# Patient Record
Sex: MALE | Race: WHITE | NOT HISPANIC OR LATINO | URBAN - METROPOLITAN AREA
[De-identification: names, ages, dates, MRNs, and addresses within clinical notes are randomized per-mention and may not be internally consistent; named-entity substitution may affect disease eponyms.]

---

## 2017-11-22 ENCOUNTER — EMERGENCY (EMERGENCY)
Facility: HOSPITAL | Age: 57
LOS: 1 days | Discharge: HOME | End: 2017-11-22
Attending: EMERGENCY MEDICINE | Admitting: EMERGENCY MEDICINE
Payer: COMMERCIAL

## 2017-11-22 VITALS
OXYGEN SATURATION: 98 % | WEIGHT: 220.02 LBS | DIASTOLIC BLOOD PRESSURE: 90 MMHG | HEIGHT: 69 IN | TEMPERATURE: 97 F | HEART RATE: 78 BPM | SYSTOLIC BLOOD PRESSURE: 142 MMHG | RESPIRATION RATE: 17 BRPM

## 2017-11-22 VITALS
TEMPERATURE: 98 F | SYSTOLIC BLOOD PRESSURE: 137 MMHG | OXYGEN SATURATION: 98 % | HEART RATE: 71 BPM | RESPIRATION RATE: 16 BRPM | DIASTOLIC BLOOD PRESSURE: 74 MMHG

## 2017-11-22 LAB
ALBUMIN SERPL ELPH-MCNC: 3.8 G/DL — SIGNIFICANT CHANGE UP (ref 3.3–5)
ALP SERPL-CCNC: 69 U/L — SIGNIFICANT CHANGE UP (ref 40–120)
ALT FLD-CCNC: 49 U/L — SIGNIFICANT CHANGE UP (ref 12–78)
ANION GAP SERPL CALC-SCNC: 8 MMOL/L — SIGNIFICANT CHANGE UP (ref 5–17)
APPEARANCE UR: ABNORMAL
AST SERPL-CCNC: 30 U/L — SIGNIFICANT CHANGE UP (ref 15–37)
BILIRUB SERPL-MCNC: 0.7 MG/DL — SIGNIFICANT CHANGE UP (ref 0.2–1.2)
BILIRUB UR-MCNC: NEGATIVE — SIGNIFICANT CHANGE UP
BUN SERPL-MCNC: 26 MG/DL — HIGH (ref 7–23)
CALCIUM SERPL-MCNC: 8.5 MG/DL — SIGNIFICANT CHANGE UP (ref 8.5–10.1)
CHLORIDE SERPL-SCNC: 105 MMOL/L — SIGNIFICANT CHANGE UP (ref 96–108)
CO2 SERPL-SCNC: 25 MMOL/L — SIGNIFICANT CHANGE UP (ref 22–31)
COLOR SPEC: SIGNIFICANT CHANGE UP
CREAT SERPL-MCNC: 1.1 MG/DL — SIGNIFICANT CHANGE UP (ref 0.5–1.3)
DIFF PNL FLD: NEGATIVE — SIGNIFICANT CHANGE UP
GLUCOSE SERPL-MCNC: 196 MG/DL — HIGH (ref 70–99)
GLUCOSE UR QL: 100 MG/DL
HCT VFR BLD CALC: 45.8 % — SIGNIFICANT CHANGE UP (ref 39–50)
HGB BLD-MCNC: 15.5 G/DL — SIGNIFICANT CHANGE UP (ref 13–17)
KETONES UR-MCNC: NEGATIVE — SIGNIFICANT CHANGE UP
LEUKOCYTE ESTERASE UR-ACNC: NEGATIVE — SIGNIFICANT CHANGE UP
MCHC RBC-ENTMCNC: 30.5 PG — SIGNIFICANT CHANGE UP (ref 27–34)
MCHC RBC-ENTMCNC: 33.8 GM/DL — SIGNIFICANT CHANGE UP (ref 32–36)
MCV RBC AUTO: 90.4 FL — SIGNIFICANT CHANGE UP (ref 80–100)
NITRITE UR-MCNC: NEGATIVE — SIGNIFICANT CHANGE UP
PH UR: 5 — SIGNIFICANT CHANGE UP (ref 5–8)
PLATELET # BLD AUTO: 216 K/UL — SIGNIFICANT CHANGE UP (ref 150–400)
POTASSIUM SERPL-MCNC: 4.2 MMOL/L — SIGNIFICANT CHANGE UP (ref 3.5–5.3)
POTASSIUM SERPL-SCNC: 4.2 MMOL/L — SIGNIFICANT CHANGE UP (ref 3.5–5.3)
PROT SERPL-MCNC: 7.1 G/DL — SIGNIFICANT CHANGE UP (ref 6–8.3)
PROT UR-MCNC: 25 MG/DL
RBC # BLD: 5.07 M/UL — SIGNIFICANT CHANGE UP (ref 4.2–5.8)
RBC # FLD: 11.4 % — SIGNIFICANT CHANGE UP (ref 10.3–14.5)
SODIUM SERPL-SCNC: 138 MMOL/L — SIGNIFICANT CHANGE UP (ref 135–145)
SP GR SPEC: 1.02 — SIGNIFICANT CHANGE UP (ref 1.01–1.02)
UROBILINOGEN FLD QL: NEGATIVE — SIGNIFICANT CHANGE UP
WBC # BLD: 5.8 K/UL — SIGNIFICANT CHANGE UP (ref 3.8–10.5)
WBC # FLD AUTO: 5.8 K/UL — SIGNIFICANT CHANGE UP (ref 3.8–10.5)

## 2017-11-22 PROCEDURE — 96376 TX/PRO/DX INJ SAME DRUG ADON: CPT

## 2017-11-22 PROCEDURE — 87086 URINE CULTURE/COLONY COUNT: CPT

## 2017-11-22 PROCEDURE — 74176 CT ABD & PELVIS W/O CONTRAST: CPT | Mod: 26

## 2017-11-22 PROCEDURE — 85027 COMPLETE CBC AUTOMATED: CPT

## 2017-11-22 PROCEDURE — 81001 URINALYSIS AUTO W/SCOPE: CPT

## 2017-11-22 PROCEDURE — 99284 EMERGENCY DEPT VISIT MOD MDM: CPT | Mod: 25

## 2017-11-22 PROCEDURE — 99285 EMERGENCY DEPT VISIT HI MDM: CPT

## 2017-11-22 PROCEDURE — 96374 THER/PROPH/DIAG INJ IV PUSH: CPT

## 2017-11-22 PROCEDURE — 96375 TX/PRO/DX INJ NEW DRUG ADDON: CPT

## 2017-11-22 PROCEDURE — 80053 COMPREHEN METABOLIC PANEL: CPT

## 2017-11-22 PROCEDURE — 74176 CT ABD & PELVIS W/O CONTRAST: CPT

## 2017-11-22 RX ORDER — KETOROLAC TROMETHAMINE 30 MG/ML
30 SYRINGE (ML) INJECTION ONCE
Qty: 0 | Refills: 0 | Status: DISCONTINUED | OUTPATIENT
Start: 2017-11-22 | End: 2017-11-22

## 2017-11-22 RX ORDER — SODIUM CHLORIDE 9 MG/ML
1000 INJECTION INTRAMUSCULAR; INTRAVENOUS; SUBCUTANEOUS ONCE
Qty: 0 | Refills: 0 | Status: COMPLETED | OUTPATIENT
Start: 2017-11-22 | End: 2017-11-22

## 2017-11-22 RX ORDER — ONDANSETRON 8 MG/1
4 TABLET, FILM COATED ORAL ONCE
Qty: 0 | Refills: 0 | Status: COMPLETED | OUTPATIENT
Start: 2017-11-22 | End: 2017-11-22

## 2017-11-22 RX ORDER — MORPHINE SULFATE 50 MG/1
4 CAPSULE, EXTENDED RELEASE ORAL ONCE
Qty: 0 | Refills: 0 | Status: DISCONTINUED | OUTPATIENT
Start: 2017-11-22 | End: 2017-11-22

## 2017-11-22 RX ORDER — TAMSULOSIN HYDROCHLORIDE 0.4 MG/1
0.4 CAPSULE ORAL ONCE
Qty: 0 | Refills: 0 | Status: COMPLETED | OUTPATIENT
Start: 2017-11-22 | End: 2017-11-22

## 2017-11-22 RX ADMIN — MORPHINE SULFATE 4 MILLIGRAM(S): 50 CAPSULE, EXTENDED RELEASE ORAL at 08:22

## 2017-11-22 RX ADMIN — SODIUM CHLORIDE 2000 MILLILITER(S): 9 INJECTION INTRAMUSCULAR; INTRAVENOUS; SUBCUTANEOUS at 08:22

## 2017-11-22 RX ADMIN — SODIUM CHLORIDE 2000 MILLILITER(S): 9 INJECTION INTRAMUSCULAR; INTRAVENOUS; SUBCUTANEOUS at 09:37

## 2017-11-22 RX ADMIN — TAMSULOSIN HYDROCHLORIDE 0.4 MILLIGRAM(S): 0.4 CAPSULE ORAL at 08:22

## 2017-11-22 RX ADMIN — MORPHINE SULFATE 4 MILLIGRAM(S): 50 CAPSULE, EXTENDED RELEASE ORAL at 09:37

## 2017-11-22 RX ADMIN — MORPHINE SULFATE 4 MILLIGRAM(S): 50 CAPSULE, EXTENDED RELEASE ORAL at 09:44

## 2017-11-22 RX ADMIN — Medication 30 MILLIGRAM(S): at 09:37

## 2017-11-22 RX ADMIN — Medication 30 MILLIGRAM(S): at 08:22

## 2017-11-22 RX ADMIN — ONDANSETRON 4 MILLIGRAM(S): 8 TABLET, FILM COATED ORAL at 08:22

## 2017-11-22 NOTE — ED ADULT NURSE NOTE - CHPI ED SYMPTOMS NEG
no blood in stool/no abdominal distension/no diarrhea/no chills/no dysuria/no fever/no burning urination/no hematuria

## 2017-11-22 NOTE — ED PROVIDER NOTE - OBJECTIVE STATEMENT
56 yo white male, PHx of renal colic and lithotripsy presents here today c/o few hours of right flank pain, non radiating, acute in onset with associated nausea but no fever or chills. In addition no dysuria/frequency/hematuria./ No c/p or SOB.

## 2017-11-22 NOTE — ED ADULT NURSE NOTE - PSH
C2 cervical fracture  3/2008  Hernia  in Infancy  Renal stone  removed -by ? blasting  Rotator cuff tear, left

## 2017-11-22 NOTE — ED ADULT NURSE NOTE - OBJECTIVE STATEMENT
pt reporting left flank pain since this am, stabbing with nausea. hx of kidney stone. pt denies pain on palpation, denies urinary complaints, fever, chills. abdomen soft, non tender, non distended.

## 2017-11-22 NOTE — ED PROVIDER NOTE - CONSTITUTIONAL, MLM
normal... Uncomfortable appearing, white male, well nourished, awake, alert, oriented to person, place, time/situation and in moderate apparent distress.

## 2017-11-22 NOTE — ED ADULT NURSE NOTE - PMH
Anxiety and depression    C2 cervical fracture  with fusion  Fall  at work 03/2008  HTN (hypertension)    Kidney stones    Panic attacks

## 2017-11-23 LAB
CULTURE RESULTS: NO GROWTH — SIGNIFICANT CHANGE UP
SPECIMEN SOURCE: SIGNIFICANT CHANGE UP

## 2019-02-14 NOTE — ED ADULT TRIAGE NOTE - WEIGHT IN LBS
Telephone Encounter by Martha Herman at 11/20/18 05:09 PM     Author:  Martha Herman Service:  (none) Author Type:  Patient      Filed:  11/20/18 05:11 PM Encounter Date:  11/20/2018 Status:  Signed     :  Martha Herman (Patient )            Prior Authorization started in cover my meds    Please sign-on to Cover My Meds and complete the Prior authorization.    Message sent to the Prior authorization pool - Please don't close this message.        Prior authorization is required for medication - Prior authorization started online at Cover My Meds.    Medication requested to be refilled:[MS1.1T]   duloxetine (CYMBALTA) 30 MG Cap[MS1.1C]  Qty:[MS1.1T]  270[MS1.1M]       Revision History        User Key Date/Time User Provider Type Action    > MS1.1 11/20/18 05:11 PM Martha Herman Patient  Sign    C - Copied, M - Manual, T - Template             220

## 2020-07-13 PROBLEM — N20.0 CALCULUS OF KIDNEY: Chronic | Status: ACTIVE | Noted: 2017-11-22

## 2020-08-03 ENCOUNTER — APPOINTMENT (OUTPATIENT)
Dept: CARDIOLOGY | Facility: CLINIC | Age: 60
End: 2020-08-03
Payer: MEDICAID

## 2020-08-03 ENCOUNTER — NON-APPOINTMENT (OUTPATIENT)
Age: 60
End: 2020-08-03

## 2020-08-03 ENCOUNTER — APPOINTMENT (OUTPATIENT)
Dept: CARDIOLOGY | Facility: CLINIC | Age: 60
End: 2020-08-03
Payer: COMMERCIAL

## 2020-08-03 ENCOUNTER — APPOINTMENT (OUTPATIENT)
Dept: INTERNAL MEDICINE | Facility: CLINIC | Age: 60
End: 2020-08-03
Payer: MEDICAID

## 2020-08-03 VITALS
HEIGHT: 68 IN | HEART RATE: 67 BPM | OXYGEN SATURATION: 98 % | TEMPERATURE: 98.1 F | WEIGHT: 218 LBS | DIASTOLIC BLOOD PRESSURE: 82 MMHG | SYSTOLIC BLOOD PRESSURE: 147 MMHG | BODY MASS INDEX: 33.04 KG/M2

## 2020-08-03 DIAGNOSIS — H83.3X3 NOISE EFFECTS ON INNER EAR, BILATERAL: ICD-10-CM

## 2020-08-03 DIAGNOSIS — S46.019A STRAIN OF MUSCLE(S) AND TENDON(S) OF THE ROTATOR CUFF OF UNSPECIFIED SHOULDER, INITIAL ENCOUNTER: ICD-10-CM

## 2020-08-03 DIAGNOSIS — Z87.81 PERSONAL HISTORY OF (HEALED) TRAUMATIC FRACTURE: ICD-10-CM

## 2020-08-03 DIAGNOSIS — Z78.9 OTHER SPECIFIED HEALTH STATUS: ICD-10-CM

## 2020-08-03 PROCEDURE — 36415 COLL VENOUS BLD VENIPUNCTURE: CPT

## 2020-08-03 PROCEDURE — 93000 ELECTROCARDIOGRAM COMPLETE: CPT

## 2020-08-03 PROCEDURE — 99203 OFFICE O/P NEW LOW 30 MIN: CPT

## 2020-08-03 PROCEDURE — 93306 TTE W/DOPPLER COMPLETE: CPT

## 2020-08-03 PROCEDURE — 99386 PREV VISIT NEW AGE 40-64: CPT | Mod: 25

## 2020-08-03 NOTE — HEALTH RISK ASSESSMENT
[Good] : ~his/her~ current health as good [Monthly or less (1 pt)] : Monthly or less (1 point) [Never (0 pts)] : Never (0 points) [1 or 2 (0 pts)] : 1 or 2 (0 points) [No] : In the past 12 months have you used drugs other than those required for medical reasons? No [0] : 2) Feeling down, depressed, or hopeless: Not at all (0) [HIV Test offered] : HIV Test offered [Hepatitis C test offered] : Hepatitis C test offered [Financial] : financial [Employed] : employed [Sexually Active] : sexually active [] : No [Audit-CScore] : 1 [[id number]] : 0 [High Risk Behavior] : no high risk behavior [ColonoscopyComments] : Will address next visit. [FreeTextEntry2] : Monico

## 2020-08-03 NOTE — REVIEW OF SYSTEMS
[Dyspnea on exertion] : not dyspnea during exertion [Shortness Of Breath] : no shortness of breath [Chest  Pressure] : no chest pressure [Chest Pain] : no chest pain [Palpitations] : no palpitations [Lower Ext Edema] : lower extremity edema [Cough] : no cough [Dysphagia] : no dysphagia [Heartburn] : no heartburn [Abdominal Pain] : no abdominal pain [Tremor] : no tremor was seen [Dizziness] : no dizziness [Nocturia] : nocturia [Convulsions] : no convulsions [Tingling (Paresthesia)] : no tingling [Excessive Thirst] : no polydipsia [Anxiety] : no anxiety [Confusion] : no confusion was observed [Negative] : Eyes

## 2020-08-03 NOTE — PHYSICAL EXAM
[No Acute Distress] : no acute distress [Well Developed] : well developed [Well Nourished] : well nourished [Well-Appearing] : well-appearing [Normal Sclera/Conjunctiva] : normal sclera/conjunctiva [PERRL] : pupils equal round and reactive to light [EOMI] : extraocular movements intact [Normal TMs] : both tympanic membranes were normal [Normal Outer Ear/Nose] : the outer ears and nose were normal in appearance [Normal Oropharynx] : the oropharynx was normal [No Lymphadenopathy] : no lymphadenopathy [No JVD] : no jugular venous distention [Supple] : supple [Thyroid Normal, No Nodules] : the thyroid was normal and there were no nodules present [No Respiratory Distress] : no respiratory distress  [No Accessory Muscle Use] : no accessory muscle use [Normal Rate] : normal rate  [Clear to Auscultation] : lungs were clear to auscultation bilaterally [Normal S1, S2] : normal S1 and S2 [Regular Rhythm] : with a regular rhythm [No Murmur] : no murmur heard [No Abdominal Bruit] : a ~M bruit was not heard ~T in the abdomen [No Varicosities] : no varicosities [No Edema] : there was no peripheral edema [No Palpable Aorta] : no palpable aorta [No Extremity Clubbing/Cyanosis] : no extremity clubbing/cyanosis [Non Tender] : non-tender [Soft] : abdomen soft [Non-distended] : non-distended [No Masses] : no abdominal mass palpated [No HSM] : no HSM [Normal Bowel Sounds] : normal bowel sounds [Normal Posterior Cervical Nodes] : no posterior cervical lymphadenopathy [Normal Anterior Cervical Nodes] : no anterior cervical lymphadenopathy [No CVA Tenderness] : no CVA  tenderness [No Spinal Tenderness] : no spinal tenderness [Grossly Normal Strength/Tone] : grossly normal strength/tone [No Joint Swelling] : no joint swelling [Coordination Grossly Intact] : coordination grossly intact [Normal Gait] : normal gait [No Focal Deficits] : no focal deficits [Normal Affect] : the affect was normal [Normal Insight/Judgement] : insight and judgment were intact [de-identified] : Multiple tophi. Right foot worst with bony destruction on several toes and medial and lateral aspects of the foot. No inflammation or erythematous areas. No severe tenderness. Tophus also noted on right knee.

## 2020-08-03 NOTE — DISCUSSION/SUMMARY
[FreeTextEntry1] : -TTE without wma but confirms LVH\par -f/u lipid panel. possible statin. we discussed possible medications for hyperTG - ideally pt would be on fenofibrate for concomitant gout, however pt reports gout is well controlled and is adverse to side effects. If this is the case, recommend Vascepa 2g BID anuj if pt were to require statin to decrease incidence of myalgias.\par -BP usually well controlled - cont ARB. increase physical activity to moderate intensity 150 min/weekly, dietary changes including less red meat, low fat, more fish and plant based protein, more fruits and vegetables.\par

## 2020-08-03 NOTE — PHYSICAL EXAM
[Normal Appearance] : normal appearance [General Appearance - In No Acute Distress] : no acute distress [Eyelids - No Xanthelasma] : the eyelids demonstrated no xanthelasmas [Normal Jugular Venous A Waves Present] : normal jugular venous A waves present [No Jugular Venous Calixto A Waves] : no jugular venous calixto A waves [Normal Jugular Venous V Waves Present] : normal jugular venous V waves present [Heart Sounds] : normal S1 and S2 [Heart Rate And Rhythm] : heart rate and rhythm were normal [Edema] : no peripheral edema present [Arterial Pulses Normal] : the arterial pulses were normal [Murmurs] : no murmurs present [FreeTextEntry1] : varicose veins of the LE [Abdomen Tenderness] : non-tender [Abdomen Soft] : soft [Cyanosis, Localized] : no localized cyanosis [Nail Clubbing] : no clubbing of the fingernails [Skin Color & Pigmentation] : normal skin color and pigmentation [Oriented To Time, Place, And Person] : oriented to person, place, and time [Impaired Insight] : insight and judgment were intact [Skin Turgor] : normal skin turgor [Mood] : the mood was normal [Affect] : the affect was normal

## 2020-08-03 NOTE — REVIEW OF SYSTEMS
[Incontinence] : incontinence [Nocturia] : nocturia [Frequency] : frequency [Joint Pain] : joint pain [Joint Stiffness] : joint stiffness [Back Pain] : back pain [Joint Swelling] : joint swelling [Depression] : depression [Negative] : Heme/Lymph [Hesitancy] : no hesitancy [Dysuria] : no dysuria [Impotence] : no impotency [Hematuria] : no hematuria [Poor Libido] : libido not poor [Muscle Pain] : no muscle pain [Muscle Weakness] : no muscle weakness [Insomnia] : no insomnia [Suicidal] : not suicidal [Anxiety] : no anxiety

## 2020-08-03 NOTE — REASON FOR VISIT
[FreeTextEntry1] : 59M HTN, HTG, DM2, tophaceous gout who presents for abnormal ECG and hyperTG\par \par Longstanding hyperTG with levels as high as 1000, though reports recently was 600s.\par No hx of pancreatitis. has diet full of red meat and animal fats.\par Does not drink etoh. 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 no WMA

## 2020-08-03 NOTE — PLAN
[FreeTextEntry1] : Routine blood work and urine today.  Additional blood work and referrals placed. Refills sent. Patient also requesting ibuprofen 600 mg for use when he has occasional pains. Patient should follow up with cardiology for abnormal ECG. Yearly ophtho and podiatry. Audiologist referral placed for hearing aids. Pt advised to sign up for [facility] portal to review labs and communicate any questions or concerns directly. Yearly physical and return as needed for illness, medication refills, and new or existing complaints. f/u 3 months.

## 2020-08-03 NOTE — HISTORY OF PRESENT ILLNESS
[de-identified] : Pt is a 59 year M with PMH type 2 diabetes, hypertension, gout with gouty arthritis and tophi, depression hypertriglyceridemia, and history of cervical fusion of the spine presents for his initial CPE. Patient was following with a doctor up until his insurance ran out and the doctor no longer accepted him as a patient. Patient is requesting refills of his medications. He is due for blood work. Patient notes a history of taking care of his own medical conditions, such as removing smaller tophi and skin tags. He also has a history of not taking medication due to cost and occasionally do to history of side effect. Pt denies CP/SOB, fever/chills, n/v/d/c.

## 2020-08-04 DIAGNOSIS — I51.9 HEART DISEASE, UNSPECIFIED: ICD-10-CM

## 2020-08-04 DIAGNOSIS — I51.7 CARDIOMEGALY: ICD-10-CM

## 2020-08-05 LAB
ALBUMIN SERPL ELPH-MCNC: 4.4 G/DL
ALP BLD-CCNC: 74 U/L
ALT SERPL-CCNC: 39 U/L
ANION GAP SERPL CALC-SCNC: 14 MMOL/L
APPEARANCE: CLEAR
AST SERPL-CCNC: 35 U/L
BACTERIA: NEGATIVE
BASOPHILS # BLD AUTO: 0.05 K/UL
BASOPHILS NFR BLD AUTO: 1.1 %
BILIRUB SERPL-MCNC: 0.4 MG/DL
BILIRUBIN URINE: NEGATIVE
BLOOD URINE: NEGATIVE
BUN SERPL-MCNC: 20 MG/DL
CALCIUM SERPL-MCNC: 9 MG/DL
CHLORIDE SERPL-SCNC: 102 MMOL/L
CHOLEST SERPL-MCNC: 157 MG/DL
CHOLEST/HDLC SERPL: 5.3 RATIO
CO2 SERPL-SCNC: 21 MMOL/L
COLOR: NORMAL
CREAT SERPL-MCNC: 1.29 MG/DL
CREAT SPEC-SCNC: 86 MG/DL
EOSINOPHIL # BLD AUTO: 0.1 K/UL
EOSINOPHIL NFR BLD AUTO: 2.2 %
ESTIMATED AVERAGE GLUCOSE: 226 MG/DL
GLUCOSE QUALITATIVE U: ABNORMAL
GLUCOSE SERPL-MCNC: 230 MG/DL
HBA1C MFR BLD HPLC: 9.5 %
HCT VFR BLD CALC: 46 %
HCV AB SER QL: NONREACTIVE
HCV S/CO RATIO: 0.06 S/CO
HDLC SERPL-MCNC: 30 MG/DL
HGB BLD-MCNC: 15.2 G/DL
HIV1+2 AB SPEC QL IA.RAPID: NONREACTIVE
HYALINE CASTS: 1 /LPF
IMM GRANULOCYTES NFR BLD AUTO: 0.2 %
KETONES URINE: NEGATIVE
LDLC SERPL CALC-MCNC: NORMAL MG/DL
LEUKOCYTE ESTERASE URINE: NEGATIVE
LYMPHOCYTES # BLD AUTO: 1.54 K/UL
LYMPHOCYTES NFR BLD AUTO: 34.1 %
MAN DIFF?: NORMAL
MCHC RBC-ENTMCNC: 30.6 PG
MCHC RBC-ENTMCNC: 33 GM/DL
MCV RBC AUTO: 92.6 FL
MICROALBUMIN 24H UR DL<=1MG/L-MCNC: 3.6 MG/DL
MICROALBUMIN/CREAT 24H UR-RTO: 42 MG/G
MICROSCOPIC-UA: NORMAL
MONOCYTES # BLD AUTO: 0.36 K/UL
MONOCYTES NFR BLD AUTO: 8 %
NEUTROPHILS # BLD AUTO: 2.45 K/UL
NEUTROPHILS NFR BLD AUTO: 54.4 %
NITRITE URINE: NEGATIVE
PH URINE: 5
PLATELET # BLD AUTO: 162 K/UL
POTASSIUM SERPL-SCNC: 5 MMOL/L
PROT SERPL-MCNC: 6.5 G/DL
PROTEIN URINE: NEGATIVE
PSA FREE FLD-MCNC: 41 %
PSA FREE SERPL-MCNC: 0.28 NG/ML
PSA SERPL-MCNC: 0.69 NG/ML
RBC # BLD: 4.97 M/UL
RBC # FLD: 13.2 %
RED BLOOD CELLS URINE: 1 /HPF
SODIUM SERPL-SCNC: 137 MMOL/L
SPECIFIC GRAVITY URINE: 1.03
SQUAMOUS EPITHELIAL CELLS: 0 /HPF
TRIGL SERPL-MCNC: 450 MG/DL
TSH SERPL-ACNC: 1.34 UIU/ML
URATE SERPL-MCNC: 6.7 MG/DL
UROBILINOGEN URINE: NORMAL
WBC # FLD AUTO: 4.51 K/UL
WHITE BLOOD CELLS URINE: 0 /HPF

## 2020-08-05 RX ORDER — METFORMIN HYDROCHLORIDE 500 MG/1
500 TABLET, COATED ORAL
Refills: 0 | Status: DISCONTINUED | COMMUNITY
End: 2020-08-05

## 2020-10-13 ENCOUNTER — APPOINTMENT (OUTPATIENT)
Dept: INTERNAL MEDICINE | Facility: CLINIC | Age: 60
End: 2020-10-13
Payer: MEDICAID

## 2020-10-13 VITALS — TEMPERATURE: 98.9 F

## 2020-10-13 PROCEDURE — 90471 IMMUNIZATION ADMIN: CPT

## 2020-10-13 PROCEDURE — 90686 IIV4 VACC NO PRSV 0.5 ML IM: CPT

## 2020-10-14 ENCOUNTER — APPOINTMENT (OUTPATIENT)
Dept: INTERNAL MEDICINE | Facility: CLINIC | Age: 60
End: 2020-10-14
Payer: MEDICAID

## 2020-10-14 VITALS
SYSTOLIC BLOOD PRESSURE: 162 MMHG | HEIGHT: 68 IN | DIASTOLIC BLOOD PRESSURE: 97 MMHG | BODY MASS INDEX: 31.83 KG/M2 | HEART RATE: 96 BPM | RESPIRATION RATE: 16 BRPM | OXYGEN SATURATION: 96 % | WEIGHT: 210 LBS | TEMPERATURE: 97.9 F

## 2020-10-14 PROCEDURE — 99213 OFFICE O/P EST LOW 20 MIN: CPT

## 2020-10-14 NOTE — PHYSICAL EXAM
[No Acute Distress] : no acute distress [Well Nourished] : well nourished [Well Developed] : well developed [No Respiratory Distress] : no respiratory distress  [Well-Appearing] : well-appearing [No Accessory Muscle Use] : no accessory muscle use [Coordination Grossly Intact] : coordination grossly intact [No Focal Deficits] : no focal deficits [Normal Gait] : normal gait [Normal Insight/Judgement] : insight and judgment were intact [de-identified] : Anxious

## 2020-10-14 NOTE — HISTORY OF PRESENT ILLNESS
[de-identified] : Pt is a 59 year M with PMH type 2 diabetes, hypertension, gout with gouty arthritis and tophi, depression hypertriglyceridemia, and history of cervical fusion of the spine presenting for medication refill for Adderall. Pt was having refills sent by his PCP in the past and does not follow with psychiatry. Pt denies CP/SOB, fever/chills, n/v/d/c.

## 2020-10-14 NOTE — PLAN
[FreeTextEntry1] : I-STOP checked. Refill meds today. f/u 1 month for chronic conditions and refills. Yearly physical and return as needed for illness, medication refills, and new or existing complaints.

## 2020-11-02 ENCOUNTER — NON-APPOINTMENT (OUTPATIENT)
Age: 60
End: 2020-11-02

## 2020-11-02 ENCOUNTER — APPOINTMENT (OUTPATIENT)
Dept: INTERNAL MEDICINE | Facility: CLINIC | Age: 60
End: 2020-11-02
Payer: MEDICAID

## 2020-11-02 VITALS
BODY MASS INDEX: 31.52 KG/M2 | HEART RATE: 72 BPM | OXYGEN SATURATION: 99 % | HEIGHT: 68 IN | SYSTOLIC BLOOD PRESSURE: 154 MMHG | WEIGHT: 208 LBS | DIASTOLIC BLOOD PRESSURE: 76 MMHG | TEMPERATURE: 97.8 F | RESPIRATION RATE: 16 BRPM

## 2020-11-02 DIAGNOSIS — R94.4 ABNORMAL RESULTS OF KIDNEY FUNCTION STUDIES: ICD-10-CM

## 2020-11-02 PROCEDURE — 36415 COLL VENOUS BLD VENIPUNCTURE: CPT

## 2020-11-02 PROCEDURE — 99214 OFFICE O/P EST MOD 30 MIN: CPT | Mod: 25

## 2020-11-02 PROCEDURE — 99072 ADDL SUPL MATRL&STAF TM PHE: CPT

## 2020-11-02 RX ORDER — AMLODIPINE BESYLATE 10 MG/1
10 TABLET ORAL DAILY
Qty: 30 | Refills: 0 | Status: DISCONTINUED | COMMUNITY
Start: 2020-10-22 | End: 2020-11-02

## 2020-11-02 NOTE — PHYSICAL EXAM
[No Acute Distress] : no acute distress [Well Nourished] : well nourished [Well Developed] : well developed [Well-Appearing] : well-appearing [No Respiratory Distress] : no respiratory distress  [No Accessory Muscle Use] : no accessory muscle use [Coordination Grossly Intact] : coordination grossly intact [No Focal Deficits] : no focal deficits [Normal Gait] : normal gait [Normal Affect] : the affect was normal [Normal Insight/Judgement] : insight and judgment were intact [de-identified] : excoriations on arms

## 2020-11-02 NOTE — PLAN
[FreeTextEntry1] : Refill meds. Replace Candesartan with Losartan for coverage purposes. Atorvastatin for DM and triglycerides. Refill Vesicare and new Rx for Adderall sent after I-STOP checked. f/u 2 months for refills. Pt advised to sign up for [facility] portal to review labs and communicate any questions or concerns directly. Yearly physical and return as needed for illness, medication refills, and new or existing complaints.

## 2020-11-02 NOTE — HISTORY OF PRESENT ILLNESS
[de-identified] : Pt is a 59 year M with PMH type 2 diabetes, hypertension, gout with gouty arthritis and tophi, depression hypertriglyceridemia, and history of cervical fusion of the spine presenting for follow up blood work. He is due for refills. Pt has had issues with his medications in the past, resulting in BP elevated and non-treatment of certain conditions, like his hypertriglyceridemia. Pt did not fast today. Pt denies CP/SOB, fever/chills, n/v/d/c.

## 2020-11-03 ENCOUNTER — NON-APPOINTMENT (OUTPATIENT)
Age: 60
End: 2020-11-03

## 2020-11-03 LAB
ANION GAP SERPL CALC-SCNC: 14 MMOL/L
BUN SERPL-MCNC: 20 MG/DL
CALCIUM SERPL-MCNC: 9.1 MG/DL
CHLORIDE SERPL-SCNC: 100 MMOL/L
CHOLEST SERPL-MCNC: 179 MG/DL
CO2 SERPL-SCNC: 23 MMOL/L
CREAT SERPL-MCNC: 1.09 MG/DL
ESTIMATED AVERAGE GLUCOSE: 171 MG/DL
GLUCOSE SERPL-MCNC: 242 MG/DL
HBA1C MFR BLD HPLC: 7.6 %
HDLC SERPL-MCNC: 25 MG/DL
LDLC SERPL CALC-MCNC: NORMAL MG/DL
NONHDLC SERPL-MCNC: 154 MG/DL
POTASSIUM SERPL-SCNC: 4.6 MMOL/L
SODIUM SERPL-SCNC: 137 MMOL/L
TRIGL SERPL-MCNC: 752 MG/DL

## 2020-11-11 ENCOUNTER — APPOINTMENT (OUTPATIENT)
Dept: INTERNAL MEDICINE | Facility: CLINIC | Age: 60
End: 2020-11-11

## 2021-02-03 ENCOUNTER — APPOINTMENT (OUTPATIENT)
Dept: INTERNAL MEDICINE | Facility: CLINIC | Age: 61
End: 2021-02-03
Payer: MEDICAID

## 2021-02-03 ENCOUNTER — RX CHANGE (OUTPATIENT)
Age: 61
End: 2021-02-03

## 2021-02-03 VITALS
OXYGEN SATURATION: 96 % | TEMPERATURE: 97 F | SYSTOLIC BLOOD PRESSURE: 143 MMHG | HEART RATE: 86 BPM | DIASTOLIC BLOOD PRESSURE: 82 MMHG | BODY MASS INDEX: 33.8 KG/M2 | HEIGHT: 68 IN | RESPIRATION RATE: 16 BRPM | WEIGHT: 223 LBS

## 2021-02-03 DIAGNOSIS — N32.81 OVERACTIVE BLADDER: ICD-10-CM

## 2021-02-03 DIAGNOSIS — L81.9 DISORDER OF PIGMENTATION, UNSPECIFIED: ICD-10-CM

## 2021-02-03 DIAGNOSIS — R10.9 UNSPECIFIED ABDOMINAL PAIN: ICD-10-CM

## 2021-02-03 PROCEDURE — 99214 OFFICE O/P EST MOD 30 MIN: CPT

## 2021-02-03 PROCEDURE — 99072 ADDL SUPL MATRL&STAF TM PHE: CPT

## 2021-02-03 RX ORDER — ICOSAPENT ETHYL 1 G/1
1 CAPSULE ORAL TWICE DAILY
Qty: 120 | Refills: 2 | Status: DISCONTINUED | COMMUNITY
Start: 2020-08-05 | End: 2021-02-03

## 2021-02-03 RX ORDER — METFORMIN ER 500 MG 500 MG/1
500 TABLET ORAL TWICE DAILY
Qty: 360 | Refills: 0 | Status: DISCONTINUED | COMMUNITY
Start: 2020-08-05 | End: 2021-02-03

## 2021-02-03 RX ORDER — ATORVASTATIN CALCIUM 20 MG/1
20 TABLET, FILM COATED ORAL
Qty: 90 | Refills: 1 | Status: DISCONTINUED | COMMUNITY
Start: 2020-11-02 | End: 2021-02-03

## 2021-02-03 RX ORDER — SOLIFENACIN SUCCINATE 10 MG/1
10 TABLET ORAL
Qty: 90 | Refills: 0 | Status: DISCONTINUED | COMMUNITY
Start: 2021-02-03 | End: 2021-02-03

## 2021-02-03 NOTE — REVIEW OF SYSTEMS
[Nausea] : no nausea [Constipation] : no constipation [Diarrhea] : no diarrhea [Vomiting] : no vomiting [Heartburn] : no heartburn [Melena] : no melena [Dysuria] : no dysuria [Incontinence] : no incontinence [Hesitancy] : hesitancy [Nocturia] : nocturia [Hematuria] : no hematuria [Frequency] : frequency [Impotence] : impotence [Poor Libido] : libido not poor [Negative] : Heme/Lymph [FreeTextEntry7] : discomfort/heaviness

## 2021-02-03 NOTE — CURRENT MEDS
[Takes medication as prescribed] : does not take [Side Effects] :  side effects [Yes] : Reviewed medication list for presence of high-risk medications.

## 2021-02-03 NOTE — HISTORY OF PRESENT ILLNESS
[de-identified] : Pt is a 60 year M with PMH type 2 diabetes, hypertension, gout with gouty arthritis and tophi, depression hypertriglyceridemia, and history of cervical fusion of the spine presenting for follow up. He does not want to do blood work today but requests medication refills for all his meds. He wants to f/u in 1 month for the blood work. He has 2 complaints, one relating to urination and one related to stomach discomfort associated with straining his back. Pt denies CP/SOB, fever/chills, n/v/d/c.

## 2021-02-03 NOTE — PLAN
[FreeTextEntry1] : Blood work due, deferred 1 month. Renew meds. Increase metformin. Pt advised to sign up for [facility] portal to review labs and communicate any questions or concerns directly. Yearly physical and return as needed for illness, medication refills, and new or existing complaints. f/u 1 month.

## 2021-02-03 NOTE — PHYSICAL EXAM
[No Acute Distress] : no acute distress [Well Nourished] : well nourished [Well Developed] : well developed [Well-Appearing] : well-appearing [Normal Sclera/Conjunctiva] : normal sclera/conjunctiva [Normal Outer Ear/Nose] : the outer ears and nose were normal in appearance [No Respiratory Distress] : no respiratory distress  [No Accessory Muscle Use] : no accessory muscle use [Clear to Auscultation] : lungs were clear to auscultation bilaterally [Normal Rate] : normal rate  [Regular Rhythm] : with a regular rhythm [Normal S1, S2] : normal S1 and S2 [No Murmur] : no murmur heard [No Edema] : there was no peripheral edema [No Extremity Clubbing/Cyanosis] : no extremity clubbing/cyanosis [No Joint Swelling] : no joint swelling [Grossly Normal Strength/Tone] : grossly normal strength/tone [Coordination Grossly Intact] : coordination grossly intact [No Focal Deficits] : no focal deficits [Normal Gait] : normal gait [Normal Affect] : the affect was normal [Normal Insight/Judgement] : insight and judgment were intact [de-identified] : erythema on the arms, areas of hyperpigmentation, multiple cuts/scrapes

## 2021-02-04 ENCOUNTER — RX CHANGE (OUTPATIENT)
Age: 61
End: 2021-02-04

## 2021-02-09 ENCOUNTER — RX CHANGE (OUTPATIENT)
Age: 61
End: 2021-02-09

## 2021-03-01 ENCOUNTER — RX RENEWAL (OUTPATIENT)
Age: 61
End: 2021-03-01

## 2021-03-22 NOTE — ED ADULT NURSE NOTE - ABDOMEN
Wound Care Center Progress Note With Procedure    Gabino Tavares  AGE: 79 y.o. GENDER: male  : 1953  EPISODE DATE:  3/22/2021     Subjective:     Chief Complaint   Patient presents with    Wound Check         HISTORY of PRESENT ILLNESS     Boyd Rosado a 74 y. o. male who presents today for wound evaluation of Acute non-healing surgical and necrotizing fascitis ulcer(s) of the rectal and perirectal area.  The ulcer is of moderate severity. The underlying cause of the wound is nonhealing surgical post rectal and perirectal incision and drain related to necrotizing fascitis performed by Dr. Christy Hoffman 2020.   Wound Pain Timing/Severity: waxing and waning, moderate  Quality of pain: aching, tender  Severity of pain:  1 / 10   Modifying Factors: venous stasis, lymphedema, diabetes, poor glucose control, obesity, smoking and anticoagulation therapy  Associated Signs/Symptoms: edema, erythema, drainage and pain    PAST MEDICAL HISTORY        Diagnosis Date    CHF (congestive heart failure) (HCC)     Chronic ulcer of left leg with fat layer exposed ([address] 2016    Chronic ulcer of right leg with fat layer exposed ([address] 2016    Chronic ulcer of right leg with fat layer exposed ([address] 2016    Diabetes mellitus ([address]     Hypertension     PVD (peripheral vascular disease) ([address] 2016    Type 2 diabetes mellitus with diabetic peripheral angiopathy without gangrene, without long-term current use of insulin ([address] 2016    Type 2 diabetes mellitus with diabetic peripheral angiopathy without gangrene, without long-term current use of insulin ([address] 2016    Venous stasis ulcer of both lower extremities without varicose veins ([address] 2016    WD-Traumatic open wound of left lower leg, complicated by diabetes and venous insufficiency 10/8/2020    WD-Ulcer of shin, left, with fat layer exposed ([address] 2016       PAST SURGICAL HISTORY    Past Surgical History: Procedure Laterality Date    RECTAL SURGERY N/A 11/24/2020    RECTAL PERIRECTAL INCISION AND DRAINAGE performed by Pastora Joyner MD at [facility] History   Problem Relation Age of Onset    Asthma Mother     Breast Cancer Mother     Cancer Mother     Diabetes Mother     High Blood Pressure Mother     Cancer Father     Early Death Brother     Arthritis Paternal Grandmother        SOCIAL HISTORY    Social History     Tobacco Use    Smoking status: Current Every Day Smoker     Packs/day: 1.00     Years: 60.00     Pack years: 60.00     Types: Cigarettes    Smokeless tobacco: Never Used    Tobacco comment: healing    Substance Use Topics    Alcohol use: Never     Frequency: Never    Drug use: No       ALLERGIES    No Known Allergies    MEDICATIONS    Current Outpatient Medications on File Prior to Encounter   Medication Sig Dispense Refill    magnesium oxide (MAG-OX) 400 MG tablet Take 1 tablet by mouth daily 30 tablet 1    rivaroxaban (XARELTO) 15 MG TABS tablet Take 1 tablet by mouth daily 42 tablet 1    metoprolol succinate (TOPROL XL) 25 MG extended release tablet Take 1 tablet by mouth 2 times daily 30 tablet 3    amLODIPine (NORVASC) 10 MG tablet Take 1 tablet by mouth nightly 30 tablet 3    Latanoprost 0.005 % EMUL Apply to eye daily      ibuprofen (ADVIL;MOTRIN) 600 MG tablet Take 1 tablet by mouth every 6 hours as needed for Pain 30 tablet 0    glipiZIDE (GLUCOTROL) 5 MG tablet Take 5 mg by mouth 2 times daily (before meals)      potassium chloride (KLOR-CON M) 20 MEQ extended release tablet Take 20 mEq by mouth daily      ALPRAZolam (XANAX) 0.5 MG tablet Take 0.5 mg by mouth nightly. Bernarda Gonzalez ipratropium-albuterol (DUONEB) 0.5-2.5 (3) MG/3ML SOLN nebulizer solution Inhale 1 vial into the lungs every 4 hours      albuterol sulfate  (90 Base) MCG/ACT inhaler Inhale 2 puffs into the lungs every 6 hours as needed for Wheezing      aspirin EC 81 MG EC tablet Take 1 tablet by mouth daily 30 tablet 3    HYDROcodone-acetaminophen (NORCO) 7.5-325 MG per tablet Take 1 tablet by mouth 3 times daily .  metFORMIN (GLUCOPHAGE) 1000 MG tablet Take 1,000 mg by mouth 2 times daily (with meals)      lisinopril (PRINIVIL;ZESTRIL) 20 MG tablet Take 40 mg by mouth daily       tamsulosin (FLOMAX) 0.4 MG capsule Take 0.4 mg by mouth daily      finasteride (PROSCAR) 5 MG tablet Take 5 mg by mouth daily      amiodarone (CORDARONE) 200 MG tablet Take 1 tablet by mouth daily 30 tablet 0     No current facility-administered medications on file prior to encounter. REVIEW OF SYSTEMS    Pertinent items are noted in HPI. Constitutional: Negative for systemic symptoms including fever, chills and malaise. Objective:      BP (!) 136/55   Pulse 59   Temp 97.3 °F (36.3 °C) (Temporal)   Resp 18     PHYSICAL EXAM    General: The patient is in no acute distress. Mental status:  Patient is appropriate, is  oriented to place and plan of care.   Dermatologic exam: Visual inspection of the periwound reveals the skin to be normal in turgor and texture  Wound exam: see wound description below in procedure note      Assessment:     Problem List Items Addressed This Visit     WD-Venous stasis of both lower extremities    Relevant Medications    lidocaine (XYLOCAINE) 4 % external solution    gentamicin (GARAMYCIN) 0.1 % ointment    WD-Lymphedema of both lower extremities    Relevant Medications    lidocaine (XYLOCAINE) 4 % external solution    gentamicin (GARAMYCIN) 0.1 % ointment    WD-Traumatic open wound of left lower leg, complicated by diabetes and venous insufficiency - Primary    Relevant Orders    Supply: Wound Cleanser    Supply: Wound Dressings    Supply: Cover and Secure    HBO-Nonhealing surgical wound groin & buttock, initial encounter    Relevant Medications    lidocaine (XYLOCAINE) 4 % external solution    gentamicin (GARAMYCIN) 0.1 % ointment    HBO-Necrotizing fasciitis (HCC)    Relevant Medications    lidocaine (XYLOCAINE) 4 % external solution    gentamicin (GARAMYCIN) 0.1 % ointment        Procedure Note    Indications:  Based on my examination of this patient's wound(s) today, sharp excision into necrotic subcutaneous tissue is required to promote healing and evaluate the extent of previous healing. Performed by: FAITH Dalton CNP    Consent obtained: Yes    Time out taken:  Yes    Pain Control: Anesthetic  Anesthetic: 4% Lidocaine Liquid Topical     Debridement:Excisional Debridement    Using curette the wound(s) was/were sharply debrided down through and including the removal of subcutaneous tissue. Devitalized Tissue Debrided:  slough and exudate    Pre Debridement Measurements:  Are located in the Wound Documentation Flow Sheet    All active wounds listed below with today's date are evaluated  Wound(s)    debrided this date include # : 2     Post  Debridement Measurements:  Wound 11/25/20 Scrotum #2 Perineum (Active)   Wound Image   03/15/21 1029   Wound Etiology Surgical 03/22/21 1056   Dressing Status New dressing applied;Clean;Dry; Intact 03/15/21 1147   Wound Cleansed Soap and water 03/22/21 1056   Dressing/Treatment ABD; Alginate with Ag;Moisture barrier;Collagen 01/18/21 1213   Offloading for Diabetic Foot Ulcers Other (comment) 03/22/21 1056   Wound Length (cm) 12 cm 03/22/21 1056   Wound Width (cm) 1 cm 03/22/21 1056   Wound Depth (cm) 0.1 cm 03/22/21 1056   Wound Surface Area (cm^2) 12 cm^2 03/22/21 1056   Change in Wound Size % (l*w) 90.4 03/22/21 1056   Wound Volume (cm^3) 1.2 cm^3 03/22/21 1056   Wound Healing % 100 03/22/21 1056   Post-Procedure Length (cm) 12 cm 03/22/21 1114   Post-Procedure Width (cm) 1 cm 03/22/21 1114   Post-Procedure Depth (cm) 0.1 cm 03/22/21 1114   Post-Procedure Surface Area (cm^2) 12 cm^2 03/22/21 1114   Post-Procedure Volume (cm^3) 1.2 cm^3 03/22/21 1114   Distance Tunneling (cm) 0 cm 03/22/21 1056 Tunneling Position ___ O'Clock 0 03/22/21 1056   Undermining Starts ___ O'Clock 0 03/22/21 1056   Undermining Ends___ O'Clock 0 03/22/21 1056   Undermining Maxium Distance (cm) 0 03/22/21 1056   Wound Assessment Granulation tissue 03/22/21 1056   Drainage Amount Moderate 03/22/21 1056   Drainage Description Serosanguinous 03/22/21 1056   Odor None 03/22/21 1056   Merary-wound Assessment Intact 03/22/21 1056   Margins Defined edges 03/22/21 1056   Wound Thickness Description not for Pressure Injury Full thickness 03/22/21 1056   Number of days: 117       Percent of Wound(s) Debrided: approximately 100%    Total  Area  Debrided:  12 sq cm     Bleeding:  Minimal    Hemostasis Achieved:  by pressure    Procedural Pain:  0  / 10     Post Procedural Pain:  0 / 10     Response to treatment:  Well tolerated by patient. Status of wound progress and description from last visit: Improving, continue regimen. Hyperbaric oxygen therapy is completed. Hyperbaric oxygen therapy greatly impacted the speed of healing. Plan:       Discharge Instructions       PHYSICIAN ORDERS AND DISCHARGE INSTRUCTIONS     NOTE: Upon discharge from the [address], you will receive a patient experience survey. We would be grateful if you would take the time to fill this survey out.     Wound care order history:                 ANDREW's   Right       Left               Date:              Cultures: 1/15/21               Grafts:                Antibiotics:           Continuing wound care orders and information:                 Residence:  Home              Continue home health care with: [facility]              Your wound-care supplies will be provided by:      Wound cleansing:               MQ not scrub or use excessive force.              Wash hands with soap and water before and after dressing changes.               RSKBS to applying a clean dressing, cleanse wound with normal saline, wound cleanser, or mild soap and water.               Ask the physician or nurse before getting the wound(s) wet in a shower     Daily Wound management:              Keep weight off wounds and reposition every 2 hours.              Avoid standing for long periods of time.              Apply wraps/stockings in AM and remove at bedtime.              If swelling is present, elevate legs to the level of the heart or above for 30 minutes 4-5 times a day and/or when sitting.                                      When taking antibiotics take entire prescription as ordered by physician do not stop taking until medicine is all gone.                              Orders for this week:  3/22/21     Left Lower leg-- Healed         Perineum -  Wash with hibicleanse and water. Rinse with saline. Pat dry with 4x4. Apply Gentamycin and Stimulin powder to base of wound   Apply desitin to periwound. Lotrisone to groin. Cover with Ag+ alginate, and ABD pad and secure with paper tape. [place] to Change Daily and with bowel movements.      Follow up with Dahlia HENDERSON at the wound care center in 1 week on Monday   Call [phone] for any questions or concerns.   Date__________   Time________        Treatment Note Wound 11/25/20 Scrotum #2 Perineum-Dressing/Treatment: (Stimulin, gentamicin, desitin to rupert, ag ca alg, abd, tape)    Written Patient Dismissal Instructions Given            Electronically signed by FAITH Dalton CNP on 3/22/2021 at 12:19 PM nondistended/soft/nontender

## 2021-05-03 ENCOUNTER — RX RENEWAL (OUTPATIENT)
Age: 61
End: 2021-05-03

## 2021-05-04 ENCOUNTER — RX RENEWAL (OUTPATIENT)
Age: 61
End: 2021-05-04

## 2021-06-29 ENCOUNTER — RX RENEWAL (OUTPATIENT)
Age: 61
End: 2021-06-29

## 2021-07-30 ENCOUNTER — RX RENEWAL (OUTPATIENT)
Age: 61
End: 2021-07-30

## 2021-08-03 ENCOUNTER — EMERGENCY (EMERGENCY)
Facility: HOSPITAL | Age: 61
LOS: 1 days | Discharge: HOME | End: 2021-08-03
Attending: STUDENT IN AN ORGANIZED HEALTH CARE EDUCATION/TRAINING PROGRAM | Admitting: STUDENT IN AN ORGANIZED HEALTH CARE EDUCATION/TRAINING PROGRAM
Payer: MEDICAID

## 2021-08-03 VITALS
DIASTOLIC BLOOD PRESSURE: 107 MMHG | WEIGHT: 214.95 LBS | RESPIRATION RATE: 91 BRPM | HEIGHT: 69 IN | SYSTOLIC BLOOD PRESSURE: 161 MMHG | TEMPERATURE: 98 F | OXYGEN SATURATION: 98 %

## 2021-08-03 VITALS
RESPIRATION RATE: 18 BRPM | HEART RATE: 77 BPM | TEMPERATURE: 98 F | DIASTOLIC BLOOD PRESSURE: 99 MMHG | OXYGEN SATURATION: 97 % | SYSTOLIC BLOOD PRESSURE: 161 MMHG

## 2021-08-03 PROCEDURE — 12001 RPR S/N/AX/GEN/TRNK 2.5CM/<: CPT

## 2021-08-03 PROCEDURE — 99283 EMERGENCY DEPT VISIT LOW MDM: CPT | Mod: 25

## 2021-08-03 PROCEDURE — 73630 X-RAY EXAM OF FOOT: CPT | Mod: 26,RT

## 2021-08-03 PROCEDURE — 73630 X-RAY EXAM OF FOOT: CPT

## 2021-08-03 PROCEDURE — 12041 INTMD RPR N-HF/GENIT 2.5CM/<: CPT

## 2021-08-03 RX ADMIN — Medication 1 TABLET(S): at 20:46

## 2021-08-03 NOTE — ED PROVIDER NOTE - CARE PROVIDER_API CALL
Chung Beltran (DPM)  Podiatric Medicine and Surgery  [address] [place] [address]  Phone: [phone]  Fax: [phone]  Follow Up Time: 1-3 Days

## 2021-08-03 NOTE — ED ADULT NURSE NOTE - OBJECTIVE STATEMENT
Patient with history of HTN presents ambulatory to the unit with c/o   No fevers, chills or purulent drainage.  UTD with tetanus. Patient with history of HTN presents ambulatory to the unit with c/o wound to lateral R foot after using a knife to "cut out callous".  No fevers, chills or purulent drainage.  UTD with tetanus.  Patient is able to ambulate with steady gait.

## 2021-08-03 NOTE — ED ADULT NURSE NOTE - SUICIDE SCREENING QUESTION 1
Avoidance of allergies discussed.  Use masks when performing yardwork or cleaning activities if indicated.  Continue allergy medications as discussed.    Air purifier as needed.  If on nasal sprays, recommend to use daily as indicated.   Medical vs surgical options discussed.    Immunocap allergy testing    Resume meds   No

## 2021-08-03 NOTE — ED PROVIDER NOTE - OBJECTIVE STATEMENT
60 y male cut his right lateral foot with a knife, states a growth on his lateral right foot was causing him pain.  hx of diabetes, utd on tetanus. 60 y male cut his right lateral foot with a knife, states a growth on his lateral right foot was causing him pain.  hx of diabetes, utd on tetanus. no active bleeding.    PMH:  Anxiety and depression    C2 cervical fracture  with fusion  Fall  at work 03/2008  HTN (hypertension)    Kidney stones    Panic attacks

## 2021-08-03 NOTE — ED PROVIDER NOTE - NSFOLLOWUPINSTRUCTIONS_ED_ALL_ED_FT
follow up with wound care center at [facility]  call tomorrow to arrange follow up  any fever, worsening symptoms or concerns return to ED

## 2021-08-03 NOTE — ED PROVIDER NOTE - ATTENDING CONTRIBUTION TO CARE
60 M here with right foot bleeding after he tried to cut off a callus. On exam, patient well appearing, avulsion wound to right lateral foot. will do local wound care, update Td as needed, prophylax with antibiotics.

## 2021-08-03 NOTE — ED PROVIDER NOTE - PATIENT PORTAL LINK FT
You can access the FollowMyHealth Patient Portal offered by [facility] by registering at the following website: [email or web address]. By joining [facility]’s FollowMyHealth portal, you will also be able to view your health information using other applications (apps) compatible with our system.

## 2021-08-03 NOTE — ED PROVIDER NOTE - CLINICAL SUMMARY MEDICAL DECISION MAKING FREE TEXT BOX
right lateral foot laceration, self sustained related to pain of a foot lesion, will obtain xray, wound care with normal saline, apply xeroform dressing, with dsd, rx abx, patient to follow up with wound care center at [facility]

## 2021-08-17 ENCOUNTER — APPOINTMENT (OUTPATIENT)
Dept: WOUND CARE | Facility: HOSPITAL | Age: 61
End: 2021-08-17
Payer: MEDICAID

## 2021-08-17 ENCOUNTER — OUTPATIENT (OUTPATIENT)
Dept: OUTPATIENT SERVICES | Facility: HOSPITAL | Age: 61
LOS: 1 days | Discharge: HOME | End: 2021-08-17
Payer: MEDICAID

## 2021-08-17 VITALS
HEART RATE: 91 BPM | TEMPERATURE: 98.5 F | BODY MASS INDEX: 31.4 KG/M2 | WEIGHT: 212 LBS | OXYGEN SATURATION: 96 % | HEIGHT: 69 IN | DIASTOLIC BLOOD PRESSURE: 97 MMHG | RESPIRATION RATE: 20 BRPM | SYSTOLIC BLOOD PRESSURE: 154 MMHG

## 2021-08-17 DIAGNOSIS — S91.309A UNSPECIFIED OPEN WOUND, UNSPECIFIED FOOT, INITIAL ENCOUNTER: ICD-10-CM

## 2021-08-17 DIAGNOSIS — L97.413 NON-PRESSURE CHRONIC ULCER OF RIGHT HEEL AND MIDFOOT WITH NECROSIS OF MUSCLE: ICD-10-CM

## 2021-08-17 PROCEDURE — G0463: CPT

## 2021-08-17 PROCEDURE — 87077 CULTURE AEROBIC IDENTIFY: CPT

## 2021-08-17 PROCEDURE — 87186 SC STD MICRODIL/AGAR DIL: CPT

## 2021-08-17 PROCEDURE — 87070 CULTURE OTHR SPECIMN AEROBIC: CPT

## 2021-08-17 PROCEDURE — 99203 OFFICE O/P NEW LOW 30 MIN: CPT

## 2021-08-17 PROCEDURE — 87184 SC STD DISK METHOD PER PLATE: CPT

## 2021-08-17 RX ORDER — OXYBUTYNIN CHLORIDE 10 MG/1
10 TABLET, EXTENDED RELEASE ORAL DAILY
Qty: 90 | Refills: 0 | Status: DISCONTINUED | COMMUNITY
Start: 2021-02-03 | End: 2021-08-17

## 2021-08-17 RX ORDER — ICOSAPENT ETHYL 1000 MG/1
1 CAPSULE ORAL
Qty: 120 | Refills: 2 | Status: DISCONTINUED | COMMUNITY
Start: 2021-02-03 | End: 2021-08-17

## 2021-08-17 RX ORDER — TADALAFIL 20 MG/1
20 TABLET ORAL
Qty: 30 | Refills: 2 | Status: DISCONTINUED | COMMUNITY
End: 2021-08-17

## 2021-08-18 DIAGNOSIS — N32.81 OVERACTIVE BLADDER: ICD-10-CM

## 2021-08-18 DIAGNOSIS — Z87.81 PERSONAL HISTORY OF (HEALED) TRAUMATIC FRACTURE: ICD-10-CM

## 2021-08-18 DIAGNOSIS — Z79.899 OTHER LONG TERM (CURRENT) DRUG THERAPY: ICD-10-CM

## 2021-08-18 DIAGNOSIS — Z79.84 LONG TERM (CURRENT) USE OF ORAL HYPOGLYCEMIC DRUGS: ICD-10-CM

## 2021-08-18 DIAGNOSIS — Z98.890 OTHER SPECIFIED POSTPROCEDURAL STATES: ICD-10-CM

## 2021-08-18 DIAGNOSIS — E11.621 TYPE 2 DIABETES MELLITUS WITH FOOT ULCER: ICD-10-CM

## 2021-08-18 DIAGNOSIS — L97.412 NON-PRESSURE CHRONIC ULCER OF RIGHT HEEL AND MIDFOOT WITH FAT LAYER EXPOSED: ICD-10-CM

## 2021-08-18 DIAGNOSIS — N40.1 BENIGN PROSTATIC HYPERPLASIA WITH LOWER URINARY TRACT SYMPTOMS: ICD-10-CM

## 2021-08-18 DIAGNOSIS — M10.071 IDIOPATHIC GOUT, RIGHT ANKLE AND FOOT: ICD-10-CM

## 2021-08-18 DIAGNOSIS — R35.1 NOCTURIA: ICD-10-CM

## 2021-08-18 DIAGNOSIS — Z87.442 PERSONAL HISTORY OF URINARY CALCULI: ICD-10-CM

## 2021-08-18 DIAGNOSIS — I10 ESSENTIAL (PRIMARY) HYPERTENSION: ICD-10-CM

## 2021-08-18 DIAGNOSIS — E78.1 PURE HYPERGLYCERIDEMIA: ICD-10-CM

## 2021-08-18 PROBLEM — L97.413 CHRONIC ULCER OF PLANTAR SURFACE OF RIGHT MIDFOOT WITH NECROSIS OF MUSCLE: Status: ACTIVE | Noted: 2021-08-18

## 2021-08-18 NOTE — PLAN
[FreeTextEntry1] : Continue wound care , patient , along with his wife , was consulted for surgical intervention , base of the 5th metatarsal , 2nd toe and 1st metatarsal right foot . All risks benefits were discussed , all questions answered and the patient and wife understand and agree with the plan . Patient to be admitted next for proposed surgery . Spent 38 minutes for patient care and medical decision making.\par

## 2021-08-18 NOTE — ASSESSMENT
[Verbal] : Verbal [Written] : Written [Demo] : Demo [Patient] : Patient [Spouse] : Spouse [Good - alert, interested, motivated] : Good - alert, interested, motivated [Verbalizes knowledge/Understanding] : Verbalizes knowledge/understanding [Dressing changes] : dressing changes [Foot Care] : foot care [Skin Care] : skin care [Pressure relief] : pressure relief [Signs and symptoms of infection] : sign and symptoms of infection [How and When to Call] : how and when to call [Off-loading] : off-loading [Patient responsibility to plan of care] : patient responsibility to plan of care [Glycemic Control] : glycemic control [Stable] : stable [Home] : Home [Ambulatory] : Ambulatory [Not Applicable - Long Term Care/Home Health Agency] : Long Term Care/Home Health Agency: Not Applicable [] : No [FreeTextEntry2] : Infection Prevention\par Restore Skin Integrity\par Glycemic Control\par Culture\par Surgical intervention\par F/U 1 week for admission to [facility]  [FreeTextEntry3] : Initial visit  [FreeTextEntry4] : Culture obtained\par F/U 1 week for admission to [facility]

## 2021-08-18 NOTE — PHYSICAL EXAM
[4 x 4] : 4 x 4  [1+] : left 1+ [Ankle Swelling (On Exam)] : present [Ankle Swelling Bilaterally] : bilaterally  [Varicose Veins Of Lower Extremities] : bilaterally [Ankle Swelling On The Left] : moderate [] : bilaterally [Ankle Swelling On The Right] : mild [Skin Ulcer] : ulcer [Alert] : alert [Oriented to Person] : oriented to person [Oriented to Place] : oriented to place [Calm] : calm [Purpura] : no purpura  [Petechiae] : no petechiae [Skin Induration] : no induration [de-identified] : calm [de-identified] : HTN [de-identified] : severe gout with associated foot deformities  [de-identified] : DFU 3 lateral right 5th metatrsal base , skin , subcutaneous and fat  [de-identified] : Diabetic neuropathy  [FreeTextEntry1] : Lateral Foot  [FreeTextEntry2] : 1.5 [FreeTextEntry3] : 1.9 [FreeTextEntry4] : 0.1 [de-identified] : serosanguineous [de-identified] : callus [de-identified] : Silver Alginate [de-identified] : Cleansed with Normal saline\par  [TWNoteComboBox1] : Right [TWNoteComboBox4] : Small [TWNoteComboBox5] : No [TWNoteComboBox6] : Other [de-identified] : No [de-identified] : other [de-identified] : None [de-identified] : None [de-identified] : 100% [de-identified] : No [de-identified] : 3x Weekly [de-identified] : Primary Dressing

## 2021-08-18 NOTE — HISTORY OF PRESENT ILLNESS
[FreeTextEntry1] : 60 year old male presents to the [facility] with  right lateral foot wound. The patient reports that he starting experiencing pain in his right lateral foot a month ago. He has a history of gout and has bony changes in his feet. He developed a callus on his right lateral foot which he cut off with a kitchen knife on 8/3/21. He followed up at  where he was treated with xeroform and given Augmentin. He was then recommended to follow up with the [facility] for care. DFU 3 lateral base of the 5th metatarsal right foot associated with trauma and underlying gout . The 2nd toe and the 1st metatarsal have collections of tophi with bone erosion and deformity , these areas need to be address also , how ever there are no open wounds .

## 2021-08-18 NOTE — REVIEW OF SYSTEMS
[Arthralgias] : arthralgias [Joint Swelling] : joint swelling [Joint Stiffness] : joint stiffness [Skin Wound] : skin wound [Fever] : no fever [Chills] : no chills [Eye Pain] : no eye pain [Loss Of Hearing] : no hearing loss [Shortness Of Breath] : no shortness of breath [Wheezing] : no wheezing [Abdominal Pain] : no abdominal pain [Anxiety] : no anxiety [Easy Bleeding] : no tendency for easy bleeding [FreeTextEntry5] : HTN [FreeTextEntry9] : Gouty arthropathy  [de-identified] : DFU 3 lateral base of the 5th metatarsal right foot  [de-identified] : Diabetic neuropathy  [de-identified] : NIDDM , severe gouty arthropathy

## 2021-08-18 NOTE — VITALS
[Pain related to present condition?] : The patient's  pain is not related to present condition. [] : No [de-identified] : 0

## 2021-08-20 LAB
-  AMIKACIN: SIGNIFICANT CHANGE UP
-  AMPICILLIN/SULBACTAM: SIGNIFICANT CHANGE UP
-  AMPICILLIN: SIGNIFICANT CHANGE UP
-  CEFEPIME: SIGNIFICANT CHANGE UP
-  CEFTAZIDIME: SIGNIFICANT CHANGE UP
-  CIPROFLOXACIN: SIGNIFICANT CHANGE UP
-  GENTAMICIN: SIGNIFICANT CHANGE UP
-  LEVOFLOXACIN: SIGNIFICANT CHANGE UP
-  MEROPENEM: SIGNIFICANT CHANGE UP
-  TETRACYCLINE: SIGNIFICANT CHANGE UP
-  TOBRAMYCIN: SIGNIFICANT CHANGE UP
-  TRIMETHOPRIM/SULFAMETHOXAZOLE: SIGNIFICANT CHANGE UP
-  VANCOMYCIN: SIGNIFICANT CHANGE UP
METHOD TYPE: SIGNIFICANT CHANGE UP
METHOD TYPE: SIGNIFICANT CHANGE UP

## 2021-08-21 LAB
-  IMIPENEM: SIGNIFICANT CHANGE UP
-  PIPERACILLIN/TAZOBACTAM: SIGNIFICANT CHANGE UP
METHOD TYPE: SIGNIFICANT CHANGE UP

## 2021-08-22 LAB
-  AMIKACIN: SIGNIFICANT CHANGE UP
-  AMOXICILLIN/CLAVULANIC ACID: SIGNIFICANT CHANGE UP
-  AMPICILLIN/SULBACTAM: SIGNIFICANT CHANGE UP
-  AMPICILLIN/SULBACTAM: SIGNIFICANT CHANGE UP
-  AMPICILLIN: SIGNIFICANT CHANGE UP
-  AZTREONAM: SIGNIFICANT CHANGE UP
-  CEFAZOLIN: SIGNIFICANT CHANGE UP
-  CEFAZOLIN: SIGNIFICANT CHANGE UP
-  CEFEPIME: SIGNIFICANT CHANGE UP
-  CEFOXITIN: SIGNIFICANT CHANGE UP
-  CEFTRIAXONE: SIGNIFICANT CHANGE UP
-  CIPROFLOXACIN: SIGNIFICANT CHANGE UP
-  CLINDAMYCIN: SIGNIFICANT CHANGE UP
-  ERTAPENEM: SIGNIFICANT CHANGE UP
-  ERYTHROMYCIN: SIGNIFICANT CHANGE UP
-  GENTAMICIN: SIGNIFICANT CHANGE UP
-  GENTAMICIN: SIGNIFICANT CHANGE UP
-  IMIPENEM: SIGNIFICANT CHANGE UP
-  LEVOFLOXACIN: SIGNIFICANT CHANGE UP
-  MEROPENEM: SIGNIFICANT CHANGE UP
-  OXACILLIN: SIGNIFICANT CHANGE UP
-  PENICILLIN: SIGNIFICANT CHANGE UP
-  PIPERACILLIN/TAZOBACTAM: SIGNIFICANT CHANGE UP
-  RIFAMPIN: SIGNIFICANT CHANGE UP
-  TETRACYCLINE: SIGNIFICANT CHANGE UP
-  TOBRAMYCIN: SIGNIFICANT CHANGE UP
-  TRIMETHOPRIM/SULFAMETHOXAZOLE: SIGNIFICANT CHANGE UP
-  TRIMETHOPRIM/SULFAMETHOXAZOLE: SIGNIFICANT CHANGE UP
-  VANCOMYCIN: SIGNIFICANT CHANGE UP
CULTURE RESULTS: SIGNIFICANT CHANGE UP
METHOD TYPE: SIGNIFICANT CHANGE UP
METHOD TYPE: SIGNIFICANT CHANGE UP
ORGANISM # SPEC MICROSCOPIC CNT: SIGNIFICANT CHANGE UP
SPECIMEN SOURCE: SIGNIFICANT CHANGE UP

## 2021-08-24 ENCOUNTER — TRANSCRIPTION ENCOUNTER (OUTPATIENT)
Age: 61
End: 2021-08-24

## 2021-08-24 ENCOUNTER — INPATIENT (INPATIENT)
Facility: HOSPITAL | Age: 61
LOS: 2 days | Discharge: HOME | DRG: 505 | End: 2021-08-27
Attending: INTERNAL MEDICINE | Admitting: INTERNAL MEDICINE
Payer: MEDICAID

## 2021-08-24 VITALS
TEMPERATURE: 98 F | HEIGHT: 69 IN | OXYGEN SATURATION: 99 % | DIASTOLIC BLOOD PRESSURE: 96 MMHG | SYSTOLIC BLOOD PRESSURE: 145 MMHG | HEART RATE: 68 BPM | WEIGHT: 214.95 LBS | RESPIRATION RATE: 18 BRPM

## 2021-08-24 DIAGNOSIS — I10 ESSENTIAL (PRIMARY) HYPERTENSION: ICD-10-CM

## 2021-08-24 DIAGNOSIS — L08.9 LOCAL INFECTION OF THE SKIN AND SUBCUTANEOUS TISSUE, UNSPECIFIED: ICD-10-CM

## 2021-08-24 DIAGNOSIS — F32.9 MAJOR DEPRESSIVE DISORDER, SINGLE EPISODE, UNSPECIFIED: ICD-10-CM

## 2021-08-24 DIAGNOSIS — Z29.9 ENCOUNTER FOR PROPHYLACTIC MEASURES, UNSPECIFIED: ICD-10-CM

## 2021-08-24 DIAGNOSIS — S91.309A UNSPECIFIED OPEN WOUND, UNSPECIFIED FOOT, INITIAL ENCOUNTER: ICD-10-CM

## 2021-08-24 DIAGNOSIS — M10.9 GOUT, UNSPECIFIED: ICD-10-CM

## 2021-08-24 DIAGNOSIS — E11.9 TYPE 2 DIABETES MELLITUS WITHOUT COMPLICATIONS: ICD-10-CM

## 2021-08-24 LAB
A1C WITH ESTIMATED AVERAGE GLUCOSE RESULT: 10.1 % — HIGH (ref 4–5.6)
ALBUMIN SERPL ELPH-MCNC: 3.7 G/DL — SIGNIFICANT CHANGE UP (ref 3.3–5)
ALP SERPL-CCNC: 79 U/L — SIGNIFICANT CHANGE UP (ref 40–120)
ALT FLD-CCNC: 48 U/L — SIGNIFICANT CHANGE UP (ref 12–78)
ANION GAP SERPL CALC-SCNC: 8 MMOL/L — SIGNIFICANT CHANGE UP (ref 5–17)
APTT BLD: 32 SEC — SIGNIFICANT CHANGE UP (ref 27.5–35.5)
AST SERPL-CCNC: 29 U/L — SIGNIFICANT CHANGE UP (ref 15–37)
BASOPHILS # BLD AUTO: 0.05 K/UL — SIGNIFICANT CHANGE UP (ref 0–0.2)
BASOPHILS NFR BLD AUTO: 0.9 % — SIGNIFICANT CHANGE UP (ref 0–2)
BILIRUB SERPL-MCNC: 0.6 MG/DL — SIGNIFICANT CHANGE UP (ref 0.2–1.2)
BUN SERPL-MCNC: 23 MG/DL — SIGNIFICANT CHANGE UP (ref 7–23)
CALCIUM SERPL-MCNC: 8.8 MG/DL — SIGNIFICANT CHANGE UP (ref 8.5–10.1)
CHLORIDE SERPL-SCNC: 105 MMOL/L — SIGNIFICANT CHANGE UP (ref 96–108)
CO2 SERPL-SCNC: 25 MMOL/L — SIGNIFICANT CHANGE UP (ref 22–31)
CREAT SERPL-MCNC: 1.1 MG/DL — SIGNIFICANT CHANGE UP (ref 0.5–1.3)
CRP SERPL-MCNC: <3 MG/L — SIGNIFICANT CHANGE UP
EOSINOPHIL # BLD AUTO: 0.14 K/UL — SIGNIFICANT CHANGE UP (ref 0–0.5)
EOSINOPHIL NFR BLD AUTO: 2.6 % — SIGNIFICANT CHANGE UP (ref 0–6)
ERYTHROCYTE [SEDIMENTATION RATE] IN BLOOD: 9 MM/HR — SIGNIFICANT CHANGE UP (ref 0–20)
ESTIMATED AVERAGE GLUCOSE: 243 MG/DL — HIGH (ref 68–114)
GLUCOSE SERPL-MCNC: 201 MG/DL — HIGH (ref 70–99)
HCT VFR BLD CALC: 43 % — SIGNIFICANT CHANGE UP (ref 39–50)
HGB BLD-MCNC: 15.2 G/DL — SIGNIFICANT CHANGE UP (ref 13–17)
IMM GRANULOCYTES NFR BLD AUTO: 0.4 % — SIGNIFICANT CHANGE UP (ref 0–1.5)
INR BLD: 0.97 RATIO — SIGNIFICANT CHANGE UP (ref 0.88–1.16)
LACTATE SERPL-SCNC: 2 MMOL/L — SIGNIFICANT CHANGE UP (ref 0.7–2)
LYMPHOCYTES # BLD AUTO: 1.58 K/UL — SIGNIFICANT CHANGE UP (ref 1–3.3)
LYMPHOCYTES # BLD AUTO: 29.6 % — SIGNIFICANT CHANGE UP (ref 13–44)
MCHC RBC-ENTMCNC: 30.3 PG — SIGNIFICANT CHANGE UP (ref 27–34)
MCHC RBC-ENTMCNC: 35.3 GM/DL — SIGNIFICANT CHANGE UP (ref 32–36)
MCV RBC AUTO: 85.8 FL — SIGNIFICANT CHANGE UP (ref 80–100)
MONOCYTES # BLD AUTO: 0.46 K/UL — SIGNIFICANT CHANGE UP (ref 0–0.9)
MONOCYTES NFR BLD AUTO: 8.6 % — SIGNIFICANT CHANGE UP (ref 2–14)
NEUTROPHILS # BLD AUTO: 3.09 K/UL — SIGNIFICANT CHANGE UP (ref 1.8–7.4)
NEUTROPHILS NFR BLD AUTO: 57.9 % — SIGNIFICANT CHANGE UP (ref 43–77)
NRBC # BLD: 0 /100 WBCS — SIGNIFICANT CHANGE UP (ref 0–0)
PLATELET # BLD AUTO: 215 K/UL — SIGNIFICANT CHANGE UP (ref 150–400)
POTASSIUM SERPL-MCNC: 4.1 MMOL/L — SIGNIFICANT CHANGE UP (ref 3.5–5.3)
POTASSIUM SERPL-SCNC: 4.1 MMOL/L — SIGNIFICANT CHANGE UP (ref 3.5–5.3)
PREALB SERPL-MCNC: 32 MG/DL — SIGNIFICANT CHANGE UP (ref 20–40)
PROT SERPL-MCNC: 7.6 G/DL — SIGNIFICANT CHANGE UP (ref 6–8.3)
PROTHROM AB SERPL-ACNC: 11.4 SEC — SIGNIFICANT CHANGE UP (ref 10.6–13.6)
RBC # BLD: 5.01 M/UL — SIGNIFICANT CHANGE UP (ref 4.2–5.8)
RBC # FLD: 12.3 % — SIGNIFICANT CHANGE UP (ref 10.3–14.5)
SARS-COV-2 RNA SPEC QL NAA+PROBE: SIGNIFICANT CHANGE UP
SODIUM SERPL-SCNC: 138 MMOL/L — SIGNIFICANT CHANGE UP (ref 135–145)
WBC # BLD: 5.34 K/UL — SIGNIFICANT CHANGE UP (ref 3.8–10.5)
WBC # FLD AUTO: 5.34 K/UL — SIGNIFICANT CHANGE UP (ref 3.8–10.5)

## 2021-08-24 PROCEDURE — 73630 X-RAY EXAM OF FOOT: CPT | Mod: 26,RT

## 2021-08-24 PROCEDURE — 99222 1ST HOSP IP/OBS MODERATE 55: CPT

## 2021-08-24 PROCEDURE — 99285 EMERGENCY DEPT VISIT HI MDM: CPT

## 2021-08-24 PROCEDURE — 99223 1ST HOSP IP/OBS HIGH 75: CPT

## 2021-08-24 PROCEDURE — 71046 X-RAY EXAM CHEST 2 VIEWS: CPT | Mod: 26

## 2021-08-24 PROCEDURE — 99221 1ST HOSP IP/OBS SF/LOW 40: CPT | Mod: 57

## 2021-08-24 PROCEDURE — 93010 ELECTROCARDIOGRAM REPORT: CPT

## 2021-08-24 RX ORDER — DEXTROSE 50 % IN WATER 50 %
25 SYRINGE (ML) INTRAVENOUS ONCE
Refills: 0 | Status: DISCONTINUED | OUTPATIENT
Start: 2021-08-24 | End: 2021-08-25

## 2021-08-24 RX ORDER — PIPERACILLIN AND TAZOBACTAM 4; .5 G/20ML; G/20ML
3.38 INJECTION, POWDER, LYOPHILIZED, FOR SOLUTION INTRAVENOUS ONCE
Refills: 0 | Status: COMPLETED | OUTPATIENT
Start: 2021-08-24 | End: 2021-08-24

## 2021-08-24 RX ORDER — DULOXETINE HYDROCHLORIDE 30 MG/1
60 CAPSULE, DELAYED RELEASE ORAL DAILY
Refills: 0 | Status: DISCONTINUED | OUTPATIENT
Start: 2021-08-24 | End: 2021-08-25

## 2021-08-24 RX ORDER — SODIUM CHLORIDE 9 MG/ML
1000 INJECTION, SOLUTION INTRAVENOUS
Refills: 0 | Status: DISCONTINUED | OUTPATIENT
Start: 2021-08-24 | End: 2021-08-25

## 2021-08-24 RX ORDER — ACETAMINOPHEN 500 MG
650 TABLET ORAL EVERY 6 HOURS
Refills: 0 | Status: DISCONTINUED | OUTPATIENT
Start: 2021-08-24 | End: 2021-08-25

## 2021-08-24 RX ORDER — METFORMIN HYDROCHLORIDE 850 MG/1
2000 TABLET ORAL
Qty: 0 | Refills: 0 | DISCHARGE

## 2021-08-24 RX ORDER — INSULIN LISPRO 100/ML
VIAL (ML) SUBCUTANEOUS
Refills: 0 | Status: DISCONTINUED | OUTPATIENT
Start: 2021-08-24 | End: 2021-08-25

## 2021-08-24 RX ORDER — DULOXETINE HYDROCHLORIDE 30 MG/1
1 CAPSULE, DELAYED RELEASE ORAL
Qty: 0 | Refills: 0 | DISCHARGE

## 2021-08-24 RX ORDER — DEXTROSE 50 % IN WATER 50 %
12.5 SYRINGE (ML) INTRAVENOUS ONCE
Refills: 0 | Status: DISCONTINUED | OUTPATIENT
Start: 2021-08-24 | End: 2021-08-25

## 2021-08-24 RX ORDER — DEXTROSE 50 % IN WATER 50 %
15 SYRINGE (ML) INTRAVENOUS ONCE
Refills: 0 | Status: DISCONTINUED | OUTPATIENT
Start: 2021-08-24 | End: 2021-08-25

## 2021-08-24 RX ORDER — SUCRALFATE 1 G
1 TABLET ORAL ONCE
Refills: 0 | Status: COMPLETED | OUTPATIENT
Start: 2021-08-24 | End: 2021-08-24

## 2021-08-24 RX ORDER — LOSARTAN POTASSIUM 100 MG/1
100 TABLET, FILM COATED ORAL DAILY
Refills: 0 | Status: DISCONTINUED | OUTPATIENT
Start: 2021-08-24 | End: 2021-08-25

## 2021-08-24 RX ORDER — SOLIFENACIN SUCCINATE 10 MG/1
1 TABLET ORAL
Qty: 0 | Refills: 0 | DISCHARGE

## 2021-08-24 RX ORDER — CHOLECALCIFEROL (VITAMIN D3) 125 MCG
0 CAPSULE ORAL
Qty: 0 | Refills: 0 | DISCHARGE

## 2021-08-24 RX ORDER — AMLODIPINE BESYLATE 2.5 MG/1
1 TABLET ORAL
Qty: 0 | Refills: 0 | DISCHARGE

## 2021-08-24 RX ORDER — AMLODIPINE BESYLATE 2.5 MG/1
5 TABLET ORAL DAILY
Refills: 0 | Status: DISCONTINUED | OUTPATIENT
Start: 2021-08-24 | End: 2021-08-25

## 2021-08-24 RX ORDER — SODIUM CHLORIDE 9 MG/ML
2200 INJECTION INTRAMUSCULAR; INTRAVENOUS; SUBCUTANEOUS ONCE
Refills: 0 | Status: COMPLETED | OUTPATIENT
Start: 2021-08-24 | End: 2021-08-24

## 2021-08-24 RX ORDER — LOPERAMIDE HCL 2 MG
2 TABLET ORAL ONCE
Refills: 0 | Status: COMPLETED | OUTPATIENT
Start: 2021-08-24 | End: 2021-08-24

## 2021-08-24 RX ORDER — GLUCAGON INJECTION, SOLUTION 0.5 MG/.1ML
1 INJECTION, SOLUTION SUBCUTANEOUS ONCE
Refills: 0 | Status: DISCONTINUED | OUTPATIENT
Start: 2021-08-24 | End: 2021-08-25

## 2021-08-24 RX ORDER — CANDESARTAN CILEXETIL 8 MG/1
1 TABLET ORAL
Qty: 0 | Refills: 0 | DISCHARGE

## 2021-08-24 RX ORDER — LAMOTRIGINE 25 MG/1
150 TABLET, ORALLY DISINTEGRATING ORAL
Refills: 0 | Status: DISCONTINUED | OUTPATIENT
Start: 2021-08-24 | End: 2021-08-25

## 2021-08-24 RX ORDER — INSULIN LISPRO 100/ML
VIAL (ML) SUBCUTANEOUS AT BEDTIME
Refills: 0 | Status: DISCONTINUED | OUTPATIENT
Start: 2021-08-24 | End: 2021-08-25

## 2021-08-24 RX ORDER — DULOXETINE HYDROCHLORIDE 30 MG/1
2 CAPSULE, DELAYED RELEASE ORAL
Qty: 0 | Refills: 0 | DISCHARGE

## 2021-08-24 RX ORDER — VANCOMYCIN HCL 1 G
1000 VIAL (EA) INTRAVENOUS ONCE
Refills: 0 | Status: COMPLETED | OUTPATIENT
Start: 2021-08-24 | End: 2021-08-24

## 2021-08-24 RX ADMIN — Medication 1 GRAM(S): at 10:18

## 2021-08-24 RX ADMIN — Medication 2 MILLIGRAM(S): at 22:21

## 2021-08-24 RX ADMIN — SODIUM CHLORIDE 2200 MILLILITER(S): 9 INJECTION INTRAMUSCULAR; INTRAVENOUS; SUBCUTANEOUS at 10:52

## 2021-08-24 RX ADMIN — Medication 1000 MILLIGRAM(S): at 11:18

## 2021-08-24 RX ADMIN — Medication 3: at 17:05

## 2021-08-24 RX ADMIN — LAMOTRIGINE 150 MILLIGRAM(S): 25 TABLET, ORALLY DISINTEGRATING ORAL at 17:45

## 2021-08-24 RX ADMIN — Medication 250 MILLIGRAM(S): at 10:15

## 2021-08-24 RX ADMIN — PIPERACILLIN AND TAZOBACTAM 3.38 GRAM(S): 4; .5 INJECTION, POWDER, LYOPHILIZED, FOR SOLUTION INTRAVENOUS at 10:15

## 2021-08-24 RX ADMIN — PIPERACILLIN AND TAZOBACTAM 200 GRAM(S): 4; .5 INJECTION, POWDER, LYOPHILIZED, FOR SOLUTION INTRAVENOUS at 09:45

## 2021-08-24 RX ADMIN — SODIUM CHLORIDE 2200 MILLILITER(S): 9 INJECTION INTRAMUSCULAR; INTRAVENOUS; SUBCUTANEOUS at 09:45

## 2021-08-24 NOTE — ED PROVIDER NOTE - PHYSICAL EXAMINATION
Right foot: later foot 1.5x1.9x0.1 cm callus with what appears to be tophi  2nd and 2st metatarsal have collections of tophi with bone erosion and deformity

## 2021-08-24 NOTE — CONSULT NOTE ADULT - SUBJECTIVE AND OBJECTIVE BOX
Full note to follow  Tophaceous gout  S/P drainage  I do not find support for active infection on exam  I do not see a role for antibiotics at present  Patient has been stable off of antibiotics for 1 week  Would not direct treatment at prior isolates  Good but not overly tight diabetic control to avoid iatrogenic hypoglycemia.   No ID objection to OR  Please feel free to recall as needed  I'll sign off at this time.  D/W patient, all questions answered.  D/W Dr. Tripathi  Thank you for the courtesy of this referral.  Abelardo Mckenzie MD  Attending Physician  [facility]  Division of Infectious Diseases  [phone]  ------------------------  [facility]  Division of Infectious Diseases  [phone]    TAYLOR HENRIQUEZ  60y, Male  [id number]    HPI--      PMH/PSH--  HTN (hypertension)    Anxiety and depression    Panic attacks    Fall    C2 cervical fracture    Kidney stones    C2 cervical fracture    Hernia    Renal stone    Rotator cuff tear, left        Allergies--  No Known Allergies      Medications--  Antibiotics:   Immunologic:   Other: acetaminophen   Tablet .. PRN  dextrose 40% Gel  dextrose 5%.  dextrose 5%.  dextrose 50% Injectable  dextrose 50% Injectable  dextrose 50% Injectable  glucagon  Injectable  insulin lispro (ADMELOG) corrective regimen sliding scale  insulin lispro (ADMELOG) corrective regimen sliding scale    Antimicrobials last 90 days per EMR: MEDICATIONS  (STANDING):  piperacillin/tazobactam IVPB.   200 mL/Hr IV Intermittent (08-24-21 @ 09:45)    vancomycin  IVPB   250 mL/Hr IV Intermittent (08-24-21 @ 10:15)        Social History--  EtOH:  "nightly shot to sleep"  Tobacco: occasional cigar  Drug Use: occasional edible THC for sleep    Family/Marital History--  Not relevant to clinical concern      Travel/Environmental/Occupational History:  No travel  [place]    Review of Systems:  A >=10-point review of systems was obtained.     Pertinent positives and negatives--  Constitutional: No fevers. No Chills. No Rigors.   Eyes:  ENMT:  Cardiovascular: No chest pain. No palpitations.  Respiratory: No shortness of breath. No cough.  Gastrointestinal: No nausea or vomiting. No diarrhea or constipation.   Genitourinary:  Musculoskeletal:  Skin:  Neurologic:  Psychiatric: Pleasant. Appropriate affect.  Endocrine:  Heme/Lymphatic:  Allergy/Immunologic:    Review of systems otherwise negative except as previously noted.    Physical Exam--  Vital Signs: T(F): 97.9 (08-24-21 @ 08:09), Max: 97.9 (08-24-21 @ 08:09)  HR: 68 (08-24-21 @ 08:09)  BP: 145/96 (08-24-21 @ 08:09)  RR: 18 (08-24-21 @ 08:09)  SpO2: 99% (08-24-21 @ 08:09)  Wt(kg): --  General: Nontoxic-appearing Male in no acute distress.  HEENT: AT/NC. PERRL. EOMI. Anicteric. Conjunctiva pink and moist. Oropharynx clear. Dentition fair.  Neck: Not rigid. No sense of mass.  Nodes: None palpable.  Lungs: Clear bilaterally without rales, wheezing or rhonchi  Heart: Regular rate and rhythm. No Murmur. No rub. No gallop. No palpable thrill.  Abdomen: Bowel sounds present and normoactive. Soft. Nondistended. Nontender. No sense of mass. No organomegaly.  Back: No spinal tenderness. No costovertebral angle tenderness.   Extremities: No cyanosis or clubbing. No edema.   Skin: Warm. Dry. Good turgor. No rash. No vasculitic stigmata.  Psychiatric: Appropriate affect and mood for situation.         Laboratory & Imaging Data--  CBC                        15.2   5.34  )-----------( 215      ( 24 Aug 2021 09:01 )             43.0       Chemistries  08-24    138  |  105  |  23  ----------------------------<  201<H>  4.1   |  25  |  1.10    Ca    8.8      24 Aug 2021 09:01    TPro  7.6  /  Alb  3.7  /  TBili  0.6  /  DBili  x   /  AST  29  /  ALT  48  /  AlkPhos  79  08-24      Culture Data    Culture - Other (collected 17 Aug 2021 17:22)  Source: .Other Right Lateral Foot- 10:32am- s  Final Report (22 Aug 2021 06:59):    Culture yields growth of greater than 3 colony types of    Call client services within 7 days if further workup is clinically    indicated. including    Moderate Acinetobacter baumannii/nosocomialis group    Moderate Enterococcus faecalis    Rare Klebsiella pneumoniae    Few Staphylococcus aureus  Organism: Acinetobacter baumannii/nosocomialis group  Enterococcus faecalis  Klebsiella pneumoniae  Staphylococcus aureus (22 Aug 2021 06:59)  Organism: Staphylococcus aureus (22 Aug 2021 06:59)  Organism: Klebsiella pneumoniae (22 Aug 2021 06:59)  Organism: Enterococcus faecalis (22 Aug 2021 06:59)  Organism: Acinetobacter baumannii/nosocomialis group (22 Aug 2021 06:59)  Organism: Acinetobacter baumannii/nosocomialis group (22 Aug 2021 06:59)        Full note to follow        ----------------           Full note to follow  Tophaceous gout  S/P drainage  I do not find support for active infection on exam  I do not see a role for antibiotics at present  Patient has been stable off of antibiotics for 1 week  Would not direct treatment at prior isolates  Good but not overly tight diabetic control to avoid iatrogenic hypoglycemia.   No ID objection to OR  Please feel free to recall as needed  I'll sign off at this time.  D/W patient, all questions answered.  D/W Dr. Tripathi  Thank you for the courtesy of this referral.  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  He was ultimately given a course of Augmentin.  The pain went away right after he drained the area himself.  He had been told to come to the emergency room for treatment a week ago, but did not present until today.  He has been afebrile and hemodynamically stable off antibiotics.  He has no pain.    With respect to his diabetes mellitus, he states that his hemoglobin A1c was in the 6 range most recently.  He has blurry vision, but is not sure whether it is from cataracts or something else.  He has not seen an eye doctor as of yet.  With respect to other potential complications of diabetes, he denies any kidney complications or peripheral neuropathy.    He is here for potential surgery on this large tophus, as well as other tophi on the right foot.      PMH/PSH--  HTN (hypertension)    Anxiety and depression    Panic attacks    Fall    C2 cervical fracture    Kidney stones    C2 cervical fracture    Hernia    Renal stone    Rotator cuff tear, left        Allergies--  No Known Allergies      Medications--  Antibiotics:   Immunologic:   Other: acetaminophen   Tablet .. PRN  dextrose 40% Gel  dextrose 5%.  dextrose 5%.  dextrose 50% Injectable  dextrose 50% Injectable  dextrose 50% Injectable  glucagon  Injectable  insulin lispro (ADMELOG) corrective regimen sliding scale  insulin lispro (ADMELOG) corrective regimen sliding scale    Antimicrobials last 90 days per EMR: MEDICATIONS  (STANDING):  piperacillin/tazobactam IVPB.   200 mL/Hr IV Intermittent (08-24-21 @ 09:45)    vancomycin  IVPB   250 mL/Hr IV Intermittent (08-24-21 @ 10:15)        Social History--  EtOH:  "nightly shot to sleep"  Tobacco: occasional cigar  Drug Use: occasional edible THC for sleep    Family/Marital History--  Not relevant to clinical concern      Travel/Environmental/Occupational History:  No travel  [place]    Review of Systems:  A >=10-point review of systems was obtained.     Pertinent positives and negatives--  Constitutional: No fevers. No Chills. No Rigors.   Eyes: As above.   ENMT: Denies  Cardiovascular: No chest pain. No palpitations.  Respiratory: No shortness of breath. No cough.  Gastrointestinal: No nausea or vomiting. No diarrhea or constipation.   Genitourinary: Denies  Musculoskeletal:As above  Skin:Denies  Neurologic:Denies  Psychiatric: Pleasant. Appropriate affect.  Endocrine:As above  Heme/Lymphatic: denies.   Allergy/Immunologic: denies.     Review of systems otherwise negative except as previously noted.    Physical Exam--  Vital Signs: T(F): 97.9 (08-24-21 @ 08:09), Max: 97.9 (08-24-21 @ 08:09)  HR: 68 (08-24-21 @ 08:09)  BP: 145/96 (08-24-21 @ 08:09)  RR: 18 (08-24-21 @ 08:09)  SpO2: 99% (08-24-21 @ 08:09)  Wt(kg): --  General: Nontoxic-appearing Male in no acute distress.  HEENT: AT/NC. Anicteric. Conjunctiva pink and moist. Oropharynx clear.   Neck: Not rigid. No sense of mass.  Nodes: None palpable.  Lungs: Clear bilaterally without rales, wheezing or rhonchi  Heart: Regular rate and rhythm. No Murmur. No rub. No gallop. No palpable thrill.  Abdomen: Bowel sounds present and normoactive. Soft. Nondistended. Nontender. No sense of mass. No organomegaly.  Extremities: No cyanosis or clubbing. No edema. On the right foot at the base of the fifth metatarsal/ankle joint there is a 3 cm tophus.  There is a break in the skin consistent with the patient's prior intervention.  There is no redness.  There is no erythema.  There is no expressible drainage.  There is no crepitus or fluctuance.  There is no devitalized appearing tissue.  There is no tenderness elicited.  There are other tophi present on the foot, none of them are actively inflamed appearing.  Skin: Warm. Dry. Good turgor. No rash. No vasculitic stigmata. Multiple tattoos  Psychiatric: Appropriate affect and mood for situation.       Laboratory & Imaging Data--  CBC                        15.2   5.34  )-----------( 215      ( 24 Aug 2021 09:01 )             43.0       Chemistries  08-24    138  |  105  |  23  ----------------------------<  201<H>  4.1   |  25  |  1.10    Ca    8.8      24 Aug 2021 09:01    TPro  7.6  /  Alb  3.7  /  TBili  0.6  /  DBili  x   /  AST  29  /  ALT  48  /  AlkPhos  79  08-24      Culture Data    Culture - Other (collected 17 Aug 2021 17:22)  Source: .Other Right Lateral Foot- 10:32am- s  Final Report (22 Aug 2021 06:59):    Culture yields growth of greater than 3 colony types of    Call client services within 7 days if further workup is clinically    indicated. including    Moderate Acinetobacter baumannii/nosocomialis group    Moderate Enterococcus faecalis    Rare Klebsiella pneumoniae    Few Staphylococcus aureus  Organism: Acinetobacter baumannii/nosocomialis group  Enterococcus faecalis  Klebsiella pneumoniae  Staphylococcus aureus (22 Aug 2021 06:59)  Organism: Staphylococcus aureus (22 Aug 2021 06:59)  Organism: Klebsiella pneumoniae (22 Aug 2021 06:59)  Organism: Enterococcus faecalis (22 Aug 2021 06:59)  Organism: Acinetobacter baumannii/nosocomialis group (22 Aug 2021 06:59)  Organism: Acinetobacter baumannii/nosocomialis group (22 Aug 2021 06:59)    < from: Xray Foot AP + Lateral + Oblique, Right (08.24.21 @ 09:41) >  3 views right foot. Prior 8/3/2021.     There is no acute fracture dislocation or focal bone destruction.  no significant change in appearance of the foot. Degenerative changes similar to prior. Soft tissue swelling with calcifications in the region of the first MTP joint and the region of the base of the fifth metatarsal may represent gout.    IMPRESSION: No acute changes compared to 8/3/2021. No plain film evidence of osteomyelitis    < end of copied text >

## 2021-08-24 NOTE — CONSULT NOTE ADULT - PROBLEM SELECTOR RECOMMENDATION 9
No concern of infection at this point in time, I do not think this gentleman needs antibiotics for his foot wound.  I would not treat the isolate is from prior wound culture at this point in time.  No objection to proposed surgery  Periprocedural prophylaxis per protocol

## 2021-08-24 NOTE — H&P ADULT - PROBLEM SELECTOR PLAN 4
Decrease Duloxetine to 60mg daily [was on 120mg of extended release daily, however outside of recommended dosage]  -continue lamictal

## 2021-08-24 NOTE — ED ADULT TRIAGE NOTE - CHIEF COMPLAINT QUOTE
Patient states sent from Wound Care to be admitted for possible surgery tomorrow for diabetic ulcers to right foot.

## 2021-08-24 NOTE — ED ADULT NURSE NOTE - NSICDXPASTMEDICALHX_GEN_ALL_CORE_FT
PAST MEDICAL HISTORY:  Anxiety and depression     C2 cervical fracture with fusion    Fall at work 03/2008    HTN (hypertension)     Kidney stones     Panic attacks

## 2021-08-24 NOTE — CONSULT NOTE ADULT - PROBLEM SELECTOR RECOMMENDATION 3
Patient does not appear to have active gout at this point in time, though there are extensive chronic tophaceous changes.  Once the patient has had his surgical interventions, would favor starting colchicine, and eventually after 3 months or so introduce allopurinol.  Check uric acid level for baseline now.

## 2021-08-24 NOTE — ED PROVIDER NOTE - CLINICAL SUMMARY MEDICAL DECISION MAKING FREE TEXT BOX
Pt is a 61 yo male who presents to the ED with a cc of foot wound and need for admission. PMHx of HTN, HLD, diastolic dysfunction, BPH, LVH, depression, SM, erectile dysfunction, overactive bladder, gout. Per chart review pt began to experience pain in his right lateral foot with swelling a little over 1 month ago. Pt does have a h/o gout and jorge a changes to his foot. The pain to the ulnar aspect of his right foot began so severe he was having difficulty wearing shoes. His step daughter's wedding was coming up and he was unable to wear the shoes due to the pain and so he decided that he was going to cut the swollen region open to hopefully relieve the pressure. Pt attempted this on 8/3. Pt was then seen in the ED on 8/3 as the bleeding would not stop. The wound was treated with Xeroform pt Tetanus was updated and he was discharged on Augmentin. Pt reports that he completed this last Monday. He has been following up with wound care and was sent to the ED for admission. Denies fever, chills, N/V, CP, SOB, abd pain. Pt is a 61 yo male who presents to the ED with a cc of foot wound and need for admission. PMHx of HTN, HLD, diastolic dysfunction, BPH, LVH, depression, SM, erectile dysfunction, overactive bladder, gout. Per chart review pt began to experience pain in his right lateral foot with swelling a little over 1 month ago. Pt does have a h/o gout and jorge a changes to his foot. The pain to the ulnar aspect of his right foot began so severe he was having difficulty wearing shoes. His step daughter's wedding was coming up and he was unable to wear the shoes due to the pain and so he decided that he was going to cut the swollen region open to hopefully relieve the pressure. Pt attempted this on 8/3. Pt was then seen in the ED on 8/3 as the bleeding would not stop. The wound was treated with Xeroform pt Tetanus was updated and he was discharged on Augmentin. Pt reports that he completed this last Monday. He has been following up with wound care and was sent to the ED for admission. Denies fever, chills, N/V, CP, SOB, abd pain. Pt presenting for suspected infection foot infection/gouty tophi. Sent from wound care. Will obtain screening septic work up, EKG, x-rays. Will begin abx and monitor

## 2021-08-24 NOTE — ED ADULT NURSE NOTE - NSICDXPASTSURGICALHX_GEN_ALL_CORE_FT
PAST SURGICAL HISTORY:  C2 cervical fracture 3/2008    Hernia in Infancy    Renal stone removed -by ? blasting    Rotator cuff tear, left

## 2021-08-24 NOTE — H&P ADULT - PROBLEM SELECTOR PLAN 1
Wound on base of Right foot and tophaceous gouty wounds.   -no acute infectious etiology identified.   -afebrile and normal WBC count  -no role of abx at this time.   -patient is medically optimized for podiatric surgery if clinically indicated.   -revised cardiac risk index 0 points. Wound on base of Right foot and tophaceous gouty wounds.   -no acute infectious etiology identified.   -afebrile and normal WBC count  -no role of abx at this time.   -EKG NSR without acute ischemic changes  -patient is medically optimized for podiatric surgery if clinically indicated.   -revised cardiac risk index 0 points.

## 2021-08-24 NOTE — CONSULT NOTE ADULT - PROBLEM SELECTOR RECOMMENDATION 9
- Patient seen and evaluated  - Xrays appreciated and reviewed   - Wound dressing taken off with care and to patient's tolerance  - Wound dressed with Aquacel Ag and DSD  - Patient is scheduled for OR procedure with Dr. Beltran tomorrow of Right 5th metatarsal base resection, 1st metatarsal head resection, 2nd digit arthroplasty and gouty tophi wash out with Dr. Beltran tomorrow 8/25/2021  - Please provide medical and cardiac clearance for OR procedure  - Patient to be NPO past midnight   - Given the patient's severity of symptoms, will plan for surgical intervention at this time. Discussed risks, benefits, and potential complications with patient and family members regarding surgical intervention including sepsis, loss of limb, and loss of life. All questions answered to satisfaction at this time.  Requesting cardiology risk stratification and optimization.  Requesting medical risk stratification and optimization.  - Podiatry team will continue to follow patient while in house

## 2021-08-24 NOTE — H&P ADULT - ASSESSMENT
Mr Avtar is a 61 yo M presenting with Right lateral foot wound and severe gout. PMH DM2, HTN, Left ventricular hypertrophy, Gout, Mood disorder

## 2021-08-24 NOTE — CONSULT NOTE ADULT - SUBJECTIVE AND OBJECTIVE BOX
HPI:  Mr Nova is a 61 yo M presenting with Right lateral foot wound and severe gout. PMH DM2, HTN, Left ventricular hypertrophy, Gout, Mood disorder  Patient seen and examined at bedside. He reports a Right lateral foot wound and callus which he cut open with a knife approximately 3 weeks ago. He was being followed by wound care center and completed a course of Augmentin. Now he presents to the ER sent by wound care center for possible foot surgery.   Patient does have R foot pain and multiple tophi. Denies purulent drainage from his foot.   Denies headaches, nausea, vomiting, chest pain, SOB, palpitations, abdominal pain, constipation, diarrhea, melena, hematochezia, dysuria.   Denies past hx of MI/CAD/CHF/TIA/CVA.  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 The patient states that he doesnt take any gouty medication. He is accompanied by his wife. The patient states that the area is painful. The patient cut the lateral aspect of his right foot mistakenly with a knife. The patient states that he stopped taking his gout medication because he felt like he was doing too much medications.  Denies any fever, chills, nausea, vomiting, chest pain, shortness of breath, or calf pain at this time.    REVIEW OF SYSTEMS    PAST MEDICAL & SURGICAL HISTORY:  HTN (hypertension)    Anxiety and depression    Panic attacks    Fall  at work 03/2008    C2 cervical fracture  with fusion    Kidney stones    C2 cervical fracture  3/2008    Hernia  in Infancy    Renal stone  removed -by ? blasting    Rotator cuff tear, left        Allergies    No Known Allergies    Intolerances        MEDICATIONS  (STANDING):  dextrose 40% Gel 15 Gram(s) Oral once  dextrose 5%. 1000 milliLiter(s) (50 mL/Hr) IV Continuous <Continuous>  dextrose 5%. 1000 milliLiter(s) (100 mL/Hr) IV Continuous <Continuous>  dextrose 50% Injectable 25 Gram(s) IV Push once  dextrose 50% Injectable 12.5 Gram(s) IV Push once  dextrose 50% Injectable 25 Gram(s) IV Push once  glucagon  Injectable 1 milliGRAM(s) IntraMuscular once  insulin lispro (ADMELOG) corrective regimen sliding scale   SubCutaneous three times a day before meals  insulin lispro (ADMELOG) corrective regimen sliding scale   SubCutaneous at bedtime    MEDICATIONS  (PRN):  acetaminophen   Tablet .. 650 milliGRAM(s) Oral every 6 hours PRN Temp greater or equal to 38.5C (101.3F), Mild Pain (1 - 3)      Social History:  Family hx: heart disease in father.   Social hx: social etoh use, no drug or tobacco use (24 Aug 2021 12:00)      FAMILY HISTORY:      Vital Signs Last 24 Hrs  T(C): 36.6 (24 Aug 2021 08:09), Max: 36.6 (24 Aug 2021 08:09)  T(F): 97.9 (24 Aug 2021 08:09), Max: 97.9 (24 Aug 2021 08:09)  HR: 68 (24 Aug 2021 08:09) (68 - 68)  BP: 145/96 (24 Aug 2021 08:09) (145/96 - 145/96)  BP(mean): --  RR: 18 (24 Aug 2021 08:09) (18 - 18)  SpO2: 99% (24 Aug 2021 08:09) (99% - 99%)    PHYSICAL EXAM:  Vascular: DP & PT palpable bilaterally, Capillary refill 3 seconds, Sparse Digital hair present bilaterally  Neurological: Light touch sensation intact bilaterally, gross epicritic sensation intact   Musculoskeletal: 5/5 strength in all quadrants bilaterally, AJ & STJ ROM intact  Dermatological:   1. Grade 3 wound right 5th met base- size 1.0cmx1.0cmx0.1cm- red granular base, with hyperkeratotic borders, probes to palpable bony prominence of the 5th met base, edema and erythema along the right 5th met base, no malodor, no tunneling, no undermining     CBC Full  -  ( 24 Aug 2021 09:01 )  WBC Count : 5.34 K/uL  RBC Count : 5.01 M/uL  Hemoglobin : 15.2 g/dL  Hematocrit : 43.0 %  Platelet Count - Automated : 215 K/uL  Mean Cell Volume : 85.8 fl  Mean Cell Hemoglobin : 30.3 pg  Mean Cell Hemoglobin Concentration : 35.3 gm/dL  Auto Neutrophil # : 3.09 K/uL  Auto Lymphocyte # : 1.58 K/uL  Auto Monocyte # : 0.46 K/uL  Auto Eosinophil # : 0.14 K/uL  Auto Basophil # : 0.05 K/uL  Auto Neutrophil % : 57.9 %  Auto Lymphocyte % : 29.6 %  Auto Monocyte % : 8.6 %  Auto Eosinophil % : 2.6 %  Auto Basophil % : 0.9 %      ----------CHEM PANEL----------    PT/INR - ( 24 Aug 2021 09:01 )   PT: 11.4 sec;   INR: 0.97 ratio         PTT - ( 24 Aug 2021 09:01 )  PTT:32.0 sec        Imaging: ----------   HPI:  Mr Nova is a 59 yo M presenting with Right lateral foot wound and severe gout. PMH DM2, HTN, Left ventricular hypertrophy, Gout, Mood disorder  Patient seen and examined at bedside. He reports a Right lateral foot wound and callus which he cut open with a knife approximately 3 weeks ago. He was being followed by wound care center and completed a course of Augmentin. Now he presents to the ER sent by wound care center for possible foot surgery.   Patient does have R foot pain and multiple tophi. Denies purulent drainage from his foot.   Denies headaches, nausea, vomiting, chest pain, SOB, palpitations, abdominal pain, constipation, diarrhea, melena, hematochezia, dysuria.   Denies past hx of MI/CAD/CHF/TIA/CVA.  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 The patient states that he doesnt take any gouty medication. He is accompanied by his wife. The patient states that the area is painful. The patient cut the lateral aspect of his right foot mistakenly with a knife. The patient states that he stopped taking his gout medication because he felt like he was doing too much medications.  Denies any fever, chills, nausea, vomiting, chest pain, shortness of breath, or calf pain at this time.    REVIEW OF SYSTEMS    PAST MEDICAL & SURGICAL HISTORY:  HTN (hypertension)    Anxiety and depression    Panic attacks    Fall  at work 03/2008    C2 cervical fracture  with fusion    Kidney stones    C2 cervical fracture  3/2008    Hernia  in Infancy    Renal stone  removed -by ? blasting    Rotator cuff tear, left        Allergies    No Known Allergies    Intolerances        MEDICATIONS  (STANDING):  dextrose 40% Gel 15 Gram(s) Oral once  dextrose 5%. 1000 milliLiter(s) (50 mL/Hr) IV Continuous <Continuous>  dextrose 5%. 1000 milliLiter(s) (100 mL/Hr) IV Continuous <Continuous>  dextrose 50% Injectable 25 Gram(s) IV Push once  dextrose 50% Injectable 12.5 Gram(s) IV Push once  dextrose 50% Injectable 25 Gram(s) IV Push once  glucagon  Injectable 1 milliGRAM(s) IntraMuscular once  insulin lispro (ADMELOG) corrective regimen sliding scale   SubCutaneous three times a day before meals  insulin lispro (ADMELOG) corrective regimen sliding scale   SubCutaneous at bedtime    MEDICATIONS  (PRN):  acetaminophen   Tablet .. 650 milliGRAM(s) Oral every 6 hours PRN Temp greater or equal to 38.5C (101.3F), Mild Pain (1 - 3)      Social History:  Family hx: heart disease in father.   Social hx: social etoh use, no drug or tobacco use (24 Aug 2021 12:00)      FAMILY HISTORY:      Vital Signs Last 24 Hrs  T(C): 36.6 (24 Aug 2021 08:09), Max: 36.6 (24 Aug 2021 08:09)  T(F): 97.9 (24 Aug 2021 08:09), Max: 97.9 (24 Aug 2021 08:09)  HR: 68 (24 Aug 2021 08:09) (68 - 68)  BP: 145/96 (24 Aug 2021 08:09) (145/96 - 145/96)  BP(mean): --  RR: 18 (24 Aug 2021 08:09) (18 - 18)  SpO2: 99% (24 Aug 2021 08:09) (99% - 99%)    PHYSICAL EXAM:  Vascular: DP & PT palpable bilaterally, Capillary refill 3 seconds, Sparse Digital hair present bilaterally  Neurological: Light touch sensation intact bilaterally, gross epicritic sensation intact   Musculoskeletal: 5/5 strength in all quadrants bilaterally, AJ & STJ ROM intact, palpable gouty tophi, limited to no ROM of the right 1st MTPJ, pain upon ROM, pain upon palpation along the right 5th metatarsal base, rigid digital contracture at the PIPJ and the DIPJ of the digits 2-5 b/l, 5th digit underriding the 4th, edema and erythema along the PIPJ 2-5, retention of arch with and without WB, hip internal and external rotation is 37 degrees b/l, ankle joint dorsiflexion less than 5 degrees with the knee extended b/l, 5 degrees with the knee flexed b/l, no limb length discrepancy, shoulders are level, limited ROM of the STJ b/l,   Gait Analysis:   Dermatological:   1. Grade 3 wound right 5th met base- size 1.0cmx1.0cmx0.1cm- red granular base, with hyperkeratotic borders, probes to palpable bony prominence of the 5th met base, edema and erythema along the right 5th met base, no malodor, no tunneling, no undermining     CBC Full  -  ( 24 Aug 2021 09:01 )  WBC Count : 5.34 K/uL  RBC Count : 5.01 M/uL  Hemoglobin : 15.2 g/dL  Hematocrit : 43.0 %  Platelet Count - Automated : 215 K/uL  Mean Cell Volume : 85.8 fl  Mean Cell Hemoglobin : 30.3 pg  Mean Cell Hemoglobin Concentration : 35.3 gm/dL  Auto Neutrophil # : 3.09 K/uL  Auto Lymphocyte # : 1.58 K/uL  Auto Monocyte # : 0.46 K/uL  Auto Eosinophil # : 0.14 K/uL  Auto Basophil # : 0.05 K/uL  Auto Neutrophil % : 57.9 %  Auto Lymphocyte % : 29.6 %  Auto Monocyte % : 8.6 %  Auto Eosinophil % : 2.6 %  Auto Basophil % : 0.9 %      ----------CHEM PANEL----------    PT/INR - ( 24 Aug 2021 09:01 )   PT: 11.4 sec;   INR: 0.97 ratio         PTT - ( 24 Aug 2021 09:01 )  PTT:32.0 sec        Imaging: ----------   HPI:  Mr Nova is a 61 yo M presenting with Right lateral foot wound and severe gout. PMH DM2, HTN, Left ventricular hypertrophy, Gout, Mood disorder  Patient seen and examined at bedside. He reports a Right lateral foot wound and callus which he cut open with a knife approximately 3 weeks ago. He was being followed by wound care center and completed a course of Augmentin. Now he presents to the ER sent by wound care center for possible foot surgery.   Patient does have R foot pain and multiple tophi. Denies purulent drainage from his foot.   Denies headaches, nausea, vomiting, chest pain, SOB, palpitations, abdominal pain, constipation, diarrhea, melena, hematochezia, dysuria.   Denies past hx of MI/CAD/CHF/TIA/CVA.  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 The patient states that he doesnt take any gouty medication. He is accompanied by his wife. The patient states that the area is painful. The patient cut the lateral aspect of his right foot mistakenly with a knife. The patient states that he stopped taking his gout medication because he felt like he was doing too much medications.  Denies any fever, chills, nausea, vomiting, chest pain, shortness of breath, or calf pain at this time.    REVIEW OF SYSTEMS    PAST MEDICAL & SURGICAL HISTORY:  HTN (hypertension)    Anxiety and depression    Panic attacks    Fall  at work 03/2008    C2 cervical fracture  with fusion    Kidney stones    C2 cervical fracture  3/2008    Hernia  in Infancy    Renal stone  removed -by ? blasting    Rotator cuff tear, left        Allergies    No Known Allergies    Intolerances        MEDICATIONS  (STANDING):  dextrose 40% Gel 15 Gram(s) Oral once  dextrose 5%. 1000 milliLiter(s) (50 mL/Hr) IV Continuous <Continuous>  dextrose 5%. 1000 milliLiter(s) (100 mL/Hr) IV Continuous <Continuous>  dextrose 50% Injectable 25 Gram(s) IV Push once  dextrose 50% Injectable 12.5 Gram(s) IV Push once  dextrose 50% Injectable 25 Gram(s) IV Push once  glucagon  Injectable 1 milliGRAM(s) IntraMuscular once  insulin lispro (ADMELOG) corrective regimen sliding scale   SubCutaneous three times a day before meals  insulin lispro (ADMELOG) corrective regimen sliding scale   SubCutaneous at bedtime    MEDICATIONS  (PRN):  acetaminophen   Tablet .. 650 milliGRAM(s) Oral every 6 hours PRN Temp greater or equal to 38.5C (101.3F), Mild Pain (1 - 3)      Social History:  Family hx: heart disease in father.   Social hx: social etoh use, no drug or tobacco use (24 Aug 2021 12:00)      FAMILY HISTORY:      Vital Signs Last 24 Hrs  T(C): 36.6 (24 Aug 2021 08:09), Max: 36.6 (24 Aug 2021 08:09)  T(F): 97.9 (24 Aug 2021 08:09), Max: 97.9 (24 Aug 2021 08:09)  HR: 68 (24 Aug 2021 08:09) (68 - 68)  BP: 145/96 (24 Aug 2021 08:09) (145/96 - 145/96)  BP(mean): --  RR: 18 (24 Aug 2021 08:09) (18 - 18)  SpO2: 99% (24 Aug 2021 08:09) (99% - 99%)    PHYSICAL EXAM:  Vascular: DP & PT palpable bilaterally, Capillary refill 3 seconds, Sparse Digital hair present bilaterally  Neurological: Light touch sensation intact bilaterally, gross epicritic sensation intact   Musculoskeletal: 5/5 strength in all quadrants bilaterally, AJ & STJ ROM intact, palpable gouty tophi, limited to no ROM of the right 1st MTPJ, pain upon ROM, pain upon palpation along the right 5th metatarsal base, rigid digital contracture at the PIPJ and the DIPJ of the digits 2-5 b/l, 5th digit underriding the 4th, edema and erythema along the PIPJ 2-5, retention of arch with and without WB, hip internal and external rotation is 37 degrees b/l, ankle joint dorsiflexion less than 5 degrees with the knee extended b/l, 5 degrees with the knee flexed b/l, no limb length discrepancy, shoulders are level, limited ROM of the STJ b/l, NCSP 2 degrees, RCSP inverted b/l, Base of gait is 10 degrees, No abductory twist or early heel off, less than 5 degress dorsiflexion and plantarflexion of the 1st MTPJ, 1mm of first ray dorsiflexion and plantar flexion, Forefoot valgus  Gait Analysis: Antalgic gait   Dermatological:   1. Grade 3 wound right 5th met base- size 1.0cmx1.0cmx0.1cm- red granular base, with hyperkeratotic borders, probes to palpable bony prominence of the 5th met base, edema and erythema along the right 5th met base, no malodor, no tunneling, no undermining     CBC Full  -  ( 24 Aug 2021 09:01 )  WBC Count : 5.34 K/uL  RBC Count : 5.01 M/uL  Hemoglobin : 15.2 g/dL  Hematocrit : 43.0 %  Platelet Count - Automated : 215 K/uL  Mean Cell Volume : 85.8 fl  Mean Cell Hemoglobin : 30.3 pg  Mean Cell Hemoglobin Concentration : 35.3 gm/dL  Auto Neutrophil # : 3.09 K/uL  Auto Lymphocyte # : 1.58 K/uL  Auto Monocyte # : 0.46 K/uL  Auto Eosinophil # : 0.14 K/uL  Auto Basophil # : 0.05 K/uL  Auto Neutrophil % : 57.9 %  Auto Lymphocyte % : 29.6 %  Auto Monocyte % : 8.6 %  Auto Eosinophil % : 2.6 %  Auto Basophil % : 0.9 %      ----------CHEM PANEL----------    PT/INR - ( 24 Aug 2021 09:01 )   PT: 11.4 sec;   INR: 0.97 ratio         PTT - ( 24 Aug 2021 09:01 )  PTT:32.0 sec        Imaging: ----------

## 2021-08-24 NOTE — ED PROVIDER NOTE - OBJECTIVE STATEMENT
Pt is a 59 yo male who presents to the ED with a cc of foot wound and need for admission. PMHx of HTN, HLD, diastolic dysfunction, BPH, LVH, depression, SM, erectile dysfunction, overactive bladder, gout. Per chart review pt began to experience pain in his right lateral foot with swelling a little over 1 month ago. Pt does have a h/o gout and jorge a changes to his foot. The pain to the ulnar aspect of his right foot began so severe he was having difficulty wearing shoes. His step daughter's wedding was coming up and he was unable to wear the shoes due to the pain and so he decided that he was going to cut the swollen region open to hopefully relieve the pressure. Pt attempted this on 8/3. Pt was then seen in the ED on 8/3 as the bleeding would not stop. The wound was treated with Xeroform pt Tetanus was updated and he was discharged on Augmentin. Pt reports that he completed this last Monday. He has been following up with wound care and was sent to the ED for admission, Pt is a 61 yo male who presents to the ED with a cc of foot wound and need for admission. PMHx of HTN, HLD, diastolic dysfunction, BPH, LVH, depression, SM, erectile dysfunction, overactive bladder, gout. Per chart review pt began to experience pain in his right lateral foot with swelling a little over 1 month ago. Pt does have a h/o gout and jorge a changes to his foot. The pain to the ulnar aspect of his right foot began so severe he was having difficulty wearing shoes. His step daughter's wedding was coming up and he was unable to wear the shoes due to the pain and so he decided that he was going to cut the swollen region open to hopefully relieve the pressure. Pt attempted this on 8/3. Pt was then seen in the ED on 8/3 as the bleeding would not stop. The wound was treated with Xeroform pt Tetanus was updated and he was discharged on Augmentin. Pt reports that he completed this last Monday. He has been following up with wound care and was sent to the ED for admission. Denies fever, chills, N/V, CP, SOB, abd pain.

## 2021-08-24 NOTE — CONSULT NOTE ADULT - NSCONSULTADDITIONALINFOA_GEN_ALL_CORE
I'll sign off at this time.   Thank you for the courtesy of this referral.    Abelardo Mckenzie MD  Attending Physician  [facility]  Division of Infectous Diseases  [phone]

## 2021-08-24 NOTE — H&P ADULT - HISTORY OF PRESENT ILLNESS
Mr Nova is a 61 yo M presenting with Right lateral foot wound and severe gout. PMH DM2, HTN, Left ventricular hypertrophy, Gout, Mood disorder  Patient seen and examined at bedside. He reports a Right lateral foot wound and callus which he cut open with a knife approximately 3 weeks ago. He was being followed by wound care center and completed a course of Augmentin. Now he presents to the ER sent by wound care center for possible foot surgery.   Patient does have R foot pain and multiple tophi. Denies purulent drainage from his foot.   Denies headaches, nausea, vomiting, chest pain, SOB, palpitations, abdominal pain, constipation, diarrhea, melena, hematochezia, dysuria.   Denies past hx of MI/CAD/CHF/TIA/CVA.

## 2021-08-24 NOTE — H&P ADULT - NSHPPHYSICALEXAM_GEN_ALL_CORE
T(C): 36.6 (08-24-21 @ 08:09), Max: 36.6 (08-24-21 @ 08:09)  HR: 68 (08-24-21 @ 08:09) (68 - 68)  BP: 145/96 (08-24-21 @ 08:09) (145/96 - 145/96)  RR: 18 (08-24-21 @ 08:09) (18 - 18)  SpO2: 99% (08-24-21 @ 08:09) (99% - 99%)  Wt(kg): --    Physical Exam:   GENERAL: well-groomed, well-developed, NAD  HEENT: head NC/AT; EOM intact, conjunctiva & sclera clear; hearing grossly intact, moist mucous membranes  NECK: supple, no JVD  RESPIRATORY: CTA B/L, no wheezing, rales, rhonchi or rubs  CARDIOVASCULAR: S1&S2, RRR, no murmurs or gallops  ABDOMEN: soft, non-tender, non-distended, + Bowel sounds x4 quadrants, no guarding, rebound or rigidity  MUSCULOSKELETAL:  no clubbing, cyanosis or edema of all 4 extremities  LYMPH: no cervical lymphadenopathy  VASCULAR: Radial pulses 2+ bilaterally, no varicose veins   SKIN: Decubitus ulcer lateral R 5th metararsal bone, severe gout on 5th, 1st metarsal and 2nd toe  NEUROLOGIC: AA&O X3, CN2-12 intact w/ no focal deficits, no sensory loss, motor Strength 5/5 in UE & LE B/L  Psych: Normal mood and affect, normal behavior

## 2021-08-24 NOTE — ED PROVIDER NOTE - NSICDXPASTMEDICALHX_GEN_ALL_CORE_FT
Ventolin inhaler    Last Written Prescription Date:  12/01/20  Last Fill Quantity: 1,  # refills: 0   Last office visit: Visit date not found with prescribing provider:  11/26/19   Future Office Visit:            
PAST MEDICAL HISTORY:  Anxiety and depression     C2 cervical fracture with fusion    Fall at work 03/2008    HTN (hypertension)     Kidney stones     Panic attacks

## 2021-08-24 NOTE — H&P ADULT - NSHPREVIEWOFSYSTEMS_GEN_ALL_CORE
CONSTITUTIONAL: denies fever, chills, fatigue, weakness  SKIN: see HPI  CARDIOVASCULAR: denies chest pain, chest pressure, palpitations  RESPIRATORY: denies shortness of breath, sputum production  GASTROINTESTINAL: denies nausea, vomiting, diarrhea, abdominal pain  GENITOURINARY: denies dysuria  NEUROLOGICAL: denies numbness, headache, focal weakness  MUSCULOSKELETAL: denies new joint pain, muscle aches  HEMATOLOGIC: denies gross bleeding, bruising  LYMPHATICS: denies enlarged lymph nodes, extremity swelling

## 2021-08-25 ENCOUNTER — RESULT REVIEW (OUTPATIENT)
Age: 61
End: 2021-08-25

## 2021-08-25 DIAGNOSIS — M10.9 GOUT, UNSPECIFIED: ICD-10-CM

## 2021-08-25 LAB
A1C WITH ESTIMATED AVERAGE GLUCOSE RESULT: 10.1 % — HIGH (ref 4–5.6)
ANION GAP SERPL CALC-SCNC: 7 MMOL/L — SIGNIFICANT CHANGE UP (ref 5–17)
APPEARANCE UR: CLEAR — SIGNIFICANT CHANGE UP
BASOPHILS # BLD AUTO: 0.05 K/UL — SIGNIFICANT CHANGE UP (ref 0–0.2)
BASOPHILS NFR BLD AUTO: 0.8 % — SIGNIFICANT CHANGE UP (ref 0–2)
BILIRUB UR-MCNC: NEGATIVE — SIGNIFICANT CHANGE UP
BUN SERPL-MCNC: 16 MG/DL — SIGNIFICANT CHANGE UP (ref 7–23)
CALCIUM SERPL-MCNC: 8.6 MG/DL — SIGNIFICANT CHANGE UP (ref 8.5–10.1)
CHLORIDE SERPL-SCNC: 106 MMOL/L — SIGNIFICANT CHANGE UP (ref 96–108)
CO2 SERPL-SCNC: 29 MMOL/L — SIGNIFICANT CHANGE UP (ref 22–31)
COLOR SPEC: YELLOW — SIGNIFICANT CHANGE UP
COVID-19 SPIKE DOMAIN AB INTERP: POSITIVE
COVID-19 SPIKE DOMAIN ANTIBODY RESULT: >250 U/ML — HIGH
CREAT SERPL-MCNC: 1 MG/DL — SIGNIFICANT CHANGE UP (ref 0.5–1.3)
DIFF PNL FLD: NEGATIVE — SIGNIFICANT CHANGE UP
EOSINOPHIL # BLD AUTO: 0.15 K/UL — SIGNIFICANT CHANGE UP (ref 0–0.5)
EOSINOPHIL NFR BLD AUTO: 2.4 % — SIGNIFICANT CHANGE UP (ref 0–6)
ESTIMATED AVERAGE GLUCOSE: 243 MG/DL — HIGH (ref 68–114)
GLUCOSE SERPL-MCNC: 178 MG/DL — HIGH (ref 70–99)
GLUCOSE UR QL: 250
HCT VFR BLD CALC: 42.1 % — SIGNIFICANT CHANGE UP (ref 39–50)
HCV AB S/CO SERPL IA: 0.09 S/CO — SIGNIFICANT CHANGE UP (ref 0–0.99)
HCV AB SERPL-IMP: SIGNIFICANT CHANGE UP
HGB BLD-MCNC: 14.9 G/DL — SIGNIFICANT CHANGE UP (ref 13–17)
IMM GRANULOCYTES NFR BLD AUTO: 0.5 % — SIGNIFICANT CHANGE UP (ref 0–1.5)
KETONES UR-MCNC: NEGATIVE — SIGNIFICANT CHANGE UP
LEUKOCYTE ESTERASE UR-ACNC: NEGATIVE — SIGNIFICANT CHANGE UP
LYMPHOCYTES # BLD AUTO: 1.66 K/UL — SIGNIFICANT CHANGE UP (ref 1–3.3)
LYMPHOCYTES # BLD AUTO: 26.3 % — SIGNIFICANT CHANGE UP (ref 13–44)
MCHC RBC-ENTMCNC: 30.7 PG — SIGNIFICANT CHANGE UP (ref 27–34)
MCHC RBC-ENTMCNC: 35.4 GM/DL — SIGNIFICANT CHANGE UP (ref 32–36)
MCV RBC AUTO: 86.8 FL — SIGNIFICANT CHANGE UP (ref 80–100)
MONOCYTES # BLD AUTO: 0.5 K/UL — SIGNIFICANT CHANGE UP (ref 0–0.9)
MONOCYTES NFR BLD AUTO: 7.9 % — SIGNIFICANT CHANGE UP (ref 2–14)
NEUTROPHILS # BLD AUTO: 3.92 K/UL — SIGNIFICANT CHANGE UP (ref 1.8–7.4)
NEUTROPHILS NFR BLD AUTO: 62.1 % — SIGNIFICANT CHANGE UP (ref 43–77)
NIGHT BLUE STAIN TISS: SIGNIFICANT CHANGE UP
NITRITE UR-MCNC: NEGATIVE — SIGNIFICANT CHANGE UP
NRBC # BLD: 0 /100 WBCS — SIGNIFICANT CHANGE UP (ref 0–0)
PH UR: 5 — SIGNIFICANT CHANGE UP (ref 5–8)
PLATELET # BLD AUTO: 189 K/UL — SIGNIFICANT CHANGE UP (ref 150–400)
POTASSIUM SERPL-MCNC: 4.4 MMOL/L — SIGNIFICANT CHANGE UP (ref 3.5–5.3)
POTASSIUM SERPL-SCNC: 4.4 MMOL/L — SIGNIFICANT CHANGE UP (ref 3.5–5.3)
PROT UR-MCNC: 15
RBC # BLD: 4.85 M/UL — SIGNIFICANT CHANGE UP (ref 4.2–5.8)
RBC # FLD: 12.3 % — SIGNIFICANT CHANGE UP (ref 10.3–14.5)
SARS-COV-2 IGG+IGM SERPL QL IA: >250 U/ML — HIGH
SARS-COV-2 IGG+IGM SERPL QL IA: POSITIVE
SODIUM SERPL-SCNC: 142 MMOL/L — SIGNIFICANT CHANGE UP (ref 135–145)
SP GR SPEC: 1.01 — SIGNIFICANT CHANGE UP (ref 1.01–1.02)
SPECIMEN SOURCE: SIGNIFICANT CHANGE UP
URATE SERPL-MCNC: 6.2 MG/DL — SIGNIFICANT CHANGE UP (ref 3.4–8.8)
UROBILINOGEN FLD QL: NEGATIVE — SIGNIFICANT CHANGE UP
WBC # BLD: 6.31 K/UL — SIGNIFICANT CHANGE UP (ref 3.8–10.5)
WBC # FLD AUTO: 6.31 K/UL — SIGNIFICANT CHANGE UP (ref 3.8–10.5)

## 2021-08-25 PROCEDURE — 88311 DECALCIFY TISSUE: CPT | Mod: 26

## 2021-08-25 PROCEDURE — 88305 TISSUE EXAM BY PATHOLOGIST: CPT | Mod: 26

## 2021-08-25 PROCEDURE — 99233 SBSQ HOSP IP/OBS HIGH 50: CPT

## 2021-08-25 PROCEDURE — 73630 X-RAY EXAM OF FOOT: CPT | Mod: 26,RT

## 2021-08-25 PROCEDURE — 28292 COR HLX VLGS RSC PRX PHLX BS: CPT | Mod: T5

## 2021-08-25 PROCEDURE — 88304 TISSUE EXAM BY PATHOLOGIST: CPT | Mod: 26

## 2021-08-25 PROCEDURE — 28122 PARTIAL REMOVAL OF FOOT BONE: CPT | Mod: RT

## 2021-08-25 PROCEDURE — 28285 REPAIR OF HAMMERTOE: CPT | Mod: T6

## 2021-08-25 RX ORDER — HYDROMORPHONE HYDROCHLORIDE 2 MG/ML
0.5 INJECTION INTRAMUSCULAR; INTRAVENOUS; SUBCUTANEOUS
Refills: 0 | Status: DISCONTINUED | OUTPATIENT
Start: 2021-08-25 | End: 2021-08-25

## 2021-08-25 RX ORDER — INSULIN LISPRO 100/ML
VIAL (ML) SUBCUTANEOUS
Refills: 0 | Status: DISCONTINUED | OUTPATIENT
Start: 2021-08-25 | End: 2021-08-26

## 2021-08-25 RX ORDER — DEXTROSE 50 % IN WATER 50 %
25 SYRINGE (ML) INTRAVENOUS ONCE
Refills: 0 | Status: DISCONTINUED | OUTPATIENT
Start: 2021-08-25 | End: 2021-08-27

## 2021-08-25 RX ORDER — GLUCAGON INJECTION, SOLUTION 0.5 MG/.1ML
1 INJECTION, SOLUTION SUBCUTANEOUS ONCE
Refills: 0 | Status: DISCONTINUED | OUTPATIENT
Start: 2021-08-25 | End: 2021-08-27

## 2021-08-25 RX ORDER — LAMOTRIGINE 25 MG/1
150 TABLET, ORALLY DISINTEGRATING ORAL
Refills: 0 | Status: DISCONTINUED | OUTPATIENT
Start: 2021-08-25 | End: 2021-08-27

## 2021-08-25 RX ORDER — DEXTROSE 50 % IN WATER 50 %
12.5 SYRINGE (ML) INTRAVENOUS ONCE
Refills: 0 | Status: DISCONTINUED | OUTPATIENT
Start: 2021-08-25 | End: 2021-08-27

## 2021-08-25 RX ORDER — DEXTROSE 50 % IN WATER 50 %
15 SYRINGE (ML) INTRAVENOUS ONCE
Refills: 0 | Status: DISCONTINUED | OUTPATIENT
Start: 2021-08-25 | End: 2021-08-27

## 2021-08-25 RX ORDER — LOSARTAN POTASSIUM 100 MG/1
100 TABLET, FILM COATED ORAL DAILY
Refills: 0 | Status: DISCONTINUED | OUTPATIENT
Start: 2021-08-25 | End: 2021-08-27

## 2021-08-25 RX ORDER — ONDANSETRON 8 MG/1
4 TABLET, FILM COATED ORAL ONCE
Refills: 0 | Status: DISCONTINUED | OUTPATIENT
Start: 2021-08-25 | End: 2021-08-25

## 2021-08-25 RX ORDER — DULOXETINE HYDROCHLORIDE 30 MG/1
60 CAPSULE, DELAYED RELEASE ORAL DAILY
Refills: 0 | Status: DISCONTINUED | OUTPATIENT
Start: 2021-08-25 | End: 2021-08-27

## 2021-08-25 RX ORDER — ACETAMINOPHEN 500 MG
1000 TABLET ORAL ONCE
Refills: 0 | Status: DISCONTINUED | OUTPATIENT
Start: 2021-08-25 | End: 2021-08-25

## 2021-08-25 RX ORDER — SODIUM CHLORIDE 9 MG/ML
1000 INJECTION, SOLUTION INTRAVENOUS
Refills: 0 | Status: DISCONTINUED | OUTPATIENT
Start: 2021-08-25 | End: 2021-08-25

## 2021-08-25 RX ORDER — INSULIN LISPRO 100/ML
VIAL (ML) SUBCUTANEOUS AT BEDTIME
Refills: 0 | Status: DISCONTINUED | OUTPATIENT
Start: 2021-08-25 | End: 2021-08-26

## 2021-08-25 RX ADMIN — Medication 4: at 22:52

## 2021-08-25 RX ADMIN — DULOXETINE HYDROCHLORIDE 60 MILLIGRAM(S): 30 CAPSULE, DELAYED RELEASE ORAL at 17:24

## 2021-08-25 RX ADMIN — LAMOTRIGINE 150 MILLIGRAM(S): 25 TABLET, ORALLY DISINTEGRATING ORAL at 17:25

## 2021-08-25 RX ADMIN — AMLODIPINE BESYLATE 5 MILLIGRAM(S): 2.5 TABLET ORAL at 05:20

## 2021-08-25 RX ADMIN — LOSARTAN POTASSIUM 100 MILLIGRAM(S): 100 TABLET, FILM COATED ORAL at 05:20

## 2021-08-25 RX ADMIN — LAMOTRIGINE 150 MILLIGRAM(S): 25 TABLET, ORALLY DISINTEGRATING ORAL at 05:20

## 2021-08-25 RX ADMIN — SODIUM CHLORIDE 75 MILLILITER(S): 9 INJECTION, SOLUTION INTRAVENOUS at 17:00

## 2021-08-25 RX ADMIN — Medication 2: at 08:02

## 2021-08-25 NOTE — BRIEF OPERATIVE NOTE - COMMENTS
patient tolerated the procedure and anesthesia well and was transferred to the PACU with vital signs stable and neurovascular status intact to the right foot. patient will be transferred to floor when stable per anesthesia

## 2021-08-25 NOTE — PROGRESS NOTE ADULT - ASSESSMENT
Mr Avtar is a 59 yo M presenting with Right lateral foot wound and severe gout. PMH DM2, HTN, Left ventricular hypertrophy, Gout, Mood disorder

## 2021-08-25 NOTE — PROVIDER CONTACT NOTE (OTHER) - BACKGROUND
Most previous episode of diarrhea was watery, nonbloody, prior episodes featured a more loose consistency.

## 2021-08-25 NOTE — PROGRESS NOTE ADULT - ATTENDING COMMENTS
Patient seen and examined at bedside. case discussed duringrounds    Patient reports feeling well no active complaints. npo for surgery  Noted X tophi on feet  ID consult noted  Renal cx for assistance In uric acid management  plan:  for surgery today  start steroids colchicine for gout  followup uric acid  no allopurinol for now to risk gout flare  no NSAIDS while on steroids

## 2021-08-25 NOTE — CONSULT NOTE ADULT - SUBJECTIVE AND OBJECTIVE BOX
Patient is a 60y old  Male who presents with a chief complaint of Right Foot wound and Tophi (25 Aug 2021 10:51)    HPI:  Mr Nova is a 59 yo M presenting with Right lateral foot wound and severe gout. PMH DM2, HTN, Left ventricular hypertrophy, Gout, Mood disorder  Patient seen and examined at bedside. He reports a Right lateral foot wound and callus which he cut open with a knife approximately 3 weeks ago. He was being followed by wound care center and completed a course of Augmentin. Now he presents to the ER sent by wound care center for possible foot surgery.   Patient does have R foot pain and multiple tophi. Denies purulent drainage from his foot.   Denies headaches, nausea, vomiting, chest pain, SOB, palpitations, abdominal pain, constipation, diarrhea, melena, hematochezia, dysuria.   Denies past hx of MI/CAD/CHF/TIA/CVA.  (24 Aug 2021 12:00)    Renal consult called for Tophaceous gout and Hypertension. Pt was not seen by rheumatologist as out pt. + h/o Nephrolithiasis.       PAST MEDICAL HISTORY:  HTN (hypertension)    Anxiety and depression    Panic attacks    Fall    C2 cervical fracture    Kidney stones        PAST SURGICAL HISTORY:  C2 cervical fracture    Hernia    Renal stone    Rotator cuff tear, left        FAMILY HISTORY:      SOCIAL HISTORY: smokes cigars, social alcohol use     Allergies    No Known Allergies    Intolerances      Home Medications:  amLODIPine 5 mg oral tablet: 1 tab(s) orally once a day (24 Aug 2021 12:40)  dextroamphetamine-amphetamine 20 mg oral tablet: 1 tab(s) orally 2 times a day (24 Aug 2021 12:40)  DULoxetine 60 mg oral delayed release capsule: 2 cap(s) orally once a day (in the morning) (24 Aug 2021 12:40)   mg oral tablet: 1 tab(s) orally 3 times a day, As Needed (24 Aug 2021 12:40)  lamoTRIgine 150 mg oral tablet: 1 tab(s) orally 2 times a day (24 Aug 2021 12:40)  losartan 100 mg oral tablet: 1 tab(s) orally once a day (24 Aug 2021 12:40)  metFORMIN 500 mg oral tablet: 2 tab(s) orally 2 times a day (24 Aug 2021 12:40)    MEDICATIONS  (STANDING):    MEDICATIONS  (PRN):      REVIEW OF SYSTEMS:  General: no distress  Respiratory: No cough, SOB  Cardiovascular: No CP or Palpitations	  Gastrointestinal: No nausea, Vomiting. No diarrhea  Genitourinary: No urinary complaints	  Musculoskeletal: No new rash or lesions		  all other systems negative    T(F): 98.4 (21 @ 13:34), Max: 98.4 (21 @ 00:38)  HR: 68 (21 @ 13:34) (61 - 68)  BP: 154/99 (21 @ 13:34) (143/80 - 156/89)  RR: 16 (21 @ 13:34) (16 - 18)  SpO2: 95% (21 @ 13:34) (94% - 96%)  Wt(kg): --    PHYSICAL EXAM:  General: NAD  Respiratory: b/l air entry  Cardiovascular: S1 S2  Gastrointestinal: soft  Extremities: edema, + tophii feet, rt elbow            142  |  106  |  16  ----------------------------<  178<H>  4.4   |  29  |  1.00    Ca    8.6      25 Aug 2021 08:02    TPro  7.6  /  Alb  3.7  /  TBili  0.6  /  DBili  x   /  AST  29  /  ALT  48  /  AlkPhos  79                            14.9   6.31  )-----------( 189      ( 25 Aug 2021 08:02 )             42.1       Blood Urea Nitrogen, Serum: 16 mg/dL ( @ 08:02)  Calcium, Total Serum: 8.6 mg/dL ( @ 08:02)  Potassium, Serum: 4.4 mmol/L ( @ 08:02)  Hemoglobin: 14.9 g/dL ( @ 08:02)      Creatinine, Serum: 1.00 ( @ 08:02)  Creatinine, Serum: 1.10 ( @ 09:01)      Urinalysis Basic - ( 25 Aug 2021 06:33 )    Color: Yellow / Appearance: Clear / S.015 / pH: x  Gluc: x / Ketone: Negative  / Bili: Negative / Urobili: Negative   Blood: x / Protein: 15 / Nitrite: Negative   Leuk Esterase: Negative / RBC: 0-2 /HPF / WBC 0-2   Sq Epi: x / Non Sq Epi: Negative / Bacteria: Occasional      LIVER FUNCTIONS - ( 24 Aug 2021 09:01 )  Alb: 3.7 g/dL / Pro: 7.6 g/dL / ALK PHOS: 79 U/L / ALT: 48 U/L / AST: 29 U/L / GGT: x                 I&O's Detail    24 Aug 2021 07:01  -  25 Aug 2021 07:00  --------------------------------------------------------  IN:  Total IN: 0 mL    OUT:    Voided (mL): 450 mL  Total OUT: 450 mL    Total NET: -450 mL            Culture - Blood (collected 24 Aug 2021 12:16)  Source: .Blood Blood-Peripheral  Preliminary Report (25 Aug 2021 13:02):    No growth to date.    Culture - Blood (collected 24 Aug 2021 12:16)  Source: .Blood Blood-Peripheral  Preliminary Report (25 Aug 2021 13:02):    No growth to date.      < from: Xray Chest 2 Views PA/Lat (21 @ 09:19) >    EXAM:  XR CHEST PA LAT 2V                            PROCEDURE DATE:  2021          INTERPRETATION:  INDICATION: Sepsis    PRIORS: 2008.    VIEWS: Frontal and lateral radiographs of the chest performed.    FINDINGS: Heart size appears within normal limits. No hilar or superior mediastinal abnormalities are identified.    There is no evidence for focal infiltrate, lobar consolidation or pulmonary edema. No pleural effusion or pneumothorax is demonstrated. No mediastinal shift is noted. Degenerative changes of the thoracic spine noted. Postsurgical changes of the left shoulder region noted. Chronic appearing deformity of the distal aspect of the right clavicle.    IMPRESSION: No evidence for focal infiltrate or lobar consolidation.    --- End of Report ---            NU WINTERS MD; Attending Radiologist  This document has been electronically signed. Aug 24 2021 10:33AM    < end of copied text >

## 2021-08-25 NOTE — PROGRESS NOTE ADULT - PROBLEM SELECTOR PLAN 2
-patient has a longstanding history of gout  -patient denies any past medication use for condition  -Patient has multiple large tophi on extremities  -put patient on steroids and colchicine for acute flare now  -f/u uric acid level today  -put patient on allopurinol on discharge  -f/u rheum outpatient, patient my be a candidate for krystexxa outpatient  -U/A- proteinuria, occasional bacteria, 250 glucose -patient has a longstanding history of gout  -patient denies any past medication use for condition  -Patient has multiple large tophi on extremities  -put patient on steroids and colchicine for acute flare now  -uric acid level today- 6.2  -consider allopurinol for uric acid and tophi maintenance  -f/u rheum outpatient, patient my be a candidate for krystexxa outpatient  -U/A- proteinuria, occasional bacteria, 250 glucose

## 2021-08-25 NOTE — BRIEF OPERATIVE NOTE - NSICDXBRIEFPROCEDURE_GEN_ALL_CORE_FT
PROCEDURES:  Ostectomy, metatarsal bone, partial 25-Aug-2021 16:20:18  Kristen Doyle  Resection, bone, metatarsal 25-Aug-2021 16:21:45  Kristen Doyle  Arthroplasty, toe, PIP joint, for hammertoe 25-Aug-2021 16:22:08  Kristen Doyle

## 2021-08-25 NOTE — CHART NOTE - NSCHARTNOTEFT_GEN_A_CORE
Called by RN for Pt c/o diarrhea. As per patient, patient has been dealing with diarrhea for a long time. Most previous episode of diarrhea was watery, nonbloody, prior episodes featured a more loose consistency. Patient specifically described stools as progressively getting waterier. Patient seen and examined at bedside. Denies chest pain, shortness of breath, abdominal pain, dizziness, headache, or nausea/vomiting        T(C): 36.6 (08-24-21 @ 21:35), Max: 36.6 (08-24-21 @ 08:09)  HR: 63 (08-24-21 @ 21:35) (63 - 90)  BP: 143/80 (08-24-21 @ 21:35) (143/80 - 158/88)  RR: 18 (08-24-21 @ 21:35) (16 - 19)  SpO2: 95% (08-24-21 @ 21:35) (94% - 99%)  Wt(kg): --    Physical Exam:  Gen: well appearing, NAD  HEENT: NCAT  Cardio: regular rate and rhythm, +s1s2, no murmurs, rubs, or gallops  Pulm: CTA b/l, no wheezes, rales or rhonchi  Abdomen: soft, nontender, nondistended, +BS x4 quadrants, no guarding  Extremities: no clubbing, cyanosis or edema, +2 pedal pulses  Neuro: AAOx3  Skin: warm and dry    Assessment/Plan  59 yo M presenting with Right lateral foot wound and severe gout. PMH DM2, HTN, Left ventricular hypertrophy, Gout, Mood disorder    1) Chronic Diarrhea    - Most recent episode was watery, prior episodes were more loose, becoming more watery with each new episode  - Patient had previously completed a course of IV zosyn and IV vancomycin   - Patient's most recent white count was normal, afebrile   - Patient at this time does not meet criteria for C.diff colitis, as he has only had one episode of truly watery stool   - Will give Imodium 2mg   - Will continue to monitor, RN to call with any changes
Called by RN for blood coming from patients dressing.   Pt is s/p partial 1st met head resection,2nd digit arthroplasty and 5th metatarsal base resection earlier today. Pt reports pain is well controlled and he would prefer to not have any intervention done at this time. States the 2nd digit was originally pale white, now is blue however the blue is improving from earlier.   As per EMR, podiatry aware of dusky toe color, reports normal after surgery.    On exam blood drained from 2nd toe, soaking some distal portions of bandage and leaked onto oliva on bed. Pulses unable to palpate given dressing. Bleeding appears to be hemostatic, blood appears dried.   Call out to podiatry, no answer, message left. RN also called out to podiatry. Awaiting call back. No urgent intervention at this time. Pt appears hemodynamically stable, refusing intervention, prefers podiatry reassess in the morning.

## 2021-08-25 NOTE — PROGRESS NOTE ADULT - SUBJECTIVE AND OBJECTIVE BOX
Patient is a 60y old  Male who presents with a chief complaint of Right Foot wound and Tophi (24 Aug 2021 12:26)      INTERVAL HPI/OVERNIGHT EVENTS: Patient seen and examined at bedside. No overnight events occurred. Patient has no acute complaints at bedside. Patient stated that he has new tophi growth occurring on his left foot in addition to the current tophi on the right. Patient denied any uric acid reducing medication use before, he stated he was not aware that they could've prevented the tophi growth. Patient is scheduled for a 5th metatarsal base resection and 1st metatarsal head resection and 2nd digit arthroplasty on the R foot today with podiatry. Patient denies chest pain, sob, n/v/d/c, dysuria. Patient states he has one loose BM last night but that has since resolved.    MEDICATIONS  (STANDING):  amLODIPine   Tablet 5 milliGRAM(s) Oral daily  dextrose 40% Gel 15 Gram(s) Oral once  dextrose 5%. 1000 milliLiter(s) (50 mL/Hr) IV Continuous <Continuous>  dextrose 5%. 1000 milliLiter(s) (100 mL/Hr) IV Continuous <Continuous>  dextrose 50% Injectable 25 Gram(s) IV Push once  dextrose 50% Injectable 12.5 Gram(s) IV Push once  dextrose 50% Injectable 25 Gram(s) IV Push once  DULoxetine 60 milliGRAM(s) Oral daily  glucagon  Injectable 1 milliGRAM(s) IntraMuscular once  insulin lispro (ADMELOG) corrective regimen sliding scale   SubCutaneous three times a day before meals  insulin lispro (ADMELOG) corrective regimen sliding scale   SubCutaneous at bedtime  lamoTRIgine 150 milliGRAM(s) Oral two times a day  losartan 100 milliGRAM(s) Oral daily    MEDICATIONS  (PRN):  acetaminophen   Tablet .. 650 milliGRAM(s) Oral every 6 hours PRN Temp greater or equal to 38.5C (101.3F), Mild Pain (1 - 3)      Allergies    No Known Allergies    Intolerances        REVIEW OF SYSTEMS:  CONSTITUTIONAL: No fever or chills  RESPIRATORY: No shortness of breath  CARDIOVASCULAR: No chest pain  GASTROINTESTINAL: No abd pain, nausea, vomiting, or diarrhea  GENITOURINARY: No dysuria, frequency, or hematuria    Vital Signs Last 24 Hrs  T(C): 36.2 (25 Aug 2021 06:14), Max: 36.6 (24 Aug 2021 15:00)  T(F): 97.2 (25 Aug 2021 06:14), Max: 97.9 (24 Aug 2021 15:00)  HR: 61 (25 Aug 2021 06:14) (61 - 90)  BP: 156/89 (25 Aug 2021 06:14) (143/80 - 158/88)  BP(mean): --  RR: 18 (25 Aug 2021 06:14) (16 - 19)  SpO2: 95% (25 Aug 2021 06:14) (94% - 99%)    PHYSICAL EXAM:  GENERAL: NAD  HEENT:  anicteric, moist mucous membranes  CHEST/LUNG:  CTA b/l, no rales, wheezes, or rhonchi  HEART:  RRR, S1, S2  ABDOMEN:  BS+, soft, nontender, nondistended  EXTREMITIES: no edema, cyanosis, or calf tenderness. Multiple tophi present on the R foot, bandaged. New tophus growth on the L foot.  NERVOUS SYSTEM: answers questions and follows commands appropriately    LABS:                        14.9   6.31  )-----------( 189      ( 25 Aug 2021 08:02 )             42.1     CBC Full  -  ( 25 Aug 2021 08:02 )  WBC Count : 6.31 K/uL  Hemoglobin : 14.9 g/dL  Hematocrit : 42.1 %  Platelet Count - Automated : 189 K/uL  Mean Cell Volume : 86.8 fl  Mean Cell Hemoglobin : 30.7 pg  Mean Cell Hemoglobin Concentration : 35.4 gm/dL  Auto Neutrophil # : 3.92 K/uL  Auto Lymphocyte # : 1.66 K/uL  Auto Monocyte # : 0.50 K/uL  Auto Eosinophil # : 0.15 K/uL  Auto Basophil # : 0.05 K/uL  Auto Neutrophil % : 62.1 %  Auto Lymphocyte % : 26.3 %  Auto Monocyte % : 7.9 %  Auto Eosinophil % : 2.4 %  Auto Basophil % : 0.8 %    25 Aug 2021 08:02    142    |  106    |  16     ----------------------------<  178    4.4     |  29     |  1.00     Ca    8.6        25 Aug 2021 08:02      PT/INR - ( 24 Aug 2021 09:01 )   PT: 11.4 sec;   INR: 0.97 ratio         PTT - ( 24 Aug 2021 09:01 )  PTT:32.0 sec  Urinalysis Basic - ( 25 Aug 2021 06:33 )    Color: Yellow / Appearance: Clear / S.015 / pH: x  Gluc: x / Ketone: Negative  / Bili: Negative / Urobili: Negative   Blood: x / Protein: 15 / Nitrite: Negative   Leuk Esterase: Negative / RBC: 0-2 /HPF / WBC 0-2   Sq Epi: x / Non Sq Epi: Negative / Bacteria: Occasional      CAPILLARY BLOOD GLUCOSE      POCT Blood Glucose.: 201 mg/dL (25 Aug 2021 07:52)  POCT Blood Glucose.: 150 mg/dL (24 Aug 2021 21:53)  POCT Blood Glucose.: 272 mg/dL (24 Aug 2021 17:00)          RADIOLOGY & ADDITIONAL TESTS:    Personally reviewed.     Consultant(s) Notes Reviewed:  [x] YES  [ ] NO

## 2021-08-25 NOTE — BRIEF OPERATIVE NOTE - OPERATION/FINDINGS
s/p s/p partial 1st met head resection,2nd digit arthroplasty and 5th metatarsal base resection     Copious amounts of gouty tophi and encapsulated gouty tophi

## 2021-08-26 LAB
ALBUMIN SERPL ELPH-MCNC: 3.3 G/DL — SIGNIFICANT CHANGE UP (ref 3.3–5)
ALP SERPL-CCNC: 76 U/L — SIGNIFICANT CHANGE UP (ref 40–120)
ALT FLD-CCNC: 42 U/L — SIGNIFICANT CHANGE UP (ref 12–78)
ANION GAP SERPL CALC-SCNC: 11 MMOL/L — SIGNIFICANT CHANGE UP (ref 5–17)
AST SERPL-CCNC: 21 U/L — SIGNIFICANT CHANGE UP (ref 15–37)
BASOPHILS # BLD AUTO: 0.02 K/UL — SIGNIFICANT CHANGE UP (ref 0–0.2)
BASOPHILS NFR BLD AUTO: 0.2 % — SIGNIFICANT CHANGE UP (ref 0–2)
BILIRUB SERPL-MCNC: 0.6 MG/DL — SIGNIFICANT CHANGE UP (ref 0.2–1.2)
BUN SERPL-MCNC: 25 MG/DL — HIGH (ref 7–23)
CALCIUM SERPL-MCNC: 8.5 MG/DL — SIGNIFICANT CHANGE UP (ref 8.5–10.1)
CHLORIDE SERPL-SCNC: 102 MMOL/L — SIGNIFICANT CHANGE UP (ref 96–108)
CO2 SERPL-SCNC: 26 MMOL/L — SIGNIFICANT CHANGE UP (ref 22–31)
CREAT SERPL-MCNC: 1.1 MG/DL — SIGNIFICANT CHANGE UP (ref 0.5–1.3)
CULTURE RESULTS: NO GROWTH — SIGNIFICANT CHANGE UP
EOSINOPHIL # BLD AUTO: 0 K/UL — SIGNIFICANT CHANGE UP (ref 0–0.5)
EOSINOPHIL NFR BLD AUTO: 0 % — SIGNIFICANT CHANGE UP (ref 0–6)
GLUCOSE SERPL-MCNC: 246 MG/DL — HIGH (ref 70–99)
HCT VFR BLD CALC: 39.1 % — SIGNIFICANT CHANGE UP (ref 39–50)
HGB BLD-MCNC: 14 G/DL — SIGNIFICANT CHANGE UP (ref 13–17)
IMM GRANULOCYTES NFR BLD AUTO: 0.7 % — SIGNIFICANT CHANGE UP (ref 0–1.5)
LYMPHOCYTES # BLD AUTO: 1.03 K/UL — SIGNIFICANT CHANGE UP (ref 1–3.3)
LYMPHOCYTES # BLD AUTO: 8.5 % — LOW (ref 13–44)
MCHC RBC-ENTMCNC: 30.3 PG — SIGNIFICANT CHANGE UP (ref 27–34)
MCHC RBC-ENTMCNC: 35.8 GM/DL — SIGNIFICANT CHANGE UP (ref 32–36)
MCV RBC AUTO: 84.6 FL — SIGNIFICANT CHANGE UP (ref 80–100)
MONOCYTES # BLD AUTO: 0.57 K/UL — SIGNIFICANT CHANGE UP (ref 0–0.9)
MONOCYTES NFR BLD AUTO: 4.7 % — SIGNIFICANT CHANGE UP (ref 2–14)
NEUTROPHILS # BLD AUTO: 10.43 K/UL — HIGH (ref 1.8–7.4)
NEUTROPHILS NFR BLD AUTO: 85.9 % — HIGH (ref 43–77)
NRBC # BLD: 0 /100 WBCS — SIGNIFICANT CHANGE UP (ref 0–0)
PLATELET # BLD AUTO: 227 K/UL — SIGNIFICANT CHANGE UP (ref 150–400)
POTASSIUM SERPL-MCNC: 3.7 MMOL/L — SIGNIFICANT CHANGE UP (ref 3.5–5.3)
POTASSIUM SERPL-SCNC: 3.7 MMOL/L — SIGNIFICANT CHANGE UP (ref 3.5–5.3)
PROT SERPL-MCNC: 7.1 G/DL — SIGNIFICANT CHANGE UP (ref 6–8.3)
RBC # BLD: 4.62 M/UL — SIGNIFICANT CHANGE UP (ref 4.2–5.8)
RBC # FLD: 11.9 % — SIGNIFICANT CHANGE UP (ref 10.3–14.5)
SODIUM SERPL-SCNC: 139 MMOL/L — SIGNIFICANT CHANGE UP (ref 135–145)
SPECIMEN SOURCE: SIGNIFICANT CHANGE UP
WBC # BLD: 12.13 K/UL — HIGH (ref 3.8–10.5)
WBC # FLD AUTO: 12.13 K/UL — HIGH (ref 3.8–10.5)

## 2021-08-26 PROCEDURE — 99024 POSTOP FOLLOW-UP VISIT: CPT

## 2021-08-26 PROCEDURE — 99232 SBSQ HOSP IP/OBS MODERATE 35: CPT | Mod: GC

## 2021-08-26 RX ORDER — INSULIN LISPRO 100/ML
VIAL (ML) SUBCUTANEOUS AT BEDTIME
Refills: 0 | Status: DISCONTINUED | OUTPATIENT
Start: 2021-08-26 | End: 2021-08-27

## 2021-08-26 RX ORDER — INSULIN LISPRO 100/ML
VIAL (ML) SUBCUTANEOUS
Refills: 0 | Status: DISCONTINUED | OUTPATIENT
Start: 2021-08-26 | End: 2021-08-27

## 2021-08-26 RX ORDER — INSULIN LISPRO 100/ML
4 VIAL (ML) SUBCUTANEOUS
Refills: 0 | Status: DISCONTINUED | OUTPATIENT
Start: 2021-08-26 | End: 2021-08-27

## 2021-08-26 RX ORDER — ENOXAPARIN SODIUM 100 MG/ML
40 INJECTION SUBCUTANEOUS DAILY
Refills: 0 | Status: DISCONTINUED | OUTPATIENT
Start: 2021-08-26 | End: 2021-08-27

## 2021-08-26 RX ORDER — INSULIN GLARGINE 100 [IU]/ML
12 INJECTION, SOLUTION SUBCUTANEOUS AT BEDTIME
Refills: 0 | Status: DISCONTINUED | OUTPATIENT
Start: 2021-08-26 | End: 2021-08-27

## 2021-08-26 RX ORDER — ALLOPURINOL 300 MG
100 TABLET ORAL DAILY
Refills: 0 | Status: DISCONTINUED | OUTPATIENT
Start: 2021-08-26 | End: 2021-08-27

## 2021-08-26 RX ORDER — AMLODIPINE BESYLATE 2.5 MG/1
5 TABLET ORAL AT BEDTIME
Refills: 0 | Status: DISCONTINUED | OUTPATIENT
Start: 2021-08-26 | End: 2021-08-27

## 2021-08-26 RX ADMIN — DULOXETINE HYDROCHLORIDE 60 MILLIGRAM(S): 30 CAPSULE, DELAYED RELEASE ORAL at 12:01

## 2021-08-26 RX ADMIN — Medication 100 MILLIGRAM(S): at 12:01

## 2021-08-26 RX ADMIN — ENOXAPARIN SODIUM 40 MILLIGRAM(S): 100 INJECTION SUBCUTANEOUS at 22:59

## 2021-08-26 RX ADMIN — INSULIN GLARGINE 12 UNIT(S): 100 INJECTION, SOLUTION SUBCUTANEOUS at 21:40

## 2021-08-26 RX ADMIN — AMLODIPINE BESYLATE 5 MILLIGRAM(S): 2.5 TABLET ORAL at 21:40

## 2021-08-26 RX ADMIN — Medication 4 UNIT(S): at 17:37

## 2021-08-26 RX ADMIN — LAMOTRIGINE 150 MILLIGRAM(S): 25 TABLET, ORALLY DISINTEGRATING ORAL at 21:40

## 2021-08-26 RX ADMIN — LOSARTAN POTASSIUM 100 MILLIGRAM(S): 100 TABLET, FILM COATED ORAL at 06:00

## 2021-08-26 RX ADMIN — LAMOTRIGINE 150 MILLIGRAM(S): 25 TABLET, ORALLY DISINTEGRATING ORAL at 09:42

## 2021-08-26 RX ADMIN — Medication 3: at 08:18

## 2021-08-26 RX ADMIN — Medication 2: at 12:17

## 2021-08-26 RX ADMIN — Medication 4: at 17:37

## 2021-08-26 RX ADMIN — Medication 4 UNIT(S): at 12:15

## 2021-08-26 NOTE — PROGRESS NOTE ADULT - ATTENDING COMMENTS
61 yo M presenting with Right lateral foot wound and severe gout. PMH DM2, HTN, Left ventricular hypertrophy, Gout, Mood disorder    Right foot multiple tophi 2/2 gout, s/p OR resection.  Pt to follow up with rheum as outpt, will coordinate best outpt regiment for control of gout symptoms.  Pt post-OR had 2nd digit cyanosis, but currently pt feelign well, with full sensation.  Pt on steroids for gout, blood sugars elevated and HbA1c at 10.1, Dr. Perlman/Nubia consulted, will f/u on recs.    Nik Santoyo, Attending Physician 61 yo M presenting with Right lateral foot wound and severe gout. PMH DM2, HTN, Left ventricular hypertrophy, Gout, Mood disorder    Right foot multiple tophi 2/2 gout, s/p OR resection.  Pt to follow up with rheum as outpt, will coordinate best outpt regiment for control of gout symptoms.  Pt post-OR had 2nd digit cyanosis, but currently pt feeling well, with full sensation.  Pt on steroids for gout, blood sugars elevated and HbA1c at 10.1, Dr. Perlman/Nubia consulted, will f/u on recs.    Nik Santoyo, Attending Physician

## 2021-08-26 NOTE — PROVIDER CONTACT NOTE (OTHER) - ACTION/TREATMENT ORDERED:
MD made aware. Imodium ordered and administered. Will call with any further changes.
intern and housedr asessed the pt ,bleeding stopped.no orders

## 2021-08-26 NOTE — PROGRESS NOTE ADULT - PROBLEM SELECTOR PLAN 3
-persistently elevated glucose levels in 400s in hospital  -adding lantus 12 units qhs  -adding admelog 4 units 3x/day before meals  -change mod dose admelog scale coverage qac/qhs  -place on cont cons cho diet  -Hold home Metformin  -A1C- 10.1  -Endocrine--> Dr. Perlman following
Hold Metformin  -correctional scale insulin as ordered.   -A1C- 10.1  -glucose levels consistently high at hospital  -adjust his current insulin dosing once patient is back from surgery

## 2021-08-26 NOTE — PROGRESS NOTE ADULT - ASSESSMENT
Tophaceous Gout  Hypertension  Diabetes  h/o Nephrolithiasis    s/p foot surgery. Add allopurinol. Pt will likely benefit from krystexxa infusion as outpt and rheumatology evaluation as out pt. Pt given referral for Dr. Nice.  Monitor blood sugar levels. Insulin coverage as needed. Dietary restriction. Monitor BP trend. Titrate BP meds as needed. Salt restriction. Stable renal indices.   Podiatry follow up.  Tophaceous Gout  Hypertension  Diabetes  h/o Nephrolithiasis    s/p foot surgery. Add allopurinol. Pt will likely benefit from krystexxa infusion as outpt and rheumatology evaluation as out pt. Pt given referral for Dr. Nice.  Monitor blood sugar levels. Insulin coverage as needed. Dietary restriction. Lower losartan dose of BP trending low. Monitor BP trend. Titrate BP meds as needed. Salt restriction. Stable renal indices.   Podiatry follow up.

## 2021-08-26 NOTE — PROGRESS NOTE ADULT - SUBJECTIVE AND OBJECTIVE BOX
60y year old Male seen at [facility] 1EAS 104 W1 for s/p Right 5th metatarsal base resection, 1st met head resection and 2nd digit arthroplasty secondary to gout and to r/o OM with Dr. Beltran, OVI 8/25/2021. The patient had an incident of sanguineous strike through last night. The patient states that he feels fine today and not experiencing any pain. The patient states that he had some numbness along the 2nd digit yesterday but he states that he is able to feel along the area now. Denies any fever, chills, nausea, vomiting, chest pain, shortness of breath, or calf pain at this time.    Allergies    No Known Allergies    Intolerances        MEDICATIONS  (STANDING):  allopurinol 100 milliGRAM(s) Oral daily  dextrose 40% Gel 15 Gram(s) Oral once  dextrose 50% Injectable 25 Gram(s) IV Push once  dextrose 50% Injectable 25 Gram(s) IV Push once  dextrose 50% Injectable 12.5 Gram(s) IV Push once  DULoxetine 60 milliGRAM(s) Oral daily  glucagon  Injectable 1 milliGRAM(s) IntraMuscular once  insulin glargine Injectable (LANTUS) 12 Unit(s) SubCutaneous at bedtime  insulin lispro (ADMELOG) corrective regimen sliding scale   SubCutaneous three times a day before meals  insulin lispro (ADMELOG) corrective regimen sliding scale   SubCutaneous at bedtime  insulin lispro Injectable (ADMELOG) 4 Unit(s) SubCutaneous three times a day before meals  lamoTRIgine 150 milliGRAM(s) Oral two times a day  losartan 100 milliGRAM(s) Oral daily    MEDICATIONS  (PRN):      Vital Signs Last 24 Hrs  T(C): 37 (26 Aug 2021 13:30), Max: 37 (26 Aug 2021 13:30)  T(F): 98.6 (26 Aug 2021 13:30), Max: 98.6 (26 Aug 2021 13:30)  HR: 74 (26 Aug 2021 13:30) (59 - 81)  BP: 137/80 (26 Aug 2021 13:30) (91/55 - 148/86)  BP(mean): --  RR: 17 (26 Aug 2021 13:30) (13 - 17)  SpO2: 94% (26 Aug 2021 13:30) (92% - 100%)    PHYSICAL EXAM:  Vascular: DP and PT palpable b/l, residual post op edema and erythema along the surgical sites, cap refill less than 3 seconds,. cap refill along the right 2nd digit   Neurological: ----------  Musculoskeletal: ----------  Dermatological: ----------    CBC Full  -  ( 26 Aug 2021 08:09 )  WBC Count : 12.13 K/uL  RBC Count : 4.62 M/uL  Hemoglobin : 14.0 g/dL  Hematocrit : 39.1 %  Platelet Count - Automated : 227 K/uL  Mean Cell Volume : 84.6 fl  Mean Cell Hemoglobin : 30.3 pg  Mean Cell Hemoglobin Concentration : 35.8 gm/dL  Auto Neutrophil # : 10.43 K/uL  Auto Lymphocyte # : 1.03 K/uL  Auto Monocyte # : 0.57 K/uL  Auto Eosinophil # : 0.00 K/uL  Auto Basophil # : 0.02 K/uL  Auto Neutrophil % : 85.9 %  Auto Lymphocyte % : 8.5 %  Auto Monocyte % : 4.7 %  Auto Eosinophil % : 0.0 %  Auto Basophil % : 0.2 %      ----------CHEM PANEL----------          Culture - Acid Fast - Tissue w/Smear (collected 25 Aug 2021 18:59)  Source: .Tissue Other, bone culture bone metatarsal    Culture - Urine (collected 25 Aug 2021 09:01)  Source: Clean Catch Clean Catch (Midstream)  Final Report (26 Aug 2021 05:53):    No growth    Culture - Blood (collected 24 Aug 2021 12:16)  Source: .Blood Blood-Peripheral  Preliminary Report (25 Aug 2021 13:02):    No growth to date.    Culture - Blood (collected 24 Aug 2021 12:16)  Source: .Blood Blood-Peripheral  Preliminary Report (25 Aug 2021 13:02):    No growth to date.        Imaging: ----------   60y year old Male seen at [facility] 1EAS 104 W1 for s/p Right 5th metatarsal base resection, 1st partial metatarsal head resection and 2nd digit arthroplasty secondary to gout and to r/o OM with Dr. Beltran, DOS 8/25/2021. The patient had an incident of sanguineous strike through last night. The patient states that he feels fine today and not experiencing any pain. The patient states that he had some numbness along the 2nd digit yesterday but he states that he is able to feel along the area now. Denies any fever, chills, nausea, vomiting, chest pain, shortness of breath, or calf pain at this time.    Allergies    No Known Allergies    Intolerances        MEDICATIONS  (STANDING):  allopurinol 100 milliGRAM(s) Oral daily  dextrose 40% Gel 15 Gram(s) Oral once  dextrose 50% Injectable 25 Gram(s) IV Push once  dextrose 50% Injectable 25 Gram(s) IV Push once  dextrose 50% Injectable 12.5 Gram(s) IV Push once  DULoxetine 60 milliGRAM(s) Oral daily  glucagon  Injectable 1 milliGRAM(s) IntraMuscular once  insulin glargine Injectable (LANTUS) 12 Unit(s) SubCutaneous at bedtime  insulin lispro (ADMELOG) corrective regimen sliding scale   SubCutaneous three times a day before meals  insulin lispro (ADMELOG) corrective regimen sliding scale   SubCutaneous at bedtime  insulin lispro Injectable (ADMELOG) 4 Unit(s) SubCutaneous three times a day before meals  lamoTRIgine 150 milliGRAM(s) Oral two times a day  losartan 100 milliGRAM(s) Oral daily    MEDICATIONS  (PRN):      Vital Signs Last 24 Hrs  T(C): 37 (26 Aug 2021 13:30), Max: 37 (26 Aug 2021 13:30)  T(F): 98.6 (26 Aug 2021 13:30), Max: 98.6 (26 Aug 2021 13:30)  HR: 74 (26 Aug 2021 13:30) (59 - 81)  BP: 137/80 (26 Aug 2021 13:30) (91/55 - 148/86)  BP(mean): --  RR: 17 (26 Aug 2021 13:30) (13 - 17)  SpO2: 94% (26 Aug 2021 13:30) (92% - 100%)    PHYSICAL EXAM:  Vascular: DP and PT palpable b/l, residual post op edema and erythema along the surgical sites, cap refill less than 3 seconds,. cap refill along the right 2nd digit, sparse digital hair, skin temperature within normal limits, the right 2nd digit a little cold to the touch, 2nd digit slightly pale but upon examination, displays cap refill  Neurological: Diminished protective sensation b/l   Musculoskeletal: 5/5 muscle strength in all 4 quadrants b/l, no pain upon palpation along the surgical sites  Dermatological:   1. s/p Right 5th metatarsal base resection- sutures intact, skin well approximated, no wound dehiscence, minimal to non serous sanguineous drainage, residual edema and erythema   2. 1st partial metatarsal head resection- sutures intact, skin well approximated, no wound dehiscence, minimal to non serous sanguineous drainage, residual edema and erythema   3. 2nd digit arthroplasty-sutures intact, skin well approximated, no wound dehiscence, minimal to non serous sanguineous drainage, residual edema and erythema, cold to the touch compared to the other digits     CBC Full  -  ( 26 Aug 2021 08:09 )  WBC Count : 12.13 K/uL  RBC Count : 4.62 M/uL  Hemoglobin : 14.0 g/dL  Hematocrit : 39.1 %  Platelet Count - Automated : 227 K/uL  Mean Cell Volume : 84.6 fl  Mean Cell Hemoglobin : 30.3 pg  Mean Cell Hemoglobin Concentration : 35.8 gm/dL  Auto Neutrophil # : 10.43 K/uL  Auto Lymphocyte # : 1.03 K/uL  Auto Monocyte # : 0.57 K/uL  Auto Eosinophil # : 0.00 K/uL  Auto Basophil # : 0.02 K/uL  Auto Neutrophil % : 85.9 %  Auto Lymphocyte % : 8.5 %  Auto Monocyte % : 4.7 %  Auto Eosinophil % : 0.0 %  Auto Basophil % : 0.2 %      ----------CHEM PANEL----------          Culture - Acid Fast - Tissue w/Smear (collected 25 Aug 2021 18:59)  Source: .Tissue Other, bone culture bone metatarsal    Culture - Urine (collected 25 Aug 2021 09:01)  Source: Clean Catch Clean Catch (Midstream)  Final Report (26 Aug 2021 05:53):    No growth    Culture - Blood (collected 24 Aug 2021 12:16)  Source: .Blood Blood-Peripheral  Preliminary Report (25 Aug 2021 13:02):    No growth to date.    Culture - Blood (collected 24 Aug 2021 12:16)  Source: .Blood Blood-Peripheral  Preliminary Report (25 Aug 2021 13:02):    No growth to date.        Imaging: ----------

## 2021-08-26 NOTE — PROGRESS NOTE ADULT - SUBJECTIVE AND OBJECTIVE BOX
Patient is a 60y old  Male who presents with a chief complaint of Right Foot wound and Tophi (26 Aug 2021 10:48)      INTERVAL HPI/OVERNIGHT EVENTS: Patient seen and examined at bedside. Overnight the team was called because patients 2nd digit in the L foot was blueish and cool to the touch. Patient also reported to have decreased sensation at that time. Please see chart note for more information    Today at bedside, patient states that toe is looking better. Patient toe still blue and cool to touch but less intense than before. Patient also reports return of sensation to his 2nd digit. Podiatry contacted and made aware. Patient bandages have blood soaked through. Patient does not report pain at the surgical sites, patient denies chest pain, sob, n/v/d/c.     MEDICATIONS  (STANDING):  allopurinol 100 milliGRAM(s) Oral daily  dextrose 40% Gel 15 Gram(s) Oral once  dextrose 50% Injectable 25 Gram(s) IV Push once  dextrose 50% Injectable 25 Gram(s) IV Push once  dextrose 50% Injectable 12.5 Gram(s) IV Push once  DULoxetine 60 milliGRAM(s) Oral daily  glucagon  Injectable 1 milliGRAM(s) IntraMuscular once  insulin glargine Injectable (LANTUS) 12 Unit(s) SubCutaneous at bedtime  insulin lispro (ADMELOG) corrective regimen sliding scale   SubCutaneous three times a day before meals  insulin lispro (ADMELOG) corrective regimen sliding scale   SubCutaneous at bedtime  insulin lispro Injectable (ADMELOG) 4 Unit(s) SubCutaneous three times a day before meals  lamoTRIgine 150 milliGRAM(s) Oral two times a day  losartan 100 milliGRAM(s) Oral daily    MEDICATIONS  (PRN):      Allergies    No Known Allergies    Intolerances        REVIEW OF SYSTEMS:  RESPIRATORY: No cough, wheezing, or shortness of breath  CARDIOVASCULAR: No chest pain, palpitations  GASTROINTESTINAL: No abd pain, nausea, vomiting, or diarrhea  GENITOURINARY: No dysuria, frequency, or hematuria  NEUROLOGICAL: no focal weakness or dizziness  MUSCULOSKELETAL: no myalgias     Vital Signs Last 24 Hrs  T(C): 36.3 (26 Aug 2021 04:54), Max: 36.9 (25 Aug 2021 13:34)  T(F): 97.4 (26 Aug 2021 04:54), Max: 98.4 (25 Aug 2021 13:34)  HR: 69 (26 Aug 2021 04:54) (59 - 81)  BP: 148/86 (26 Aug 2021 04:54) (91/55 - 154/99)  BP(mean): --  RR: 17 (26 Aug 2021 04:54) (13 - 17)  SpO2: 92% (26 Aug 2021 04:54) (92% - 100%)    PHYSICAL EXAM:  GENERAL: NAD  HEENT:  anicteric, moist mucous membranes  CHEST/LUNG:  CTA b/l, no rales, wheezes, or rhonchi  HEART:  RRR, S1, S2  ABDOMEN:  BS+, soft, nontender, nondistended  EXTREMITIES: no edema or calf tenderness. Cyanosis present on the 2nd digit L foot. Cool to touch, sensation intact. Bloody bandages covering the L foot. Tophi present on R foot  NERVOUS SYSTEM: answers questions and follows commands appropriately    LABS:                        14.0   12.13 )-----------( 227      ( 26 Aug 2021 08:09 )             39.1     CBC Full  -  ( 26 Aug 2021 08:09 )  WBC Count : 12.13 K/uL  Hemoglobin : 14.0 g/dL  Hematocrit : 39.1 %  Platelet Count - Automated : 227 K/uL  Mean Cell Volume : 84.6 fl  Mean Cell Hemoglobin : 30.3 pg  Mean Cell Hemoglobin Concentration : 35.8 gm/dL  Auto Neutrophil # : 10.43 K/uL  Auto Lymphocyte # : 1.03 K/uL  Auto Monocyte # : 0.57 K/uL  Auto Eosinophil # : 0.00 K/uL  Auto Basophil # : 0.02 K/uL  Auto Neutrophil % : 85.9 %  Auto Lymphocyte % : 8.5 %  Auto Monocyte % : 4.7 %  Auto Eosinophil % : 0.0 %  Auto Basophil % : 0.2 %    26 Aug 2021 08:09    139    |  102    |  25     ----------------------------<  246    3.7     |  26     |  1.10     Ca    8.5        26 Aug 2021 08:09    TPro  7.1    /  Alb  3.3    /  TBili  0.6    /  DBili  x      /  AST  21     /  ALT  42     /  AlkPhos  76     26 Aug 2021 08:09      Urinalysis Basic - ( 25 Aug 2021 06:33 )    Color: Yellow / Appearance: Clear / S.015 / pH: x  Gluc: x / Ketone: Negative  / Bili: Negative / Urobili: Negative   Blood: x / Protein: 15 / Nitrite: Negative   Leuk Esterase: Negative / RBC: 0-2 /HPF / WBC 0-2   Sq Epi: x / Non Sq Epi: Negative / Bacteria: Occasional      CAPILLARY BLOOD GLUCOSE      POCT Blood Glucose.: 275 mg/dL (26 Aug 2021 07:43)  POCT Blood Glucose.: 440 mg/dL (25 Aug 2021 22:08)  POCT Blood Glucose.: 443 mg/dL (25 Aug 2021 21:55)  POCT Blood Glucose.: 153 mg/dL (25 Aug 2021 15:56)  POCT Blood Glucose.: 154 mg/dL (25 Aug 2021 11:42)        Culture - Acid Fast - Tissue w/Smear (collected 21 @ 18:59)  Source: .Tissue Other, bone culture bone metatarsal    Culture - Urine (collected 21 @ 09:01)  Source: Clean Catch Clean Catch (Midstream)  Final Report (21 @ 05:53):    No growth    Culture - Blood (collected 21 @ 12:16)  Source: .Blood Blood-Peripheral  Preliminary Report (21 @ 13:02):    No growth to date.    Culture - Blood (collected 21 @ 12:16)  Source: .Blood Blood-Peripheral  Preliminary Report (21 @ 13:02):    No growth to date.        RADIOLOGY & ADDITIONAL TESTS:    Personally reviewed.     Consultant(s) Notes Reviewed:  [x] YES  [ ] NO

## 2021-08-26 NOTE — PROGRESS NOTE ADULT - SUBJECTIVE AND OBJECTIVE BOX
Patient is a 60y old  Male who presents with a chief complaint of Right Foot wound and Tophi (25 Aug 2021 15:26)  Patient seen in follow up for tophaceous gout, hypertension.        PAST MEDICAL HISTORY:  HTN (hypertension)    Anxiety and depression    Panic attacks    Fall    C2 cervical fracture    Kidney stones      MEDICATIONS  (STANDING):  allopurinol 100 milliGRAM(s) Oral daily  dextrose 40% Gel 15 Gram(s) Oral once  dextrose 50% Injectable 25 Gram(s) IV Push once  dextrose 50% Injectable 25 Gram(s) IV Push once  dextrose 50% Injectable 12.5 Gram(s) IV Push once  DULoxetine 60 milliGRAM(s) Oral daily  glucagon  Injectable 1 milliGRAM(s) IntraMuscular once  insulin lispro (ADMELOG) corrective regimen sliding scale   SubCutaneous at bedtime  insulin lispro (ADMELOG) corrective regimen sliding scale   SubCutaneous three times a day before meals  lamoTRIgine 150 milliGRAM(s) Oral two times a day  losartan 100 milliGRAM(s) Oral daily    MEDICATIONS  (PRN):    T(C): 36.3 (21 @ 04:54), Max: 36.9 (21 @ 00:38)  HR: 69 (21 @ 04:54) (59 - 81)  BP: 148/86 (21 @ 04:54) (91/55 - 156/89)  RR: 17 (21 @ 04:54) (13 - 18)  SpO2: 92% (21 @ 04:54) (92% - 100%)  Wt(kg): --  I&O's Detail    25 Aug 2021 07:01  -  26 Aug 2021 07:00  --------------------------------------------------------  IN:    Lactated Ringers: 75 mL  Total IN: 75 mL    OUT:    Voided (mL): 700 mL  Total OUT: 700 mL    Total NET: -625 mL          PHYSICAL EXAM:  General: No distress  Respiratory: b/l air entry  Cardiovascular: S1 S2  Gastrointestinal: soft  Extremities:  no edema, + tophii                              14.0   12.13 )-----------( 227      ( 26 Aug 2021 08:09 )             39.1         139  |  102  |  25<H>  ----------------------------<  246<H>  3.7   |  26  |  1.10    Ca    8.5      26 Aug 2021 08:09    TPro  7.1  /  Alb  3.3  /  TBili  0.6  /  DBili  x   /  AST  21  /  ALT  42  /  AlkPhos  76          LIVER FUNCTIONS - ( 26 Aug 2021 08:09 )  Alb: 3.3 g/dL / Pro: 7.1 g/dL / ALK PHOS: 76 U/L / ALT: 42 U/L / AST: 21 U/L / GGT: x           Urinalysis Basic - ( 25 Aug 2021 06:33 )    Color: Yellow / Appearance: Clear / S.015 / pH: x  Gluc: x / Ketone: Negative  / Bili: Negative / Urobili: Negative   Blood: x / Protein: 15 / Nitrite: Negative   Leuk Esterase: Negative / RBC: 0-2 /HPF / WBC 0-2   Sq Epi: x / Non Sq Epi: Negative / Bacteria: Occasional        Sodium, Serum: 139 ( @ 08:09)  Sodium, Serum: 142 ( @ 08:02)  Sodium, Serum: 138 ( @ 09:01)    Creatinine, Serum: 1.10 ( @ 08:09)  Creatinine, Serum: 1.00 ( @ 08:02)  Creatinine, Serum: 1.10 ( @ 09:01)    Potassium, Serum: 3.7 ( @ 08:09)  Potassium, Serum: 4.4 ( @ 08:02)  Potassium, Serum: 4.1 ( @ 09:01)    Hemoglobin: 14.0 ( @ 08:09)  Hemoglobin: 14.9 ( @ 08:02)  Hemoglobin: 15.2 ( @ 09:01)

## 2021-08-26 NOTE — PROGRESS NOTE ADULT - PROBLEM SELECTOR PLAN 4
BP stable and at goal.   -continue Losartan and Norvasc
BP stable and at goal.   -continue Losartan and Norvasc

## 2021-08-26 NOTE — PROGRESS NOTE ADULT - PROBLEM SELECTOR PLAN 2
-patient has a longstanding history of gout and denies past medication use for condition  -Patient has multiple large tophi on extremities  -put patient on steroids and colchicine for acute flare now  -uric acid level- 6.2  -consider allopurinol for uric acid and tophi maintenance on d/c  -f/u rheum outpatient, patient my be a candidate for krystexxa outpatient  -U/A- proteinuria, occasional bacteria, 250 glucose

## 2021-08-26 NOTE — CONSULT NOTE ADULT - PROBLEM SELECTOR RECOMMENDATION 9
add lantus 12 units qhs  add admelog 4 units 3x/day before meals  change mod dose admelog scale coverage qac/qhs  cont cons cho diet  metformin on hold during hospitalization  goal bg 100-180 in hosp setting

## 2021-08-26 NOTE — PROGRESS NOTE ADULT - ASSESSMENT
S/p Right 5th metatarsal base resection, 1st partial metatarsal head resection and 2nd digit arthroplasty secondary to gout and to r/o OM with Dr. Beltran, DOS 8/25/2021    2nd digit displays some vascular embarrassment which is why the area appears somewhat pale to the touch but displaying cap refill upon examination     Surgical sites dressed with adaptic and DSD, very loosely    Instructed the patient to keep right foot in the dependent position for an hour every 2 hours     Will continue to closely monitor the right 2nd digit

## 2021-08-26 NOTE — PHYSICAL THERAPY INITIAL EVALUATION ADULT - RANGE OF MOTION EXAMINATION, REHAB EVAL
R foot dressing intact, some drainage noted. RN made aware./no ROM deficits were identified/deficits as listed below

## 2021-08-26 NOTE — CONSULT NOTE ADULT - SUBJECTIVE AND OBJECTIVE BOX
Patient is a 60y old  Male who presents with a chief complaint of Right Foot wound and Tophi (25 Aug 2021 15:26)      Reason For Consult: dm2 uncontrolled    HPI:  Mr Avtar is a 61 yo M presenting with Right lateral foot wound and severe gout. PMH DM2, HTN, Left ventricular hypertrophy, Gout, Mood disorder  Patient seen and examined at bedside. He reports a Right lateral foot wound and callus which he cut open with a knife approximately 3 weeks ago. He was being followed by wound care center and completed a course of Augmentin. Now he presents to the ER sent by wound care center for possible foot surgery.   Patient does have R foot pain and multiple tophi. Denies purulent drainage from his foot.   Denies headaches, nausea, vomiting, chest pain, SOB, palpitations, abdominal pain, constipation, diarrhea, melena, hematochezia, dysuria.   Denies past hx of MI/CAD/CHF/TIA/CVA.  (24 Aug 2021 12:00)      PAST MEDICAL & SURGICAL HISTORY:  HTN (hypertension)    Anxiety and depression    Panic attacks    Fall  at work 03/2008    C2 cervical fracture  with fusion    Kidney stones    C2 cervical fracture  3/2008    Hernia  in Infancy    Renal stone  removed -by ? blasting    Rotator cuff tear, left        FAMILY HISTORY:        Social History:    MEDICATIONS  (STANDING):  allopurinol 100 milliGRAM(s) Oral daily  dextrose 40% Gel 15 Gram(s) Oral once  dextrose 50% Injectable 25 Gram(s) IV Push once  dextrose 50% Injectable 25 Gram(s) IV Push once  dextrose 50% Injectable 12.5 Gram(s) IV Push once  DULoxetine 60 milliGRAM(s) Oral daily  glucagon  Injectable 1 milliGRAM(s) IntraMuscular once  insulin lispro (ADMELOG) corrective regimen sliding scale   SubCutaneous at bedtime  insulin lispro (ADMELOG) corrective regimen sliding scale   SubCutaneous three times a day before meals  lamoTRIgine 150 milliGRAM(s) Oral two times a day  losartan 100 milliGRAM(s) Oral daily    MEDICATIONS  (PRN):        T(C): 36.3 (08-26-21 @ 04:54), Max: 36.9 (08-25-21 @ 13:34)  HR: 69 (08-26-21 @ 04:54) (59 - 81)  BP: 148/86 (08-26-21 @ 04:54) (91/55 - 154/99)  RR: 17 (08-26-21 @ 04:54) (13 - 17)  SpO2: 92% (08-26-21 @ 04:54) (92% - 100%)  Wt(kg): --    PHYSICAL EXAM:  CHEST/LUNG: Clear to percussion bilaterally; No rales, rhonchi, wheezing, or rubs  HEART: Regular rate and rhythm; No murmurs, rubs, or gallops  ABDOMEN: Soft, Nontender, Nondistended; Bowel sounds present  EXTREMITIES:  right le foot dsg intact    CAPILLARY BLOOD GLUCOSE      POCT Blood Glucose.: 275 mg/dL (26 Aug 2021 07:43)  POCT Blood Glucose.: 440 mg/dL (25 Aug 2021 22:08)  POCT Blood Glucose.: 443 mg/dL (25 Aug 2021 21:55)  POCT Blood Glucose.: 153 mg/dL (25 Aug 2021 15:56)  POCT Blood Glucose.: 154 mg/dL (25 Aug 2021 11:42)                            14.0   12.13 )-----------( 227      ( 26 Aug 2021 08:09 )             39.1       CMP:  08-26 @ 08:09  SGPT 42  Albumin 3.3   Alk Phos 76   Anion Gap 11   SGOT 21   Total Bili 0.6   BUN 25   Calcium Total 8.5   CO2 26   Chloride 102   Creatinine 1.10   eGFR if AA 85   eGFR if non AA 74   Glucose 246   Potassium 3.7   Protein 7.1   Sodium 139      Thyroid Function Tests:      Diabetes Tests:       Radiology:

## 2021-08-27 ENCOUNTER — TRANSCRIPTION ENCOUNTER (OUTPATIENT)
Age: 61
End: 2021-08-27

## 2021-08-27 VITALS
RESPIRATION RATE: 18 BRPM | HEART RATE: 66 BPM | DIASTOLIC BLOOD PRESSURE: 80 MMHG | OXYGEN SATURATION: 94 % | SYSTOLIC BLOOD PRESSURE: 140 MMHG | TEMPERATURE: 98 F

## 2021-08-27 LAB
ALBUMIN SERPL ELPH-MCNC: 3.2 G/DL — LOW (ref 3.3–5)
ALP SERPL-CCNC: 68 U/L — SIGNIFICANT CHANGE UP (ref 40–120)
ALT FLD-CCNC: 40 U/L — SIGNIFICANT CHANGE UP (ref 12–78)
ANION GAP SERPL CALC-SCNC: 8 MMOL/L — SIGNIFICANT CHANGE UP (ref 5–17)
AST SERPL-CCNC: 25 U/L — SIGNIFICANT CHANGE UP (ref 15–37)
BASOPHILS # BLD AUTO: 0.04 K/UL — SIGNIFICANT CHANGE UP (ref 0–0.2)
BASOPHILS NFR BLD AUTO: 0.4 % — SIGNIFICANT CHANGE UP (ref 0–2)
BILIRUB SERPL-MCNC: 0.5 MG/DL — SIGNIFICANT CHANGE UP (ref 0.2–1.2)
BUN SERPL-MCNC: 19 MG/DL — SIGNIFICANT CHANGE UP (ref 7–23)
CALCIUM SERPL-MCNC: 8.4 MG/DL — LOW (ref 8.5–10.1)
CHLORIDE SERPL-SCNC: 105 MMOL/L — SIGNIFICANT CHANGE UP (ref 96–108)
CO2 SERPL-SCNC: 28 MMOL/L — SIGNIFICANT CHANGE UP (ref 22–31)
CREAT SERPL-MCNC: 0.95 MG/DL — SIGNIFICANT CHANGE UP (ref 0.5–1.3)
EOSINOPHIL # BLD AUTO: 0.16 K/UL — SIGNIFICANT CHANGE UP (ref 0–0.5)
EOSINOPHIL NFR BLD AUTO: 1.8 % — SIGNIFICANT CHANGE UP (ref 0–6)
GLUCOSE SERPL-MCNC: 153 MG/DL — HIGH (ref 70–99)
HCT VFR BLD CALC: 38.6 % — LOW (ref 39–50)
HGB BLD-MCNC: 13.7 G/DL — SIGNIFICANT CHANGE UP (ref 13–17)
IMM GRANULOCYTES NFR BLD AUTO: 0.4 % — SIGNIFICANT CHANGE UP (ref 0–1.5)
LYMPHOCYTES # BLD AUTO: 2.95 K/UL — SIGNIFICANT CHANGE UP (ref 1–3.3)
LYMPHOCYTES # BLD AUTO: 32.6 % — SIGNIFICANT CHANGE UP (ref 13–44)
MCHC RBC-ENTMCNC: 30.5 PG — SIGNIFICANT CHANGE UP (ref 27–34)
MCHC RBC-ENTMCNC: 35.5 GM/DL — SIGNIFICANT CHANGE UP (ref 32–36)
MCV RBC AUTO: 86 FL — SIGNIFICANT CHANGE UP (ref 80–100)
MONOCYTES # BLD AUTO: 0.75 K/UL — SIGNIFICANT CHANGE UP (ref 0–0.9)
MONOCYTES NFR BLD AUTO: 8.3 % — SIGNIFICANT CHANGE UP (ref 2–14)
NEUTROPHILS # BLD AUTO: 5.1 K/UL — SIGNIFICANT CHANGE UP (ref 1.8–7.4)
NEUTROPHILS NFR BLD AUTO: 56.5 % — SIGNIFICANT CHANGE UP (ref 43–77)
NRBC # BLD: 0 /100 WBCS — SIGNIFICANT CHANGE UP (ref 0–0)
PLATELET # BLD AUTO: 206 K/UL — SIGNIFICANT CHANGE UP (ref 150–400)
POTASSIUM SERPL-MCNC: 3.6 MMOL/L — SIGNIFICANT CHANGE UP (ref 3.5–5.3)
POTASSIUM SERPL-SCNC: 3.6 MMOL/L — SIGNIFICANT CHANGE UP (ref 3.5–5.3)
PROT SERPL-MCNC: 6.7 G/DL — SIGNIFICANT CHANGE UP (ref 6–8.3)
RBC # BLD: 4.49 M/UL — SIGNIFICANT CHANGE UP (ref 4.2–5.8)
RBC # FLD: 12.3 % — SIGNIFICANT CHANGE UP (ref 10.3–14.5)
SODIUM SERPL-SCNC: 141 MMOL/L — SIGNIFICANT CHANGE UP (ref 135–145)
WBC # BLD: 9.04 K/UL — SIGNIFICANT CHANGE UP (ref 3.8–10.5)
WBC # FLD AUTO: 9.04 K/UL — SIGNIFICANT CHANGE UP (ref 3.8–10.5)

## 2021-08-27 PROCEDURE — 96375 TX/PRO/DX INJ NEW DRUG ADDON: CPT

## 2021-08-27 PROCEDURE — 96374 THER/PROPH/DIAG INJ IV PUSH: CPT

## 2021-08-27 PROCEDURE — 87116 MYCOBACTERIA CULTURE: CPT

## 2021-08-27 PROCEDURE — 83036 HEMOGLOBIN GLYCOSYLATED A1C: CPT

## 2021-08-27 PROCEDURE — 82962 GLUCOSE BLOOD TEST: CPT

## 2021-08-27 PROCEDURE — 86803 HEPATITIS C AB TEST: CPT

## 2021-08-27 PROCEDURE — 99024 POSTOP FOLLOW-UP VISIT: CPT

## 2021-08-27 PROCEDURE — 97163 PT EVAL HIGH COMPLEX 45 MIN: CPT

## 2021-08-27 PROCEDURE — 86769 SARS-COV-2 COVID-19 ANTIBODY: CPT

## 2021-08-27 PROCEDURE — 87040 BLOOD CULTURE FOR BACTERIA: CPT

## 2021-08-27 PROCEDURE — 36415 COLL VENOUS BLD VENIPUNCTURE: CPT

## 2021-08-27 PROCEDURE — 86140 C-REACTIVE PROTEIN: CPT

## 2021-08-27 PROCEDURE — 81001 URINALYSIS AUTO W/SCOPE: CPT

## 2021-08-27 PROCEDURE — 87206 SMEAR FLUORESCENT/ACID STAI: CPT

## 2021-08-27 PROCEDURE — 88311 DECALCIFY TISSUE: CPT

## 2021-08-27 PROCEDURE — 88305 TISSUE EXAM BY PATHOLOGIST: CPT

## 2021-08-27 PROCEDURE — 99239 HOSP IP/OBS DSCHRG MGMT >30: CPT

## 2021-08-27 PROCEDURE — 83605 ASSAY OF LACTIC ACID: CPT

## 2021-08-27 PROCEDURE — 80053 COMPREHEN METABOLIC PANEL: CPT

## 2021-08-27 PROCEDURE — 85025 COMPLETE CBC W/AUTO DIFF WBC: CPT

## 2021-08-27 PROCEDURE — 73630 X-RAY EXAM OF FOOT: CPT

## 2021-08-27 PROCEDURE — 87015 SPECIMEN INFECT AGNT CONCNTJ: CPT

## 2021-08-27 PROCEDURE — 84550 ASSAY OF BLOOD/URIC ACID: CPT

## 2021-08-27 PROCEDURE — 88304 TISSUE EXAM BY PATHOLOGIST: CPT

## 2021-08-27 PROCEDURE — 84134 ASSAY OF PREALBUMIN: CPT

## 2021-08-27 PROCEDURE — 80048 BASIC METABOLIC PNL TOTAL CA: CPT

## 2021-08-27 PROCEDURE — U0005: CPT

## 2021-08-27 PROCEDURE — 71046 X-RAY EXAM CHEST 2 VIEWS: CPT

## 2021-08-27 PROCEDURE — 85652 RBC SED RATE AUTOMATED: CPT

## 2021-08-27 PROCEDURE — 87086 URINE CULTURE/COLONY COUNT: CPT

## 2021-08-27 PROCEDURE — 93005 ELECTROCARDIOGRAM TRACING: CPT

## 2021-08-27 PROCEDURE — U0003: CPT

## 2021-08-27 PROCEDURE — 85610 PROTHROMBIN TIME: CPT

## 2021-08-27 PROCEDURE — 85730 THROMBOPLASTIN TIME PARTIAL: CPT

## 2021-08-27 PROCEDURE — 99285 EMERGENCY DEPT VISIT HI MDM: CPT | Mod: 25

## 2021-08-27 RX ORDER — INSULIN GLARGINE 100 [IU]/ML
12 INJECTION, SOLUTION SUBCUTANEOUS
Qty: 3 | Refills: 0
Start: 2021-08-27 | End: 2021-09-25

## 2021-08-27 RX ORDER — ALLOPURINOL 300 MG
1 TABLET ORAL
Qty: 14 | Refills: 0 | DISCHARGE
Start: 2021-08-27 | End: 2021-09-09

## 2021-08-27 RX ORDER — INSULIN LISPRO 100/ML
4 VIAL (ML) SUBCUTANEOUS
Qty: 3 | Refills: 0
Start: 2021-08-27 | End: 2021-09-25

## 2021-08-27 RX ORDER — ALLOPURINOL 300 MG
1 TABLET ORAL
Qty: 14 | Refills: 0
Start: 2021-08-27 | End: 2021-09-09

## 2021-08-27 RX ORDER — IBUPROFEN 200 MG
600 TABLET ORAL ONCE
Refills: 0 | Status: COMPLETED | OUTPATIENT
Start: 2021-08-27 | End: 2021-08-27

## 2021-08-27 RX ADMIN — Medication 100 MILLIGRAM(S): at 12:10

## 2021-08-27 RX ADMIN — LAMOTRIGINE 150 MILLIGRAM(S): 25 TABLET, ORALLY DISINTEGRATING ORAL at 06:09

## 2021-08-27 RX ADMIN — Medication 4 UNIT(S): at 08:18

## 2021-08-27 RX ADMIN — Medication 600 MILLIGRAM(S): at 09:19

## 2021-08-27 RX ADMIN — Medication 4 UNIT(S): at 12:05

## 2021-08-27 RX ADMIN — LOSARTAN POTASSIUM 100 MILLIGRAM(S): 100 TABLET, FILM COATED ORAL at 06:09

## 2021-08-27 RX ADMIN — Medication 600 MILLIGRAM(S): at 08:19

## 2021-08-27 RX ADMIN — DULOXETINE HYDROCHLORIDE 60 MILLIGRAM(S): 30 CAPSULE, DELAYED RELEASE ORAL at 12:10

## 2021-08-27 RX ADMIN — Medication 2: at 08:19

## 2021-08-27 RX ADMIN — Medication 2: at 12:06

## 2021-08-27 RX ADMIN — ENOXAPARIN SODIUM 40 MILLIGRAM(S): 100 INJECTION SUBCUTANEOUS at 12:10

## 2021-08-27 NOTE — DISCHARGE NOTE PROVIDER - CARE PROVIDERS DIRECT ADDRESSES
,DirectAddress_Unknown,[email or web address],DirectAddress_Unknown,[email or web address]

## 2021-08-27 NOTE — CONSULT NOTE ADULT - ASSESSMENT
Grade 3 wound along the lateral right 5th met base    Gouty tophi accumulation and joint destruction along the 1st metatarsal head, 5th metatarsal base and 2nd digit     Infectious disease consultation reviewed  and appreciated     Patient is scheduled for Right 5th metatarsal base resection, 1st metatarsal head resection, 2nd digit arthroplasty and gouty tophi wash out with Dr. Beltran tomorrow 8/25/2021    Patient requires medical and cardiac clearance for OR procedure tomorrow    NPO past midnight 
Tophaceous Gout  Hypertension  Diabetes  h/o Nephrolithiasis    For podiatry surgery today. Add allopurinol. No acute gout episode at this time. Pt will likely benefit from krystexxa infusion as outpt and rheumatology follow up.   Monitor blood sugar levels. Insulin coverage as needed. Dietary restriction. Monitor BP trend. Titrate BP meds as needed. Salt restriction. Stable renal indices.   Further recommendations pending clinical course. Thank you for the courtesy of this referral. 
Physical Exam:   Vital Signs Last 24 Hrs  T(C): 36.4 (27 Aug 2021 05:35), Max: 37.1 (26 Aug 2021 21:42)  T(F): 97.6 (27 Aug 2021 05:35), Max: 98.7 (26 Aug 2021 21:42)  HR: 66 (27 Aug 2021 05:35) (66 - 74)  BP: 144/83 (27 Aug 2021 05:35) (137/80 - 158/92)  BP(mean): --  RR: 18 (27 Aug 2021 05:35) (17 - 18)  SpO2: 94% (27 Aug 2021 05:35) (94% - 94%)    General: NAD, denies Fever, chills     eGFR if Non African American: 88 mL/min/1.73M2 (08-27-21 @ 08:03)  eGFR if : 102 mL/min/1.73M2 (08-27-21 @ 08:03)  eGFR if Non African American: 74 mL/min/1.73M2 (08-26-21 @ 08:09)  eGFR if : 85 mL/min/1.73M2 (08-26-21 @ 08:09)  eGFR if Non African American: 83 mL/min/1.73M2 (08-25-21 @ 08:02)  eGFR if : 96 mL/min/1.73M2 (08-25-21 @ 08:02)      CAPILLARY BLOOD GLUCOSE      POCT Blood Glucose.: 183 mg/dL (27 Aug 2021 11:52)  POCT Blood Glucose.: 159 mg/dL (27 Aug 2021 07:51)  POCT Blood Glucose.: 193 mg/dL (26 Aug 2021 21:22)  POCT Blood Glucose.: 248 mg/dL (26 Aug 2021 16:41)      Cholesterol, Serum: 113 mg/dL (05.19.21 @ 08:36)     HDL Cholesterol, Serum: 22 mg/dL (05.19.21 @ 08:36)     LDL Cholesterol Calculated: 66 mg/dL (05.19.21 @ 08:36)     DIET: CC          
This is a 60-year-old man with type 2 diabetes mellitus on oral Metformin, poor compliance but with apparently reasonable control; potential ophthalmic complications related to diabetes; no other known diabetes complications; hypertension, tophaceous gout, who is status post patient directed incision and drainage of the tophus on the right foot at the level of the fifth metatarsal base.  There is no active infection there at this point in time evident.  There is no other active inflammation evident in the foot either.  The patient is not systemically ill.  He has had no fevers.  His white blood cell count is normal.  The wound isolate from the 17th are of dubious significance.  I do not think an merit treatment at this point in time.  I do not see a role for antibiotics.

## 2021-08-27 NOTE — PROGRESS NOTE ADULT - REASON FOR ADMISSION
Right Foot wound and Tophi

## 2021-08-27 NOTE — DISCHARGE NOTE NURSING/CASE MANAGEMENT/SOCIAL WORK - PATIENT PORTAL LINK FT
You can access the FollowMyHealth Patient Portal offered by [facility] by registering at the following website: [email or web address]. By joining [employer]’s FollowMyHealth portal, you will also be able to view your health information using other applications (apps) compatible with our system.

## 2021-08-27 NOTE — DISCHARGE NOTE PROVIDER - CARE PROVIDER_API CALL
Perlman, Craig D  MEDICINE  [address] [place] [address], [address]  [place] [address]  Phone: [phone]  Fax: [phone]  Follow Up Time: 1-3 days    Chung Beltran (DPM)  Podiatric Medicine and Surgery  [address] [place] [address]  Phone: [phone]  Fax: [phone]  Scheduled Appointment: 09/01/2021    Tod Nice (MD)  Internal Medicine; Rheumatology  [address] [place] [address]  Phone: [phone]  Fax: [phone]  Follow Up Time: 1 week    Tio Fried (DO)  [place] [address]  Phone: [phone]  Fax: [phone])-  Follow Up Time: 1 week

## 2021-08-27 NOTE — CONSULT NOTE ADULT - CONSULT REASON
60y A1C with Estimated Average Glucose Result: 10.1 % (08-25-21 @ 12:46)  A1C with Estimated Average Glucose Result: 10.1 % (08-24-21 @ 15:18)   diabetes mellitus uncontrolled type 2
Grade 3 wound Right 5th met base w/ gouty tophi and 1st met head gouty tophi and 2nd digit rigid hammertoe
Tophaceous gout, Hypertension
Foot infection
dm2 uncontrolled

## 2021-08-27 NOTE — DISCHARGE NOTE NURSING/CASE MANAGEMENT/SOCIAL WORK - NSDCPEFALRISK_GEN_ALL_CORE
For information on Fall & injury Prevention, visit [email or web address]

## 2021-08-27 NOTE — PROGRESS NOTE ADULT - SUBJECTIVE AND OBJECTIVE BOX
60y year old Male seen at [facility] 1EAS 104 W1 for s/p Right 5th metatarsal base resection, 1st partial metatarsal head resection and 2nd digit arthroplasty secondary to gout and to r/o OM with Dr. Beltran, DOS 8/25/2021. The patient had an incident of sanguineous strike through last night. The patient states that he feels fine today and not experiencing any pain. The patient states that he had some numbness along the 2nd digit yesterday but he states that he is able to feel along the area now. Denies any fever, chills, nausea, vomiting, chest pain, shortness of breath, or calf pain at this time.    Allergies    No Known Allergies    Intolerances        MEDICATIONS  (STANDING):  allopurinol 100 milliGRAM(s) Oral daily  dextrose 40% Gel 15 Gram(s) Oral once  dextrose 50% Injectable 25 Gram(s) IV Push once  dextrose 50% Injectable 25 Gram(s) IV Push once  dextrose 50% Injectable 12.5 Gram(s) IV Push once  DULoxetine 60 milliGRAM(s) Oral daily  glucagon  Injectable 1 milliGRAM(s) IntraMuscular once  insulin glargine Injectable (LANTUS) 12 Unit(s) SubCutaneous at bedtime  insulin lispro (ADMELOG) corrective regimen sliding scale   SubCutaneous three times a day before meals  insulin lispro (ADMELOG) corrective regimen sliding scale   SubCutaneous at bedtime  insulin lispro Injectable (ADMELOG) 4 Unit(s) SubCutaneous three times a day before meals  lamoTRIgine 150 milliGRAM(s) Oral two times a day  losartan 100 milliGRAM(s) Oral daily    MEDICATIONS  (PRN):      Vital Signs Last 24 Hrs  T(C): 36.4 (27 Aug 2021 05:35), Max: 37.1 (26 Aug 2021 21:42)  T(F): 97.6 (27 Aug 2021 05:35), Max: 98.7 (26 Aug 2021 21:42)  HR: 66 (27 Aug 2021 05:35) (66 - 66)  BP: 144/83 (27 Aug 2021 05:35) (144/83 - 158/92)  BP(mean): --  RR: 18 (27 Aug 2021 05:35) (18 - 18)  SpO2: 94% (27 Aug 2021 05:35) (94% - 94%)    PHYSICAL EXAM:  Vascular: DP and PT palpable b/l, residual post op edema and erythema along the surgical sites, cap refill less than 3 seconds,. cap refill along the right 2nd digit, sparse digital hair, skin temperature within normal limits, the right 2nd digit a little cold to the touch, 2nd digit slightly pale but upon examination, displays cap refill  Neurological: Diminished protective sensation b/l , patient is able to feel touch of the right 2nd digit with eyes closed   Musculoskeletal: 5/5 muscle strength in all 4 quadrants b/l, no pain upon palpation along the surgical sites  Dermatological:   1. s/p Right 5th metatarsal base resection- sutures intact, skin well approximated, no wound dehiscence, minimal to non serous sanguineous drainage, residual edema and erythema   2. 1st partial metatarsal head resection- sutures intact, skin well approximated, no wound dehiscence, minimal to non serous sanguineous drainage, residual edema and erythema   3. 2nd digit arthroplasty-sutures intact, skin well approximated, no wound dehiscence, minimal to non serous sanguineous drainage, residual edema and erythema, cold to the touch compared to the other digits     CBC Full  -  ( 27 Aug 2021 08:03 )  WBC Count : 9.04 K/uL  RBC Count : 4.49 M/uL  Hemoglobin : 13.7 g/dL  Hematocrit : 38.6 %  Platelet Count - Automated : 206 K/uL  Mean Cell Volume : 86.0 fl  Mean Cell Hemoglobin : 30.5 pg  Mean Cell Hemoglobin Concentration : 35.5 gm/dL  Auto Neutrophil # : 5.10 K/uL  Auto Lymphocyte # : 2.95 K/uL  Auto Monocyte # : 0.75 K/uL  Auto Eosinophil # : 0.16 K/uL  Auto Basophil # : 0.04 K/uL  Auto Neutrophil % : 56.5 %  Auto Lymphocyte % : 32.6 %  Auto Monocyte % : 8.3 %  Auto Eosinophil % : 1.8 %  Auto Basophil % : 0.4 %    ----------CHEM PANEL----------    08-27    141  |  105  |  19  ----------------------------<  153<H>  3.6   |  28  |  0.95    Ca    8.4<L>      27 Aug 2021 08:03    TPro  6.7  /  Alb  3.2<L>  /  TBili  0.5  /  DBili  x   /  AST  25  /  ALT  40  /  AlkPhos  68  08-27        Culture - Acid Fast - Tissue w/Smear (collected 25 Aug 2021 18:59)  Source: .Tissue Other, bone culture bone metatarsal    Culture - Urine (collected 25 Aug 2021 09:01)  Source: Clean Catch Clean Catch (Midstream)  Final Report (26 Aug 2021 05:53):    No growth    Culture - Blood (collected 24 Aug 2021 12:16)  Source: .Blood Blood-Peripheral  Preliminary Report (25 Aug 2021 13:02):    No growth to date.    Culture - Blood (collected 24 Aug 2021 12:16)  Source: .Blood Blood-Peripheral  Preliminary Report (25 Aug 2021 13:02):    No growth to date.        Imaging: ----------

## 2021-08-27 NOTE — DISCHARGE NOTE PROVIDER - NSDCMRMEDTOKEN_GEN_ALL_CORE_FT
Admelog SoloStar 100 units/mL injectable solution: 4 unit(s) subcutaneous 3 times a day (with meals)   allopurinol 100 mg oral tablet: 1 tab(s) orally once a day  amLODIPine 5 mg oral tablet: 1 tab(s) orally once a day  Augmentin 875 mg-125 mg oral tablet: 1 milligram(s) orally 2 times a day   dextroamphetamine-amphetamine 20 mg oral tablet: 1 tab(s) orally 2 times a day  DULoxetine 60 mg oral delayed release capsule: 2 cap(s) orally once a day (in the morning)   mg oral tablet: 1 tab(s) orally 3 times a day, As Needed  lamoTRIgine 150 mg oral tablet: 1 tab(s) orally 2 times a day  Lantus Solostar Pen 100 units/mL subcutaneous solution: 12 unit(s) subcutaneous once a day   losartan 100 mg oral tablet: 1 tab(s) orally once a day  metFORMIN 500 mg oral tablet: 2 tab(s) orally 2 times a day

## 2021-08-27 NOTE — DISCHARGE NOTE PROVIDER - HOSPITAL COURSE
FROM ADMISSION H+P:   HPI:  Mr Nova is a 61 yo M presenting with Right lateral foot wound and severe gout. PMH DM2, HTN, Left ventricular hypertrophy, Gout, Mood disorder  Patient seen and examined at bedside. He reports a Right lateral foot wound and callus which he cut open with a knife approximately 3 weeks ago. He was being followed by wound care center and completed a course of Augmentin. Now he presents to the ER sent by wound care center for possible foot surgery.   Patient does have R foot pain and multiple tophi. Denies purulent drainage from his foot.   Denies headaches, nausea, vomiting, chest pain, SOB, palpitations, abdominal pain, constipation, diarrhea, melena, hematochezia, dysuria.   Denies past hx of MI/CAD/CHF/TIA/CVA.  (24 Aug 2021 12:00)      ---  HOSPITAL COURSE/PERTINENT LABS/PROCEDURES PERFORMED/PENDING TESTS:  Patient was admitted for management of right foot wound and gout. X-Ray on admission showed soft tissue swelling with calcifications in the region of the first MTP joint and the region of the base of the fifth metatarsal may represent gout and no concern for osteomyelitis. Patient underwent Right 5th metatarsal base resection, 1st partial metatarsal head resection and 2nd digit arthroplasty secondary to gout with Dr. Beltran on 8/25/21. Patient noted to have some paleness of left second toe on the right, which was evaluated by Podiatry who stated that this was a normal finding after the procedure. Patient had good pulses and feeling in the toes and was able to move toes without difficulty. Surgical pathology was pending on time of discharge and patient will complete 7 day course of Augmentin prophylactically prior to report. Patient noted to have A1c 10.1%, started on insulin Lantus 12 units qhs and Admelog 4 units TID. Patient to be discharged on insulin regimen. Patient noted to have chronic gouty flair, seen by nephrology who recommended rheumatology consult outpatient and to start allopurinol.     ---  PATIENT CONDITION:  - stable    ---  PHYSICAL EXAM ON DAY OF DISCHARGE:  Patient seen and examined on day of discharge. Feels well, notes some right foot pain which is worse when putting weight on it. Sensation in the right second digit improved.  Vital Signs Last 24 Hrs  T(C): 36.4 (27 Aug 2021 05:35), Max: 37.1 (26 Aug 2021 21:42)  T(F): 97.6 (27 Aug 2021 05:35), Max: 98.7 (26 Aug 2021 21:42)  HR: 66 (27 Aug 2021 05:35) (66 - 66)  BP: 144/83 (27 Aug 2021 05:35) (144/83 - 158/92)  BP(mean): --  RR: 18 (27 Aug 2021 05:35) (18 - 18)  SpO2: 94% (27 Aug 2021 05:35) (94% - 94%)    PHYSICAL EXAM:  GENERAL: NAD  HEENT:  anicteric, moist mucous membranes  CHEST/LUNG:  CTA b/l, no rales, wheezes, or rhonchi  HEART:  RRR, S1, S2  ABDOMEN:  BS+, soft, nontender, nondistended  EXTREMITIES: no edema or calf tenderness. Improved coloring of second digit on right foot, sensation intact, good capillary refill. Clean bandages covering right foot.   NERVOUS SYSTEM: answers questions and follows commands appropriately    ---  CONSULTANTS:   Podiatry: Dr Beltran  Endocrine: Dr. Craig Perlman  Nephrology: Dr. Kearns        ---  TIME SPENT:  I, the attending physician, was physically present for the key portions of the evaluation and management (E/M) service provided. The total amount of time spent reviewing the hospital notes, laboratory values, imaging findings, assessing/counseling the patient, discussing with consultant physicians, social work, nursing staff was -- minutes FROM ADMISSION H+P:   HPI:  Mr Nova is a 59 yo M presenting with Right lateral foot wound and severe gout. PMH DM2, HTN, Left ventricular hypertrophy, Gout, Mood disorder  Patient seen and examined at bedside. He reports a Right lateral foot wound and callus which he cut open with a knife approximately 3 weeks ago. He was being followed by wound care center and completed a course of Augmentin. Now he presents to the ER sent by wound care center for possible foot surgery.   Patient does have R foot pain and multiple tophi. Denies purulent drainage from his foot.   Denies headaches, nausea, vomiting, chest pain, SOB, palpitations, abdominal pain, constipation, diarrhea, melena, hematochezia, dysuria.   Denies past hx of MI/CAD/CHF/TIA/CVA.  (24 Aug 2021 12:00)      ---  HOSPITAL COURSE/PERTINENT LABS/PROCEDURES PERFORMED/PENDING TESTS:  Patient was admitted for management of right foot wound and gout. X-Ray on admission showed soft tissue swelling with calcifications in the region of the first MTP joint and the region of the base of the fifth metatarsal may represent gout and no concern for osteomyelitis. Patient underwent Right 5th metatarsal base resection, 1st partial metatarsal head resection and 2nd digit arthroplasty secondary to gout with Dr. Beltran on 8/25/21. Patient noted to have some paleness of left second toe on the right, which was evaluated by Podiatry who stated that this was a normal finding after the procedure. Patient had good pulses and feeling in the toes and was able to move toes without difficulty. Surgical pathology was pending on time of discharge and patient will complete 7 day course of Augmentin prophylactically prior to report. Patient noted to have A1c 10.1%, started on insulin Lantus 12 units qhs and Admelog 4 units TID. Patient to be discharged on insulin regimen. Patient noted to have chronic gouty flair, seen by nephrology who recommended rheumatology consult outpatient and to start allopurinol.     ---  PATIENT CONDITION:  - stable    ---  PHYSICAL EXAM ON DAY OF DISCHARGE:  Patient seen and examined on day of discharge. Feels well, notes some right foot pain which is worse when putting weight on it. Sensation in the right second digit improved.  Vital Signs Last 24 Hrs  T(C): 36.4 (27 Aug 2021 05:35), Max: 37.1 (26 Aug 2021 21:42)  T(F): 97.6 (27 Aug 2021 05:35), Max: 98.7 (26 Aug 2021 21:42)  HR: 66 (27 Aug 2021 05:35) (66 - 66)  BP: 144/83 (27 Aug 2021 05:35) (144/83 - 158/92)  BP(mean): --  RR: 18 (27 Aug 2021 05:35) (18 - 18)  SpO2: 94% (27 Aug 2021 05:35) (94% - 94%)    PHYSICAL EXAM:  GENERAL: NAD  HEENT:  anicteric, moist mucous membranes  CHEST/LUNG:  CTA b/l, no rales, wheezes, or rhonchi  HEART:  RRR, S1, S2  ABDOMEN:  BS+, soft, nontender, nondistended  EXTREMITIES: no edema or calf tenderness. Improved coloring of second digit on right foot, sensation intact, good capillary refill. Clean bandages covering right foot.   NERVOUS SYSTEM: answers questions and follows commands appropriately    ---  CONSULTANTS:   Podiatry: Dr Beltran  Endocrine: Dr. Craig Perlman  Nephrology: Dr. Kearns        ---  TIME SPENT:  I, the attending physician, was physically present for the key portions of the evaluation and management (E/M) service provided. The total amount of time spent reviewing the hospital notes, laboratory values, imaging findings, assessing/counseling the patient, discussing with consultant physicians, social work, nursing staff was 35 minutes

## 2021-08-27 NOTE — PROGRESS NOTE ADULT - PROBLEM SELECTOR PLAN 1
S/p Right 5th metatarsal base resection, 1st partial metatarsal head resection and 2nd digit arthroplasty secondary to gout and to r/o OM with Dr. Beltran, DOS 8/25/2021  - patient seen and evaluated  -  wound dressing taken off with care and to patient's tolerance  -  surgical sites gently cleansed with normal saline and pat area dry with sterile gauze  -  Surgical sites dressed with adaptic and DSD  -  Will continue to follow up and monitor the right 2nd digit for vascular embarrasement   -  please reference above   - Wound Care Instructions below  - Follow up with the OR pathologies and cultures  - Patient is to be partial weight bearing to the right heel in surgical shoe  - Podiatry team will continue to follow patient while in house  WOUND CARE INSTRUCTIONS   1. Keep dressing clean, dry and intact at all times until first follow up appointment   2.  monitor for any signs of infection.  3. Patient is to follow up in [facility] with Dr. Beltran or Dr. Green within 1 week upon discharge.
cont lantus 12 units qhs  cont admelog 4 units 3x/day before meals  cont mod dose admelog scale coverage qac/qhs  cont cons cho diet  goal bg 100-180 in hosp setting
Wound on base of Right foot and tophaceous gouty wounds.   -no acute infectious etiology identified.   -afebrile and normal WBC count  -no role of abx at this time.   -EKG NSR without acute ischemic changes  -patient is going for surgery with podiatry today  -patient NPO  -revised cardiac risk index 0 points.
S/p Right 5th metatarsal base resection, 1st partial metatarsal head resection and 2nd digit arthroplasty secondary to gout and to r/o OM with Dr. Beltran, DOS 8/25/2021  - patient seen and evaluated  -  wound dressing taken off with care and to patient's tolerance  -  surgical sites gently cleansed with normal saline and pat area dry with sterile gauze  -  Surgical sites dressed with adaptic and DSD  -  Will continue to follow up and monitor the right 2nd digit for vascular embarrasement   -  please reference above   - Wound Care Instructions below  - Follow up with the OR pathologies and cultures  - Patient is to be partial weight bearing to the right heel in surgical shoe  - Podiatry team will continue to follow patient while in house  WOUND CARE INSTRUCTIONS   1. Keep dressing clean, dry and intact at all times until first follow up appointment   2.  monitor for any signs of infection.  3. Patient is to follow up in the [facility] with Dr. Beltran or Dr. Green within 1 week upon discharge.
Wound on base of Right foot and tophaceous gouty wounds.  -S/p L foot podiatric surgery   -POD 1   -Patient had episode of cyanosis on 2nd digit. Overnight blue, cool to touch, and sensation impaired. Resolving today, podiatry made aware  -As per podiatry could be an expected short term side effect of surgery  -no acute infectious etiology identified.   -afebrile and normal WBC count  -no role of abx at this time.

## 2021-08-27 NOTE — PROGRESS NOTE ADULT - SUBJECTIVE AND OBJECTIVE BOX
Patient is a 60y old  Male who presents with a chief complaint of Right Foot wound and Tophi (25 Aug 2021 15:26)  Patient seen in follow up for tophaceous gout, hypertension.        PAST MEDICAL HISTORY:  HTN (hypertension)    Anxiety and depression    Panic attacks    Fall    C2 cervical fracture    Kidney stones      MEDICATIONS  (STANDING):  allopurinol 100 milliGRAM(s) Oral daily  amLODIPine   Tablet 5 milliGRAM(s) Oral at bedtime  dextrose 40% Gel 15 Gram(s) Oral once  dextrose 50% Injectable 25 Gram(s) IV Push once  dextrose 50% Injectable 25 Gram(s) IV Push once  dextrose 50% Injectable 12.5 Gram(s) IV Push once  DULoxetine 60 milliGRAM(s) Oral daily  enoxaparin Injectable 40 milliGRAM(s) SubCutaneous daily  glucagon  Injectable 1 milliGRAM(s) IntraMuscular once  insulin glargine Injectable (LANTUS) 12 Unit(s) SubCutaneous at bedtime  insulin lispro (ADMELOG) corrective regimen sliding scale   SubCutaneous three times a day before meals  insulin lispro (ADMELOG) corrective regimen sliding scale   SubCutaneous at bedtime  insulin lispro Injectable (ADMELOG) 4 Unit(s) SubCutaneous three times a day before meals  lamoTRIgine 150 milliGRAM(s) Oral two times a day  losartan 100 milliGRAM(s) Oral daily    MEDICATIONS  (PRN):    T(C): 36.4 (08-27-21 @ 05:35), Max: 37.1 (08-26-21 @ 21:42)  HR: 66 (08-27-21 @ 05:35) (59 - 81)  BP: 144/83 (08-27-21 @ 05:35) (91/55 - 158/92)  RR: 18 (08-27-21 @ 05:35)  SpO2: 94% (08-27-21 @ 05:35)  Wt(kg): --  I&O's Detail          PHYSICAL EXAM:  General: No distress  Respiratory: b/l air entry  Cardiovascular: S1 S2  Gastrointestinal: soft  Extremities:  no edema, + tophii             LABORATORY:                        13.7   9.04  )-----------( 206      ( 27 Aug 2021 08:03 )             38.6     08-27    141  |  105  |  19  ----------------------------<  153<H>  3.6   |  28  |  0.95    Ca    8.4<L>      27 Aug 2021 08:03    TPro  6.7  /  Alb  3.2<L>  /  TBili  0.5  /  DBili  x   /  AST  25  /  ALT  40  /  AlkPhos  68  08-27    Sodium, Serum: 141 mmol/L (08-27 @ 08:03)  Sodium, Serum: 139 mmol/L (08-26 @ 08:09)    Potassium, Serum: 3.6 mmol/L (08-27 @ 08:03)  Potassium, Serum: 3.7 mmol/L (08-26 @ 08:09)    Hemoglobin: 13.7 g/dL (08-27 @ 08:03)  Hemoglobin: 14.0 g/dL (08-26 @ 08:09)  Hemoglobin: 14.9 g/dL (08-25 @ 08:02)    Creatinine, Serum 0.95 (08-27 @ 08:03)  Creatinine, Serum 1.10 (08-26 @ 08:09)  Creatinine, Serum 1.00 (08-25 @ 08:02)        LIVER FUNCTIONS - ( 27 Aug 2021 08:03 )  Alb: 3.2 g/dL / Pro: 6.7 g/dL / ALK PHOS: 68 U/L / ALT: 40 U/L / AST: 25 U/L / GGT: x

## 2021-08-27 NOTE — CONSULT NOTE ADULT - REASON FOR ADMISSION
Right Foot wound and Tophi

## 2021-08-27 NOTE — DISCHARGE NOTE PROVIDER - NSDCFUADDINST_GEN_ALL_CORE_FT
WOUND CARE INSTRUCTIONS   1. Keep dressing clean, dry and intact at all times until first follow up appointment   2.  monitor for any signs of infection.  3. Patient is to follow up in the [facility] with Dr. Beltran or Dr. Green within 1 week upon discharge.

## 2021-08-27 NOTE — CONSULT NOTE ADULT - SUBJECTIVE AND OBJECTIVE BOX
Patient is a 60y old  Male who presents with a chief complaint of Right Foot wound and Tophi (27 Aug 2021 08:39)    Type 2 diabetes diagnosed approximately 4 years ago. Does not see an Endocrinologist. Admits to dietary indiscretion previous A1c was 6% per patient. PMD managing diabetes. Home medications include Metformin 2g daily. Not Hx DKA/HHS, No expressed needs,  HPI:  Mr Nova is a 59 yo M presenting with Right lateral foot wound and severe gout. PMH DM2, HTN, Left ventricular hypertrophy, Gout, Mood disorder  Patient seen and examined at bedside. He reports a Right lateral foot wound and callus which he cut open with a knife approximately 3 weeks ago. He was being followed by wound care center and completed a course of Augmentin. Now he presents to the ER sent by wound care center for possible foot surgery.   Patient does have R foot pain and multiple tophi. Denies purulent drainage from his foot.   Denies headaches, nausea, vomiting, chest pain, SOB, palpitations, abdominal pain, constipation, diarrhea, melena, hematochezia, dysuria.   Denies past hx of MI/CAD/CHF/TIA/CVA.  (24 Aug 2021 12:00)      PAST MEDICAL & SURGICAL HISTORY:  HTN (hypertension)    Anxiety and depression    Panic attacks    Fall  at work 03/2008    C2 cervical fracture  with fusion    Kidney stones    C2 cervical fracture  3/2008    Hernia  in Infancy    Renal stone  removed -by ? blasting    Rotator cuff tear, left        REVIEW OF SYSTEMS  General:	as above  Eye: no blurry vison  Respiratory: NAD, No SOB, no cough  Cardiovascular: No chest pain, no palpitations	  Gastrointestinal:	No nausea, vomiting, no abdominal pain  Endocrine: no polyuria, no polydipsia, or S/S of hypoglycemia  Neuro: denies numbess, no tingling	  Other:	      Allergies    No Known Allergies    Intolerances        MEDICATIONS  (STANDING):  allopurinol 100 milliGRAM(s) Oral daily  amLODIPine   Tablet 5 milliGRAM(s) Oral at bedtime  dextrose 40% Gel 15 Gram(s) Oral once  dextrose 50% Injectable 25 Gram(s) IV Push once  dextrose 50% Injectable 25 Gram(s) IV Push once  dextrose 50% Injectable 12.5 Gram(s) IV Push once  DULoxetine 60 milliGRAM(s) Oral daily  enoxaparin Injectable 40 milliGRAM(s) SubCutaneous daily  glucagon  Injectable 1 milliGRAM(s) IntraMuscular once  insulin glargine Injectable (LANTUS) 12 Unit(s) SubCutaneous at bedtime  insulin lispro (ADMELOG) corrective regimen sliding scale   SubCutaneous three times a day before meals  insulin lispro (ADMELOG) corrective regimen sliding scale   SubCutaneous at bedtime  insulin lispro Injectable (ADMELOG) 4 Unit(s) SubCutaneous three times a day before meals  lamoTRIgine 150 milliGRAM(s) Oral two times a day  losartan 100 milliGRAM(s) Oral daily

## 2021-08-27 NOTE — PROGRESS NOTE ADULT - SUBJECTIVE AND OBJECTIVE BOX
CAPILLARY BLOOD GLUCOSE      POCT Blood Glucose.: 159 mg/dL (27 Aug 2021 07:51)  POCT Blood Glucose.: 193 mg/dL (26 Aug 2021 21:22)  POCT Blood Glucose.: 248 mg/dL (26 Aug 2021 16:41)  POCT Blood Glucose.: 195 mg/dL (26 Aug 2021 12:02)      Vital Signs Last 24 Hrs  T(C): 36.4 (27 Aug 2021 05:35), Max: 37.1 (26 Aug 2021 21:42)  T(F): 97.6 (27 Aug 2021 05:35), Max: 98.7 (26 Aug 2021 21:42)  HR: 66 (27 Aug 2021 05:35) (66 - 74)  BP: 144/83 (27 Aug 2021 05:35) (137/80 - 158/92)  BP(mean): --  RR: 18 (27 Aug 2021 05:35) (17 - 18)  SpO2: 94% (27 Aug 2021 05:35) (94% - 94%)    General: WN/WD NAD  Respiratory: CTA B/L  CV: RRR, S1S2, no murmurs, rubs or gallops  Abdominal: Soft, NT, ND +BS, Last BM  Extremities: le foot dsg intact     08-26    139  |  102  |  25<H>  ----------------------------<  246<H>  3.7   |  26  |  1.10    Ca    8.5      26 Aug 2021 08:09    TPro  7.1  /  Alb  3.3  /  TBili  0.6  /  DBili  x   /  AST  21  /  ALT  42  /  AlkPhos  76  08-26      allopurinol 100 milliGRAM(s) Oral daily  dextrose 40% Gel 15 Gram(s) Oral once  dextrose 50% Injectable 25 Gram(s) IV Push once  dextrose 50% Injectable 25 Gram(s) IV Push once  dextrose 50% Injectable 12.5 Gram(s) IV Push once  glucagon  Injectable 1 milliGRAM(s) IntraMuscular once  insulin glargine Injectable (LANTUS) 12 Unit(s) SubCutaneous at bedtime  insulin lispro (ADMELOG) corrective regimen sliding scale   SubCutaneous three times a day before meals  insulin lispro (ADMELOG) corrective regimen sliding scale   SubCutaneous at bedtime  insulin lispro Injectable (ADMELOG) 4 Unit(s) SubCutaneous three times a day before meals

## 2021-08-27 NOTE — DISCHARGE NOTE PROVIDER - NSDCCPCAREPLAN_GEN_ALL_CORE_FT
PRINCIPAL DISCHARGE DIAGNOSIS  Diagnosis: Wound of foot  Assessment and Plan of Treatment: You were admitted for surgical management of your right foot and gout.  You underwent right 5th metatarsal base resection, 1st partial metatarsal head resection and 2nd digit arthroplasty secondary to gout with Dr. Beltran on 8/25.   Your surgical pathology was not available at time of discharge, so you will start a 7 day antibiotic course of Augmentin and follow up with Dr. Beltran for the results.  START Augmentin 875mg twice daily. A prescription was sent to your pharmacy.  Follow up with Dr. Beltran on Wednesday, 9/1/21 in the office, call to make an appointment.      SECONDARY DISCHARGE DIAGNOSES  Diagnosis: DM2 (diabetes mellitus, type 2)  Assessment and Plan of Treatment: Your A1c was 10.1 on this admission and your diabetes is not controlled.  You were started on insulin and will continue this at home.  START Lantus 12 units (injected at bedtime) and Admelog 4units three times a day before meals. A prescription was sent to your pharmacy.  Follow up with Dr. Craig Perlman, office information provided.  Diabetes supplies will be provided by Dr. Perlman.    Diagnosis: Gout  Assessment and Plan of Treatment: You were started on allopurinol by our nephrologist.  START allopurinol 100mg daily. A prescription was sent to your pharmacy.  Follow up with rheumatology Dr. Nice. Office information provided.    Diagnosis: HTN (hypertension)  Assessment and Plan of Treatment: Continue your home amlodipine.

## 2021-08-27 NOTE — PROGRESS NOTE ADULT - ASSESSMENT
Tophaceous Gout  Hypertension  Diabetes  h/o Nephrolithiasis    Stable renal indices. Continue allopurinol. Pt will likely benefit from krystexxa infusion as outpt and rheumatology evaluation as out pt.   Monitor blood sugar levels. Insulin coverage as needed. Dietary restriction. Lower losartan dose of BP trending low. Monitor BP trend. Titrate BP meds as needed. Salt restriction. Rheumatology and Podiatry follow up as out pt.

## 2021-08-27 NOTE — CONSULT NOTE ADULT - PROBLEM SELECTOR RECOMMENDATION 9
Type 2 A1c 10.1 % s/p right foot surgery  Recommend endocrine-Perlman on consult  FU appt: TBA patient opted to make own appointment with Dr Perlman  DSC recommendations: return to home regimen and glucose monitoring, needs to add a second agent for better glucose management.   diabetes education provided    Goal 100-180 mg/dL; 140-180 mg/dL in critical care areas Type 2 A1c 10.1 % s/p right foot surgery  Recommend endocrine-Perlman on consult  FU appt: TBA patient opted to make own appointment with Dr Perlman  DSC recommendations: return to home regimen and glucose monitoring, needs to add a second agent for better glucose management.   diabetes education provided, insulin pen teaching done with patient and his wife.  Patient was able to teach back and verbalized understanding. discussed possibility of starting Freestyle [place] sensor. will discuss with Dr Perlman.   patient to  supplies from Dr Perlman's office and will make appt when he is there to follow up.    Goal 100-180 mg/dL; 140-180 mg/dL in critical care areas

## 2021-08-27 NOTE — DISCHARGE NOTE PROVIDER - NSDCFUADDAPPT_GEN_ALL_CORE_FT
Follow up with endocrine Dr. Craig Perlman, for diabetes supplies and follow diabetes management.  Follow up with Dr Beltran on 9/1 to check wound and follow up surgical pathology.  Follow up with rheumatology Dr. Nice for gout management.

## 2021-08-27 NOTE — DISCHARGE NOTE PROVIDER - PROVIDER TOKENS
PROVIDER:[TOKEN:[[id number]],FOLLOWUP:[1-3 days]],PROVIDER:[TOKEN:[[id number]],SCHEDULEDAPPT:[09/01/2021]],PROVIDER:[TOKEN:[[id number]],FOLLOWUP:[1 week]],PROVIDER:[TOKEN:[[id number]],FOLLOWUP:[1 week]]

## 2021-08-29 LAB
CULTURE RESULTS: SIGNIFICANT CHANGE UP
CULTURE RESULTS: SIGNIFICANT CHANGE UP
SPECIMEN SOURCE: SIGNIFICANT CHANGE UP
SPECIMEN SOURCE: SIGNIFICANT CHANGE UP

## 2021-08-30 ENCOUNTER — NON-APPOINTMENT (OUTPATIENT)
Age: 61
End: 2021-08-30

## 2021-08-30 LAB — SURGICAL PATHOLOGY STUDY: SIGNIFICANT CHANGE UP

## 2021-09-01 ENCOUNTER — APPOINTMENT (OUTPATIENT)
Dept: WOUND CARE | Facility: HOSPITAL | Age: 61
End: 2021-09-01
Payer: MEDICAID

## 2021-09-01 ENCOUNTER — OUTPATIENT (OUTPATIENT)
Dept: OUTPATIENT SERVICES | Facility: HOSPITAL | Age: 61
LOS: 1 days | Discharge: HOME | End: 2021-09-01
Payer: MEDICAID

## 2021-09-01 VITALS
HEART RATE: 77 BPM | HEIGHT: 69 IN | RESPIRATION RATE: 20 BRPM | OXYGEN SATURATION: 97 % | TEMPERATURE: 97.2 F | BODY MASS INDEX: 31.4 KG/M2 | SYSTOLIC BLOOD PRESSURE: 126 MMHG | DIASTOLIC BLOOD PRESSURE: 81 MMHG | WEIGHT: 212 LBS

## 2021-09-01 DIAGNOSIS — E11.621 TYPE 2 DIABETES MELLITUS WITH FOOT ULCER: ICD-10-CM

## 2021-09-01 PROCEDURE — G0463: CPT

## 2021-09-01 PROCEDURE — 99024 POSTOP FOLLOW-UP VISIT: CPT

## 2021-09-02 DIAGNOSIS — R35.1 NOCTURIA: ICD-10-CM

## 2021-09-02 DIAGNOSIS — I10 ESSENTIAL (PRIMARY) HYPERTENSION: ICD-10-CM

## 2021-09-02 DIAGNOSIS — Z87.442 PERSONAL HISTORY OF URINARY CALCULI: ICD-10-CM

## 2021-09-02 DIAGNOSIS — N52.9 MALE ERECTILE DYSFUNCTION, UNSPECIFIED: ICD-10-CM

## 2021-09-02 DIAGNOSIS — M10.071 IDIOPATHIC GOUT, RIGHT ANKLE AND FOOT: ICD-10-CM

## 2021-09-02 DIAGNOSIS — L84 CORNS AND CALLOSITIES: ICD-10-CM

## 2021-09-02 DIAGNOSIS — E78.1 PURE HYPERGLYCERIDEMIA: ICD-10-CM

## 2021-09-02 DIAGNOSIS — L97.412 NON-PRESSURE CHRONIC ULCER OF RIGHT HEEL AND MIDFOOT WITH FAT LAYER EXPOSED: ICD-10-CM

## 2021-09-02 DIAGNOSIS — N40.1 BENIGN PROSTATIC HYPERPLASIA WITH LOWER URINARY TRACT SYMPTOMS: ICD-10-CM

## 2021-09-02 DIAGNOSIS — Z79.899 OTHER LONG TERM (CURRENT) DRUG THERAPY: ICD-10-CM

## 2021-09-02 DIAGNOSIS — N32.81 OVERACTIVE BLADDER: ICD-10-CM

## 2021-09-02 DIAGNOSIS — Z87.81 PERSONAL HISTORY OF (HEALED) TRAUMATIC FRACTURE: ICD-10-CM

## 2021-09-02 DIAGNOSIS — Z98.890 OTHER SPECIFIED POSTPROCEDURAL STATES: ICD-10-CM

## 2021-09-02 DIAGNOSIS — E11.621 TYPE 2 DIABETES MELLITUS WITH FOOT ULCER: ICD-10-CM

## 2021-09-02 DIAGNOSIS — Z79.84 LONG TERM (CURRENT) USE OF ORAL HYPOGLYCEMIC DRUGS: ICD-10-CM

## 2021-09-02 NOTE — ASSESSMENT
[Verbal] : Verbal [Written] : Written [Demo] : Demo [Patient] : Patient [Spouse] : Spouse [Good - alert, interested, motivated] : Good - alert, interested, motivated [Verbalizes knowledge/Understanding] : Verbalizes knowledge/understanding [Dressing changes] : dressing changes [Foot Care] : foot care [Skin Care] : skin care [Pressure relief] : pressure relief [Signs and symptoms of infection] : sign and symptoms of infection [How and When to Call] : how and when to call [Off-loading] : off-loading [Patient responsibility to plan of care] : patient responsibility to plan of care [Glycemic Control] : glycemic control [] : Yes [Stable] : stable [Home] : Home [Ambulatory] : Ambulatory [Not Applicable - Long Term Care/Home Health Agency] : Long Term Care/Home Health Agency: Not Applicable [FreeTextEntry2] : Localized Wound Care\par Infection Prevention\par Offloading/Pressure Relief  [FreeTextEntry3] : post op surgery  [FreeTextEntry4] : DPANTHONY performed arthroplasty on Pt in [facility] on 8/25/21.\par Pt to F/U to [facility] in 1 Week

## 2021-09-02 NOTE — PHYSICAL EXAM
[4 x 4] : 4 x 4  [1+] : left 1+ [Ankle Swelling (On Exam)] : present [Ankle Swelling Bilaterally] : bilaterally  [Varicose Veins Of Lower Extremities] : bilaterally [Ankle Swelling On The Left] : moderate [] : bilaterally [Ankle Swelling On The Right] : mild [Purpura] : no purpura  [Petechiae] : no petechiae [Skin Ulcer] : ulcer [Skin Induration] : no induration [Alert] : alert [Oriented to Person] : oriented to person [Oriented to Place] : oriented to place [Calm] : calm [de-identified] : calm [de-identified] : severe gout with associated foot deformities  [de-identified] : HTN [de-identified] : s/p right foot sx , healing well [de-identified] : Diabetic neuropathy  [FreeTextEntry1] : Lateral Foot- Sutures in place- wound dehisced  [FreeTextEntry2] : 5.1 [FreeTextEntry3] : 0.6 [FreeTextEntry4] : 0.1 [de-identified] : serosanguineous [de-identified] : callus [de-identified] : 90% [de-identified] : 10% [de-identified] : Silver Alginate [de-identified] : Cleansed with Normal saline\par  [FreeTextEntry7] : Hallux- Sutures in place [FreeTextEntry8] : 6.0 [FreeTextEntry9] : 0 [de-identified] : Incision line [de-identified] : Intact  [de-identified] : Silver Alginate  [de-identified] : Cleansed with Normal Saline.  [de-identified] : Foot 2nd Digit- Sutures in place [de-identified] : 4.5 [de-identified] : incision line [de-identified] : Serosanguineous  [de-identified] : ecchymosis  [de-identified] : Silver Alginate  [de-identified] : Cleansed with Normal Saline.  [TWNoteComboBox1] : Right [TWNoteComboBox4] : Moderate [TWNoteComboBox5] : No [TWNoteComboBox6] : Other [de-identified] : No [de-identified] : other [de-identified] : None [de-identified] : None [de-identified] : >75% [de-identified] : Yes [de-identified] : 3x Weekly [de-identified] : Primary Dressing [TWNoteComboBox9] : Right [de-identified] : None [de-identified] : other [de-identified] : 3x Weekly [de-identified] : Right [de-identified] : Small [de-identified] : Erythema [de-identified] : None [de-identified] : None [de-identified] : 100% [de-identified] : No [de-identified] : 3x Weekly

## 2021-09-02 NOTE — PLAN
[FreeTextEntry1] : continue off loading and post op wound dressing  .ptr 1 week   Spent 20 minutes for patient care and medical decision making.\par

## 2021-09-02 NOTE — REVIEW OF SYSTEMS
[Fever] : no fever [Eye Pain] : no eye pain [Chills] : no chills [Loss Of Hearing] : no hearing loss [Shortness Of Breath] : no shortness of breath [Wheezing] : no wheezing [Abdominal Pain] : no abdominal pain [Arthralgias] : arthralgias [Joint Swelling] : joint swelling [Joint Stiffness] : joint stiffness [Skin Wound] : skin wound [Anxiety] : no anxiety [Easy Bleeding] : no tendency for easy bleeding [FreeTextEntry5] : HTN [FreeTextEntry9] : Gouty arthropathy  [de-identified] : s/p right foot surgery , no so , no pain  [de-identified] : Diabetic neuropathy  [de-identified] : NIDDM , severe gouty arthropathy

## 2021-09-02 NOTE — HISTORY OF PRESENT ILLNESS
[FreeTextEntry1] : s/p right foot sx , base 5th metatarsal , 2nd toe , 1st metatarsal . Healing well , sutures intact no signs of infection

## 2021-09-07 ENCOUNTER — APPOINTMENT (OUTPATIENT)
Dept: WOUND CARE | Facility: HOSPITAL | Age: 61
End: 2021-09-07
Payer: MEDICAID

## 2021-09-07 ENCOUNTER — OUTPATIENT (OUTPATIENT)
Dept: OUTPATIENT SERVICES | Facility: HOSPITAL | Age: 61
LOS: 1 days | Discharge: HOME | End: 2021-09-07
Payer: MEDICAID

## 2021-09-07 VITALS
RESPIRATION RATE: 20 BRPM | SYSTOLIC BLOOD PRESSURE: 137 MMHG | BODY MASS INDEX: 31.4 KG/M2 | HEIGHT: 69 IN | WEIGHT: 212 LBS | DIASTOLIC BLOOD PRESSURE: 85 MMHG | TEMPERATURE: 97 F | OXYGEN SATURATION: 96 % | HEART RATE: 81 BPM

## 2021-09-07 DIAGNOSIS — E11.621 TYPE 2 DIABETES MELLITUS WITH FOOT ULCER: ICD-10-CM

## 2021-09-07 PROCEDURE — G0463: CPT

## 2021-09-07 PROCEDURE — 99024 POSTOP FOLLOW-UP VISIT: CPT

## 2021-09-07 RX ORDER — DOXYCYCLINE HYCLATE 100 MG/1
100 CAPSULE ORAL
Qty: 30 | Refills: 2 | Status: COMPLETED | COMMUNITY
Start: 2021-09-07 | End: 2021-10-22

## 2021-09-08 DIAGNOSIS — Z87.442 PERSONAL HISTORY OF URINARY CALCULI: ICD-10-CM

## 2021-09-08 DIAGNOSIS — M10.071 IDIOPATHIC GOUT, RIGHT ANKLE AND FOOT: ICD-10-CM

## 2021-09-08 DIAGNOSIS — L84 CORNS AND CALLOSITIES: ICD-10-CM

## 2021-09-08 DIAGNOSIS — Z79.899 OTHER LONG TERM (CURRENT) DRUG THERAPY: ICD-10-CM

## 2021-09-08 DIAGNOSIS — E78.1 PURE HYPERGLYCERIDEMIA: ICD-10-CM

## 2021-09-08 DIAGNOSIS — Z79.84 LONG TERM (CURRENT) USE OF ORAL HYPOGLYCEMIC DRUGS: ICD-10-CM

## 2021-09-08 DIAGNOSIS — N32.81 OVERACTIVE BLADDER: ICD-10-CM

## 2021-09-08 DIAGNOSIS — N40.1 BENIGN PROSTATIC HYPERPLASIA WITH LOWER URINARY TRACT SYMPTOMS: ICD-10-CM

## 2021-09-08 DIAGNOSIS — I10 ESSENTIAL (PRIMARY) HYPERTENSION: ICD-10-CM

## 2021-09-08 DIAGNOSIS — R35.1 NOCTURIA: ICD-10-CM

## 2021-09-08 DIAGNOSIS — Z48.02 ENCOUNTER FOR REMOVAL OF SUTURES: ICD-10-CM

## 2021-09-08 DIAGNOSIS — Z98.890 OTHER SPECIFIED POSTPROCEDURAL STATES: ICD-10-CM

## 2021-09-08 DIAGNOSIS — E11.621 TYPE 2 DIABETES MELLITUS WITH FOOT ULCER: ICD-10-CM

## 2021-09-08 DIAGNOSIS — L97.412 NON-PRESSURE CHRONIC ULCER OF RIGHT HEEL AND MIDFOOT WITH FAT LAYER EXPOSED: ICD-10-CM

## 2021-09-08 DIAGNOSIS — Z87.81 PERSONAL HISTORY OF (HEALED) TRAUMATIC FRACTURE: ICD-10-CM

## 2021-09-08 DIAGNOSIS — N52.9 MALE ERECTILE DYSFUNCTION, UNSPECIFIED: ICD-10-CM

## 2021-09-08 NOTE — REVIEW OF SYSTEMS
[Fever] : no fever [Chills] : no chills [Eye Pain] : no eye pain [Loss Of Hearing] : no hearing loss [Shortness Of Breath] : no shortness of breath [Wheezing] : no wheezing [Abdominal Pain] : no abdominal pain [Arthralgias] : arthralgias [Joint Swelling] : joint swelling [Joint Stiffness] : joint stiffness [Skin Wound] : skin wound [Anxiety] : no anxiety [Easy Bleeding] : no tendency for easy bleeding [FreeTextEntry5] : HTN [FreeTextEntry9] : Gouty arthropathy  [de-identified] : s/p right foot surgery , no so , no pain  [de-identified] : Diabetic neuropathy  [de-identified] : NIDDM , severe gouty arthropathy

## 2021-09-08 NOTE — VITALS
[] : No [de-identified] : Pt rates pain 0/10.  Patient denies any pain or discomfort at the present  [FreeTextEntry5] : Pt completed antibiotic 9/5/2021

## 2021-09-08 NOTE — PHYSICAL EXAM
[4 x 4] : 4 x 4  [1+] : left 1+ [Ankle Swelling (On Exam)] : present [Ankle Swelling Bilaterally] : bilaterally  [Varicose Veins Of Lower Extremities] : bilaterally [Ankle Swelling On The Left] : moderate [] : bilaterally [Ankle Swelling On The Right] : mild [Purpura] : no purpura  [Petechiae] : no petechiae [Skin Ulcer] : ulcer [Skin Induration] : no induration [Alert] : alert [Oriented to Person] : oriented to person [Oriented to Place] : oriented to place [Calm] : calm [de-identified] : calm [de-identified] : HTN [de-identified] : severe gout with associated foot deformities  [de-identified] : s/p right foot sx , healing well [de-identified] : Diabetic neuropathy  [de-identified] : Suture removed by DPM  [FreeTextEntry1] : Lateral Foot- Sutures in place- wound dehisced  [FreeTextEntry2] : 6.2 [FreeTextEntry3] : 0.6 [FreeTextEntry4] : 0.1 [de-identified] : serosanguineous [de-identified] : callus [de-identified] : 90% [de-identified] : 10% [de-identified] : Silver Alginate [de-identified] : Cleansed with Normal saline\par  [de-identified] : Sutures removed by DPM  [FreeTextEntry7] : Hallux- Sutures in place - Suture CDI [FreeTextEntry8] : 6.0 [de-identified] : Incision line [de-identified] : Intact  [de-identified] : Silver Alginate  [de-identified] : Cleansed with Normal Saline.  [de-identified] : Suture Removed by DPM  [de-identified] : Foot 2nd Digit- Sutures in place - CDI [de-identified] : 4.5 [de-identified] : incision line [de-identified] : Sanguineous  [de-identified] : ecchymosis  [de-identified] : Silver Alginate  [de-identified] : Cleansed with Normal Saline.  [TWNoteComboBox1] : Right [TWNoteComboBox4] : Moderate [TWNoteComboBox5] : No [TWNoteComboBox6] : Other [de-identified] : No [de-identified] : other [de-identified] : None [de-identified] : None [de-identified] : Yes [de-identified] : 3x Weekly [de-identified] : Primary Dressing [TWNoteComboBox9] : Right [de-identified] : None [de-identified] : other [de-identified] : 3x Weekly [de-identified] : Right [de-identified] : Small [de-identified] : Erythema [de-identified] : None [de-identified] : None [de-identified] : 100% [de-identified] : No [de-identified] : 3x Weekly

## 2021-09-08 NOTE — PLAN
[FreeTextEntry1] : continue post op wound care , all sutures removed , PTR 1week  Spent 20 minutes for patient care and medical decision making.\par

## 2021-09-08 NOTE — ASSESSMENT
[Verbal] : Verbal [Written] : Written [Patient] : Patient [Significant other] : Significant other [Good - alert, interested, motivated] : Good - alert, interested, motivated [Verbalizes knowledge/Understanding] : Verbalizes knowledge/understanding [Dressing changes] : dressing changes [Foot Care] : foot care [Skin Care] : skin care [Pressure relief] : pressure relief [Signs and symptoms of infection] : sign and symptoms of infection [Nutrition] : nutrition [How and When to Call] : how and when to call [Off-loading] : off-loading [Patient responsibility to plan of care] : patient responsibility to plan of care [Stable] : stable [Home] : Home [Ambulatory] : Ambulatory [Not Applicable - Long Term Care/Home Health Agency] : Long Term Care/Home Health Agency: Not Applicable [] : No [FreeTextEntry2] : Infection Prevention\par Localized Wound Care\par Offloading / Pressure Relief\par Promote Optimal Skin Integrity\par Maintain acceptable pain level with use of pharmacological and nonpharmacological interventions\par  [FreeTextEntry3] : Increase in wound size [FreeTextEntry4] : F/U to [facility] for an assessment in one week\par Doxycycline sent to pt pharmacy, pt instructed to

## 2021-09-08 NOTE — HISTORY OF PRESENT ILLNESS
[FreeTextEntry1] : s/p gout / bone resection , 5th met base , medial 1st metatarsal and arthroplasty 2nd toe right foot , all healing well , some dehiscence at the base of the 5th metatarsal and the dorsal 2nd toe right foot , no soi

## 2021-09-14 ENCOUNTER — OUTPATIENT (OUTPATIENT)
Dept: OUTPATIENT SERVICES | Facility: HOSPITAL | Age: 61
LOS: 1 days | Discharge: HOME | End: 2021-09-14
Payer: MEDICAID

## 2021-09-14 ENCOUNTER — APPOINTMENT (OUTPATIENT)
Dept: WOUND CARE | Facility: HOSPITAL | Age: 61
End: 2021-09-14
Payer: MEDICAID

## 2021-09-14 VITALS
RESPIRATION RATE: 20 BRPM | HEIGHT: 69 IN | HEART RATE: 82 BPM | DIASTOLIC BLOOD PRESSURE: 96 MMHG | OXYGEN SATURATION: 96 % | WEIGHT: 212 LBS | SYSTOLIC BLOOD PRESSURE: 141 MMHG | TEMPERATURE: 97.5 F | BODY MASS INDEX: 31.4 KG/M2

## 2021-09-14 DIAGNOSIS — E11.621 TYPE 2 DIABETES MELLITUS WITH FOOT ULCER: ICD-10-CM

## 2021-09-14 PROCEDURE — G0463: CPT

## 2021-09-14 PROCEDURE — 99024 POSTOP FOLLOW-UP VISIT: CPT

## 2021-09-14 NOTE — HISTORY OF PRESENT ILLNESS
[FreeTextEntry1] : s/p gout / bone resection , 5th met base , medial 1st metatarsal and arthroplasty 2nd toe right foot, dehiscence at the base of the 5th metatarsal and the dorsal 2nd toe right foot , no soi

## 2021-09-14 NOTE — REVIEW OF SYSTEMS
[Fever] : no fever [Chills] : no chills [Eye Pain] : no eye pain [Loss Of Hearing] : no hearing loss [Shortness Of Breath] : no shortness of breath [Wheezing] : no wheezing [Abdominal Pain] : no abdominal pain [Arthralgias] : arthralgias [Joint Swelling] : joint swelling [Joint Stiffness] : joint stiffness [Skin Wound] : skin wound [Anxiety] : no anxiety [Easy Bleeding] : no tendency for easy bleeding [FreeTextEntry5] : HTN [FreeTextEntry9] : Gouty arthropathy  [de-identified] : s/p right foot surgery , no so , no pain  [de-identified] : Diabetic neuropathy  [de-identified] : NIDDM , severe gouty arthropathy

## 2021-09-14 NOTE — PHYSICAL EXAM
[4 x 4] : 4 x 4  [1+] : left 1+ [Ankle Swelling (On Exam)] : present [Ankle Swelling Bilaterally] : bilaterally  [Varicose Veins Of Lower Extremities] : bilaterally [Ankle Swelling On The Left] : moderate [] : bilaterally [Ankle Swelling On The Right] : mild [Purpura] : no purpura  [Petechiae] : no petechiae [Skin Ulcer] : ulcer [Skin Induration] : no induration [Alert] : alert [Oriented to Person] : oriented to person [Oriented to Place] : oriented to place [Calm] : calm [de-identified] : A&Ox3, NAD [de-identified] : severe gout with associated foot deformities  [de-identified] : right medial incision healing well, right 2nd dorsal toe incision dehiscence with wound down to skin, subcutnaeous tissue, and fat. right lateral foot incision dehiscence with ulcer down to skin, subcutaneous tissue, fat, and bone. [de-identified] : Diabetic neuropathy  [FreeTextEntry1] : Right Lateral Foot [FreeTextEntry2] : 1.4 [FreeTextEntry3] : 5.7 [FreeTextEntry4] : Hypergranular - 0.2 [de-identified] : serosanguineous [de-identified] : 60% [de-identified] : 40% [de-identified] : Silver Alginate [de-identified] : Cleansed with Normal saline\par Kerlix  [FreeTextEntry7] : Right Hallux - areas of dry eschar  [FreeTextEntry8] : 4.0 [de-identified] : Incision line [de-identified] : Intact dry / flaky  [de-identified] : Silver Alginate  [de-identified] : Cleansed with Normal Saline\par Kerlix  [de-identified] : Right Foot 2nd Digit [de-identified] : 3.4 [de-identified] : 4.3 [de-identified] : 0.2 [de-identified] : Serosanguineous  [de-identified] : 10% [de-identified] : 15% [de-identified] : Silver Alginate  [de-identified] : Cleansed with Normal Saline\par Kerlix  [TWNoteComboBox1] : Right [TWNoteComboBox4] : Moderate [TWNoteComboBox5] : No [TWNoteComboBox6] : Other [de-identified] : No [de-identified] : Erythema [de-identified] : None [de-identified] : None [de-identified] : Yes [de-identified] : 3x Weekly [de-identified] : Primary Dressing [TWNoteComboBox9] : Right [de-identified] : None [de-identified] : other [de-identified] : 3x Weekly [de-identified] : Right [de-identified] : Small [de-identified] : Erythema [de-identified] : None [de-identified] : >75% [de-identified] : <20% [de-identified] : Yes [de-identified] : 3x Weekly

## 2021-09-14 NOTE — PLAN
[FreeTextEntry1] : Patient examined and evaluated at this time.\par Continue local wound care and offloading.\par Patient is a HBOT candidate and will benefit.\par Will auth for HBO at this time.\par Spent 20 minutes for patient care and medical decision making.\par Patient to follow up in 1 week.\par

## 2021-09-14 NOTE — ASSESSMENT
[Verbal] : Verbal [Written] : Written [Patient] : Patient [Significant other] : Significant other [Good - alert, interested, motivated] : Good - alert, interested, motivated [Verbalizes knowledge/Understanding] : Verbalizes knowledge/understanding [Dressing changes] : dressing changes [Foot Care] : foot care [Skin Care] : skin care [Pressure relief] : pressure relief [Signs and symptoms of infection] : sign and symptoms of infection [Nutrition] : nutrition [How and When to Call] : how and when to call [Off-loading] : off-loading [Patient responsibility to plan of care] : patient responsibility to plan of care [] : Yes [Stable] : stable [Home] : Home [Ambulatory] : Ambulatory [Not Applicable - Long Term Care/Home Health Agency] : Long Term Care/Home Health Agency: Not Applicable [FreeTextEntry2] : Infection Prevention\par Localized Wound Care\par Offloading / Pressure Relief\par Promote Optimal Skin Integrity\par Maintain acceptable pain level with use of pharmacological and nonpharmacological interventions\par  [FreeTextEntry4] : F/U to [facility] for an assessment in one week\par DPM discussed HBOT, pt stated that he would go forward with HBOT\par Pt stated that he did not have enough time to complete the HBO orientation \par Pt had X-ray (8/24/2021) and Blood work (8/27/2021) prior to surgical procedure \par Auth submitted for HBOT \par

## 2021-09-15 DIAGNOSIS — Z79.84 LONG TERM (CURRENT) USE OF ORAL HYPOGLYCEMIC DRUGS: ICD-10-CM

## 2021-09-15 DIAGNOSIS — L84 CORNS AND CALLOSITIES: ICD-10-CM

## 2021-09-15 DIAGNOSIS — F41.8 OTHER SPECIFIED ANXIETY DISORDERS: ICD-10-CM

## 2021-09-15 DIAGNOSIS — E78.1 PURE HYPERGLYCERIDEMIA: ICD-10-CM

## 2021-09-15 DIAGNOSIS — M10.071 IDIOPATHIC GOUT, RIGHT ANKLE AND FOOT: ICD-10-CM

## 2021-09-15 DIAGNOSIS — Z87.442 PERSONAL HISTORY OF URINARY CALCULI: ICD-10-CM

## 2021-09-15 DIAGNOSIS — Z98.1 ARTHRODESIS STATUS: ICD-10-CM

## 2021-09-15 DIAGNOSIS — R35.1 NOCTURIA: ICD-10-CM

## 2021-09-15 DIAGNOSIS — L97.416 NON-PRESSURE CHRONIC ULCER OF RIGHT HEEL AND MIDFOOT WITH BONE INVOLVEMENT WITHOUT EVIDENCE OF NECROSIS: ICD-10-CM

## 2021-09-15 DIAGNOSIS — E11.621 TYPE 2 DIABETES MELLITUS WITH FOOT ULCER: ICD-10-CM

## 2021-09-15 DIAGNOSIS — Z98.890 OTHER SPECIFIED POSTPROCEDURAL STATES: ICD-10-CM

## 2021-09-15 DIAGNOSIS — N52.9 MALE ERECTILE DYSFUNCTION, UNSPECIFIED: ICD-10-CM

## 2021-09-15 DIAGNOSIS — N40.1 BENIGN PROSTATIC HYPERPLASIA WITH LOWER URINARY TRACT SYMPTOMS: ICD-10-CM

## 2021-09-15 DIAGNOSIS — Z87.81 PERSONAL HISTORY OF (HEALED) TRAUMATIC FRACTURE: ICD-10-CM

## 2021-09-15 DIAGNOSIS — N32.81 OVERACTIVE BLADDER: ICD-10-CM

## 2021-09-15 DIAGNOSIS — Z79.899 OTHER LONG TERM (CURRENT) DRUG THERAPY: ICD-10-CM

## 2021-09-15 DIAGNOSIS — I10 ESSENTIAL (PRIMARY) HYPERTENSION: ICD-10-CM

## 2021-09-20 ENCOUNTER — APPOINTMENT (OUTPATIENT)
Dept: WOUND CARE | Facility: HOSPITAL | Age: 61
End: 2021-09-20

## 2021-09-20 ENCOUNTER — NON-APPOINTMENT (OUTPATIENT)
Age: 61
End: 2021-09-20

## 2021-09-21 ENCOUNTER — APPOINTMENT (OUTPATIENT)
Dept: WOUND CARE | Facility: HOSPITAL | Age: 61
End: 2021-09-21
Payer: MEDICAID

## 2021-09-21 ENCOUNTER — OUTPATIENT (OUTPATIENT)
Dept: OUTPATIENT SERVICES | Facility: HOSPITAL | Age: 61
LOS: 1 days | Discharge: HOME | End: 2021-09-21
Payer: MEDICAID

## 2021-09-21 VITALS
SYSTOLIC BLOOD PRESSURE: 122 MMHG | WEIGHT: 212 LBS | HEIGHT: 69 IN | DIASTOLIC BLOOD PRESSURE: 84 MMHG | BODY MASS INDEX: 31.4 KG/M2 | HEART RATE: 111 BPM | RESPIRATION RATE: 18 BRPM | OXYGEN SATURATION: 97 % | TEMPERATURE: 97.9 F

## 2021-09-21 DIAGNOSIS — E11.621 TYPE 2 DIABETES MELLITUS WITH FOOT ULCER: ICD-10-CM

## 2021-09-21 PROCEDURE — U0005: CPT

## 2021-09-21 PROCEDURE — 99024 POSTOP FOLLOW-UP VISIT: CPT

## 2021-09-21 PROCEDURE — G0463: CPT

## 2021-09-21 PROCEDURE — U0003: CPT

## 2021-09-22 ENCOUNTER — NON-APPOINTMENT (OUTPATIENT)
Age: 61
End: 2021-09-22

## 2021-09-22 DIAGNOSIS — E78.1 PURE HYPERGLYCERIDEMIA: ICD-10-CM

## 2021-09-22 DIAGNOSIS — R35.1 NOCTURIA: ICD-10-CM

## 2021-09-22 DIAGNOSIS — I10 ESSENTIAL (PRIMARY) HYPERTENSION: ICD-10-CM

## 2021-09-22 DIAGNOSIS — Z98.890 OTHER SPECIFIED POSTPROCEDURAL STATES: ICD-10-CM

## 2021-09-22 DIAGNOSIS — Z98.1 ARTHRODESIS STATUS: ICD-10-CM

## 2021-09-22 DIAGNOSIS — N52.9 MALE ERECTILE DYSFUNCTION, UNSPECIFIED: ICD-10-CM

## 2021-09-22 DIAGNOSIS — Z20.822 CONTACT WITH AND (SUSPECTED) EXPOSURE TO COVID-19: ICD-10-CM

## 2021-09-22 DIAGNOSIS — Z79.899 OTHER LONG TERM (CURRENT) DRUG THERAPY: ICD-10-CM

## 2021-09-22 DIAGNOSIS — N40.1 BENIGN PROSTATIC HYPERPLASIA WITH LOWER URINARY TRACT SYMPTOMS: ICD-10-CM

## 2021-09-22 DIAGNOSIS — N32.81 OVERACTIVE BLADDER: ICD-10-CM

## 2021-09-22 DIAGNOSIS — M10.071 IDIOPATHIC GOUT, RIGHT ANKLE AND FOOT: ICD-10-CM

## 2021-09-22 DIAGNOSIS — L97.516 NON-PRESSURE CHRONIC ULCER OF OTHER PART OF RIGHT FOOT WITH BONE INVOLVEMENT WITHOUT EVIDENCE OF NECROSIS: ICD-10-CM

## 2021-09-22 DIAGNOSIS — Z87.81 PERSONAL HISTORY OF (HEALED) TRAUMATIC FRACTURE: ICD-10-CM

## 2021-09-22 DIAGNOSIS — E11.621 TYPE 2 DIABETES MELLITUS WITH FOOT ULCER: ICD-10-CM

## 2021-09-22 DIAGNOSIS — F41.8 OTHER SPECIFIED ANXIETY DISORDERS: ICD-10-CM

## 2021-09-22 DIAGNOSIS — Z87.442 PERSONAL HISTORY OF URINARY CALCULI: ICD-10-CM

## 2021-09-22 DIAGNOSIS — L97.416 NON-PRESSURE CHRONIC ULCER OF RIGHT HEEL AND MIDFOOT WITH BONE INVOLVEMENT WITHOUT EVIDENCE OF NECROSIS: ICD-10-CM

## 2021-09-22 DIAGNOSIS — Z79.84 LONG TERM (CURRENT) USE OF ORAL HYPOGLYCEMIC DRUGS: ICD-10-CM

## 2021-09-22 LAB — SARS-COV-2 RNA SPEC QL NAA+PROBE: SIGNIFICANT CHANGE UP

## 2021-09-22 NOTE — VITALS
[] : No [de-identified] : Pt rates pain 0/10.  Patient denies any pain or discomfort at the present

## 2021-09-22 NOTE — PHYSICAL EXAM
[4 x 4] : 4 x 4  [1+] : left 1+ [Ankle Swelling (On Exam)] : present [Ankle Swelling Bilaterally] : bilaterally  [Varicose Veins Of Lower Extremities] : bilaterally [Ankle Swelling On The Left] : moderate [] : bilaterally [Ankle Swelling On The Right] : mild [Skin Ulcer] : ulcer [Alert] : alert [Oriented to Person] : oriented to person [Oriented to Place] : oriented to place [Calm] : calm [Abdominal Pad] : Abdominal Pad [Purpura] : no purpura  [Petechiae] : no petechiae [Skin Induration] : no induration [de-identified] : A&Ox3, NAD [de-identified] : severe gout with associated foot deformities  [de-identified] : right medial incision healing well, right 2nd dorsal toe incision dehiscence with wound down to skin, subcutnaeous tissue, and fat. right lateral foot incision dehiscence with ulcer down to skin, subcutaneous tissue, fat, and bone. [de-identified] : Diabetic neuropathy  [FreeTextEntry1] : Right Lateral Foot [FreeTextEntry2] : 1.1 [FreeTextEntry3] : 5.2 [FreeTextEntry4] : Hypergranular - 0.2 [de-identified] : Serosanguineous [de-identified] : 20% [de-identified] : Silver Alginate and Adaptic Touch  [de-identified] : Cleansed with Normal saline\par Kerlix  [FreeTextEntry7] : Right Hallux - areas of dry eschar  [FreeTextEntry8] : 2.0 [de-identified] : Incision line [de-identified] : Intact dry / flaky  [de-identified] : NONE [de-identified] : Cleansed with Normal Saline\par Kerlix  [de-identified] : Right Foot 2nd Digit [de-identified] : 2.8 [de-identified] : 3.7 [de-identified] : 0.2 [de-identified] : Serosanguineous  [de-identified] : 10% [de-identified] : 15% [de-identified] : Silver Alginate and Adaptic Touch  [de-identified] : Cleansed with Normal Saline\par Kerlix  [TWNoteComboBox4] : Moderate [TWNoteComboBox5] : No [TWNoteComboBox6] : Other [de-identified] : No [de-identified] : Erythema [de-identified] : None [de-identified] : None [de-identified] : >75% [de-identified] : Yes [de-identified] : 3x Weekly [de-identified] : Primary Dressing [de-identified] : None [de-identified] : other [de-identified] : 3x Weekly [de-identified] : Small [de-identified] : Erythema [de-identified] : None [de-identified] : >75% [de-identified] : Yes [de-identified] : 3x Weekly

## 2021-09-22 NOTE — REVIEW OF SYSTEMS
[Joint Swelling] : joint swelling [Arthralgias] : arthralgias [Joint Stiffness] : joint stiffness [Skin Wound] : skin wound [Fever] : no fever [Chills] : no chills [Eye Pain] : no eye pain [Loss Of Hearing] : no hearing loss [Shortness Of Breath] : no shortness of breath [Wheezing] : no wheezing [Abdominal Pain] : no abdominal pain [Anxiety] : no anxiety [Easy Bleeding] : no tendency for easy bleeding [FreeTextEntry5] : HTN [FreeTextEntry9] : Gouty arthropathy  [de-identified] : s/p right foot surgery , no so , no pain  [de-identified] : Diabetic neuropathy  [de-identified] : NIDDM , severe gouty arthropathy

## 2021-09-22 NOTE — ASSESSMENT
[Verbal] : Verbal [Patient] : Patient [Good - alert, interested, motivated] : Good - alert, interested, motivated [Verbalizes knowledge/Understanding] : Verbalizes knowledge/understanding [Dressing changes] : dressing changes [Foot Care] : foot care [Skin Care] : skin care [Pressure relief] : pressure relief [Signs and symptoms of infection] : sign and symptoms of infection [Nutrition] : nutrition [How and When to Call] : how and when to call [Hyperbaric Therapy] : hyperbaric therapy [Off-loading] : off-loading [Patient responsibility to plan of care] : patient responsibility to plan of care [Glycemic Control] : glycemic control [Stable] : stable [Home] : Home [Ambulatory] : Ambulatory [Not Applicable - Long Term Care/Home Health Agency] : Long Term Care/Home Health Agency: Not Applicable [] : No [FreeTextEntry2] : Infection Prevention\par Localized Wound Care\par Offloading / Pressure Relief\par Promote Optimal Skin Integrity \par Maintain acceptable pain level with use of pharmacological and nonpharmacological interventions\par  [FreeTextEntry4] : F/U to [facility] for an assessment in one week\par Pt completed HBO orientation, HBO Pre-Procedure checklist, HBO Consent, TM evaluated by DPM, and COVID nasopharyngeal swab obtained\par Pt tolerated test well

## 2021-09-22 NOTE — PLAN
[FreeTextEntry1] : Patient examined and evaluated at this time.\par Continue local wound care and offloading.\par Patient is a HBOT candidate and will benefit.\par Spent 20 minutes for patient care and medical decision making.\par Patient to follow up in 1 week.\par

## 2021-09-23 ENCOUNTER — APPOINTMENT (OUTPATIENT)
Dept: HYPERBARIC MEDICINE | Facility: HOSPITAL | Age: 61
End: 2021-09-23
Payer: MEDICAID

## 2021-09-23 ENCOUNTER — RX RENEWAL (OUTPATIENT)
Age: 61
End: 2021-09-23

## 2021-09-23 ENCOUNTER — OUTPATIENT (OUTPATIENT)
Dept: OUTPATIENT SERVICES | Facility: HOSPITAL | Age: 61
LOS: 1 days | Discharge: HOME | End: 2021-09-23
Payer: MEDICAID

## 2021-09-23 VITALS
TEMPERATURE: 97.3 F | HEART RATE: 89 BPM | OXYGEN SATURATION: 98 % | SYSTOLIC BLOOD PRESSURE: 144 MMHG | DIASTOLIC BLOOD PRESSURE: 93 MMHG | RESPIRATION RATE: 18 BRPM

## 2021-09-23 VITALS
HEART RATE: 87 BPM | RESPIRATION RATE: 18 BRPM | TEMPERATURE: 97.3 F | SYSTOLIC BLOOD PRESSURE: 171 MMHG | DIASTOLIC BLOOD PRESSURE: 94 MMHG | OXYGEN SATURATION: 97 %

## 2021-09-23 DIAGNOSIS — E11.621 TYPE 2 DIABETES MELLITUS WITH FOOT ULCER: ICD-10-CM

## 2021-09-23 DIAGNOSIS — F41.9 ANXIETY DISORDER, UNSPECIFIED: ICD-10-CM

## 2021-09-23 DIAGNOSIS — L97.416 NON-PRESSURE CHRONIC ULCER OF RIGHT HEEL AND MIDFOOT WITH BONE INVOLVEMENT WITHOUT EVIDENCE OF NECROSIS: ICD-10-CM

## 2021-09-23 DIAGNOSIS — L97.516 NON-PRESSURE CHRONIC ULCER OF OTHER PART OF RIGHT FOOT WITH BONE INVOLVEMENT WITHOUT EVIDENCE OF NECROSIS: ICD-10-CM

## 2021-09-23 PROCEDURE — 99183 HYPERBARIC OXYGEN THERAPY: CPT

## 2021-09-23 PROCEDURE — 82962 GLUCOSE BLOOD TEST: CPT

## 2021-09-23 PROCEDURE — G0277: CPT

## 2021-09-23 NOTE — ASSESSMENT
[No change from previous assessment] : No change from previous assessment [Patient descended without problem for 9 minutes] : Patient descended without problem for 9 minutes [No dizziness or thirst] :  No dizziness or thirst [Vital signs stable] : Vital signs stable [No ear problems] : No ear problems [Tolerating dive well] : Tolerating dive well [No Chest Pain, shortness of breath] : No Chest Pain, shortness of breath [Respiratory Rate Stable] : Respiratory Rate Stable [No chest pain, shortness of breath, or ear pain] :  No chest pain, shortness of breath, or ear pain  [Tolerated Ascent well] : Tolerated Ascent well [Vital Signs stable] : Vital Signs stable [A physician was present throughout the entire HBOT] : A physician was present throughout the entire HBOT [No] : No [Clinically Stable] : Clinically stable [Continue Treatment Plan] : Continue treatment plan [0] : 0 out of 10

## 2021-09-24 ENCOUNTER — APPOINTMENT (OUTPATIENT)
Dept: HYPERBARIC MEDICINE | Facility: HOSPITAL | Age: 61
End: 2021-09-24
Payer: MEDICAID

## 2021-09-24 ENCOUNTER — OUTPATIENT (OUTPATIENT)
Dept: OUTPATIENT SERVICES | Facility: HOSPITAL | Age: 61
LOS: 1 days | Discharge: HOME | End: 2021-09-24
Payer: MEDICAID

## 2021-09-24 VITALS
TEMPERATURE: 97.3 F | SYSTOLIC BLOOD PRESSURE: 148 MMHG | OXYGEN SATURATION: 99 % | DIASTOLIC BLOOD PRESSURE: 94 MMHG | HEART RATE: 74 BPM | RESPIRATION RATE: 18 BRPM

## 2021-09-24 VITALS
DIASTOLIC BLOOD PRESSURE: 88 MMHG | RESPIRATION RATE: 20 BRPM | SYSTOLIC BLOOD PRESSURE: 132 MMHG | TEMPERATURE: 97.2 F | OXYGEN SATURATION: 97 % | HEART RATE: 99 BPM

## 2021-09-24 DIAGNOSIS — E11.621 TYPE 2 DIABETES MELLITUS WITH FOOT ULCER: ICD-10-CM

## 2021-09-24 DIAGNOSIS — L97.516 NON-PRESSURE CHRONIC ULCER OF OTHER PART OF RIGHT FOOT WITH BONE INVOLVEMENT WITHOUT EVIDENCE OF NECROSIS: ICD-10-CM

## 2021-09-24 DIAGNOSIS — L97.416 NON-PRESSURE CHRONIC ULCER OF RIGHT HEEL AND MIDFOOT WITH BONE INVOLVEMENT WITHOUT EVIDENCE OF NECROSIS: ICD-10-CM

## 2021-09-24 PROCEDURE — 82962 GLUCOSE BLOOD TEST: CPT

## 2021-09-24 PROCEDURE — G0277: CPT

## 2021-09-24 PROCEDURE — 99183 HYPERBARIC OXYGEN THERAPY: CPT

## 2021-09-25 NOTE — ADDENDUM
[FreeTextEntry1] : PT ARRIVED AMBULATORY A&OX3\par PT REPORTS TAKING XANAX PRIOR TO DESCENT. NO DRAINAGE NOTED ON DRESSING PRIOR TO DESCENT. WOUND CARE TO FOLLOW HBOT FOR ORDERED 3X WEEKLY DRESSING CHANGE.\par ALL VITALS WITHIN PARAMETERS FOR HBOT.\par AT DEPTH FLOW RATE WAS INCREASED FOR PT COMFORT.\par PT DESCENDED TO 2.4 DAVID @ 2.2 PSI/MIN IN CHAMBER #1 WITHOUT INCIDENT\par PT TOLERATED AIR BREAKS WELL\par PT ASCENDED FROM 2.4 DAVID @ 2.2 PSI/MIN WITHOUT INCIDENT\par PT TOLERATED TX WELL\par PT TO RECEIVE WOUND CARE POST HBOT

## 2021-09-25 NOTE — PROCEDURE
[Outpatient] : Outpatient [Ambulatory] : Patient is ambulatory. [THIS CHAMBER HAS BEEN CLEANED / DISINFECTED] : This chamber has been cleaned / disinfected according to local and hospital policy and procedure prior to this treatment. [Patient demonstrated and verbalized proper technique for using air break mask] : Patient demonstrated and verbalized proper technique for using air break mask [Patient educated on the risks of SMOKING prior to HBOT with understanding] : Patient educated on the risks of SMOKING prior to HBOT with understanding [Patient educated on the risks of CONSUMING ALCOHOL prior to HBOT with understanding] : Patient educated on the risks of CONSUMING ALCOHOL prior to HBOT with understanding [Number: ___] : Number: [unfilled] [Diagnosis: ___] : Diagnosis: [unfilled] [____] : Post-Dive: Time - [unfilled] [___] : Post-Dive: Value - [unfilled] mg/dL [100% Cotton] : 100% cotton [Empty all pockets] : empty all pockets [No hair oils, wigs, hairpieces, pins] : no hair oils, wigs, hairpieces, pins  [Pre tx medications] : pre tx medications  [No make-up, creams] : no make-up, creams  [No jewelry] : no jewelry  [No matches, cigarettes, lighters] : no matches, cigarettes, lighters  [Hearing aid removed] : hearing aid removed [Dentures removed] : dentures removed [Ground bracelet on pt's wrist] : ground bracelet on pt's wrist  [Contacts removed] : contacts removed  [Remove nail polish] : remove nail polish  [No reading material] : no reading material  [Bra, undergarments removed] : bra, undergarments removed  [No contraindicated dressings] : no contraindicated dressings [Ground Wire - VISUAL Verification - Intact/Free of Obstruction] : Ground Wire - VISUAL Verification - Intact/Free of Obstruction  [Ground Continuity - Verified < 1 ohm w/ Wrist Strap Mila] : Ground Continuity - Verified < 1 ohm w/ Wrist Strap Mila [Clear all fields] : clear all fields [] : No [FreeTextEntry4] : 100 [FreeTextEntry6] : 8:09 [FreeTextEntry8] : 8:19 [de-identified] : 8:54 [de-identified] : 8:49 [de-identified] : 9:24 [de-identified] : 9:29 [de-identified] : 9:59 [de-identified] : 10:09 [de-identified] : 120 MINUTES

## 2021-09-27 ENCOUNTER — APPOINTMENT (OUTPATIENT)
Dept: HYPERBARIC MEDICINE | Facility: HOSPITAL | Age: 61
End: 2021-09-27
Payer: MEDICAID

## 2021-09-27 ENCOUNTER — OUTPATIENT (OUTPATIENT)
Dept: OUTPATIENT SERVICES | Facility: HOSPITAL | Age: 61
LOS: 1 days | End: 2021-09-27
Payer: MEDICAID

## 2021-09-27 VITALS
TEMPERATURE: 97.3 F | DIASTOLIC BLOOD PRESSURE: 90 MMHG | SYSTOLIC BLOOD PRESSURE: 157 MMHG | OXYGEN SATURATION: 98 % | RESPIRATION RATE: 18 BRPM | HEART RATE: 67 BPM

## 2021-09-27 VITALS
OXYGEN SATURATION: 99 % | SYSTOLIC BLOOD PRESSURE: 152 MMHG | RESPIRATION RATE: 20 BRPM | DIASTOLIC BLOOD PRESSURE: 97 MMHG | TEMPERATURE: 97.2 F | HEART RATE: 89 BPM

## 2021-09-27 DIAGNOSIS — Z00.00 ENCOUNTER FOR GENERAL ADULT MEDICAL EXAMINATION WITHOUT ABNORMAL FINDINGS: ICD-10-CM

## 2021-09-27 DIAGNOSIS — E11.621 TYPE 2 DIABETES MELLITUS WITH FOOT ULCER: ICD-10-CM

## 2021-09-27 LAB — SARS-COV-2 RNA SPEC QL NAA+PROBE: SIGNIFICANT CHANGE UP

## 2021-09-27 PROCEDURE — 82962 GLUCOSE BLOOD TEST: CPT

## 2021-09-27 PROCEDURE — U0003: CPT

## 2021-09-27 PROCEDURE — U0005: CPT

## 2021-09-27 PROCEDURE — 99183 HYPERBARIC OXYGEN THERAPY: CPT

## 2021-09-27 NOTE — PROCEDURE
[Outpatient] : Outpatient [Ambulatory] : Patient is ambulatory. [THIS CHAMBER HAS BEEN CLEANED / DISINFECTED] : This chamber has been cleaned / disinfected according to local and hospital policy and procedure prior to this treatment. [Patient demonstrated and verbalized proper technique for using air break mask] : Patient demonstrated and verbalized proper technique for using air break mask [Patient educated on the risks of SMOKING prior to HBOT with understanding] : Patient educated on the risks of SMOKING prior to HBOT with understanding [Patient educated on the risks of CONSUMING ALCOHOL prior to HBOT with understanding] : Patient educated on the risks of CONSUMING ALCOHOL prior to HBOT with understanding [100% Cotton] : 100% cotton [Empty all pockets] : empty all pockets [No hair oils, wigs, hairpieces, pins] : no hair oils, wigs, hairpieces, pins  [Pre tx medications] : pre tx medications  [No make-up, creams] : no make-up, creams  [No jewelry] : no jewelry  [No matches, cigarettes, lighters] : no matches, cigarettes, lighters  [Hearing aid removed] : hearing aid removed [Dentures removed] : dentures removed [Ground bracelet on pt's wrist] : ground bracelet on pt's wrist  [Contacts removed] : contacts removed  [Remove nail polish] : remove nail polish  [No reading material] : no reading material  [Bra, undergarments removed] : bra, undergarments removed  [No contraindicated dressings] : no contraindicated dressings [Ground Wire - VISUAL Verification - Intact/Free of Obstruction] : Ground Wire - VISUAL Verification - Intact/Free of Obstruction  [Ground Continuity - Verified < 1 ohm w/ Wrist Strap Mila] : Ground Continuity - Verified < 1 ohm w/ Wrist Strap Mila [Number: ___] : Number: [unfilled] [Diagnosis: ___] : Diagnosis: [unfilled] [____] : Post-Dive: Time - [unfilled] [___] : Post-Dive: Value - [unfilled] mg/dL [Clear all fields] : clear all fields [] : No [FreeTextEntry4] : 100 [FreeTextEntry6] : 6044 [FreeTextEntry8] : 2729 [de-identified] : 9110 [de-identified] : 0404 [de-identified] : 0901 [de-identified] : 8276 [de-identified] : 100 [de-identified] : 1010 [de-identified] : 120 MINUTES

## 2021-09-27 NOTE — ADDENDUM
[FreeTextEntry1] : PT ARRIVED A&OX3\par ALL VITALS WITHIN PARAMETERS FOR HBOT\par PT DESCENDED TO 2.4 DAVID @ 2.2 PSI/MIN IN CHAMBER #2 WITHOUT INCIDENT\par PT RESTING AT TX DEPTH WITH VISIBLE CHEST RISE AND FALL OBSERVED CHAMBER SIDE\par PT TOLERATED AIR BREAKS WELL\par PT ASCENDED FROM 2.4 DAVID @ 2.2 PSI/MIN WITHOUT INCIDENT\par PT TOLERATED TX WELL\par PT TO RECEIVE WOUND CARE POST HBOT

## 2021-09-28 ENCOUNTER — OUTPATIENT (OUTPATIENT)
Dept: OUTPATIENT SERVICES | Facility: HOSPITAL | Age: 61
LOS: 1 days | Discharge: HOME | End: 2021-09-28
Payer: MEDICAID

## 2021-09-28 ENCOUNTER — APPOINTMENT (OUTPATIENT)
Dept: HYPERBARIC MEDICINE | Facility: HOSPITAL | Age: 61
End: 2021-09-28
Payer: MEDICAID

## 2021-09-28 VITALS
RESPIRATION RATE: 18 BRPM | HEART RATE: 61 BPM | TEMPERATURE: 97.4 F | OXYGEN SATURATION: 98 % | SYSTOLIC BLOOD PRESSURE: 150 MMHG | DIASTOLIC BLOOD PRESSURE: 92 MMHG

## 2021-09-28 VITALS
HEART RATE: 72 BPM | DIASTOLIC BLOOD PRESSURE: 98 MMHG | TEMPERATURE: 96.8 F | RESPIRATION RATE: 18 BRPM | SYSTOLIC BLOOD PRESSURE: 164 MMHG | OXYGEN SATURATION: 96 %

## 2021-09-28 DIAGNOSIS — L97.416 NON-PRESSURE CHRONIC ULCER OF RIGHT HEEL AND MIDFOOT WITH BONE INVOLVEMENT WITHOUT EVIDENCE OF NECROSIS: ICD-10-CM

## 2021-09-28 DIAGNOSIS — L97.516 NON-PRESSURE CHRONIC ULCER OF OTHER PART OF RIGHT FOOT WITH BONE INVOLVEMENT WITHOUT EVIDENCE OF NECROSIS: ICD-10-CM

## 2021-09-28 DIAGNOSIS — E11.621 TYPE 2 DIABETES MELLITUS WITH FOOT ULCER: ICD-10-CM

## 2021-09-28 PROCEDURE — 99183 HYPERBARIC OXYGEN THERAPY: CPT

## 2021-09-28 PROCEDURE — G0277: CPT

## 2021-09-28 PROCEDURE — 82962 GLUCOSE BLOOD TEST: CPT

## 2021-09-28 NOTE — ADDENDUM
[FreeTextEntry1] : PT ARRIVED A&OX3\par ALL VITALS WITHIN PARAMETERS FOR HBOT\par PT DESCENDED TO 2.4 DAVID @ 2.2 PSI/MIN IN CHAMBER #2 WITHOUT INCIDENT\par PT RESTING AT TX DEPTH WITH VISIBLE CHEST RISE AND FALL OBSERVED CHAMBER SIDE\par PT TOLERATED AIR BREAK WELL\par PT STATED THAT HE WOULD LIKE TO END HIS TREATMENT EARLY TODAY AS HE WAS STARTING TO DEVELOP ANXIETY WITHIN THE CHAMBER. PT WAS ASKED IF HE COULD LAST 12 MINUTES IN ORDER FOR HIS TREATMENT TO COUNT. PT AGREED TO STAYING IN THE CHAMBER. PT WAS ADVISED THAT AN MD COULD PRESCRIBE ANTI-ANXIETY MEDICATIONS FOR THE PT PRIOR TO COMING IN FOR TX \par PT ASCENDED FROM 2.4 DAVID @ 2.2 PSI/MIN WITHOUT INCIDENT\par PT TO RECEIVE WOUND CARE POST HBOT

## 2021-09-28 NOTE — PROCEDURE
[Outpatient] : Outpatient [Ambulatory] : Patient is ambulatory. [THIS CHAMBER HAS BEEN CLEANED / DISINFECTED] : This chamber has been cleaned / disinfected according to local and hospital policy and procedure prior to this treatment. [Patient demonstrated and verbalized proper technique for using air break mask] : Patient demonstrated and verbalized proper technique for using air break mask [Patient educated on the risks of SMOKING prior to HBOT with understanding] : Patient educated on the risks of SMOKING prior to HBOT with understanding [Patient educated on the risks of CONSUMING ALCOHOL prior to HBOT with understanding] : Patient educated on the risks of CONSUMING ALCOHOL prior to HBOT with understanding [100% Cotton] : 100% cotton [Empty all pockets] : empty all pockets [No hair oils, wigs, hairpieces, pins] : no hair oils, wigs, hairpieces, pins  [Pre tx medications] : pre tx medications  [No make-up, creams] : no make-up, creams  [No jewelry] : no jewelry  [No matches, cigarettes, lighters] : no matches, cigarettes, lighters  [Hearing aid removed] : hearing aid removed [Dentures removed] : dentures removed [Ground bracelet on pt's wrist] : ground bracelet on pt's wrist  [Contacts removed] : contacts removed  [Remove nail polish] : remove nail polish  [No reading material] : no reading material  [Bra, undergarments removed] : bra, undergarments removed  [No contraindicated dressings] : no contraindicated dressings [Ground Wire - VISUAL Verification - Intact/Free of Obstruction] : Ground Wire - VISUAL Verification - Intact/Free of Obstruction  [Ground Continuity - Verified < 1 ohm w/ Wrist Strap Mila] : Ground Continuity - Verified < 1 ohm w/ Wrist Strap Mila [Number: ___] : Number: [unfilled] [Diagnosis: ___] : Diagnosis: [unfilled] [____] : Post-Dive: Time - [unfilled] [___] : Post-Dive: Value - [unfilled] mg/dL [Clear all fields] : clear all fields [] : No [FreeTextEntry4] : 65 [FreeTextEntry6] : 0044 [FreeTextEntry8] : 3784 [de-identified] : 6968 [de-identified] : 0159 [de-identified] : ---- [de-identified] : ---- [de-identified] : 6029 [de-identified] : 2245 [de-identified] : 85 MINUTES [de-identified] : BARBIE

## 2021-09-28 NOTE — PROCEDURE
[Outpatient] : Outpatient [Ambulatory] : Patient is ambulatory. [THIS CHAMBER HAS BEEN CLEANED / DISINFECTED] : This chamber has been cleaned / disinfected according to local and hospital policy and procedure prior to this treatment. [Patient demonstrated and verbalized proper technique for using air break mask] : Patient demonstrated and verbalized proper technique for using air break mask [Patient educated on the risks of SMOKING prior to HBOT with understanding] : Patient educated on the risks of SMOKING prior to HBOT with understanding [Patient educated on the risks of CONSUMING ALCOHOL prior to HBOT with understanding] : Patient educated on the risks of CONSUMING ALCOHOL prior to HBOT with understanding [100% Cotton] : 100% cotton [Empty all pockets] : empty all pockets [No hair oils, wigs, hairpieces, pins] : no hair oils, wigs, hairpieces, pins  [Pre tx medications] : pre tx medications  [No make-up, creams] : no make-up, creams  [No jewelry] : no jewelry  [No matches, cigarettes, lighters] : no matches, cigarettes, lighters  [Hearing aid removed] : hearing aid removed [Dentures removed] : dentures removed [Ground bracelet on pt's wrist] : ground bracelet on pt's wrist  [Contacts removed] : contacts removed  [Remove nail polish] : remove nail polish  [No reading material] : no reading material  [Bra, undergarments removed] : bra, undergarments removed  [No contraindicated dressings] : no contraindicated dressings [Ground Wire - VISUAL Verification - Intact/Free of Obstruction] : Ground Wire - VISUAL Verification - Intact/Free of Obstruction  [Ground Continuity - Verified < 1 ohm w/ Wrist Strap Mila] : Ground Continuity - Verified < 1 ohm w/ Wrist Strap Mila [Number: ___] : Number: [unfilled] [Diagnosis: ___] : Diagnosis: [unfilled] [____] : Post-Dive: Time - [unfilled] [___] : Post-Dive: Value - [unfilled] mg/dL [Clear all fields] : clear all fields [] : No [FreeTextEntry4] : 100 [FreeTextEntry6] : 08 : 38 [FreeTextEntry8] : 08 : 48 [de-identified] : 09 : 18 [de-identified] : 09 : 23 [de-identified] : 09 : 53 [de-identified] : 09 : 58 [de-identified] : 10 : 28 [de-identified] : 120 MINUTES [de-identified] : 10 : 38

## 2021-09-28 NOTE — ADDENDUM
[FreeTextEntry1] : The pt presented to [facility] ambulatory and A&Ox3 for scheduled HBOT tx.\par The pt's pre-dive screening was found to be within acceptable limits to begin HBOT.\par The pt. descended @ 2.2 PSI/min. to Rx'd HBOT tx. depth of 2.4 DAVID in chamber # 2 without incident.\par The pt. was observed with visible chest motion and without incident for the duration of HBOT.\par The pt. was administered intermittent med. air over course of HBOT tx. without incident.\par TRANSFER OF OBSERVATION FROM [facility] TO \par PT ASCENDED FROM 2.4 DAVID @ 2.2 PSI/MIN WITHOUT INCIDENT\par PT TOLERATED TX WELL

## 2021-09-29 ENCOUNTER — OUTPATIENT (OUTPATIENT)
Dept: OUTPATIENT SERVICES | Facility: HOSPITAL | Age: 61
LOS: 1 days | Discharge: HOME | End: 2021-09-29
Payer: MEDICAID

## 2021-09-29 ENCOUNTER — APPOINTMENT (OUTPATIENT)
Dept: HYPERBARIC MEDICINE | Facility: HOSPITAL | Age: 61
End: 2021-09-29
Payer: MEDICAID

## 2021-09-29 VITALS
OXYGEN SATURATION: 100 % | HEART RATE: 67 BPM | TEMPERATURE: 97.3 F | SYSTOLIC BLOOD PRESSURE: 170 MMHG | DIASTOLIC BLOOD PRESSURE: 97 MMHG | RESPIRATION RATE: 18 BRPM

## 2021-09-29 VITALS — DIASTOLIC BLOOD PRESSURE: 106 MMHG | SYSTOLIC BLOOD PRESSURE: 175 MMHG

## 2021-09-29 VITALS — DIASTOLIC BLOOD PRESSURE: 98 MMHG | SYSTOLIC BLOOD PRESSURE: 162 MMHG

## 2021-09-29 VITALS
SYSTOLIC BLOOD PRESSURE: 166 MMHG | HEART RATE: 89 BPM | OXYGEN SATURATION: 96 % | DIASTOLIC BLOOD PRESSURE: 100 MMHG | TEMPERATURE: 97 F | RESPIRATION RATE: 18 BRPM

## 2021-09-29 DIAGNOSIS — E11.621 TYPE 2 DIABETES MELLITUS WITH FOOT ULCER: ICD-10-CM

## 2021-09-29 DIAGNOSIS — L97.516 NON-PRESSURE CHRONIC ULCER OF OTHER PART OF RIGHT FOOT WITH BONE INVOLVEMENT WITHOUT EVIDENCE OF NECROSIS: ICD-10-CM

## 2021-09-29 DIAGNOSIS — L97.416 NON-PRESSURE CHRONIC ULCER OF RIGHT HEEL AND MIDFOOT WITH BONE INVOLVEMENT WITHOUT EVIDENCE OF NECROSIS: ICD-10-CM

## 2021-09-29 PROCEDURE — 99183 HYPERBARIC OXYGEN THERAPY: CPT

## 2021-09-29 PROCEDURE — G0277: CPT

## 2021-09-29 PROCEDURE — 82962 GLUCOSE BLOOD TEST: CPT

## 2021-09-30 ENCOUNTER — OUTPATIENT (OUTPATIENT)
Dept: OUTPATIENT SERVICES | Facility: HOSPITAL | Age: 61
LOS: 1 days | Discharge: HOME | End: 2021-09-30
Payer: MEDICAID

## 2021-09-30 ENCOUNTER — APPOINTMENT (OUTPATIENT)
Dept: HYPERBARIC MEDICINE | Facility: HOSPITAL | Age: 61
End: 2021-09-30
Payer: MEDICAID

## 2021-09-30 VITALS
SYSTOLIC BLOOD PRESSURE: 168 MMHG | RESPIRATION RATE: 18 BRPM | HEART RATE: 78 BPM | OXYGEN SATURATION: 96 % | DIASTOLIC BLOOD PRESSURE: 104 MMHG | TEMPERATURE: 97.1 F

## 2021-09-30 VITALS
DIASTOLIC BLOOD PRESSURE: 100 MMHG | RESPIRATION RATE: 18 BRPM | SYSTOLIC BLOOD PRESSURE: 158 MMHG | OXYGEN SATURATION: 97 % | TEMPERATURE: 97.4 F | HEART RATE: 79 BPM

## 2021-09-30 DIAGNOSIS — E11.621 TYPE 2 DIABETES MELLITUS WITH FOOT ULCER: ICD-10-CM

## 2021-09-30 DIAGNOSIS — L97.416 NON-PRESSURE CHRONIC ULCER OF RIGHT HEEL AND MIDFOOT WITH BONE INVOLVEMENT WITHOUT EVIDENCE OF NECROSIS: ICD-10-CM

## 2021-09-30 DIAGNOSIS — L97.516 NON-PRESSURE CHRONIC ULCER OF OTHER PART OF RIGHT FOOT WITH BONE INVOLVEMENT WITHOUT EVIDENCE OF NECROSIS: ICD-10-CM

## 2021-09-30 PROCEDURE — 82962 GLUCOSE BLOOD TEST: CPT

## 2021-09-30 PROCEDURE — 99183 HYPERBARIC OXYGEN THERAPY: CPT

## 2021-09-30 PROCEDURE — G0277: CPT

## 2021-09-30 NOTE — ADDENDUM
[FreeTextEntry1] : The pt. presented to [facility] ambulatory and A&Ox3 for scheduled HBOT tx.\par \par The pt's pre-dive screening found the pt's systolic BP to be outside of acceptable limits to begin HBOT tx.\par The pt. was allowed a period of rest and provided a re-test of BP.\par The pt's BP remained outside of acceptable limits to begin HBOT tx.\par The pt. was allowed an additional period of rest and provided an additional re-test of BP.\par The pt's BP had fallen to within acceptable limits to begin HBOT tx.\par \par The pt's pre-dive screening was found to be within acceptable limits to begin HBOT.\par The pt. descended @ 2.2 PSI/min. to Rx'd HBOT tx. depth of 2.4 DAVID in chamber # 4 without incident.\par The pt. was observed with visible chest motion and without incident for the duration of HBOT.\par The pt. was administered intermittent med. air over course of HBOT tx. without incident.\par TRANSFER OF OBSERVATION FROM [facility] TO \par PT ASCEDNED FROM 2.4 DAVID @ 2.2 PSI/MIN WITHOUT INCIDENT\par PT TOLERATED TX WELL\par PT TO RECEIVE WOUND CARE POST HBOT

## 2021-10-01 ENCOUNTER — APPOINTMENT (OUTPATIENT)
Dept: HYPERBARIC MEDICINE | Facility: HOSPITAL | Age: 61
End: 2021-10-01

## 2021-10-01 NOTE — PROCEDURE
[Outpatient] : Outpatient [Ambulatory] : Patient is ambulatory. [THIS CHAMBER HAS BEEN CLEANED / DISINFECTED] : This chamber has been cleaned / disinfected according to local and hospital policy and procedure prior to this treatment. [Patient demonstrated and verbalized proper technique for using air break mask] : Patient demonstrated and verbalized proper technique for using air break mask [Patient educated on the risks of SMOKING prior to HBOT with understanding] : Patient educated on the risks of SMOKING prior to HBOT with understanding [Patient educated on the risks of CONSUMING ALCOHOL prior to HBOT with understanding] : Patient educated on the risks of CONSUMING ALCOHOL prior to HBOT with understanding [100% Cotton] : 100% cotton [Empty all pockets] : empty all pockets [No hair oils, wigs, hairpieces, pins] : no hair oils, wigs, hairpieces, pins  [Pre tx medications] : pre tx medications  [No make-up, creams] : no make-up, creams  [No jewelry] : no jewelry  [No matches, cigarettes, lighters] : no matches, cigarettes, lighters  [Hearing aid removed] : hearing aid removed [Dentures removed] : dentures removed [Ground bracelet on pt's wrist] : ground bracelet on pt's wrist  [Contacts removed] : contacts removed  [Remove nail polish] : remove nail polish  [No reading material] : no reading material  [Bra, undergarments removed] : bra, undergarments removed  [No contraindicated dressings] : no contraindicated dressings [Ground Wire - VISUAL Verification - Intact/Free of Obstruction] : Ground Wire - VISUAL Verification - Intact/Free of Obstruction  [Ground Continuity - Verified < 1 ohm w/ Wrist Strap Mila] : Ground Continuity - Verified < 1 ohm w/ Wrist Strap Mila [Number: ___] : Number: [unfilled] [Diagnosis: ___] : Diagnosis: [unfilled] [____] : Post-Dive: Time - [unfilled] [___] : Post-Dive: Value - [unfilled] mg/dL [Clear all fields] : clear all fields [] : No [FreeTextEntry4] : 100 [FreeTextEntry6] : 1016 [FreeTextEntry8] : 1020 [de-identified] : 1059 [de-identified] : 1109 [de-identified] : 0235 [de-identified] : 1881 [de-identified] : 8841 [de-identified] : 7933 [de-identified] : 120 MIN

## 2021-10-01 NOTE — ADDENDUM
[FreeTextEntry1] : Pt arrived to the chamber suite requesting an assessment due to his wound "looking worse", questioning why his wound, "has not healed yet" and frustrations with "finding parking at this time". RN assessed issues chamber side. See nurse note. Pt put in chamber.\par Drainage noted on Pt's dressing prior to descent. RN notified. Pt to receive wound care post HBOT.\par Pt descended to 2.4 DAVID @ 2.2 PSI/MIN without incident in chamber # 3\par Pt's resting at tx depth with chest rise and fall observed chamber side. \par Pt tolerated air breaks well. \par Pt ascended from 2.4 DAVID @ 2.2 PSI/MIN without incident. Pt tolerated tx well.\par

## 2021-10-02 ENCOUNTER — APPOINTMENT (OUTPATIENT)
Dept: HYPERBARIC MEDICINE | Facility: HOSPITAL | Age: 61
End: 2021-10-02

## 2021-10-04 ENCOUNTER — APPOINTMENT (OUTPATIENT)
Dept: HYPERBARIC MEDICINE | Facility: HOSPITAL | Age: 61
End: 2021-10-04
Payer: MEDICAID

## 2021-10-04 ENCOUNTER — OUTPATIENT (OUTPATIENT)
Dept: OUTPATIENT SERVICES | Facility: HOSPITAL | Age: 61
LOS: 1 days | Discharge: HOME | End: 2021-10-04
Payer: MEDICAID

## 2021-10-04 VITALS
RESPIRATION RATE: 18 BRPM | DIASTOLIC BLOOD PRESSURE: 85 MMHG | SYSTOLIC BLOOD PRESSURE: 136 MMHG | TEMPERATURE: 96.9 F | HEART RATE: 80 BPM | OXYGEN SATURATION: 97 %

## 2021-10-04 VITALS
HEART RATE: 73 BPM | DIASTOLIC BLOOD PRESSURE: 92 MMHG | TEMPERATURE: 97.1 F | OXYGEN SATURATION: 97 % | SYSTOLIC BLOOD PRESSURE: 150 MMHG | RESPIRATION RATE: 18 BRPM

## 2021-10-04 DIAGNOSIS — E11.621 TYPE 2 DIABETES MELLITUS WITH FOOT ULCER: ICD-10-CM

## 2021-10-04 LAB — SARS-COV-2 RNA SPEC QL NAA+PROBE: SIGNIFICANT CHANGE UP

## 2021-10-04 PROCEDURE — 99183 HYPERBARIC OXYGEN THERAPY: CPT

## 2021-10-04 PROCEDURE — U0003: CPT

## 2021-10-04 PROCEDURE — U0005: CPT

## 2021-10-04 PROCEDURE — G0277: CPT

## 2021-10-04 PROCEDURE — 82962 GLUCOSE BLOOD TEST: CPT

## 2021-10-04 NOTE — ADDENDUM
[FreeTextEntry1] : PT ARRIVED A&OX3\par ALL VITALS WITHIN PARAMETERS FOR HBOT\par PT DESCENDED TO 2.4 DAVID @ 2.2 PSI/MIN IN CHAMBER #4 WITHOUT INCIDENT\par PT RESTING AT TX DEPTH WITH VISIBLE CHEST RISE AND FALL OBSERVED CHAMBER SIDE\par PT TOLERATED AIR BREAKS WELL\par PT ASCENDED FROM 2.4 DAVID @ 2.2 PSI/MIN WITHOUT INCIDENT\par PT TOLERATED TX WELL\par PT TO RECEIVE WOUND CARE POST HBOT

## 2021-10-04 NOTE — PROCEDURE
[Outpatient] : Outpatient [Ambulatory] : Patient is ambulatory. [THIS CHAMBER HAS BEEN CLEANED / DISINFECTED] : This chamber has been cleaned / disinfected according to local and hospital policy and procedure prior to this treatment. [Patient demonstrated and verbalized proper technique for using air break mask] : Patient demonstrated and verbalized proper technique for using air break mask [Patient educated on the risks of SMOKING prior to HBOT with understanding] : Patient educated on the risks of SMOKING prior to HBOT with understanding [Patient educated on the risks of CONSUMING ALCOHOL prior to HBOT with understanding] : Patient educated on the risks of CONSUMING ALCOHOL prior to HBOT with understanding [100% Cotton] : 100% cotton [Empty all pockets] : empty all pockets [No hair oils, wigs, hairpieces, pins] : no hair oils, wigs, hairpieces, pins  [Pre tx medications] : pre tx medications  [No make-up, creams] : no make-up, creams  [No jewelry] : no jewelry  [No matches, cigarettes, lighters] : no matches, cigarettes, lighters  [Hearing aid removed] : hearing aid removed [Dentures removed] : dentures removed [Ground bracelet on pt's wrist] : ground bracelet on pt's wrist  [Contacts removed] : contacts removed  [Remove nail polish] : remove nail polish  [No reading material] : no reading material  [Bra, undergarments removed] : bra, undergarments removed  [No contraindicated dressings] : no contraindicated dressings [Ground Wire - VISUAL Verification - Intact/Free of Obstruction] : Ground Wire - VISUAL Verification - Intact/Free of Obstruction  [Ground Continuity - Verified < 1 ohm w/ Wrist Strap Mila] : Ground Continuity - Verified < 1 ohm w/ Wrist Strap Mila [Number: ___] : Number: [unfilled] [Diagnosis: ___] : Diagnosis: [unfilled] [____] : Post-Dive: Time - [unfilled] [___] : Post-Dive: Value - [unfilled] mg/dL [Clear all fields] : clear all fields [] : No [FreeTextEntry4] : 100 [FreeTextEntry6] : 1020 [FreeTextEntry8] : 0484 [de-identified] : 1105 [de-identified] : 110 [de-identified] : 9578 [de-identified] : 4716 [de-identified] : 1217 [de-identified] : 1549 [de-identified] : 120 MINUTES

## 2021-10-05 ENCOUNTER — APPOINTMENT (OUTPATIENT)
Dept: HYPERBARIC MEDICINE | Facility: HOSPITAL | Age: 61
End: 2021-10-05
Payer: MEDICAID

## 2021-10-05 ENCOUNTER — OUTPATIENT (OUTPATIENT)
Dept: OUTPATIENT SERVICES | Facility: HOSPITAL | Age: 61
LOS: 1 days | Discharge: HOME | End: 2021-10-05
Payer: MEDICAID

## 2021-10-05 VITALS
BODY MASS INDEX: 30.81 KG/M2 | HEART RATE: 83 BPM | HEIGHT: 69 IN | RESPIRATION RATE: 18 BRPM | DIASTOLIC BLOOD PRESSURE: 88 MMHG | TEMPERATURE: 96.1 F | WEIGHT: 208 LBS | SYSTOLIC BLOOD PRESSURE: 135 MMHG | OXYGEN SATURATION: 96 %

## 2021-10-05 DIAGNOSIS — E11.621 TYPE 2 DIABETES MELLITUS WITH FOOT ULCER: ICD-10-CM

## 2021-10-05 DIAGNOSIS — Z20.822 CONTACT WITH AND (SUSPECTED) EXPOSURE TO COVID-19: ICD-10-CM

## 2021-10-05 DIAGNOSIS — L97.416 NON-PRESSURE CHRONIC ULCER OF RIGHT HEEL AND MIDFOOT WITH BONE INVOLVEMENT WITHOUT EVIDENCE OF NECROSIS: ICD-10-CM

## 2021-10-05 DIAGNOSIS — L97.516 NON-PRESSURE CHRONIC ULCER OF OTHER PART OF RIGHT FOOT WITH BONE INVOLVEMENT WITHOUT EVIDENCE OF NECROSIS: ICD-10-CM

## 2021-10-05 PROCEDURE — 99024 POSTOP FOLLOW-UP VISIT: CPT

## 2021-10-05 PROCEDURE — G0463: CPT

## 2021-10-05 NOTE — HISTORY OF PRESENT ILLNESS
[FreeTextEntry1] : s/p gout / bone resection , 5th met base , medial 1st metatarsal and arthroplasty 2nd toe right foot, dehiscence at the base of the 5th metatarsal and the dorsal 2nd toe right foot , no soi. currently in HBOT.

## 2021-10-05 NOTE — PLAN
[FreeTextEntry1] : Patient examined and evaluated at this time.\par Continue local wound care and offloading.\par Patient to continue HBOT. Will auth for apligraf.\par Spent 20 minutes for patient care and medical decision making.\par Patient to follow up in 1 week.\par

## 2021-10-05 NOTE — ASSESSMENT
[Verbal] : Verbal [Patient] : Patient [Good - alert, interested, motivated] : Good - alert, interested, motivated [Verbalizes knowledge/Understanding] : Verbalizes knowledge/understanding [Dressing changes] : dressing changes [Foot Care] : foot care [Skin Care] : skin care [Signs and symptoms of infection] : sign and symptoms of infection [How and When to Call] : how and when to call [Hyperbaric Therapy] : hyperbaric therapy [Patient responsibility to plan of care] : patient responsibility to plan of care [] : Yes [Stable] : stable [Home] : Home [Not Applicable - Long Term Care/Home Health Agency] : Long Term Care/Home Health Agency: Not Applicable [Ambulatory] : Ambulatory [FreeTextEntry2] : Restore Skin Integrity\par Infection Control\par Offloading\par Localized wound care\par Hyperbaric Oxygen Therapy\par Maintain acceptable pain levels at satisfactory relief.\par Demonstrates use of both nonpharmacological and pharmacological pain relief strategies [FreeTextEntry4] : HBOT 7/30\par MD Feels Patient Would Benefit from Apligraf Application, Auth submitted\par F/U to [facility] Daily HBOT

## 2021-10-05 NOTE — REVIEW OF SYSTEMS
[Fever] : no fever [Chills] : no chills [Eye Pain] : no eye pain [Loss Of Hearing] : no hearing loss [Shortness Of Breath] : no shortness of breath [Wheezing] : no wheezing [Abdominal Pain] : no abdominal pain [Arthralgias] : arthralgias [Joint Swelling] : joint swelling [Joint Stiffness] : joint stiffness [Skin Wound] : skin wound [Anxiety] : no anxiety [Easy Bleeding] : no tendency for easy bleeding [FreeTextEntry5] : HTN [FreeTextEntry9] : Gouty arthropathy  [de-identified] : s/p right foot surgery , no so , no pain  [de-identified] : Diabetic neuropathy  [de-identified] : NIDDM , severe gouty arthropathy

## 2021-10-05 NOTE — PHYSICAL EXAM
[1+] : left 1+ [Ankle Swelling (On Exam)] : present [Ankle Swelling Bilaterally] : bilaterally  [Ankle Swelling On The Left] : moderate [Varicose Veins Of Lower Extremities] : bilaterally [] : bilaterally [Ankle Swelling On The Right] : mild [Purpura] : no purpura  [Petechiae] : no petechiae [Skin Ulcer] : ulcer [Skin Induration] : no induration [Alert] : alert [Oriented to Person] : oriented to person [Oriented to Place] : oriented to place [Calm] : calm [de-identified] : A&Ox3, NAD [de-identified] : severe gout with associated foot deformities  [de-identified] : right medial incision healed, right 2nd dorsal toe incision dehiscence with wound down to skin, subcutnaeous tissue, and fat. right lateral foot incision dehiscence with ulcer down to skin, subcutaneous tissue, fat, and bone. [de-identified] : Diabetic neuropathy  [FreeTextEntry1] : Right Lateral Foot [FreeTextEntry2] : 3.3 [FreeTextEntry3] : 1.5 [FreeTextEntry4] : 0.3 [de-identified] : Serosanguineous [de-identified] : Callus [de-identified] : Silver Alginate [de-identified] : Cleansed with Normal Saline\par  [FreeTextEntry7] : Right Hallux [de-identified] : No Product [de-identified] : Cleansed with Normal Saline\par  [de-identified] : Right Foot 2nd Digit- Dry Eschar [de-identified] : 2.4 [de-identified] : 3.5 [de-identified] : 0.1 [de-identified] : Serosanguineous [de-identified] : Silver Alginate, Adaptic Touch [de-identified] : Cleansed with Normal Saline\par  [TWNoteComboBox4] : Moderate [TWNoteComboBox5] : No [de-identified] : No [de-identified] : None [de-identified] : None [de-identified] : 100% [de-identified] : No [de-identified] : 3x Weekly [de-identified] : Small [de-identified] : No [de-identified] : No [de-identified] : None [de-identified] : 100% [de-identified] : None [de-identified] : No [de-identified] : 3x Weekly

## 2021-10-06 ENCOUNTER — NON-APPOINTMENT (OUTPATIENT)
Age: 61
End: 2021-10-06

## 2021-10-06 ENCOUNTER — APPOINTMENT (OUTPATIENT)
Dept: HYPERBARIC MEDICINE | Facility: HOSPITAL | Age: 61
End: 2021-10-06
Payer: MEDICAID

## 2021-10-06 ENCOUNTER — OUTPATIENT (OUTPATIENT)
Dept: OUTPATIENT SERVICES | Facility: HOSPITAL | Age: 61
LOS: 1 days | End: 2021-10-06
Payer: MEDICAID

## 2021-10-06 VITALS
HEART RATE: 78 BPM | OXYGEN SATURATION: 99 % | DIASTOLIC BLOOD PRESSURE: 78 MMHG | RESPIRATION RATE: 18 BRPM | SYSTOLIC BLOOD PRESSURE: 134 MMHG | TEMPERATURE: 97.4 F

## 2021-10-06 VITALS
TEMPERATURE: 97.2 F | DIASTOLIC BLOOD PRESSURE: 88 MMHG | OXYGEN SATURATION: 97 % | HEART RATE: 89 BPM | RESPIRATION RATE: 20 BRPM | SYSTOLIC BLOOD PRESSURE: 147 MMHG

## 2021-10-06 DIAGNOSIS — R35.1 NOCTURIA: ICD-10-CM

## 2021-10-06 DIAGNOSIS — N40.1 BENIGN PROSTATIC HYPERPLASIA WITH LOWER URINARY TRACT SYMPTOMS: ICD-10-CM

## 2021-10-06 DIAGNOSIS — Z98.890 OTHER SPECIFIED POSTPROCEDURAL STATES: ICD-10-CM

## 2021-10-06 DIAGNOSIS — N32.81 OVERACTIVE BLADDER: ICD-10-CM

## 2021-10-06 DIAGNOSIS — L97.416 NON-PRESSURE CHRONIC ULCER OF RIGHT HEEL AND MIDFOOT WITH BONE INVOLVEMENT WITHOUT EVIDENCE OF NECROSIS: ICD-10-CM

## 2021-10-06 DIAGNOSIS — L97.516 NON-PRESSURE CHRONIC ULCER OF OTHER PART OF RIGHT FOOT WITH BONE INVOLVEMENT WITHOUT EVIDENCE OF NECROSIS: ICD-10-CM

## 2021-10-06 DIAGNOSIS — E11.621 TYPE 2 DIABETES MELLITUS WITH FOOT ULCER: ICD-10-CM

## 2021-10-06 DIAGNOSIS — I10 ESSENTIAL (PRIMARY) HYPERTENSION: ICD-10-CM

## 2021-10-06 DIAGNOSIS — Z79.899 OTHER LONG TERM (CURRENT) DRUG THERAPY: ICD-10-CM

## 2021-10-06 DIAGNOSIS — E78.1 PURE HYPERGLYCERIDEMIA: ICD-10-CM

## 2021-10-06 DIAGNOSIS — N52.9 MALE ERECTILE DYSFUNCTION, UNSPECIFIED: ICD-10-CM

## 2021-10-06 DIAGNOSIS — Z87.442 PERSONAL HISTORY OF URINARY CALCULI: ICD-10-CM

## 2021-10-06 DIAGNOSIS — M10.071 IDIOPATHIC GOUT, RIGHT ANKLE AND FOOT: ICD-10-CM

## 2021-10-06 DIAGNOSIS — Z79.84 LONG TERM (CURRENT) USE OF ORAL HYPOGLYCEMIC DRUGS: ICD-10-CM

## 2021-10-06 DIAGNOSIS — Z98.1 ARTHRODESIS STATUS: ICD-10-CM

## 2021-10-06 DIAGNOSIS — Z87.81 PERSONAL HISTORY OF (HEALED) TRAUMATIC FRACTURE: ICD-10-CM

## 2021-10-06 PROCEDURE — 82962 GLUCOSE BLOOD TEST: CPT

## 2021-10-06 PROCEDURE — 99183 HYPERBARIC OXYGEN THERAPY: CPT

## 2021-10-07 ENCOUNTER — OUTPATIENT (OUTPATIENT)
Dept: OUTPATIENT SERVICES | Facility: HOSPITAL | Age: 61
LOS: 1 days | Discharge: HOME | End: 2021-10-07
Payer: MEDICAID

## 2021-10-07 ENCOUNTER — APPOINTMENT (OUTPATIENT)
Dept: HYPERBARIC MEDICINE | Facility: HOSPITAL | Age: 61
End: 2021-10-07
Payer: MEDICAID

## 2021-10-07 VITALS
DIASTOLIC BLOOD PRESSURE: 90 MMHG | HEART RATE: 66 BPM | OXYGEN SATURATION: 100 % | SYSTOLIC BLOOD PRESSURE: 139 MMHG | RESPIRATION RATE: 18 BRPM | TEMPERATURE: 97.5 F

## 2021-10-07 VITALS
HEART RATE: 74 BPM | OXYGEN SATURATION: 97 % | SYSTOLIC BLOOD PRESSURE: 155 MMHG | DIASTOLIC BLOOD PRESSURE: 96 MMHG | RESPIRATION RATE: 18 BRPM | TEMPERATURE: 97.3 F

## 2021-10-07 DIAGNOSIS — E11.621 TYPE 2 DIABETES MELLITUS WITH FOOT ULCER: ICD-10-CM

## 2021-10-07 DIAGNOSIS — L97.416 NON-PRESSURE CHRONIC ULCER OF RIGHT HEEL AND MIDFOOT WITH BONE INVOLVEMENT WITHOUT EVIDENCE OF NECROSIS: ICD-10-CM

## 2021-10-07 DIAGNOSIS — L97.516 NON-PRESSURE CHRONIC ULCER OF OTHER PART OF RIGHT FOOT WITH BONE INVOLVEMENT WITHOUT EVIDENCE OF NECROSIS: ICD-10-CM

## 2021-10-07 PROCEDURE — 82962 GLUCOSE BLOOD TEST: CPT

## 2021-10-07 PROCEDURE — 99183 HYPERBARIC OXYGEN THERAPY: CPT

## 2021-10-07 PROCEDURE — G0277: CPT

## 2021-10-07 NOTE — PROCEDURE
[Outpatient] : Outpatient [Ambulatory] : Patient is ambulatory. [THIS CHAMBER HAS BEEN CLEANED / DISINFECTED] : This chamber has been cleaned / disinfected according to local and hospital policy and procedure prior to this treatment. [Patient demonstrated and verbalized proper technique for using air break mask] : Patient demonstrated and verbalized proper technique for using air break mask [Patient educated on the risks of SMOKING prior to HBOT with understanding] : Patient educated on the risks of SMOKING prior to HBOT with understanding [Patient educated on the risks of CONSUMING ALCOHOL prior to HBOT with understanding] : Patient educated on the risks of CONSUMING ALCOHOL prior to HBOT with understanding [100% Cotton] : 100% cotton [Empty all pockets] : empty all pockets [No hair oils, wigs, hairpieces, pins] : no hair oils, wigs, hairpieces, pins  [Pre tx medications] : pre tx medications  [No make-up, creams] : no make-up, creams  [No jewelry] : no jewelry  [No matches, cigarettes, lighters] : no matches, cigarettes, lighters  [Hearing aid removed] : hearing aid removed [Dentures removed] : dentures removed [Ground bracelet on pt's wrist] : ground bracelet on pt's wrist  [Contacts removed] : contacts removed  [Remove nail polish] : remove nail polish  [No reading material] : no reading material  [Bra, undergarments removed] : bra, undergarments removed  [No contraindicated dressings] : no contraindicated dressings [Ground Wire - VISUAL Verification - Intact/Free of Obstruction] : Ground Wire - VISUAL Verification - Intact/Free of Obstruction  [Ground Continuity - Verified < 1 ohm w/ Wrist Strap Mila] : Ground Continuity - Verified < 1 ohm w/ Wrist Strap Mila [Number: ___] : Number: [unfilled] [Diagnosis: ___] : Diagnosis: [unfilled] [____] : Post-Dive: Time - [unfilled] [___] : Post-Dive: Value - [unfilled] mg/dL [Clear all fields] : clear all fields [] : No [FreeTextEntry4] : 100 [FreeTextEntry6] : 5627 [[facility]] : 9697 [de-identified] : 9009 [de-identified] : 6651 [de-identified] : 4933 [de-identified] : 7638 [de-identified] : 6454 [de-identified] : 6232 [de-identified] : 120 MINUTES

## 2021-10-07 NOTE — ADDENDUM
[FreeTextEntry1] : PT ARRIVED A &OX3 \par ALL VITALS WITHIN PARAMETERS FOR HBOT\par PT DESCENDED TO 2.4 DAVID @ 2.2 PSI/MIN IN CHAMBER #2 WITHOUT INCIDENT\par PT RESTING AT TX DEPTH WITH VISIBLE CHEST RISE AND FALL OBSERVED CHAMBER SIDE\par PT TOLERATED AIR BREAKS WELL\par PT ASCENDED FROM 2.4 DAVID @ 2.2 PSI/MIN WITHOUT INCIDENT\par PT TOLERATED TX WELL

## 2021-10-08 ENCOUNTER — APPOINTMENT (OUTPATIENT)
Dept: HYPERBARIC MEDICINE | Facility: HOSPITAL | Age: 61
End: 2021-10-08
Payer: MEDICAID

## 2021-10-08 ENCOUNTER — OUTPATIENT (OUTPATIENT)
Dept: OUTPATIENT SERVICES | Facility: HOSPITAL | Age: 61
LOS: 1 days | Discharge: HOME | End: 2021-10-08
Payer: MEDICAID

## 2021-10-08 VITALS
DIASTOLIC BLOOD PRESSURE: 84 MMHG | SYSTOLIC BLOOD PRESSURE: 129 MMHG | OXYGEN SATURATION: 98 % | HEART RATE: 81 BPM | TEMPERATURE: 97.1 F | RESPIRATION RATE: 20 BRPM

## 2021-10-08 VITALS
RESPIRATION RATE: 18 BRPM | SYSTOLIC BLOOD PRESSURE: 161 MMHG | TEMPERATURE: 97.4 F | HEART RATE: 63 BPM | DIASTOLIC BLOOD PRESSURE: 98 MMHG | OXYGEN SATURATION: 100 %

## 2021-10-08 DIAGNOSIS — E11.621 TYPE 2 DIABETES MELLITUS WITH FOOT ULCER: ICD-10-CM

## 2021-10-08 DIAGNOSIS — L97.516 NON-PRESSURE CHRONIC ULCER OF OTHER PART OF RIGHT FOOT WITH BONE INVOLVEMENT WITHOUT EVIDENCE OF NECROSIS: ICD-10-CM

## 2021-10-08 DIAGNOSIS — L97.416 NON-PRESSURE CHRONIC ULCER OF RIGHT HEEL AND MIDFOOT WITH BONE INVOLVEMENT WITHOUT EVIDENCE OF NECROSIS: ICD-10-CM

## 2021-10-08 PROCEDURE — 99183 HYPERBARIC OXYGEN THERAPY: CPT

## 2021-10-08 PROCEDURE — 82962 GLUCOSE BLOOD TEST: CPT

## 2021-10-08 PROCEDURE — G0277: CPT

## 2021-10-09 ENCOUNTER — OUTPATIENT (OUTPATIENT)
Dept: OUTPATIENT SERVICES | Facility: HOSPITAL | Age: 61
LOS: 1 days | Discharge: HOME | End: 2021-10-09
Payer: MEDICAID

## 2021-10-09 ENCOUNTER — APPOINTMENT (OUTPATIENT)
Dept: HYPERBARIC MEDICINE | Facility: HOSPITAL | Age: 61
End: 2021-10-09
Payer: MEDICAID

## 2021-10-09 VITALS
HEART RATE: 78 BPM | OXYGEN SATURATION: 98 % | SYSTOLIC BLOOD PRESSURE: 140 MMHG | DIASTOLIC BLOOD PRESSURE: 80 MMHG | TEMPERATURE: 98.1 F | RESPIRATION RATE: 16 BRPM

## 2021-10-09 VITALS
TEMPERATURE: 98.1 F | HEART RATE: 64 BPM | SYSTOLIC BLOOD PRESSURE: 167 MMHG | RESPIRATION RATE: 16 BRPM | OXYGEN SATURATION: 98 % | DIASTOLIC BLOOD PRESSURE: 90 MMHG

## 2021-10-09 DIAGNOSIS — E11.621 TYPE 2 DIABETES MELLITUS WITH FOOT ULCER: ICD-10-CM

## 2021-10-09 DIAGNOSIS — L97.416 NON-PRESSURE CHRONIC ULCER OF RIGHT HEEL AND MIDFOOT WITH BONE INVOLVEMENT WITHOUT EVIDENCE OF NECROSIS: ICD-10-CM

## 2021-10-09 DIAGNOSIS — L97.516 NON-PRESSURE CHRONIC ULCER OF OTHER PART OF RIGHT FOOT WITH BONE INVOLVEMENT WITHOUT EVIDENCE OF NECROSIS: ICD-10-CM

## 2021-10-09 PROCEDURE — 82962 GLUCOSE BLOOD TEST: CPT

## 2021-10-09 PROCEDURE — 99183 HYPERBARIC OXYGEN THERAPY: CPT

## 2021-10-09 PROCEDURE — G0277: CPT

## 2021-10-09 NOTE — PROCEDURE
[Outpatient] : Outpatient [Ambulatory] : Patient is ambulatory. [THIS CHAMBER HAS BEEN CLEANED / DISINFECTED] : This chamber has been cleaned / disinfected according to local and hospital policy and procedure prior to this treatment. [____] : Post-Dive: Time - [unfilled] [___] : Post-Dive: Value - [unfilled] mg/dL [Patient demonstrated and verbalized proper technique for using air break mask] : Patient demonstrated and verbalized proper technique for using air break mask [Patient educated on the risks of SMOKING prior to HBOT with understanding] : Patient educated on the risks of SMOKING prior to HBOT with understanding [Patient educated on the risks of CONSUMING ALCOHOL prior to HBOT with understanding] : Patient educated on the risks of CONSUMING ALCOHOL prior to HBOT with understanding [100% Cotton] : 100% cotton [Empty all pockets] : empty all pockets [No hair oils, wigs, hairpieces, pins] : no hair oils, wigs, hairpieces, pins  [Pre tx medications] : pre tx medications  [No make-up, creams] : no make-up, creams  [No jewelry] : no jewelry  [No matches, cigarettes, lighters] : no matches, cigarettes, lighters  [Hearing aid removed] : hearing aid removed [Dentures removed] : dentures removed [Ground bracelet on pt's wrist] : ground bracelet on pt's wrist  [Contacts removed] : contacts removed  [Remove nail polish] : remove nail polish  [No reading material] : no reading material  [Bra, undergarments removed] : bra, undergarments removed  [No contraindicated dressings] : no contraindicated dressings [Ground Wire - VISUAL Verification - Intact/Free of Obstruction] : Ground Wire - VISUAL Verification - Intact/Free of Obstruction  [Ground Continuity - Verified < 1 ohm w/ Wrist Strap Mila] : Ground Continuity - Verified < 1 ohm w/ Wrist Strap Mila [Diagnosis: ___] : Diagnosis: [unfilled] [Number: ___] : Number: [unfilled] [Clear all fields] : clear all fields [] : No [FreeTextEntry4] : 100 Mins [FreeTextEntry6] : 2520 [FreeTextEntry8] : 3840 [de-identified] : 6743 [de-identified] : 2866 [de-identified] : 0979 [de-identified] : 0987 [de-identified] : 0971 [de-identified] : 0997 [de-identified] : 120 MINUTES

## 2021-10-09 NOTE — ADDENDUM
[FreeTextEntry1] : Pt descended to 2.4 DAVID @ 2.2 PSI/min without incident in chamber #1\par Pt resting @ depth with chest rise and fall observed throughout tx. \par Pt tolerated air breaks well. \par Pt ascended from 2.4 DAVID @ 2.2 PSI/min without incident. \par Pt tolerated tx well.\par

## 2021-10-11 ENCOUNTER — APPOINTMENT (OUTPATIENT)
Dept: HYPERBARIC MEDICINE | Facility: HOSPITAL | Age: 61
End: 2021-10-11
Payer: MEDICAID

## 2021-10-11 ENCOUNTER — OUTPATIENT (OUTPATIENT)
Dept: OUTPATIENT SERVICES | Facility: HOSPITAL | Age: 61
LOS: 1 days | Discharge: HOME | End: 2021-10-11
Payer: MEDICAID

## 2021-10-11 VITALS
DIASTOLIC BLOOD PRESSURE: 91 MMHG | HEART RATE: 75 BPM | TEMPERATURE: 97.1 F | OXYGEN SATURATION: 100 % | RESPIRATION RATE: 20 BRPM | SYSTOLIC BLOOD PRESSURE: 160 MMHG

## 2021-10-11 VITALS
OXYGEN SATURATION: 100 % | TEMPERATURE: 97.4 F | DIASTOLIC BLOOD PRESSURE: 93 MMHG | HEART RATE: 64 BPM | SYSTOLIC BLOOD PRESSURE: 147 MMHG | RESPIRATION RATE: 18 BRPM

## 2021-10-11 DIAGNOSIS — E11.621 TYPE 2 DIABETES MELLITUS WITH FOOT ULCER: ICD-10-CM

## 2021-10-11 DIAGNOSIS — L97.416 NON-PRESSURE CHRONIC ULCER OF RIGHT HEEL AND MIDFOOT WITH BONE INVOLVEMENT WITHOUT EVIDENCE OF NECROSIS: ICD-10-CM

## 2021-10-11 DIAGNOSIS — L97.516 NON-PRESSURE CHRONIC ULCER OF OTHER PART OF RIGHT FOOT WITH BONE INVOLVEMENT WITHOUT EVIDENCE OF NECROSIS: ICD-10-CM

## 2021-10-11 LAB — SARS-COV-2 RNA SPEC QL NAA+PROBE: SIGNIFICANT CHANGE UP

## 2021-10-11 PROCEDURE — 82962 GLUCOSE BLOOD TEST: CPT

## 2021-10-11 PROCEDURE — U0003: CPT

## 2021-10-11 PROCEDURE — U0005: CPT

## 2021-10-11 PROCEDURE — G0277: CPT

## 2021-10-11 PROCEDURE — 99183 HYPERBARIC OXYGEN THERAPY: CPT

## 2021-10-11 NOTE — PROCEDURE
[Outpatient] : Outpatient [Ambulatory] : Patient is ambulatory. [THIS CHAMBER HAS BEEN CLEANED / DISINFECTED] : This chamber has been cleaned / disinfected according to local and hospital policy and procedure prior to this treatment. [Patient demonstrated and verbalized proper technique for using air break mask] : Patient demonstrated and verbalized proper technique for using air break mask [Patient educated on the risks of SMOKING prior to HBOT with understanding] : Patient educated on the risks of SMOKING prior to HBOT with understanding [Patient educated on the risks of CONSUMING ALCOHOL prior to HBOT with understanding] : Patient educated on the risks of CONSUMING ALCOHOL prior to HBOT with understanding [100% Cotton] : 100% cotton [Empty all pockets] : empty all pockets [No hair oils, wigs, hairpieces, pins] : no hair oils, wigs, hairpieces, pins  [Pre tx medications] : pre tx medications  [No make-up, creams] : no make-up, creams  [No jewelry] : no jewelry  [No matches, cigarettes, lighters] : no matches, cigarettes, lighters  [Hearing aid removed] : hearing aid removed [Dentures removed] : dentures removed [Ground bracelet on pt's wrist] : ground bracelet on pt's wrist  [Contacts removed] : contacts removed  [Remove nail polish] : remove nail polish  [No reading material] : no reading material  [Bra, undergarments removed] : bra, undergarments removed  [No contraindicated dressings] : no contraindicated dressings [Ground Wire - VISUAL Verification - Intact/Free of Obstruction] : Ground Wire - VISUAL Verification - Intact/Free of Obstruction  [Ground Continuity - Verified < 1 ohm w/ Wrist Strap Mila] : Ground Continuity - Verified < 1 ohm w/ Wrist Strap Mila [Number: ___] : Number: [unfilled] [Diagnosis: ___] : Diagnosis: [unfilled] [____] : Post-Dive: Time - [unfilled] [___] : Post-Dive: Value - [unfilled] mg/dL [Clear all fields] : clear all fields [] : No [FreeTextEntry4] : 100 [FreeTextEntry6] : 4664 [FreeTextEntry8] : 6938 [de-identified] : 7812 [de-identified] : 5748 [de-identified] : 1129 [de-identified] : 7715 [de-identified] : 8874 [de-identified] : 6746 [de-identified] : 120 MINUTES

## 2021-10-11 NOTE — ADDENDUM
[FreeTextEntry1] : PT ARRIVED A&OX3\par ALL VITALS WITHIN PARAMETERS FOR HBOT\par PT DESCENDED TO 2.4 DAVID @ 2.2 PSI/MIN IN CHAMBER #4 WITHOUT INCIDENT\par PT RESTING AT TX DEPTH WITH VISIBLE CHEST RISE AND FALL OBSERVED CHAMBER SIDE\par PT TOLERATED AIR BREAKS WELL\par PT ASCENDED FROM 2.4 DAVID @ 2.2 PSI/MIN WITHOUT INCIDENT\par PT TOLERATED TX WELL

## 2021-10-12 ENCOUNTER — APPOINTMENT (OUTPATIENT)
Dept: HYPERBARIC MEDICINE | Facility: HOSPITAL | Age: 61
End: 2021-10-12
Payer: MEDICAID

## 2021-10-12 ENCOUNTER — OUTPATIENT (OUTPATIENT)
Dept: OUTPATIENT SERVICES | Facility: HOSPITAL | Age: 61
LOS: 1 days | Discharge: HOME | End: 2021-10-12
Payer: MEDICAID

## 2021-10-12 VITALS
BODY MASS INDEX: 30.81 KG/M2 | TEMPERATURE: 97.2 F | WEIGHT: 208 LBS | HEART RATE: 74 BPM | RESPIRATION RATE: 18 BRPM | SYSTOLIC BLOOD PRESSURE: 148 MMHG | DIASTOLIC BLOOD PRESSURE: 64 MMHG | HEIGHT: 69 IN | OXYGEN SATURATION: 95 %

## 2021-10-12 DIAGNOSIS — E11.621 TYPE 2 DIABETES MELLITUS WITH FOOT ULCER: ICD-10-CM

## 2021-10-12 PROCEDURE — 15275 SKIN SUB GRAFT FACE/NK/HF/G: CPT

## 2021-10-12 PROCEDURE — 15275 SKIN SUB GRAFT FACE/NK/HF/G: CPT | Mod: 58

## 2021-10-12 NOTE — ADDENDUM
[FreeTextEntry1] : PT ARRIVED AMBULATORY A&OX3.\par ALL VITALS WITHIN PARAMETERS FOR HBOT.\par DRAINAGE NOTED ON DRESSING. WOUND CARE PROVIDED PRIOR TO DESCENT.\par TRANSFER OF CARE TO \par PT DESCENDED TO 2.4 DAVID @ 2.2 PSI/MIN IN CHAMBER #3 WITHOUT INCIDENT\par PT RESTING AT TX DEPTH WITH VISIBLE CHEST RISE AND FALL OBSERVED CHAMBER SIDE\par PT TOLERATED AIR BREAKS WELL\par PT ASCENDED FROM 2.4 DAVID @ 2.2 PSI/MIN WITHOUT INCIDENT\par PT TOLERATED TX WELL

## 2021-10-12 NOTE — PROCEDURE
[Outpatient] : Outpatient [Ambulatory] : Patient is ambulatory. [THIS CHAMBER HAS BEEN CLEANED / DISINFECTED] : This chamber has been cleaned / disinfected according to local and hospital policy and procedure prior to this treatment. [Number: ___] : Number: [unfilled] [Diagnosis: ___] : Diagnosis: [unfilled] [Patient demonstrated and verbalized proper technique for using air break mask] : Patient demonstrated and verbalized proper technique for using air break mask [Patient educated on the risks of SMOKING prior to HBOT with understanding] : Patient educated on the risks of SMOKING prior to HBOT with understanding [Patient educated on the risks of CONSUMING ALCOHOL prior to HBOT with understanding] : Patient educated on the risks of CONSUMING ALCOHOL prior to HBOT with understanding [100% Cotton] : 100% cotton [Empty all pockets] : empty all pockets [No hair oils, wigs, hairpieces, pins] : no hair oils, wigs, hairpieces, pins  [Pre tx medications] : pre tx medications  [No make-up, creams] : no make-up, creams  [No jewelry] : no jewelry  [No matches, cigarettes, lighters] : no matches, cigarettes, lighters  [Hearing aid removed] : hearing aid removed [Dentures removed] : dentures removed [Ground bracelet on pt's wrist] : ground bracelet on pt's wrist  [Contacts removed] : contacts removed  [Remove nail polish] : remove nail polish  [No reading material] : no reading material  [Bra, undergarments removed] : bra, undergarments removed  [No contraindicated dressings] : no contraindicated dressings [Ground Wire - VISUAL Verification - Intact/Free of Obstruction] : Ground Wire - VISUAL Verification - Intact/Free of Obstruction  [Ground Continuity - Verified < 1 ohm w/ Wrist Strap Mila] : Ground Continuity - Verified < 1 ohm w/ Wrist Strap Mila [____] : Post-Dive: Time - [unfilled] [___] : Post-Dive: Value - [unfilled] mg/dL [Clear all fields] : clear all fields [] : No [FreeTextEntry4] : 100 [FreeTextEntry6] : 1016 [FreeTextEntry8] : 1025 [de-identified] : 4665 [de-identified] : 1103 [de-identified] : 0364 [de-identified] : 4728 [de-identified] : 9541 [de-identified] : 1651 [de-identified] : 120 MINUTES

## 2021-10-13 ENCOUNTER — OUTPATIENT (OUTPATIENT)
Dept: OUTPATIENT SERVICES | Facility: HOSPITAL | Age: 61
LOS: 1 days | Discharge: HOME | End: 2021-10-13
Payer: MEDICAID

## 2021-10-13 ENCOUNTER — APPOINTMENT (OUTPATIENT)
Dept: HYPERBARIC MEDICINE | Facility: HOSPITAL | Age: 61
End: 2021-10-13
Payer: MEDICAID

## 2021-10-13 VITALS
HEART RATE: 59 BPM | RESPIRATION RATE: 18 BRPM | SYSTOLIC BLOOD PRESSURE: 143 MMHG | DIASTOLIC BLOOD PRESSURE: 90 MMHG | OXYGEN SATURATION: 100 % | TEMPERATURE: 97.3 F

## 2021-10-13 VITALS
TEMPERATURE: 96.9 F | DIASTOLIC BLOOD PRESSURE: 75 MMHG | SYSTOLIC BLOOD PRESSURE: 133 MMHG | HEART RATE: 69 BPM | RESPIRATION RATE: 18 BRPM | OXYGEN SATURATION: 99 %

## 2021-10-13 DIAGNOSIS — Z87.442 PERSONAL HISTORY OF URINARY CALCULI: ICD-10-CM

## 2021-10-13 DIAGNOSIS — Z79.84 LONG TERM (CURRENT) USE OF ORAL HYPOGLYCEMIC DRUGS: ICD-10-CM

## 2021-10-13 DIAGNOSIS — E78.1 PURE HYPERGLYCERIDEMIA: ICD-10-CM

## 2021-10-13 DIAGNOSIS — Z98.890 OTHER SPECIFIED POSTPROCEDURAL STATES: ICD-10-CM

## 2021-10-13 DIAGNOSIS — N40.1 BENIGN PROSTATIC HYPERPLASIA WITH LOWER URINARY TRACT SYMPTOMS: ICD-10-CM

## 2021-10-13 DIAGNOSIS — F41.8 OTHER SPECIFIED ANXIETY DISORDERS: ICD-10-CM

## 2021-10-13 DIAGNOSIS — R35.1 NOCTURIA: ICD-10-CM

## 2021-10-13 DIAGNOSIS — L97.416 NON-PRESSURE CHRONIC ULCER OF RIGHT HEEL AND MIDFOOT WITH BONE INVOLVEMENT WITHOUT EVIDENCE OF NECROSIS: ICD-10-CM

## 2021-10-13 DIAGNOSIS — Z79.899 OTHER LONG TERM (CURRENT) DRUG THERAPY: ICD-10-CM

## 2021-10-13 DIAGNOSIS — L97.516 NON-PRESSURE CHRONIC ULCER OF OTHER PART OF RIGHT FOOT WITH BONE INVOLVEMENT WITHOUT EVIDENCE OF NECROSIS: ICD-10-CM

## 2021-10-13 DIAGNOSIS — E11.621 TYPE 2 DIABETES MELLITUS WITH FOOT ULCER: ICD-10-CM

## 2021-10-13 DIAGNOSIS — Z98.1 ARTHRODESIS STATUS: ICD-10-CM

## 2021-10-13 DIAGNOSIS — M10.071 IDIOPATHIC GOUT, RIGHT ANKLE AND FOOT: ICD-10-CM

## 2021-10-13 DIAGNOSIS — N32.81 OVERACTIVE BLADDER: ICD-10-CM

## 2021-10-13 DIAGNOSIS — Z87.81 PERSONAL HISTORY OF (HEALED) TRAUMATIC FRACTURE: ICD-10-CM

## 2021-10-13 DIAGNOSIS — I10 ESSENTIAL (PRIMARY) HYPERTENSION: ICD-10-CM

## 2021-10-13 DIAGNOSIS — N52.9 MALE ERECTILE DYSFUNCTION, UNSPECIFIED: ICD-10-CM

## 2021-10-13 PROCEDURE — G0277: CPT

## 2021-10-13 PROCEDURE — 82962 GLUCOSE BLOOD TEST: CPT

## 2021-10-13 PROCEDURE — 99183 HYPERBARIC OXYGEN THERAPY: CPT

## 2021-10-13 NOTE — REVIEW OF SYSTEMS
[FreeTextEntry1] : 2887 [Fever] : no fever [Chills] : no chills [Eye Pain] : no eye pain [Loss Of Hearing] : no hearing loss [Shortness Of Breath] : no shortness of breath [Wheezing] : no wheezing [Abdominal Pain] : no abdominal pain [Arthralgias] : arthralgias [Joint Swelling] : joint swelling [Skin Wound] : skin wound [Joint Stiffness] : joint stiffness [Anxiety] : no anxiety [Easy Bleeding] : no tendency for easy bleeding [FreeTextEntry5] : HTN [FreeTextEntry9] : Gouty arthropathy  [de-identified] : s/p right foot surgery , no so , no pain  [de-identified] : Diabetic neuropathy  [de-identified] : NIDDM , severe gouty arthropathy

## 2021-10-13 NOTE — PROCEDURE
[Saline] : saline [Fenestrated] : fenestrated [Hydrated with saline] : hydrated with saline [Apligraf] : apligraf [____ % was used] : and [unfilled] % was used [____ % was discarded] : and [unfilled] % was discarded [FreeTextEntry1] : adaptic, calcium alginate, dry dressing, ACE [FreeTextEntry9] : 3401 [de-identified] : right lateral foot incision dehiscence with ulcer down to skin, subcutaneous tissue, fat, and bone. [de-identified] : clara [de-identified] : morgan [FreeTextEntry6] : right lateral foot incision dehiscence with ulcer down to skin, subcutaneous tissue, fat, and bone. [FreeTextEntry7] : right lateral foot incision dehiscence with ulcer down to skin, subcutaneous tissue, fat, and bone.

## 2021-10-13 NOTE — PHYSICAL EXAM
[Abdominal Pad] : Abdominal Pad [4 x 4] : 4 x 4  [1+] : left 1+ [Ankle Swelling Bilaterally] : bilaterally  [Ankle Swelling (On Exam)] : present [Varicose Veins Of Lower Extremities] : bilaterally [Ankle Swelling On The Left] : moderate [] : bilaterally [Ankle Swelling On The Right] : mild [Purpura] : no purpura  [Skin Ulcer] : ulcer [Petechiae] : no petechiae [Skin Induration] : no induration [Alert] : alert [Oriented to Person] : oriented to person [Oriented to Place] : oriented to place [Calm] : calm [de-identified] : A&Ox3, NAD [de-identified] : severe gout with associated foot deformities  [de-identified] : right medial incision healed, right 2nd dorsal toe incision dehiscence with wound down to skin, subcutnaeous tissue, and fat. right lateral foot incision dehiscence with ulcer down to skin, subcutaneous tissue, fat, and bone. [de-identified] : Diabetic neuropathy  [FreeTextEntry1] : Right Lateral Foot [FreeTextEntry2] : 1.2 [FreeTextEntry3] : 3.3 [FreeTextEntry4] : 0.4 [de-identified] : Serosanguineous [de-identified] : Callus/erythema  [FreeTextEntry5] : post debridement measurements: 1.3 x 3.4 x 0.5 [de-identified] : Apligraf 44 sq cm [de-identified] : Apligraf, Adaptic Touch, And Alginate [de-identified] : Cleansed with Normal Saline\par \par Apligraf 44 sq cm\par [id number]\par Exp: 10/14/21\par 10% Implanted 90% Discarded\par pH 7.3-7.5 [FreeTextEntry7] : Right Hallux [de-identified] : No Product [de-identified] : Cleansed with Normal Saline\par  [de-identified] : Right Foot 2nd Digit- Dry Eschar [de-identified] : 1.9 [de-identified] : 2.2 [de-identified] : 0.1 [de-identified] : Serosanguineous [de-identified] : 90% [de-identified] : 5% [de-identified] : 5% [de-identified] : adaptic touch and silver alginate [de-identified] : Cleansed with Normal Saline\par  [TWNoteComboBox4] : Moderate [TWNoteComboBox5] : No [TWNoteComboBox6] : Diabetic [de-identified] : None [de-identified] : No [de-identified] : None [de-identified] : 100% [de-identified] : No [TWNoteComboBox7] : Pao [de-identified] : Application of skin substitute [de-identified] : 3x Weekly [de-identified] : Primary Dressing [de-identified] : No [de-identified] : Small [de-identified] : No [de-identified] : None [de-identified] : 100% [de-identified] : None [de-identified] : Yes [de-identified] : 3x Weekly [de-identified] : Primary Dressing

## 2021-10-13 NOTE — ASSESSMENT
[Verbal] : Verbal [Patient] : Patient [Good - alert, interested, motivated] : Good - alert, interested, motivated [Verbalizes knowledge/Understanding] : Verbalizes knowledge/understanding [Dressing changes] : dressing changes [Foot Care] : foot care [Skin Care] : skin care [Signs and symptoms of infection] : sign and symptoms of infection [How and When to Call] : how and when to call [Hyperbaric Therapy] : hyperbaric therapy [Patient responsibility to plan of care] : patient responsibility to plan of care [] : Yes [Stable] : stable [Home] : Home [Ambulatory] : Ambulatory [Not Applicable - Long Term Care/Home Health Agency] : Long Term Care/Home Health Agency: Not Applicable [Pressure relief] : pressure relief [Nutrition] : nutrition [Off-loading] : off-loading [Glycemic Control] : glycemic control [FreeTextEntry2] : Restore Skin Integrity\par Infection Control\par Offloading\par Localized wound care\par Hyperbaric Oxygen Therapy\par Maintain acceptable pain levels at satisfactory relief.\par Demonstrates use of both nonpharmacological and pharmacological pain relief strategies\par encourage glycemic control\par pressure relief [FreeTextEntry4] : HBOT 12/30 completed\par F/U 1 week for assessment and daily HBOT\par Preauth for apligraf #2 submitted for next assessment

## 2021-10-13 NOTE — PROCEDURE
[Outpatient] : Outpatient [Ambulatory] : Patient is ambulatory. [THIS CHAMBER HAS BEEN CLEANED / DISINFECTED] : This chamber has been cleaned / disinfected according to local and hospital policy and procedure prior to this treatment. [Patient demonstrated and verbalized proper technique for using air break mask] : Patient demonstrated and verbalized proper technique for using air break mask [Patient educated on the risks of SMOKING prior to HBOT with understanding] : Patient educated on the risks of SMOKING prior to HBOT with understanding [Patient educated on the risks of CONSUMING ALCOHOL prior to HBOT with understanding] : Patient educated on the risks of CONSUMING ALCOHOL prior to HBOT with understanding [100% Cotton] : 100% cotton [Empty all pockets] : empty all pockets [No hair oils, wigs, hairpieces, pins] : no hair oils, wigs, hairpieces, pins  [Pre tx medications] : pre tx medications  [No make-up, creams] : no make-up, creams  [No jewelry] : no jewelry  [No matches, cigarettes, lighters] : no matches, cigarettes, lighters  [Hearing aid removed] : hearing aid removed [Dentures removed] : dentures removed [Ground bracelet on pt's wrist] : ground bracelet on pt's wrist  [Contacts removed] : contacts removed  [Remove nail polish] : remove nail polish  [No reading material] : no reading material  [Bra, undergarments removed] : bra, undergarments removed  [No contraindicated dressings] : no contraindicated dressings [Ground Wire - VISUAL Verification - Intact/Free of Obstruction] : Ground Wire - VISUAL Verification - Intact/Free of Obstruction  [Ground Continuity - Verified < 1 ohm w/ Wrist Strap Mila] : Ground Continuity - Verified < 1 ohm w/ Wrist Strap Mila [Number: ___] : Number: [unfilled] [Diagnosis: ___] : Diagnosis: [unfilled] [Clear all fields] : clear all fields [____] : Post-Dive: Time - [unfilled] [___] : Post-Dive: Value - [unfilled] mg/dL [] : No [FreeTextEntry4] : 100 [FreeTextEntry6] : 8575 [FreeTextEntry8] : 1030 [de-identified] : 1107 [de-identified] : 1101 [de-identified] : 9922 [de-identified] : 3386 [de-identified] : 7542 [de-identified] : 3800 [de-identified] : 120 MIN

## 2021-10-13 NOTE — ADDENDUM
[FreeTextEntry1] : PT ARRIVED A&OX3 \par ALL VITALS WITHIN PARAMETERS FOR HBOT\par PT DESCENDED TO 2.4 DAVID @ 2.2 PSI/MIN IN CHAMBEr #2 WITHOUT INCIDENT\par PT RESTING AT TX DEPTH WITH VISIBLE CHEST RISE AND FALL OBSERVED CHAMBER SIDE\par PT TOLERATED AIR BREAKS WELL\par PT ASCENDED FROM TX DEPTH WITHOUT INCIDENT. \par PT TOLERATED TX WELL

## 2021-10-14 ENCOUNTER — APPOINTMENT (OUTPATIENT)
Dept: HYPERBARIC MEDICINE | Facility: HOSPITAL | Age: 61
End: 2021-10-14
Payer: MEDICAID

## 2021-10-14 ENCOUNTER — OUTPATIENT (OUTPATIENT)
Dept: OUTPATIENT SERVICES | Facility: HOSPITAL | Age: 61
LOS: 1 days | Discharge: HOME | End: 2021-10-14
Payer: MEDICAID

## 2021-10-14 VITALS
OXYGEN SATURATION: 100 % | SYSTOLIC BLOOD PRESSURE: 150 MMHG | DIASTOLIC BLOOD PRESSURE: 97 MMHG | RESPIRATION RATE: 18 BRPM | TEMPERATURE: 97 F | HEART RATE: 62 BPM

## 2021-10-14 VITALS
OXYGEN SATURATION: 98 % | RESPIRATION RATE: 18 BRPM | SYSTOLIC BLOOD PRESSURE: 159 MMHG | TEMPERATURE: 97.4 F | HEART RATE: 72 BPM | DIASTOLIC BLOOD PRESSURE: 89 MMHG

## 2021-10-14 DIAGNOSIS — E11.621 TYPE 2 DIABETES MELLITUS WITH FOOT ULCER: ICD-10-CM

## 2021-10-14 DIAGNOSIS — L97.516 NON-PRESSURE CHRONIC ULCER OF OTHER PART OF RIGHT FOOT WITH BONE INVOLVEMENT WITHOUT EVIDENCE OF NECROSIS: ICD-10-CM

## 2021-10-14 DIAGNOSIS — L97.416 NON-PRESSURE CHRONIC ULCER OF RIGHT HEEL AND MIDFOOT WITH BONE INVOLVEMENT WITHOUT EVIDENCE OF NECROSIS: ICD-10-CM

## 2021-10-14 PROCEDURE — 99183 HYPERBARIC OXYGEN THERAPY: CPT

## 2021-10-14 PROCEDURE — G0277: CPT

## 2021-10-14 PROCEDURE — 82962 GLUCOSE BLOOD TEST: CPT

## 2021-10-14 NOTE — ADDENDUM
[FreeTextEntry1] : PT ARRIVED A&OX3 \par ALL VITALS WITHIN PARAMETERS FOR HBOT\par PT DESCENDED TO 2.4 DAVID @ 2.2 PSI/MIN IN CHAMBER # 2 WITHOUT INCIDENT\par PT RESTING AT TX DEPTH WITH VISIBLE CHEST RISE AND FALL OBSERVED CHAMBER SIDE \par PT TOLERATED AIR BREAKS WELL\par PT ASCENDED FROM 2.4 DAVID @ 2.2 PSI/MIN WITHOUT INCIDENT\par PT TOLERATD TX WELL\par PT TO RECEIVE WOUND CARE POST HBOT

## 2021-10-14 NOTE — PROCEDURE
[Outpatient] : Outpatient [Ambulatory] : Patient is ambulatory. [THIS CHAMBER HAS BEEN CLEANED / DISINFECTED] : This chamber has been cleaned / disinfected according to local and hospital policy and procedure prior to this treatment. [Patient demonstrated and verbalized proper technique for using air break mask] : Patient demonstrated and verbalized proper technique for using air break mask [Patient educated on the risks of SMOKING prior to HBOT with understanding] : Patient educated on the risks of SMOKING prior to HBOT with understanding [Patient educated on the risks of CONSUMING ALCOHOL prior to HBOT with understanding] : Patient educated on the risks of CONSUMING ALCOHOL prior to HBOT with understanding [100% Cotton] : 100% cotton [Empty all pockets] : empty all pockets [No hair oils, wigs, hairpieces, pins] : no hair oils, wigs, hairpieces, pins  [Pre tx medications] : pre tx medications  [No make-up, creams] : no make-up, creams  [No jewelry] : no jewelry  [No matches, cigarettes, lighters] : no matches, cigarettes, lighters  [Hearing aid removed] : hearing aid removed [Dentures removed] : dentures removed [Ground bracelet on pt's wrist] : ground bracelet on pt's wrist  [Contacts removed] : contacts removed  [Remove nail polish] : remove nail polish  [No reading material] : no reading material  [Bra, undergarments removed] : bra, undergarments removed  [No contraindicated dressings] : no contraindicated dressings [Ground Wire - VISUAL Verification - Intact/Free of Obstruction] : Ground Wire - VISUAL Verification - Intact/Free of Obstruction  [Ground Continuity - Verified < 1 ohm w/ Wrist Strap Mila] : Ground Continuity - Verified < 1 ohm w/ Wrist Strap Mila [Number: ___] : Number: [unfilled] [Diagnosis: ___] : Diagnosis: [unfilled] [____] : Post-Dive: Time - [unfilled] [___] : Post-Dive: Value - [unfilled] mg/dL [Clear all fields] : clear all fields [] : No [FreeTextEntry4] : 100 [FreeTextEntry6] : 8877 [FreeTextEntry8] : 1038 [de-identified] : 1106 [de-identified] : 3617 [de-identified] : 4635 [de-identified] : 4824 [de-identified] : 9660 [de-identified] : 9467 [de-identified] : 120 MINUTES

## 2021-10-15 ENCOUNTER — OUTPATIENT (OUTPATIENT)
Dept: OUTPATIENT SERVICES | Facility: HOSPITAL | Age: 61
LOS: 1 days | Discharge: HOME | End: 2021-10-15
Payer: MEDICAID

## 2021-10-15 ENCOUNTER — APPOINTMENT (OUTPATIENT)
Dept: HYPERBARIC MEDICINE | Facility: HOSPITAL | Age: 61
End: 2021-10-15
Payer: MEDICAID

## 2021-10-15 VITALS
RESPIRATION RATE: 18 BRPM | OXYGEN SATURATION: 98 % | SYSTOLIC BLOOD PRESSURE: 142 MMHG | DIASTOLIC BLOOD PRESSURE: 91 MMHG | TEMPERATURE: 97.1 F | HEART RATE: 75 BPM

## 2021-10-15 VITALS
TEMPERATURE: 97.6 F | RESPIRATION RATE: 16 BRPM | DIASTOLIC BLOOD PRESSURE: 98 MMHG | OXYGEN SATURATION: 97 % | SYSTOLIC BLOOD PRESSURE: 173 MMHG | HEART RATE: 70 BPM

## 2021-10-15 DIAGNOSIS — E11.621 TYPE 2 DIABETES MELLITUS WITH FOOT ULCER: ICD-10-CM

## 2021-10-15 DIAGNOSIS — L97.416 NON-PRESSURE CHRONIC ULCER OF RIGHT HEEL AND MIDFOOT WITH BONE INVOLVEMENT WITHOUT EVIDENCE OF NECROSIS: ICD-10-CM

## 2021-10-15 DIAGNOSIS — L97.516 NON-PRESSURE CHRONIC ULCER OF OTHER PART OF RIGHT FOOT WITH BONE INVOLVEMENT WITHOUT EVIDENCE OF NECROSIS: ICD-10-CM

## 2021-10-15 PROCEDURE — 82962 GLUCOSE BLOOD TEST: CPT

## 2021-10-15 PROCEDURE — 99183 HYPERBARIC OXYGEN THERAPY: CPT

## 2021-10-15 PROCEDURE — G0277: CPT

## 2021-10-16 LAB
CULTURE RESULTS: SIGNIFICANT CHANGE UP
SPECIMEN SOURCE: SIGNIFICANT CHANGE UP

## 2021-10-18 ENCOUNTER — APPOINTMENT (OUTPATIENT)
Dept: HYPERBARIC MEDICINE | Facility: HOSPITAL | Age: 61
End: 2021-10-18

## 2021-10-18 NOTE — PROCEDURE
[Outpatient] : Outpatient [Ambulatory] : Patient is ambulatory. [THIS CHAMBER HAS BEEN CLEANED / DISINFECTED] : This chamber has been cleaned / disinfected according to local and hospital policy and procedure prior to this treatment. [Patient demonstrated and verbalized proper technique for using air break mask] : Patient demonstrated and verbalized proper technique for using air break mask [Patient educated on the risks of SMOKING prior to HBOT with understanding] : Patient educated on the risks of SMOKING prior to HBOT with understanding [Patient educated on the risks of CONSUMING ALCOHOL prior to HBOT with understanding] : Patient educated on the risks of CONSUMING ALCOHOL prior to HBOT with understanding [100% Cotton] : 100% cotton [Empty all pockets] : empty all pockets [No hair oils, wigs, hairpieces, pins] : no hair oils, wigs, hairpieces, pins  [Pre tx medications] : pre tx medications  [No make-up, creams] : no make-up, creams  [No jewelry] : no jewelry  [No matches, cigarettes, lighters] : no matches, cigarettes, lighters  [Hearing aid removed] : hearing aid removed [Dentures removed] : dentures removed [Ground bracelet on pt's wrist] : ground bracelet on pt's wrist  [Contacts removed] : contacts removed  [Remove nail polish] : remove nail polish  [No reading material] : no reading material  [Bra, undergarments removed] : bra, undergarments removed  [No contraindicated dressings] : no contraindicated dressings [Ground Wire - VISUAL Verification - Intact/Free of Obstruction] : Ground Wire - VISUAL Verification - Intact/Free of Obstruction  [Ground Continuity - Verified < 1 ohm w/ Wrist Strap Mila] : Ground Continuity - Verified < 1 ohm w/ Wrist Strap Mila [Number: ___] : Number: [unfilled] [Diagnosis: ___] : Diagnosis: [unfilled] [____] : Post-Dive: Time - [unfilled] [___] : Post-Dive: Value - [unfilled] mg/dL [Clear all fields] : clear all fields [] : No [FreeTextEntry4] : 100 [FreeTextEntry6] : 10 : 09 [FreeTextEntry8] : 10 : 19 [de-identified] : 10 : 49 [de-identified] : 10 : 54 [de-identified] : 11 : 24 [de-identified] : 11 : 29 [de-identified] : 11 : 59 [de-identified] : 12 : 09 [de-identified] : 120 MIN

## 2021-10-18 NOTE — ADDENDUM
[FreeTextEntry1] : The pt. presented to [facility] ambulatory and A&Ox3 for scheduled HBOT tx. \par The pt's pre-dive screening was found to be within acceptable limits to begin HBOT.\par \par During PDS, the pt. advised CHT that his far-sighted vision has degenerated since beginning HBOT.\par The pt. was advised that vision will return to pre-HBOT quality upon completion of HBOT tx.\par Pt. verbalized understanding of same.\par \par The pt's PVHO ventilation rate was increased from 275 to 450 lpm at the start of HBOT procedure at pt's request.\par  \par The pt. descended @ 2.2 PSI/min. to Rx'd HBOT tx. depth of 2.4 DAVID in chamber # 2 without incident.\par The pt. was observed with visible chest motion and without incident for the duration of HBOT.\par The pt. was administered intermittent med. air over course of HBOT without incident. \par Transfer of Observation from [facility] [place] to  @ 11:11. \par Pt ascended from tx depth without incident. Pt tolerated tx well.

## 2021-10-19 ENCOUNTER — APPOINTMENT (OUTPATIENT)
Dept: HYPERBARIC MEDICINE | Facility: HOSPITAL | Age: 61
End: 2021-10-19
Payer: MEDICAID

## 2021-10-19 ENCOUNTER — OUTPATIENT (OUTPATIENT)
Dept: OUTPATIENT SERVICES | Facility: HOSPITAL | Age: 61
LOS: 1 days | Discharge: HOME | End: 2021-10-19
Payer: MEDICAID

## 2021-10-19 VITALS
SYSTOLIC BLOOD PRESSURE: 148 MMHG | HEART RATE: 73 BPM | WEIGHT: 215 LBS | TEMPERATURE: 97.2 F | RESPIRATION RATE: 18 BRPM | HEIGHT: 69 IN | OXYGEN SATURATION: 100 % | DIASTOLIC BLOOD PRESSURE: 93 MMHG | BODY MASS INDEX: 31.84 KG/M2

## 2021-10-19 DIAGNOSIS — E11.621 TYPE 2 DIABETES MELLITUS WITH FOOT ULCER: ICD-10-CM

## 2021-10-19 LAB — SARS-COV-2 RNA SPEC QL NAA+PROBE: SIGNIFICANT CHANGE UP

## 2021-10-19 PROCEDURE — 15275 SKIN SUB GRAFT FACE/NK/HF/G: CPT | Mod: 58

## 2021-10-19 PROCEDURE — 15275 SKIN SUB GRAFT FACE/NK/HF/G: CPT

## 2021-10-19 PROCEDURE — U0005: CPT

## 2021-10-19 PROCEDURE — G0277: CPT

## 2021-10-19 PROCEDURE — U0003: CPT

## 2021-10-20 ENCOUNTER — APPOINTMENT (OUTPATIENT)
Dept: HYPERBARIC MEDICINE | Facility: HOSPITAL | Age: 61
End: 2021-10-20
Payer: MEDICAID

## 2021-10-20 ENCOUNTER — NON-APPOINTMENT (OUTPATIENT)
Age: 61
End: 2021-10-20

## 2021-10-20 ENCOUNTER — OUTPATIENT (OUTPATIENT)
Dept: OUTPATIENT SERVICES | Facility: HOSPITAL | Age: 61
LOS: 1 days | Discharge: HOME | End: 2021-10-20
Payer: MEDICAID

## 2021-10-20 VITALS
DIASTOLIC BLOOD PRESSURE: 88 MMHG | TEMPERATURE: 97.3 F | RESPIRATION RATE: 18 BRPM | HEART RATE: 80 BPM | SYSTOLIC BLOOD PRESSURE: 152 MMHG | OXYGEN SATURATION: 97 %

## 2021-10-20 VITALS
RESPIRATION RATE: 16 BRPM | DIASTOLIC BLOOD PRESSURE: 93 MMHG | OXYGEN SATURATION: 96 % | SYSTOLIC BLOOD PRESSURE: 155 MMHG | HEART RATE: 94 BPM | TEMPERATURE: 97.1 F

## 2021-10-20 DIAGNOSIS — Z79.84 LONG TERM (CURRENT) USE OF ORAL HYPOGLYCEMIC DRUGS: ICD-10-CM

## 2021-10-20 DIAGNOSIS — Z20.822 CONTACT WITH AND (SUSPECTED) EXPOSURE TO COVID-19: ICD-10-CM

## 2021-10-20 DIAGNOSIS — Z79.899 OTHER LONG TERM (CURRENT) DRUG THERAPY: ICD-10-CM

## 2021-10-20 DIAGNOSIS — I10 ESSENTIAL (PRIMARY) HYPERTENSION: ICD-10-CM

## 2021-10-20 DIAGNOSIS — E78.1 PURE HYPERGLYCERIDEMIA: ICD-10-CM

## 2021-10-20 DIAGNOSIS — R35.1 NOCTURIA: ICD-10-CM

## 2021-10-20 DIAGNOSIS — E11.621 TYPE 2 DIABETES MELLITUS WITH FOOT ULCER: ICD-10-CM

## 2021-10-20 DIAGNOSIS — N32.81 OVERACTIVE BLADDER: ICD-10-CM

## 2021-10-20 DIAGNOSIS — L97.416 NON-PRESSURE CHRONIC ULCER OF RIGHT HEEL AND MIDFOOT WITH BONE INVOLVEMENT WITHOUT EVIDENCE OF NECROSIS: ICD-10-CM

## 2021-10-20 DIAGNOSIS — L97.516 NON-PRESSURE CHRONIC ULCER OF OTHER PART OF RIGHT FOOT WITH BONE INVOLVEMENT WITHOUT EVIDENCE OF NECROSIS: ICD-10-CM

## 2021-10-20 DIAGNOSIS — Z98.1 ARTHRODESIS STATUS: ICD-10-CM

## 2021-10-20 DIAGNOSIS — M10.071 IDIOPATHIC GOUT, RIGHT ANKLE AND FOOT: ICD-10-CM

## 2021-10-20 DIAGNOSIS — Z87.442 PERSONAL HISTORY OF URINARY CALCULI: ICD-10-CM

## 2021-10-20 DIAGNOSIS — N52.9 MALE ERECTILE DYSFUNCTION, UNSPECIFIED: ICD-10-CM

## 2021-10-20 DIAGNOSIS — N40.1 BENIGN PROSTATIC HYPERPLASIA WITH LOWER URINARY TRACT SYMPTOMS: ICD-10-CM

## 2021-10-20 DIAGNOSIS — F41.8 OTHER SPECIFIED ANXIETY DISORDERS: ICD-10-CM

## 2021-10-20 DIAGNOSIS — Z87.81 PERSONAL HISTORY OF (HEALED) TRAUMATIC FRACTURE: ICD-10-CM

## 2021-10-20 DIAGNOSIS — Z98.890 OTHER SPECIFIED POSTPROCEDURAL STATES: ICD-10-CM

## 2021-10-20 PROCEDURE — 82962 GLUCOSE BLOOD TEST: CPT

## 2021-10-20 PROCEDURE — 99183 HYPERBARIC OXYGEN THERAPY: CPT

## 2021-10-20 PROCEDURE — G0277: CPT

## 2021-10-20 NOTE — PROCEDURE
[Outpatient] : Outpatient [Ambulatory] : Patient is ambulatory. [THIS CHAMBER HAS BEEN CLEANED / DISINFECTED] : This chamber has been cleaned / disinfected according to local and hospital policy and procedure prior to this treatment. [Patient demonstrated and verbalized proper technique for using air break mask] : Patient demonstrated and verbalized proper technique for using air break mask [Patient educated on the risks of SMOKING prior to HBOT with understanding] : Patient educated on the risks of SMOKING prior to HBOT with understanding [Patient educated on the risks of CONSUMING ALCOHOL prior to HBOT with understanding] : Patient educated on the risks of CONSUMING ALCOHOL prior to HBOT with understanding [100% Cotton] : 100% cotton [Empty all pockets] : empty all pockets [No hair oils, wigs, hairpieces, pins] : no hair oils, wigs, hairpieces, pins  [Pre tx medications] : pre tx medications  [No make-up, creams] : no make-up, creams  [No jewelry] : no jewelry  [No matches, cigarettes, lighters] : no matches, cigarettes, lighters  [Hearing aid removed] : hearing aid removed [Dentures removed] : dentures removed [Ground bracelet on pt's wrist] : ground bracelet on pt's wrist  [Contacts removed] : contacts removed  [Remove nail polish] : remove nail polish  [No reading material] : no reading material  [Bra, undergarments removed] : bra, undergarments removed  [No contraindicated dressings] : no contraindicated dressings [Ground Wire - VISUAL Verification - Intact/Free of Obstruction] : Ground Wire - VISUAL Verification - Intact/Free of Obstruction  [Ground Continuity - Verified < 1 ohm w/ Wrist Strap Mila] : Ground Continuity - Verified < 1 ohm w/ Wrist Strap Mila [Number: ___] : Number: [unfilled] [Diagnosis: ___] : Diagnosis: [unfilled] [____] : Post-Dive: Time - [unfilled] [___] : Post-Dive: Value - [unfilled] mg/dL [Clear all fields] : clear all fields [] : No [FreeTextEntry4] : 100 [FreeTextEntry6] : 0396 [FreeTextEntry8] : 8751 [de-identified] : 1430 [de-identified] : 1283 [de-identified] : 0998 [de-identified] : 0835 [de-identified] : 8900 [de-identified] : 5647 [de-identified] : 120 MINUTES

## 2021-10-20 NOTE — ADDENDUM
[FreeTextEntry1] : PT ARRIVED A&OX3\par ALL VITALS WITHIN PARAMETERS FOR HBOT\par PT DESCENDED TO 2.4 DAVID @ 2.2 PSI/MIN IN CHAMBER #1 WITHOUT INCIDENT\par PT RESTING AT TX DEPTH WITH VISIBLE CHEST RISE AND FALL OBSERVED CHAMBER SIDE\par PT TOLERATED AIR BREAKS WELL\par PT ASCENDED FROM 2.4 DAVID @ 2.2 PSI/MIN WITHOUT INCIDENT\par PT TOLERATED TX WELL\par \par

## 2021-10-21 ENCOUNTER — APPOINTMENT (OUTPATIENT)
Dept: HYPERBARIC MEDICINE | Facility: HOSPITAL | Age: 61
End: 2021-10-21
Payer: MEDICAID

## 2021-10-21 ENCOUNTER — OUTPATIENT (OUTPATIENT)
Dept: OUTPATIENT SERVICES | Facility: HOSPITAL | Age: 61
LOS: 1 days | Discharge: HOME | End: 2021-10-21
Payer: MEDICAID

## 2021-10-21 VITALS
DIASTOLIC BLOOD PRESSURE: 100 MMHG | RESPIRATION RATE: 18 BRPM | TEMPERATURE: 97.2 F | SYSTOLIC BLOOD PRESSURE: 157 MMHG | OXYGEN SATURATION: 98 % | HEART RATE: 68 BPM

## 2021-10-21 VITALS
HEART RATE: 83 BPM | DIASTOLIC BLOOD PRESSURE: 91 MMHG | RESPIRATION RATE: 18 BRPM | OXYGEN SATURATION: 99 % | TEMPERATURE: 97.2 F | SYSTOLIC BLOOD PRESSURE: 155 MMHG

## 2021-10-21 VITALS — SYSTOLIC BLOOD PRESSURE: 160 MMHG | DIASTOLIC BLOOD PRESSURE: 89 MMHG

## 2021-10-21 DIAGNOSIS — E11.621 TYPE 2 DIABETES MELLITUS WITH FOOT ULCER: ICD-10-CM

## 2021-10-21 DIAGNOSIS — L97.516 NON-PRESSURE CHRONIC ULCER OF OTHER PART OF RIGHT FOOT WITH BONE INVOLVEMENT WITHOUT EVIDENCE OF NECROSIS: ICD-10-CM

## 2021-10-21 DIAGNOSIS — L97.526 NON-PRESSURE CHRONIC ULCER OF OTHER PART OF LEFT FOOT WITH BONE INVOLVEMENT WITHOUT EVIDENCE OF NECROSIS: ICD-10-CM

## 2021-10-21 DIAGNOSIS — L97.416 NON-PRESSURE CHRONIC ULCER OF RIGHT HEEL AND MIDFOOT WITH BONE INVOLVEMENT WITHOUT EVIDENCE OF NECROSIS: ICD-10-CM

## 2021-10-21 PROCEDURE — G0277: CPT

## 2021-10-21 PROCEDURE — 99183 HYPERBARIC OXYGEN THERAPY: CPT

## 2021-10-21 PROCEDURE — 82962 GLUCOSE BLOOD TEST: CPT

## 2021-10-21 NOTE — ADDENDUM
[FreeTextEntry1] : PT ARRIVED A&OX3 \par ALL VITALS WITHIN PARAMETERS FOR HBOT\par PT DESCENDED TO 2.4 DAVID @ 2.2 PSI/MIN IN CHAMBER #1 WITHOUT INCIDENT\par PT RESTING AT TX DEPTH WITH VISIBLE CHEST RISE AND FALL OBSERVED CHAMBER SIDE\par PT TOLERATED AIR BREAKS WELL\par PT ASCENDED FROM 2.4 DAVID @ 2.2 PSI/MIN WITHOUT INCIDENT\par PT TOLERATED TX WELL

## 2021-10-21 NOTE — PROCEDURE
[Outpatient] : Outpatient [Ambulatory] : Patient is ambulatory. [THIS CHAMBER HAS BEEN CLEANED / DISINFECTED] : This chamber has been cleaned / disinfected according to local and hospital policy and procedure prior to this treatment. [Patient demonstrated and verbalized proper technique for using air break mask] : Patient demonstrated and verbalized proper technique for using air break mask [Patient educated on the risks of SMOKING prior to HBOT with understanding] : Patient educated on the risks of SMOKING prior to HBOT with understanding [Patient educated on the risks of CONSUMING ALCOHOL prior to HBOT with understanding] : Patient educated on the risks of CONSUMING ALCOHOL prior to HBOT with understanding [100% Cotton] : 100% cotton [Empty all pockets] : empty all pockets [No hair oils, wigs, hairpieces, pins] : no hair oils, wigs, hairpieces, pins  [Pre tx medications] : pre tx medications  [No make-up, creams] : no make-up, creams  [No jewelry] : no jewelry  [No matches, cigarettes, lighters] : no matches, cigarettes, lighters  [Hearing aid removed] : hearing aid removed [Dentures removed] : dentures removed [Ground bracelet on pt's wrist] : ground bracelet on pt's wrist  [Contacts removed] : contacts removed  [Remove nail polish] : remove nail polish  [No reading material] : no reading material  [Bra, undergarments removed] : bra, undergarments removed  [No contraindicated dressings] : no contraindicated dressings [Ground Wire - VISUAL Verification - Intact/Free of Obstruction] : Ground Wire - VISUAL Verification - Intact/Free of Obstruction  [Ground Continuity - Verified < 1 ohm w/ Wrist Strap Mila] : Ground Continuity - Verified < 1 ohm w/ Wrist Strap Mila [Number: ___] : Number: [unfilled] [Diagnosis: ___] : Diagnosis: [unfilled] [____] : Post-Dive: Time - [unfilled] [___] : Post-Dive: Value - [unfilled] mg/dL [Clear all fields] : clear all fields [] : No [FreeTextEntry4] : 100 [FreeTextEntry6] : 1013 [FreeTextEntry8] : 1022 [de-identified] : 2272 [de-identified] : 9556 [de-identified] : 1123 [de-identified] : 5181 [de-identified] : 1202 [de-identified] : 1214 [de-identified] : 120 MINUTES

## 2021-10-22 ENCOUNTER — APPOINTMENT (OUTPATIENT)
Dept: HYPERBARIC MEDICINE | Facility: HOSPITAL | Age: 61
End: 2021-10-22
Payer: MEDICAID

## 2021-10-22 ENCOUNTER — OUTPATIENT (OUTPATIENT)
Dept: OUTPATIENT SERVICES | Facility: HOSPITAL | Age: 61
LOS: 1 days | Discharge: HOME | End: 2021-10-22
Payer: MEDICAID

## 2021-10-22 VITALS
OXYGEN SATURATION: 99 % | RESPIRATION RATE: 18 BRPM | SYSTOLIC BLOOD PRESSURE: 158 MMHG | DIASTOLIC BLOOD PRESSURE: 83 MMHG | HEART RATE: 67 BPM | TEMPERATURE: 97.5 F

## 2021-10-22 VITALS
SYSTOLIC BLOOD PRESSURE: 147 MMHG | HEART RATE: 79 BPM | RESPIRATION RATE: 20 BRPM | TEMPERATURE: 97 F | OXYGEN SATURATION: 98 % | DIASTOLIC BLOOD PRESSURE: 87 MMHG

## 2021-10-22 DIAGNOSIS — E11.621 TYPE 2 DIABETES MELLITUS WITH FOOT ULCER: ICD-10-CM

## 2021-10-22 DIAGNOSIS — L97.416 NON-PRESSURE CHRONIC ULCER OF RIGHT HEEL AND MIDFOOT WITH BONE INVOLVEMENT WITHOUT EVIDENCE OF NECROSIS: ICD-10-CM

## 2021-10-22 DIAGNOSIS — L97.516 NON-PRESSURE CHRONIC ULCER OF OTHER PART OF RIGHT FOOT WITH BONE INVOLVEMENT WITHOUT EVIDENCE OF NECROSIS: ICD-10-CM

## 2021-10-22 PROCEDURE — 99183 HYPERBARIC OXYGEN THERAPY: CPT

## 2021-10-22 PROCEDURE — 82962 GLUCOSE BLOOD TEST: CPT

## 2021-10-22 PROCEDURE — G0277: CPT

## 2021-10-23 ENCOUNTER — OUTPATIENT (OUTPATIENT)
Dept: OUTPATIENT SERVICES | Facility: HOSPITAL | Age: 61
LOS: 1 days | Discharge: HOME | End: 2021-10-23
Payer: MEDICAID

## 2021-10-23 ENCOUNTER — APPOINTMENT (OUTPATIENT)
Dept: HYPERBARIC MEDICINE | Facility: HOSPITAL | Age: 61
End: 2021-10-23
Payer: MEDICAID

## 2021-10-23 VITALS
OXYGEN SATURATION: 99 % | TEMPERATURE: 98 F | DIASTOLIC BLOOD PRESSURE: 82 MMHG | RESPIRATION RATE: 16 BRPM | HEART RATE: 58 BPM | SYSTOLIC BLOOD PRESSURE: 180 MMHG

## 2021-10-23 VITALS
TEMPERATURE: 97 F | SYSTOLIC BLOOD PRESSURE: 168 MMHG | OXYGEN SATURATION: 100 % | RESPIRATION RATE: 18 BRPM | DIASTOLIC BLOOD PRESSURE: 92 MMHG | HEART RATE: 59 BPM

## 2021-10-23 DIAGNOSIS — E11.621 TYPE 2 DIABETES MELLITUS WITH FOOT ULCER: ICD-10-CM

## 2021-10-23 DIAGNOSIS — L97.416 NON-PRESSURE CHRONIC ULCER OF RIGHT HEEL AND MIDFOOT WITH BONE INVOLVEMENT WITHOUT EVIDENCE OF NECROSIS: ICD-10-CM

## 2021-10-23 DIAGNOSIS — L97.516 NON-PRESSURE CHRONIC ULCER OF OTHER PART OF RIGHT FOOT WITH BONE INVOLVEMENT WITHOUT EVIDENCE OF NECROSIS: ICD-10-CM

## 2021-10-23 PROCEDURE — 99183 HYPERBARIC OXYGEN THERAPY: CPT

## 2021-10-23 PROCEDURE — G0277: CPT

## 2021-10-23 PROCEDURE — 82962 GLUCOSE BLOOD TEST: CPT

## 2021-10-23 NOTE — PROCEDURE
[Outpatient] : Outpatient [Ambulatory] : Patient is ambulatory. [THIS CHAMBER HAS BEEN CLEANED / DISINFECTED] : This chamber has been cleaned / disinfected according to local and hospital policy and procedure prior to this treatment. [___] : Post-Dive: Value - [unfilled] mg/dL [Patient demonstrated and verbalized proper technique for using air break mask] : Patient demonstrated and verbalized proper technique for using air break mask [Patient educated on the risks of SMOKING prior to HBOT with understanding] : Patient educated on the risks of SMOKING prior to HBOT with understanding [Patient educated on the risks of CONSUMING ALCOHOL prior to HBOT with understanding] : Patient educated on the risks of CONSUMING ALCOHOL prior to HBOT with understanding [100% Cotton] : 100% cotton [Empty all pockets] : empty all pockets [No hair oils, wigs, hairpieces, pins] : no hair oils, wigs, hairpieces, pins  [Pre tx medications] : pre tx medications  [No make-up, creams] : no make-up, creams  [No jewelry] : no jewelry  [No matches, cigarettes, lighters] : no matches, cigarettes, lighters  [Hearing aid removed] : hearing aid removed [Dentures removed] : dentures removed [Ground bracelet on pt's wrist] : ground bracelet on pt's wrist  [Contacts removed] : contacts removed  [Remove nail polish] : remove nail polish  [No reading material] : no reading material  [Bra, undergarments removed] : bra, undergarments removed  [No contraindicated dressings] : no contraindicated dressings [Ground Wire - VISUAL Verification - Intact/Free of Obstruction] : Ground Wire - VISUAL Verification - Intact/Free of Obstruction  [Ground Continuity - Verified < 1 ohm w/ Wrist Strap Mila] : Ground Continuity - Verified < 1 ohm w/ Wrist Strap Mila [Diagnosis: ___] : Diagnosis: [unfilled] [____] : Post-Dive: Time - [unfilled] [Number: ___] : Number: [unfilled] [Clear all fields] : clear all fields [] : No [FreeTextEntry4] : 100 mins [FreeTextEntry6] : 2224 [FreeTextEntry8] : 0426 [de-identified] : 2366 [de-identified] : 7313 [de-identified] : 2855 [de-identified] : 1000 [de-identified] : 8466 [de-identified] : 1016 [de-identified] : 120 MINUTES

## 2021-10-25 ENCOUNTER — OUTPATIENT (OUTPATIENT)
Dept: OUTPATIENT SERVICES | Facility: HOSPITAL | Age: 61
LOS: 1 days | Discharge: HOME | End: 2021-10-25
Payer: MEDICAID

## 2021-10-25 ENCOUNTER — APPOINTMENT (OUTPATIENT)
Dept: HYPERBARIC MEDICINE | Facility: HOSPITAL | Age: 61
End: 2021-10-25
Payer: MEDICAID

## 2021-10-25 VITALS
HEART RATE: 63 BPM | DIASTOLIC BLOOD PRESSURE: 87 MMHG | RESPIRATION RATE: 18 BRPM | OXYGEN SATURATION: 99 % | SYSTOLIC BLOOD PRESSURE: 161 MMHG | TEMPERATURE: 97.1 F

## 2021-10-25 VITALS
TEMPERATURE: 97 F | OXYGEN SATURATION: 97 % | SYSTOLIC BLOOD PRESSURE: 153 MMHG | HEART RATE: 77 BPM | DIASTOLIC BLOOD PRESSURE: 97 MMHG | RESPIRATION RATE: 18 BRPM

## 2021-10-25 DIAGNOSIS — L97.416 NON-PRESSURE CHRONIC ULCER OF RIGHT HEEL AND MIDFOOT WITH BONE INVOLVEMENT WITHOUT EVIDENCE OF NECROSIS: ICD-10-CM

## 2021-10-25 DIAGNOSIS — L97.516 NON-PRESSURE CHRONIC ULCER OF OTHER PART OF RIGHT FOOT WITH BONE INVOLVEMENT WITHOUT EVIDENCE OF NECROSIS: ICD-10-CM

## 2021-10-25 DIAGNOSIS — E11.621 TYPE 2 DIABETES MELLITUS WITH FOOT ULCER: ICD-10-CM

## 2021-10-25 LAB — SARS-COV-2 RNA SPEC QL NAA+PROBE: SIGNIFICANT CHANGE UP

## 2021-10-25 PROCEDURE — 99183 HYPERBARIC OXYGEN THERAPY: CPT

## 2021-10-25 PROCEDURE — 87635 SARS-COV-2 COVID-19 AMP PRB: CPT

## 2021-10-25 PROCEDURE — 82962 GLUCOSE BLOOD TEST: CPT

## 2021-10-25 PROCEDURE — G0277: CPT

## 2021-10-25 NOTE — ADDENDUM
[FreeTextEntry1] : PT RECEIVED WOUND CARE BY RN PRE HBOT \par PT DESCENDED TO 2.4 DAVID @ 2.2 PSI/MIN WITHOUT INCIDENT IN CHAMBER #2\par PT RESTING AT TX DEPTH WITH VISIBLE CHEST RISE AND FALL OBSERVED CHAMBERSIDE \par PT TOLERATED BOTH AIR BREAKS WELL \par PT ASCENDED FROM TX DEPTH WITHOUT INCIDENT IN CHAMBER #2\par PT RECEIVED COVID SWAB POST HBOT \par PT TOLERATED TX WELL

## 2021-10-25 NOTE — REVIEW OF SYSTEMS
[Arthralgias] : arthralgias [Joint Swelling] : joint swelling [Joint Stiffness] : joint stiffness [Skin Wound] : skin wound [FreeTextEntry1] : 1105 [Fever] : no fever [Chills] : no chills [Eye Pain] : no eye pain [Loss Of Hearing] : no hearing loss [Shortness Of Breath] : no shortness of breath [Wheezing] : no wheezing [Abdominal Pain] : no abdominal pain [Anxiety] : no anxiety [Easy Bleeding] : no tendency for easy bleeding [FreeTextEntry5] : HTN [FreeTextEntry9] : Gouty arthropathy  [de-identified] : s/p right foot surgery , no so , no pain  [de-identified] : Diabetic neuropathy  [de-identified] : NIDDM , severe gouty arthropathy

## 2021-10-25 NOTE — PROCEDURE
[Saline] : saline [Fenestrated] : fenestrated [Hydrated with saline] : hydrated with saline [Apligraf] : apligraf [____ % was used] : and [unfilled] % was used [____ % was discarded] : and [unfilled] % was discarded [FreeTextEntry1] : adaptic, calcium alginate, dry dressing, ACE [FreeTextEntry9] : 4241 [de-identified] : right lateral foot incision dehiscence with ulcer down to skin, subcutaneous tissue, fat, and bone. [de-identified] : clara [de-identified] : nicola [FreeTextEntry6] : right lateral foot incision dehiscence with ulcer down to skin, subcutaneous tissue, fat, and bone. [FreeTextEntry7] : right lateral foot incision dehiscence with ulcer down to skin, subcutaneous tissue, fat, and bone.

## 2021-10-25 NOTE — PROCEDURE
[Outpatient] : Outpatient [Ambulatory] : Patient is ambulatory. [THIS CHAMBER HAS BEEN CLEANED / DISINFECTED] : This chamber has been cleaned / disinfected according to local and hospital policy and procedure prior to this treatment. [Patient demonstrated and verbalized proper technique for using air break mask] : Patient demonstrated and verbalized proper technique for using air break mask [Patient educated on the risks of SMOKING prior to HBOT with understanding] : Patient educated on the risks of SMOKING prior to HBOT with understanding [Patient educated on the risks of CONSUMING ALCOHOL prior to HBOT with understanding] : Patient educated on the risks of CONSUMING ALCOHOL prior to HBOT with understanding [100% Cotton] : 100% cotton [Empty all pockets] : empty all pockets [No hair oils, wigs, hairpieces, pins] : no hair oils, wigs, hairpieces, pins  [Pre tx medications] : pre tx medications  [No make-up, creams] : no make-up, creams  [No jewelry] : no jewelry  [No matches, cigarettes, lighters] : no matches, cigarettes, lighters  [Hearing aid removed] : hearing aid removed [Dentures removed] : dentures removed [Ground bracelet on pt's wrist] : ground bracelet on pt's wrist  [Contacts removed] : contacts removed  [Remove nail polish] : remove nail polish  [No reading material] : no reading material  [Bra, undergarments removed] : bra, undergarments removed  [No contraindicated dressings] : no contraindicated dressings [Ground Wire - VISUAL Verification - Intact/Free of Obstruction] : Ground Wire - VISUAL Verification - Intact/Free of Obstruction  [Ground Continuity - Verified < 1 ohm w/ Wrist Strap Mila] : Ground Continuity - Verified < 1 ohm w/ Wrist Strap Mila [Number: ___] : Number: [unfilled] [Diagnosis: ___] : Diagnosis: [unfilled] [____] : Post-Dive: Time - [unfilled] [___] : Post-Dive: Value - [unfilled] mg/dL [Clear all fields] : clear all fields [] : No [FreeTextEntry4] : 100  [FreeTextEntry6] : 1520 [FreeTextEntry8] : 5301 [de-identified] : 5922 [de-identified] : 0141 [de-identified] : 0991 [de-identified] : 0014 [de-identified] : 1010 [de-identified] : 1021 [de-identified] : 120 MINUTES

## 2021-10-26 ENCOUNTER — APPOINTMENT (OUTPATIENT)
Dept: WOUND CARE | Facility: HOSPITAL | Age: 61
End: 2021-10-26
Payer: MEDICAID

## 2021-10-26 ENCOUNTER — OUTPATIENT (OUTPATIENT)
Dept: OUTPATIENT SERVICES | Facility: HOSPITAL | Age: 61
LOS: 1 days | Discharge: HOME | End: 2021-10-26
Payer: MEDICAID

## 2021-10-26 VITALS
OXYGEN SATURATION: 96 % | RESPIRATION RATE: 18 BRPM | HEART RATE: 67 BPM | HEIGHT: 69 IN | SYSTOLIC BLOOD PRESSURE: 151 MMHG | DIASTOLIC BLOOD PRESSURE: 97 MMHG | TEMPERATURE: 97.2 F | WEIGHT: 215 LBS | BODY MASS INDEX: 31.84 KG/M2

## 2021-10-26 DIAGNOSIS — E11.621 TYPE 2 DIABETES MELLITUS WITH FOOT ULCER: ICD-10-CM

## 2021-10-26 PROCEDURE — 99213 OFFICE O/P EST LOW 20 MIN: CPT

## 2021-10-26 PROCEDURE — G0463: CPT

## 2021-10-26 NOTE — PROCEDURE
[Outpatient] : Outpatient [Ambulatory] : Patient is ambulatory. [THIS CHAMBER HAS BEEN CLEANED / DISINFECTED] : This chamber has been cleaned / disinfected according to local and hospital policy and procedure prior to this treatment. [Patient demonstrated and verbalized proper technique for using air break mask] : Patient demonstrated and verbalized proper technique for using air break mask [Patient educated on the risks of SMOKING prior to HBOT with understanding] : Patient educated on the risks of SMOKING prior to HBOT with understanding [Patient educated on the risks of CONSUMING ALCOHOL prior to HBOT with understanding] : Patient educated on the risks of CONSUMING ALCOHOL prior to HBOT with understanding [100% Cotton] : 100% cotton [Empty all pockets] : empty all pockets [No hair oils, wigs, hairpieces, pins] : no hair oils, wigs, hairpieces, pins  [Pre tx medications] : pre tx medications  [No make-up, creams] : no make-up, creams  [No jewelry] : no jewelry  [No matches, cigarettes, lighters] : no matches, cigarettes, lighters  [Hearing aid removed] : hearing aid removed [Dentures removed] : dentures removed [Ground bracelet on pt's wrist] : ground bracelet on pt's wrist  [Contacts removed] : contacts removed  [Remove nail polish] : remove nail polish  [No reading material] : no reading material  [Bra, undergarments removed] : bra, undergarments removed  [No contraindicated dressings] : no contraindicated dressings [Ground Wire - VISUAL Verification - Intact/Free of Obstruction] : Ground Wire - VISUAL Verification - Intact/Free of Obstruction  [Ground Continuity - Verified < 1 ohm w/ Wrist Strap Mila] : Ground Continuity - Verified < 1 ohm w/ Wrist Strap Mila [Number: ___] : Number: [unfilled] [Diagnosis: ___] : Diagnosis: [unfilled] [____] : Post-Dive: Time - [unfilled] [___] : Post-Dive: Value - [unfilled] mg/dL [Clear all fields] : clear all fields [] : No [FreeTextEntry4] : 100 [FreeTextEntry6] : 1016 [FreeTextEntry8] : 1027 [de-identified] : 0067 [de-identified] : 1100 [de-identified] : 5832 [de-identified] : 4161 [de-identified] : 5750 [de-identified] : 120 MINUTES [de-identified] : 8701

## 2021-10-26 NOTE — ADDENDUM
[FreeTextEntry1] : PT ARRIVED A&OX3 \par ALL VITALS WITHIN PARAMETERS FOR HBOT\par PT DESCENDED TO 2.4 DAVID @ 2.2 PSI/MIN IN CHAMBER #4 WITHOUT INCIDENT\par PT RESTING AT TX DEPTH WITH VISIBLE CHEST RISE AND FALL OBSERVED CHAMBER SIDE\par PT TOLERATED AIR BREAKS WELL\par PT ASCENDED FROM 2.4 DAVID @ 2.2 PSI/MIN WITHOUT INCIDENT\par PT TOLERATED TX WELL\par PT TO RECEIVE WOUND CARE POST HBOT

## 2021-10-27 ENCOUNTER — OUTPATIENT (OUTPATIENT)
Dept: OUTPATIENT SERVICES | Facility: HOSPITAL | Age: 61
LOS: 1 days | Discharge: HOME | End: 2021-10-27
Payer: MEDICAID

## 2021-10-27 ENCOUNTER — APPOINTMENT (OUTPATIENT)
Dept: HYPERBARIC MEDICINE | Facility: HOSPITAL | Age: 61
End: 2021-10-27
Payer: MEDICAID

## 2021-10-27 VITALS
DIASTOLIC BLOOD PRESSURE: 96 MMHG | HEART RATE: 65 BPM | TEMPERATURE: 97.1 F | OXYGEN SATURATION: 98 % | RESPIRATION RATE: 20 BRPM | SYSTOLIC BLOOD PRESSURE: 168 MMHG

## 2021-10-27 VITALS
TEMPERATURE: 97.1 F | OXYGEN SATURATION: 99 % | HEART RATE: 64 BPM | RESPIRATION RATE: 20 BRPM | SYSTOLIC BLOOD PRESSURE: 155 MMHG | DIASTOLIC BLOOD PRESSURE: 84 MMHG

## 2021-10-27 DIAGNOSIS — L97.516 NON-PRESSURE CHRONIC ULCER OF OTHER PART OF RIGHT FOOT WITH BONE INVOLVEMENT WITHOUT EVIDENCE OF NECROSIS: ICD-10-CM

## 2021-10-27 DIAGNOSIS — E11.621 TYPE 2 DIABETES MELLITUS WITH FOOT ULCER: ICD-10-CM

## 2021-10-27 DIAGNOSIS — L97.416 NON-PRESSURE CHRONIC ULCER OF RIGHT HEEL AND MIDFOOT WITH BONE INVOLVEMENT WITHOUT EVIDENCE OF NECROSIS: ICD-10-CM

## 2021-10-27 PROCEDURE — G0277: CPT

## 2021-10-27 PROCEDURE — 82962 GLUCOSE BLOOD TEST: CPT

## 2021-10-27 PROCEDURE — 99183 HYPERBARIC OXYGEN THERAPY: CPT

## 2021-10-28 ENCOUNTER — APPOINTMENT (OUTPATIENT)
Dept: HYPERBARIC MEDICINE | Facility: HOSPITAL | Age: 61
End: 2021-10-28
Payer: MEDICAID

## 2021-10-28 ENCOUNTER — OUTPATIENT (OUTPATIENT)
Dept: OUTPATIENT SERVICES | Facility: HOSPITAL | Age: 61
LOS: 1 days | Discharge: HOME | End: 2021-10-28
Payer: MEDICAID

## 2021-10-28 VITALS
DIASTOLIC BLOOD PRESSURE: 102 MMHG | OXYGEN SATURATION: 100 % | RESPIRATION RATE: 18 BRPM | SYSTOLIC BLOOD PRESSURE: 167 MMHG | TEMPERATURE: 97.3 F | HEART RATE: 67 BPM

## 2021-10-28 VITALS
TEMPERATURE: 97.2 F | RESPIRATION RATE: 18 BRPM | HEART RATE: 65 BPM | DIASTOLIC BLOOD PRESSURE: 94 MMHG | SYSTOLIC BLOOD PRESSURE: 166 MMHG | OXYGEN SATURATION: 100 %

## 2021-10-28 VITALS — SYSTOLIC BLOOD PRESSURE: 173 MMHG | DIASTOLIC BLOOD PRESSURE: 99 MMHG

## 2021-10-28 DIAGNOSIS — L97.416 NON-PRESSURE CHRONIC ULCER OF RIGHT HEEL AND MIDFOOT WITH BONE INVOLVEMENT WITHOUT EVIDENCE OF NECROSIS: ICD-10-CM

## 2021-10-28 DIAGNOSIS — E11.621 TYPE 2 DIABETES MELLITUS WITH FOOT ULCER: ICD-10-CM

## 2021-10-28 DIAGNOSIS — L97.516 NON-PRESSURE CHRONIC ULCER OF OTHER PART OF RIGHT FOOT WITH BONE INVOLVEMENT WITHOUT EVIDENCE OF NECROSIS: ICD-10-CM

## 2021-10-28 PROCEDURE — 99183 HYPERBARIC OXYGEN THERAPY: CPT

## 2021-10-28 PROCEDURE — 82962 GLUCOSE BLOOD TEST: CPT

## 2021-10-28 PROCEDURE — G0277: CPT

## 2021-10-28 NOTE — ADDENDUM
[FreeTextEntry1] : PT ARRIVED A&OX3 \par ALL VITALS WITHIN PARAMETERS FOR HBOT\par PT DESCENDED TO 2.4 DAVID @ 2.2 PSI/MIN IN CHAMBER #1 WITHOUT INCIDENT\par PT RESTING AT TX DEPTH WITH VISIBLE CHEST RISE AND FALL OBSERVED CHAMBER SIDE \par PT TOLERATED AIR BREAKS WELL\par PT ASCENDED FROM 2.4 DAVID @ 2.2 PSI/MIN WITHOUT INCIDENT\par PT TOLERATED TX WELL\par

## 2021-10-28 NOTE — PROCEDURE
[Outpatient] : Outpatient [Ambulatory] : Patient is ambulatory. [THIS CHAMBER HAS BEEN CLEANED / DISINFECTED] : This chamber has been cleaned / disinfected according to local and hospital policy and procedure prior to this treatment. [Patient demonstrated and verbalized proper technique for using air break mask] : Patient demonstrated and verbalized proper technique for using air break mask [Patient educated on the risks of SMOKING prior to HBOT with understanding] : Patient educated on the risks of SMOKING prior to HBOT with understanding [Patient educated on the risks of CONSUMING ALCOHOL prior to HBOT with understanding] : Patient educated on the risks of CONSUMING ALCOHOL prior to HBOT with understanding [100% Cotton] : 100% cotton [Empty all pockets] : empty all pockets [No hair oils, wigs, hairpieces, pins] : no hair oils, wigs, hairpieces, pins  [Pre tx medications] : pre tx medications  [No make-up, creams] : no make-up, creams  [No jewelry] : no jewelry  [No matches, cigarettes, lighters] : no matches, cigarettes, lighters  [Hearing aid removed] : hearing aid removed [Dentures removed] : dentures removed [Ground bracelet on pt's wrist] : ground bracelet on pt's wrist  [Contacts removed] : contacts removed  [Remove nail polish] : remove nail polish  [No reading material] : no reading material  [Bra, undergarments removed] : bra, undergarments removed  [No contraindicated dressings] : no contraindicated dressings [Ground Wire - VISUAL Verification - Intact/Free of Obstruction] : Ground Wire - VISUAL Verification - Intact/Free of Obstruction  [Ground Continuity - Verified < 1 ohm w/ Wrist Strap Mila] : Ground Continuity - Verified < 1 ohm w/ Wrist Strap Mila [Number: ___] : Number: [unfilled] [Diagnosis: ___] : Diagnosis: [unfilled] [____] : Post-Dive: Time - [unfilled] [___] : Post-Dive: Value - [unfilled] mg/dL [Clear all fields] : clear all fields [] : No [FreeTextEntry4] : 100 [FreeTextEntry6] : 2037 [FreeTextEntry8] : 2617 [de-identified] : 8751 [de-identified] : 5552 [de-identified] : 0915 [de-identified] : 8550 [de-identified] : 1004 [de-identified] : 1016 [de-identified] : 120 MINUTES

## 2021-10-28 NOTE — PROCEDURE
[Outpatient] : Outpatient [Ambulatory] : Patient is ambulatory. [THIS CHAMBER HAS BEEN CLEANED / DISINFECTED] : This chamber has been cleaned / disinfected according to local and hospital policy and procedure prior to this treatment. [Patient demonstrated and verbalized proper technique for using air break mask] : Patient demonstrated and verbalized proper technique for using air break mask [Patient educated on the risks of SMOKING prior to HBOT with understanding] : Patient educated on the risks of SMOKING prior to HBOT with understanding [Patient educated on the risks of CONSUMING ALCOHOL prior to HBOT with understanding] : Patient educated on the risks of CONSUMING ALCOHOL prior to HBOT with understanding [100% Cotton] : 100% cotton [Empty all pockets] : empty all pockets [No hair oils, wigs, hairpieces, pins] : no hair oils, wigs, hairpieces, pins  [Pre tx medications] : pre tx medications  [No make-up, creams] : no make-up, creams  [No jewelry] : no jewelry  [No matches, cigarettes, lighters] : no matches, cigarettes, lighters  [Hearing aid removed] : hearing aid removed [Dentures removed] : dentures removed [Ground bracelet on pt's wrist] : ground bracelet on pt's wrist  [Contacts removed] : contacts removed  [Remove nail polish] : remove nail polish  [No reading material] : no reading material  [Bra, undergarments removed] : bra, undergarments removed  [No contraindicated dressings] : no contraindicated dressings [Ground Wire - VISUAL Verification - Intact/Free of Obstruction] : Ground Wire - VISUAL Verification - Intact/Free of Obstruction  [Ground Continuity - Verified < 1 ohm w/ Wrist Strap Mila] : Ground Continuity - Verified < 1 ohm w/ Wrist Strap Mila [Number: ___] : Number: [unfilled] [Diagnosis: ___] : Diagnosis: [unfilled] [____] : Post-Dive: Time - [unfilled] [___] : Post-Dive: Value - [unfilled] mg/dL [Clear all fields] : clear all fields [] : No [FreeTextEntry4] : 100 [FreeTextEntry6] : 08 : 09 [FreeTextEntry8] : 08 : 19 [de-identified] : 08 : 49 [de-identified] : 08 : 54 [de-identified] : 09 : 24 [de-identified] : 09 : 29 [de-identified] : 09 : 59 [de-identified] : 10 : 09 [de-identified] : 120 min.

## 2021-10-28 NOTE — ADDENDUM
[FreeTextEntry1] : PT ARRIVED AMBULATORY A&OX3.\par ALL VITALS WITHIN PARAMETERS FOR HBOT.\par DRAINAGE NOTED ON DRESSING WAS EVALUATED BY NURSE. WOUND CARE TO FOLLOW HBOT.\par PT DESCENT TO RX TX DEPTH IN CHAMBER 1 WAS WITHOUT INCIDENT.\par TRANSFER OF CARE TO [facility]\par The pt. was observed with visible chest motion and without incident for the duration of observed HBOT tx.\par The pt. was administered intermittent med. air over course of HBOT tx. without incident. \par \par \par TRANSFER OF CARE TO [facility] DURING ASCENT. PT ASCENT WAS WITHOUT INCIDENT. \par PT TOLERATED HBOT WELL. WOUND CARE PROVIDED BY NURSE PRIOR TO PT LEAVING THE UNIT\par \par [facility] WILLIAM DONATO 10/27/21

## 2021-10-29 ENCOUNTER — APPOINTMENT (OUTPATIENT)
Dept: HYPERBARIC MEDICINE | Facility: HOSPITAL | Age: 61
End: 2021-10-29

## 2021-10-29 ENCOUNTER — NON-APPOINTMENT (OUTPATIENT)
Age: 61
End: 2021-10-29

## 2021-10-30 ENCOUNTER — APPOINTMENT (OUTPATIENT)
Dept: HYPERBARIC MEDICINE | Facility: HOSPITAL | Age: 61
End: 2021-10-30
Payer: MEDICAID

## 2021-10-30 ENCOUNTER — OUTPATIENT (OUTPATIENT)
Dept: OUTPATIENT SERVICES | Facility: HOSPITAL | Age: 61
LOS: 1 days | Discharge: HOME | End: 2021-10-30
Payer: MEDICAID

## 2021-10-30 VITALS
RESPIRATION RATE: 16 BRPM | SYSTOLIC BLOOD PRESSURE: 130 MMHG | DIASTOLIC BLOOD PRESSURE: 64 MMHG | TEMPERATURE: 98 F | OXYGEN SATURATION: 99 % | HEART RATE: 74 BPM

## 2021-10-30 VITALS
SYSTOLIC BLOOD PRESSURE: 166 MMHG | RESPIRATION RATE: 16 BRPM | DIASTOLIC BLOOD PRESSURE: 88 MMHG | HEART RATE: 60 BPM | TEMPERATURE: 98 F | OXYGEN SATURATION: 98 %

## 2021-10-30 DIAGNOSIS — L97.416 NON-PRESSURE CHRONIC ULCER OF RIGHT HEEL AND MIDFOOT WITH BONE INVOLVEMENT WITHOUT EVIDENCE OF NECROSIS: ICD-10-CM

## 2021-10-30 DIAGNOSIS — L97.516 NON-PRESSURE CHRONIC ULCER OF OTHER PART OF RIGHT FOOT WITH BONE INVOLVEMENT WITHOUT EVIDENCE OF NECROSIS: ICD-10-CM

## 2021-10-30 DIAGNOSIS — E11.621 TYPE 2 DIABETES MELLITUS WITH FOOT ULCER: ICD-10-CM

## 2021-10-30 PROCEDURE — G0277: CPT

## 2021-10-30 PROCEDURE — 99183 HYPERBARIC OXYGEN THERAPY: CPT

## 2021-10-30 PROCEDURE — 82962 GLUCOSE BLOOD TEST: CPT

## 2021-10-30 NOTE — PROCEDURE
[Outpatient] : Outpatient [Ambulatory] : Patient is ambulatory. [THIS CHAMBER HAS BEEN CLEANED / DISINFECTED] : This chamber has been cleaned / disinfected according to local and hospital policy and procedure prior to this treatment. [____] : Post-Dive: Time - [unfilled] [___] : Post-Dive: Value - [unfilled] mg/dL [Patient demonstrated and verbalized proper technique for using air break mask] : Patient demonstrated and verbalized proper technique for using air break mask [Patient educated on the risks of SMOKING prior to HBOT with understanding] : Patient educated on the risks of SMOKING prior to HBOT with understanding [Patient educated on the risks of CONSUMING ALCOHOL prior to HBOT with understanding] : Patient educated on the risks of CONSUMING ALCOHOL prior to HBOT with understanding [100% Cotton] : 100% cotton [Empty all pockets] : empty all pockets [No hair oils, wigs, hairpieces, pins] : no hair oils, wigs, hairpieces, pins  [Pre tx medications] : pre tx medications  [No make-up, creams] : no make-up, creams  [No jewelry] : no jewelry  [No matches, cigarettes, lighters] : no matches, cigarettes, lighters  [Hearing aid removed] : hearing aid removed [Dentures removed] : dentures removed [Ground bracelet on pt's wrist] : ground bracelet on pt's wrist  [Contacts removed] : contacts removed  [Remove nail polish] : remove nail polish  [No reading material] : no reading material  [Bra, undergarments removed] : bra, undergarments removed  [No contraindicated dressings] : no contraindicated dressings [Ground Wire - VISUAL Verification - Intact/Free of Obstruction] : Ground Wire - VISUAL Verification - Intact/Free of Obstruction  [Ground Continuity - Verified < 1 ohm w/ Wrist Strap Mila] : Ground Continuity - Verified < 1 ohm w/ Wrist Strap Mila [Diagnosis: ___] : Diagnosis: [unfilled] [Number: ___] : Number: [unfilled] [Clear all fields] : clear all fields [] : No [FreeTextEntry4] : 100 [FreeTextEntry6] : 4465 [FreeTextEntry8] : 6825 [de-identified] : 1589 [de-identified] : 8685 [de-identified] : 0932 [de-identified] : 5037 [de-identified] : 1017 [de-identified] : 120 MINUTES [de-identified] : 1025

## 2021-10-30 NOTE — ADDENDUM
[FreeTextEntry1] : Due to drainage pt Recieved dressing by RN prior to tx. \par \par Pt descended to 2.4 DAVID @ 2.2 PSI/min without incident in chamber #3\par Pt resting @ depth with chest rise and fall observed throughout tx. \par Pt tolerated air breaks well. \par Pt ascended from 2.4 DAVID @ 2.2 PSI/min without incident. \par Pt tolerated tx well.\par \par

## 2021-11-01 ENCOUNTER — RX RENEWAL (OUTPATIENT)
Age: 61
End: 2021-11-01

## 2021-11-01 ENCOUNTER — APPOINTMENT (OUTPATIENT)
Dept: HYPERBARIC MEDICINE | Facility: HOSPITAL | Age: 61
End: 2021-11-01
Payer: MEDICAID

## 2021-11-01 ENCOUNTER — OUTPATIENT (OUTPATIENT)
Dept: OUTPATIENT SERVICES | Facility: HOSPITAL | Age: 61
LOS: 1 days | Discharge: HOME | End: 2021-11-01
Payer: MEDICAID

## 2021-11-01 VITALS
TEMPERATURE: 97.1 F | OXYGEN SATURATION: 98 % | SYSTOLIC BLOOD PRESSURE: 150 MMHG | RESPIRATION RATE: 20 BRPM | HEART RATE: 71 BPM | DIASTOLIC BLOOD PRESSURE: 98 MMHG

## 2021-11-01 VITALS
HEART RATE: 60 BPM | SYSTOLIC BLOOD PRESSURE: 145 MMHG | TEMPERATURE: 97.1 F | RESPIRATION RATE: 20 BRPM | DIASTOLIC BLOOD PRESSURE: 85 MMHG | OXYGEN SATURATION: 100 %

## 2021-11-01 DIAGNOSIS — Z87.442 PERSONAL HISTORY OF URINARY CALCULI: ICD-10-CM

## 2021-11-01 DIAGNOSIS — N52.9 MALE ERECTILE DYSFUNCTION, UNSPECIFIED: ICD-10-CM

## 2021-11-01 DIAGNOSIS — R35.1 NOCTURIA: ICD-10-CM

## 2021-11-01 DIAGNOSIS — E11.621 TYPE 2 DIABETES MELLITUS WITH FOOT ULCER: ICD-10-CM

## 2021-11-01 DIAGNOSIS — Z79.899 OTHER LONG TERM (CURRENT) DRUG THERAPY: ICD-10-CM

## 2021-11-01 DIAGNOSIS — E78.1 PURE HYPERGLYCERIDEMIA: ICD-10-CM

## 2021-11-01 DIAGNOSIS — Z79.84 LONG TERM (CURRENT) USE OF ORAL HYPOGLYCEMIC DRUGS: ICD-10-CM

## 2021-11-01 DIAGNOSIS — L84 CORNS AND CALLOSITIES: ICD-10-CM

## 2021-11-01 DIAGNOSIS — L97.416 NON-PRESSURE CHRONIC ULCER OF RIGHT HEEL AND MIDFOOT WITH BONE INVOLVEMENT WITHOUT EVIDENCE OF NECROSIS: ICD-10-CM

## 2021-11-01 DIAGNOSIS — N32.81 OVERACTIVE BLADDER: ICD-10-CM

## 2021-11-01 DIAGNOSIS — N40.1 BENIGN PROSTATIC HYPERPLASIA WITH LOWER URINARY TRACT SYMPTOMS: ICD-10-CM

## 2021-11-01 DIAGNOSIS — Z98.890 OTHER SPECIFIED POSTPROCEDURAL STATES: ICD-10-CM

## 2021-11-01 DIAGNOSIS — L97.516 NON-PRESSURE CHRONIC ULCER OF OTHER PART OF RIGHT FOOT WITH BONE INVOLVEMENT WITHOUT EVIDENCE OF NECROSIS: ICD-10-CM

## 2021-11-01 DIAGNOSIS — M10.071 IDIOPATHIC GOUT, RIGHT ANKLE AND FOOT: ICD-10-CM

## 2021-11-01 DIAGNOSIS — Z87.81 PERSONAL HISTORY OF (HEALED) TRAUMATIC FRACTURE: ICD-10-CM

## 2021-11-01 DIAGNOSIS — F41.8 OTHER SPECIFIED ANXIETY DISORDERS: ICD-10-CM

## 2021-11-01 DIAGNOSIS — I10 ESSENTIAL (PRIMARY) HYPERTENSION: ICD-10-CM

## 2021-11-01 DIAGNOSIS — Z98.1 ARTHRODESIS STATUS: ICD-10-CM

## 2021-11-01 LAB — SARS-COV-2 RNA SPEC QL NAA+PROBE: SIGNIFICANT CHANGE UP

## 2021-11-01 PROCEDURE — 82962 GLUCOSE BLOOD TEST: CPT

## 2021-11-01 PROCEDURE — U0005: CPT

## 2021-11-01 PROCEDURE — U0003: CPT

## 2021-11-01 PROCEDURE — G0277: CPT

## 2021-11-01 PROCEDURE — 99183 HYPERBARIC OXYGEN THERAPY: CPT

## 2021-11-01 NOTE — HISTORY OF PRESENT ILLNESS
[FreeTextEntry1] : s/p gout / bone resection , 5th met base , medial 1st metatarsal and arthroplasty 2nd toe right foot, dehiscence at the base of the 5th metatarsal and the dorsal 2nd toe right foot , no soi. currently in HBOT.\par \par 10/26/21 wounds much smaller

## 2021-11-01 NOTE — ADDENDUM
[FreeTextEntry1] : PT ARRIVED AMBULATORY A&OX3.\par ALL VITALS WITHIN PARAMETERS FOR HBOT.\par SCANT DRY DRAINAGE ON DRESSING PRIOR TO DESCENT. WOUND CARE TO FOLLOW HBOT\par COVID -19 SWAB TO BE OBTAINED POST HBOT.\par PT DESCENT TO RX TX DEPTH IN CHAMBER 1 WAS WITHOUT INCIDENT.\par PT RESTING AT DEPTH, CHEST RISE AND FALL OBSERVED.\par TRANSFER OF CARE TO [facility] @ 8:36\par The pt. was observed with visible chest motion and without incident for the duration of observed HBOT tx.\par The pt. was administered intermittent med. air over course of HBOT tx. without incident. \par \par Care transferred to [facility] during ascent.\par Pt ascent was without incident. Pt tolerated hbot well.\par Dressing change provided by nurse.\par Covid-19 swab obtained.\par \par T WILLIAM Madrigal 11/01/21

## 2021-11-01 NOTE — REVIEW OF SYSTEMS
[Arthralgias] : arthralgias [Joint Swelling] : joint swelling [Joint Stiffness] : joint stiffness [Skin Wound] : skin wound [Fever] : no fever [Chills] : no chills [Eye Pain] : no eye pain [Loss Of Hearing] : no hearing loss [Shortness Of Breath] : no shortness of breath [Wheezing] : no wheezing [Abdominal Pain] : no abdominal pain [Anxiety] : no anxiety [Easy Bleeding] : no tendency for easy bleeding [FreeTextEntry5] : HTN [FreeTextEntry9] : Gouty arthropathy  [de-identified] : s/p right foot surgery , no so , no pain  [de-identified] : Diabetic neuropathy  [de-identified] : NIDDM , severe gouty arthropathy

## 2021-11-01 NOTE — REVIEW OF SYSTEMS
[Arthralgias] : arthralgias [Joint Swelling] : joint swelling [Joint Stiffness] : joint stiffness [Skin Wound] : skin wound [Fever] : no fever [Chills] : no chills [Eye Pain] : no eye pain [Loss Of Hearing] : no hearing loss [Shortness Of Breath] : no shortness of breath [Wheezing] : no wheezing [Abdominal Pain] : no abdominal pain [Anxiety] : no anxiety [Easy Bleeding] : no tendency for easy bleeding [FreeTextEntry5] : HTN [FreeTextEntry9] : Gouty arthropathy  [de-identified] : s/p right foot surgery , no so , no pain  [de-identified] : Diabetic neuropathy  [de-identified] : NIDDM , severe gouty arthropathy

## 2021-11-01 NOTE — PHYSICAL EXAM
[Abdominal Pad] : Abdominal Pad [4 x 4] : 4 x 4  [1+] : left 1+ [Ankle Swelling (On Exam)] : present [Ankle Swelling Bilaterally] : bilaterally  [Varicose Veins Of Lower Extremities] : bilaterally [Ankle Swelling On The Left] : moderate [] : bilaterally [Ankle Swelling On The Right] : mild [Skin Ulcer] : ulcer [Alert] : alert [Oriented to Person] : oriented to person [Oriented to Place] : oriented to place [Calm] : calm [Purpura] : no purpura  [Petechiae] : no petechiae [Skin Induration] : no induration [de-identified] : A&Ox3, NAD [de-identified] : HTN [de-identified] : severe gout with associated foot deformities  [de-identified] : right medial incision healed, right 2nd dorsal toe incision dehiscence with wound down to skin, subcutnaeous tissue, and fat. right lateral foot incision dehiscence with ulcer down to skin, subcutaneous tissue, fat, and bone. [de-identified] : Diabetic neuropathy  [FreeTextEntry1] : Right lateral foot [FreeTextEntry2] : 0.9 [FreeTextEntry3] : 3.0 [FreeTextEntry4] : 0.3 [de-identified] : serosanguineous [de-identified] : callus [de-identified] : 100% [de-identified] : Adaptic Touch and Silver Alginate [de-identified] : NSC\par DD\par  [FreeTextEntry7] : Right foot 2nd digit [FreeTextEntry8] : 1.7 [FreeTextEntry9] : 2.5 [de-identified] : 0.1 [de-identified] :  serosanguineous  [de-identified] : 75% [de-identified] : 25% [de-identified] : Adaptic Touch and silver alginate [de-identified] : NSC\par DD [TWNoteComboBox4] : Moderate [TWNoteComboBox6] : Diabetic [de-identified] : other [de-identified] : None [de-identified] : None [de-identified] : No [TWNoteComboBox7] : False [de-identified] : False [de-identified] : 3x Weekly [de-identified] : Primary Dressing [de-identified] : Moderate [de-identified] : No [de-identified] : Diabetic [de-identified] : No [de-identified] : Erythema [de-identified] : None [de-identified] : 3x Weekly [de-identified] : Primary Dressing

## 2021-11-01 NOTE — PLAN
[FreeTextEntry1] : Patient examined and evaluated at this time.\par Continue local wound care and offloading.\par right lateral foot and 2nd toe adaptic touch, ag alginate,DD and kerlix\par Spent 20 minutes for patient care and medical decision making.\par Patient to follow up in 2 week.\par

## 2021-11-01 NOTE — PHYSICAL EXAM
[Abdominal Pad] : Abdominal Pad [4 x 4] : 4 x 4  [1+] : left 1+ [Ankle Swelling (On Exam)] : present [Ankle Swelling Bilaterally] : bilaterally  [Varicose Veins Of Lower Extremities] : bilaterally [Ankle Swelling On The Left] : moderate [] : bilaterally [Ankle Swelling On The Right] : mild [Skin Ulcer] : ulcer [Alert] : alert [Oriented to Person] : oriented to person [Oriented to Place] : oriented to place [Calm] : calm [Purpura] : no purpura  [Petechiae] : no petechiae [Skin Induration] : no induration [de-identified] : A&Ox3, NAD [de-identified] : HTN [de-identified] : severe gout with associated foot deformities  [de-identified] : right medial incision healed, right 2nd dorsal toe incision dehiscence with wound down to skin, subcutnaeous tissue, and fat. right lateral foot incision dehiscence with ulcer down to skin, subcutaneous tissue, fat, and bone. [de-identified] : Diabetic neuropathy  [FreeTextEntry1] : Right lateral foot [FreeTextEntry2] : 0.9 [FreeTextEntry3] : 3.0 [FreeTextEntry4] : 0.3 [de-identified] : serosanguineous [de-identified] : callus [de-identified] : 100% [de-identified] : Adaptic Touch and Silver Alginate [de-identified] : NSC\par DD\par  [FreeTextEntry7] : Right foot 2nd digit [FreeTextEntry8] : 1.7 [FreeTextEntry9] : 2.5 [de-identified] : 0.1 [de-identified] :  serosanguineous  [de-identified] : 75% [de-identified] : 25% [de-identified] : Adaptic Touch and silver alginate [de-identified] : NSC\par DD [TWNoteComboBox4] : Moderate [TWNoteComboBox6] : Diabetic [de-identified] : other [de-identified] : None [de-identified] : None [de-identified] : No [TWNoteComboBox7] : False [de-identified] : False [de-identified] : 3x Weekly [de-identified] : Primary Dressing [de-identified] : Moderate [de-identified] : No [de-identified] : Diabetic [de-identified] : No [de-identified] : Erythema [de-identified] : None [de-identified] : 3x Weekly [de-identified] : Primary Dressing

## 2021-11-01 NOTE — PROCEDURE
[Outpatient] : Outpatient [Ambulatory] : Patient is ambulatory. [THIS CHAMBER HAS BEEN CLEANED / DISINFECTED] : This chamber has been cleaned / disinfected according to local and hospital policy and procedure prior to this treatment. [Patient demonstrated and verbalized proper technique for using air break mask] : Patient demonstrated and verbalized proper technique for using air break mask [Patient educated on the risks of SMOKING prior to HBOT with understanding] : Patient educated on the risks of SMOKING prior to HBOT with understanding [Patient educated on the risks of CONSUMING ALCOHOL prior to HBOT with understanding] : Patient educated on the risks of CONSUMING ALCOHOL prior to HBOT with understanding [100% Cotton] : 100% cotton [Empty all pockets] : empty all pockets [No hair oils, wigs, hairpieces, pins] : no hair oils, wigs, hairpieces, pins  [Pre tx medications] : pre tx medications  [No make-up, creams] : no make-up, creams  [No jewelry] : no jewelry  [No matches, cigarettes, lighters] : no matches, cigarettes, lighters  [Hearing aid removed] : hearing aid removed [Dentures removed] : dentures removed [Ground bracelet on pt's wrist] : ground bracelet on pt's wrist  [Contacts removed] : contacts removed  [Remove nail polish] : remove nail polish  [No reading material] : no reading material  [Bra, undergarments removed] : bra, undergarments removed  [No contraindicated dressings] : no contraindicated dressings [Ground Wire - VISUAL Verification - Intact/Free of Obstruction] : Ground Wire - VISUAL Verification - Intact/Free of Obstruction  [Ground Continuity - Verified < 1 ohm w/ Wrist Strap Mila] : Ground Continuity - Verified < 1 ohm w/ Wrist Strap Mila [Number: ___] : Number: [unfilled] [Diagnosis: ___] : Diagnosis: [unfilled] [____] : Post-Dive: Time - [unfilled] [___] : Post-Dive: Value - [unfilled] mg/dL [Clear all fields] : clear all fields [] : No [FreeTextEntry4] : 100 [FreeTextEntry6] : 8 : 18 [FreeTextEntry8] : 8 : 28 [de-identified] : 8 : 58 [de-identified] : 9 :03 [de-identified] : 9:33 [de-identified] : 9:38 [de-identified] : 10:08 [de-identified] : 10:18 [de-identified] : 120 min

## 2021-11-01 NOTE — ASSESSMENT
[Verbal] : Verbal [Written] : Written [Demo] : Demo [Patient] : Patient [Spouse] : Spouse [Good - alert, interested, motivated] : Good - alert, interested, motivated [Verbalizes knowledge/Understanding] : Verbalizes knowledge/understanding [Dressing changes] : dressing changes [Foot Care] : foot care [Skin Care] : skin care [Pressure relief] : pressure relief [Signs and symptoms of infection] : sign and symptoms of infection [Nutrition] : nutrition [How and When to Call] : how and when to call [Hyperbaric Therapy] : hyperbaric therapy [Off-loading] : off-loading [Patient responsibility to plan of care] : patient responsibility to plan of care [Glycemic Control] : glycemic control [Stable] : stable [Home] : Home [Ambulatory] : Ambulatory [Not Applicable - Long Term Care/Home Health Agency] : Long Term Care/Home Health Agency: Not Applicable [] : No [FreeTextEntry2] : Infection prevention \par Wound care (dressing changes)\par Maintain optimal skin integrity to high pressure areas\par Weight reduction strategies.\par HBOT\par Encourage Glycemic Control\par \par  [FreeTextEntry4] : F/U 2 weeks for assessment and daily for HBOT\par Apligraf held due to wound progress as per MD\par 21/30 HBOT completed thus far

## 2021-11-02 ENCOUNTER — APPOINTMENT (OUTPATIENT)
Dept: HYPERBARIC MEDICINE | Facility: HOSPITAL | Age: 61
End: 2021-11-02
Payer: MEDICAID

## 2021-11-02 ENCOUNTER — OUTPATIENT (OUTPATIENT)
Dept: OUTPATIENT SERVICES | Facility: HOSPITAL | Age: 61
LOS: 1 days | Discharge: HOME | End: 2021-11-02
Payer: MEDICAID

## 2021-11-02 VITALS
SYSTOLIC BLOOD PRESSURE: 161 MMHG | TEMPERATURE: 97 F | OXYGEN SATURATION: 99 % | DIASTOLIC BLOOD PRESSURE: 85 MMHG | RESPIRATION RATE: 20 BRPM | HEART RATE: 60 BPM

## 2021-11-02 VITALS
SYSTOLIC BLOOD PRESSURE: 175 MMHG | HEART RATE: 57 BPM | OXYGEN SATURATION: 100 % | RESPIRATION RATE: 18 BRPM | TEMPERATURE: 97.2 F | DIASTOLIC BLOOD PRESSURE: 74 MMHG

## 2021-11-02 DIAGNOSIS — E11.621 TYPE 2 DIABETES MELLITUS WITH FOOT ULCER: ICD-10-CM

## 2021-11-02 DIAGNOSIS — L97.516 NON-PRESSURE CHRONIC ULCER OF OTHER PART OF RIGHT FOOT WITH BONE INVOLVEMENT WITHOUT EVIDENCE OF NECROSIS: ICD-10-CM

## 2021-11-02 DIAGNOSIS — L97.416 NON-PRESSURE CHRONIC ULCER OF RIGHT HEEL AND MIDFOOT WITH BONE INVOLVEMENT WITHOUT EVIDENCE OF NECROSIS: ICD-10-CM

## 2021-11-02 DIAGNOSIS — Z20.822 CONTACT WITH AND (SUSPECTED) EXPOSURE TO COVID-19: ICD-10-CM

## 2021-11-02 PROCEDURE — 99183 HYPERBARIC OXYGEN THERAPY: CPT

## 2021-11-02 PROCEDURE — 82962 GLUCOSE BLOOD TEST: CPT

## 2021-11-02 PROCEDURE — G0277: CPT

## 2021-11-02 NOTE — PROCEDURE
[Outpatient] : Outpatient [Ambulatory] : Patient is ambulatory. [THIS CHAMBER HAS BEEN CLEANED / DISINFECTED] : This chamber has been cleaned / disinfected according to local and hospital policy and procedure prior to this treatment. [Patient demonstrated and verbalized proper technique for using air break mask] : Patient demonstrated and verbalized proper technique for using air break mask [Patient educated on the risks of SMOKING prior to HBOT with understanding] : Patient educated on the risks of SMOKING prior to HBOT with understanding [Patient educated on the risks of CONSUMING ALCOHOL prior to HBOT with understanding] : Patient educated on the risks of CONSUMING ALCOHOL prior to HBOT with understanding [100% Cotton] : 100% cotton [Empty all pockets] : empty all pockets [No hair oils, wigs, hairpieces, pins] : no hair oils, wigs, hairpieces, pins  [Pre tx medications] : pre tx medications  [No make-up, creams] : no make-up, creams  [No jewelry] : no jewelry  [No matches, cigarettes, lighters] : no matches, cigarettes, lighters  [Hearing aid removed] : hearing aid removed [Dentures removed] : dentures removed [Ground bracelet on pt's wrist] : ground bracelet on pt's wrist  [Contacts removed] : contacts removed  [Remove nail polish] : remove nail polish  [No reading material] : no reading material  [Bra, undergarments removed] : bra, undergarments removed  [No contraindicated dressings] : no contraindicated dressings [Ground Wire - VISUAL Verification - Intact/Free of Obstruction] : Ground Wire - VISUAL Verification - Intact/Free of Obstruction  [Ground Continuity - Verified < 1 ohm w/ Wrist Strap Mila] : Ground Continuity - Verified < 1 ohm w/ Wrist Strap Mila [Number: ___] : Number: [unfilled] [Diagnosis: ___] : Diagnosis: [unfilled] [____] : Post-Dive: Time - [unfilled] [___] : Post-Dive: Value - [unfilled] mg/dL [Clear all fields] : clear all fields [] : No [FreeTextEntry4] : 100 [FreeTextEntry6] : 8038 [FreeTextEntry8] : 7007 [de-identified] : 7587 [de-identified] : 3350 [de-identified] : 2993 [de-identified] : 0491 [de-identified] : 3653 [de-identified] : 1001 [de-identified] : 120 MINUTES

## 2021-11-02 NOTE — ADDENDUM
[FreeTextEntry1] : PT ARRIVED A&OX3 \par ALL VITALS WITHIN PARAMETERS FOR HBOT\par PT DESCENDED TO 2.4 DAVID @ 2.2 PSI/MIN IN CHAMBER #2 WITHOUT INCIDENT\par PT RESTING AT TX DEPTH WITH VISIBLE CHEST RISE AND FALL OBSERVED CHAMBER SIDE\par PT TOLERATED AIR BREAKS WELL\par PT ASCENDED FROM 2.4 DAVID @ 2.2 PSI/MIN WITHOUT INCIDENT\par PT TOLERATED TX WELL\par

## 2021-11-03 ENCOUNTER — OUTPATIENT (OUTPATIENT)
Dept: OUTPATIENT SERVICES | Facility: HOSPITAL | Age: 61
LOS: 1 days | Discharge: HOME | End: 2021-11-03
Payer: MEDICAID

## 2021-11-03 ENCOUNTER — APPOINTMENT (OUTPATIENT)
Dept: HYPERBARIC MEDICINE | Facility: HOSPITAL | Age: 61
End: 2021-11-03
Payer: MEDICAID

## 2021-11-03 VITALS
RESPIRATION RATE: 20 BRPM | OXYGEN SATURATION: 100 % | DIASTOLIC BLOOD PRESSURE: 82 MMHG | TEMPERATURE: 97.1 F | SYSTOLIC BLOOD PRESSURE: 164 MMHG | HEART RATE: 63 BPM

## 2021-11-03 VITALS
HEART RATE: 67 BPM | RESPIRATION RATE: 20 BRPM | OXYGEN SATURATION: 98 % | DIASTOLIC BLOOD PRESSURE: 90 MMHG | TEMPERATURE: 97.2 F | SYSTOLIC BLOOD PRESSURE: 171 MMHG

## 2021-11-03 DIAGNOSIS — E11.621 TYPE 2 DIABETES MELLITUS WITH FOOT ULCER: ICD-10-CM

## 2021-11-03 DIAGNOSIS — L97.416 NON-PRESSURE CHRONIC ULCER OF RIGHT HEEL AND MIDFOOT WITH BONE INVOLVEMENT WITHOUT EVIDENCE OF NECROSIS: ICD-10-CM

## 2021-11-03 DIAGNOSIS — L97.516 NON-PRESSURE CHRONIC ULCER OF OTHER PART OF RIGHT FOOT WITH BONE INVOLVEMENT WITHOUT EVIDENCE OF NECROSIS: ICD-10-CM

## 2021-11-03 PROCEDURE — 82962 GLUCOSE BLOOD TEST: CPT

## 2021-11-03 PROCEDURE — G0277: CPT

## 2021-11-03 PROCEDURE — 99183 HYPERBARIC OXYGEN THERAPY: CPT

## 2021-11-04 ENCOUNTER — OUTPATIENT (OUTPATIENT)
Dept: OUTPATIENT SERVICES | Facility: HOSPITAL | Age: 61
LOS: 1 days | Discharge: HOME | End: 2021-11-04
Payer: MEDICAID

## 2021-11-04 ENCOUNTER — APPOINTMENT (OUTPATIENT)
Dept: HYPERBARIC MEDICINE | Facility: HOSPITAL | Age: 61
End: 2021-11-04
Payer: MEDICAID

## 2021-11-04 VITALS
RESPIRATION RATE: 18 BRPM | HEART RATE: 65 BPM | DIASTOLIC BLOOD PRESSURE: 87 MMHG | OXYGEN SATURATION: 100 % | TEMPERATURE: 97.3 F | SYSTOLIC BLOOD PRESSURE: 165 MMHG

## 2021-11-04 VITALS
TEMPERATURE: 97.2 F | SYSTOLIC BLOOD PRESSURE: 178 MMHG | RESPIRATION RATE: 18 BRPM | OXYGEN SATURATION: 100 % | DIASTOLIC BLOOD PRESSURE: 88 MMHG | HEART RATE: 74 BPM

## 2021-11-04 DIAGNOSIS — L97.416 NON-PRESSURE CHRONIC ULCER OF RIGHT HEEL AND MIDFOOT WITH BONE INVOLVEMENT WITHOUT EVIDENCE OF NECROSIS: ICD-10-CM

## 2021-11-04 DIAGNOSIS — L97.516 NON-PRESSURE CHRONIC ULCER OF OTHER PART OF RIGHT FOOT WITH BONE INVOLVEMENT WITHOUT EVIDENCE OF NECROSIS: ICD-10-CM

## 2021-11-04 DIAGNOSIS — E11.621 TYPE 2 DIABETES MELLITUS WITH FOOT ULCER: ICD-10-CM

## 2021-11-04 PROCEDURE — G0277: CPT

## 2021-11-04 PROCEDURE — 99183 HYPERBARIC OXYGEN THERAPY: CPT

## 2021-11-04 PROCEDURE — 82962 GLUCOSE BLOOD TEST: CPT

## 2021-11-04 NOTE — PROCEDURE
[Outpatient] : Outpatient [Ambulatory] : Patient is ambulatory. [THIS CHAMBER HAS BEEN CLEANED / DISINFECTED] : This chamber has been cleaned / disinfected according to local and hospital policy and procedure prior to this treatment. [Patient demonstrated and verbalized proper technique for using air break mask] : Patient demonstrated and verbalized proper technique for using air break mask [Patient educated on the risks of SMOKING prior to HBOT with understanding] : Patient educated on the risks of SMOKING prior to HBOT with understanding [Patient educated on the risks of CONSUMING ALCOHOL prior to HBOT with understanding] : Patient educated on the risks of CONSUMING ALCOHOL prior to HBOT with understanding [100% Cotton] : 100% cotton [Empty all pockets] : empty all pockets [No hair oils, wigs, hairpieces, pins] : no hair oils, wigs, hairpieces, pins  [Pre tx medications] : pre tx medications  [No make-up, creams] : no make-up, creams  [No jewelry] : no jewelry  [No matches, cigarettes, lighters] : no matches, cigarettes, lighters  [Hearing aid removed] : hearing aid removed [Dentures removed] : dentures removed [Ground bracelet on pt's wrist] : ground bracelet on pt's wrist  [Contacts removed] : contacts removed  [Remove nail polish] : remove nail polish  [No reading material] : no reading material  [Bra, undergarments removed] : bra, undergarments removed  [No contraindicated dressings] : no contraindicated dressings [Ground Wire - VISUAL Verification - Intact/Free of Obstruction] : Ground Wire - VISUAL Verification - Intact/Free of Obstruction  [Ground Continuity - Verified < 1 ohm w/ Wrist Strap Mila] : Ground Continuity - Verified < 1 ohm w/ Wrist Strap Mila [Number: ___] : Number: [unfilled] [Diagnosis: ___] : Diagnosis: [unfilled] [____] : Post-Dive: Time - [unfilled] [___] : Post-Dive: Value - [unfilled] mg/dL [Clear all fields] : clear all fields [] : No [FreeTextEntry4] : 100 [FreeTextEntry6] : 9024 [FreeTextEntry8] : 9394 [de-identified] : 2145 [de-identified] : 0976 [de-identified] : 8240 [de-identified] : 6656 [de-identified] : 1018 [de-identified] : 1023 [de-identified] : 120 MINUTES

## 2021-11-05 ENCOUNTER — OUTPATIENT (OUTPATIENT)
Dept: OUTPATIENT SERVICES | Facility: HOSPITAL | Age: 61
LOS: 1 days | Discharge: HOME | End: 2021-11-05
Payer: MEDICAID

## 2021-11-05 ENCOUNTER — APPOINTMENT (OUTPATIENT)
Dept: HYPERBARIC MEDICINE | Facility: HOSPITAL | Age: 61
End: 2021-11-05
Payer: MEDICAID

## 2021-11-05 VITALS
SYSTOLIC BLOOD PRESSURE: 145 MMHG | HEART RATE: 60 BPM | RESPIRATION RATE: 18 BRPM | OXYGEN SATURATION: 100 % | TEMPERATURE: 96.9 F | DIASTOLIC BLOOD PRESSURE: 89 MMHG

## 2021-11-05 VITALS
OXYGEN SATURATION: 99 % | RESPIRATION RATE: 20 BRPM | HEART RATE: 65 BPM | SYSTOLIC BLOOD PRESSURE: 150 MMHG | DIASTOLIC BLOOD PRESSURE: 88 MMHG | TEMPERATURE: 96 F

## 2021-11-05 DIAGNOSIS — E11.621 TYPE 2 DIABETES MELLITUS WITH FOOT ULCER: ICD-10-CM

## 2021-11-05 DIAGNOSIS — L97.516 NON-PRESSURE CHRONIC ULCER OF OTHER PART OF RIGHT FOOT WITH BONE INVOLVEMENT WITHOUT EVIDENCE OF NECROSIS: ICD-10-CM

## 2021-11-05 DIAGNOSIS — L97.416 NON-PRESSURE CHRONIC ULCER OF RIGHT HEEL AND MIDFOOT WITH BONE INVOLVEMENT WITHOUT EVIDENCE OF NECROSIS: ICD-10-CM

## 2021-11-05 PROCEDURE — 99183 HYPERBARIC OXYGEN THERAPY: CPT

## 2021-11-05 PROCEDURE — G0277: CPT

## 2021-11-05 PROCEDURE — 82962 GLUCOSE BLOOD TEST: CPT

## 2021-11-06 NOTE — PROCEDURE
[Outpatient] : Outpatient [Ambulatory] : Patient is ambulatory. [THIS CHAMBER HAS BEEN CLEANED / DISINFECTED] : This chamber has been cleaned / disinfected according to local and hospital policy and procedure prior to this treatment. [100% Cotton] : 100% cotton [Empty all pockets] : empty all pockets [No hair oils, wigs, hairpieces, pins] : no hair oils, wigs, hairpieces, pins  [Pre tx medications] : pre tx medications  [No make-up, creams] : no make-up, creams  [No jewelry] : no jewelry  [No matches, cigarettes, lighters] : no matches, cigarettes, lighters  [Hearing aid removed] : hearing aid removed [Dentures removed] : dentures removed [Ground bracelet on pt's wrist] : ground bracelet on pt's wrist  [Contacts removed] : contacts removed  [Remove nail polish] : remove nail polish  [No reading material] : no reading material  [Bra, undergarments removed] : bra, undergarments removed  [No contraindicated dressings] : no contraindicated dressings [Ground Wire - VISUAL Verification - Intact/Free of Obstruction] : Ground Wire - VISUAL Verification - Intact/Free of Obstruction  [Ground Continuity - Verified < 1 ohm w/ Wrist Strap Mila] : Ground Continuity - Verified < 1 ohm w/ Wrist Strap Mila [Number: ___] : Number: [unfilled] [Diagnosis: ___] : Diagnosis: [unfilled] [Patient demonstrated and verbalized proper technique for using air break mask] : Patient demonstrated and verbalized proper technique for using air break mask [Patient educated on the risks of SMOKING prior to HBOT with understanding] : Patient educated on the risks of SMOKING prior to HBOT with understanding [Patient educated on the risks of CONSUMING ALCOHOL prior to HBOT with understanding] : Patient educated on the risks of CONSUMING ALCOHOL prior to HBOT with understanding [____] : Post-Dive: Time - [unfilled] [___] : Post-Dive: Value - [unfilled] mg/dL [Clear all fields] : clear all fields [] : No [FreeTextEntry4] : 100 min [FreeTextEntry6] : 8:18 [FreeTextEntry8] : 8 :18 [de-identified] : 8 :58 [de-identified] : 9 :03 [de-identified] : 9:33 [de-identified] : 9:38 [de-identified] : 10:08 [de-identified] : 10:18 [de-identified] : 120 min

## 2021-11-06 NOTE — ADDENDUM
[FreeTextEntry1] : PT ARRIVED AMBULATORY A&OX3\par ALL VITALS WITHIN PARAMETERS FOR HBOT.\par DRAINAGE EVALUATED BY NURSE PRIOR TO DESCENT. WOUND CARE TO FOLLOW HBOT.\par TRANSFER OF CARE TO TECHNICIAN @ 8:19.\par \par The pt. descended @ 2.2 PSI/min. to Rx'd HBOT tx. depth of 2.4 DAVID in chamber # 1 without incident.\par The pt. was observed with visible chest motion and without incident for the duration of observed HBOT tx.\par The pt. was administered intermittent med. air over course of HBOT tx. without incident.\par \par CARE RETURNED DURING ASCENT.\par PT ASCENT WAS WITHOUT INCIDENT. PT TOLERATED HBOT WELL.\par WOUND CARE PROVIDED.\par \par GO DONATO 11/03/21

## 2021-11-08 ENCOUNTER — APPOINTMENT (OUTPATIENT)
Dept: HYPERBARIC MEDICINE | Facility: HOSPITAL | Age: 61
End: 2021-11-08
Payer: MEDICAID

## 2021-11-08 ENCOUNTER — OUTPATIENT (OUTPATIENT)
Dept: OUTPATIENT SERVICES | Facility: HOSPITAL | Age: 61
LOS: 1 days | Discharge: HOME | End: 2021-11-08
Payer: MEDICAID

## 2021-11-08 VITALS
TEMPERATURE: 97 F | OXYGEN SATURATION: 99 % | SYSTOLIC BLOOD PRESSURE: 153 MMHG | RESPIRATION RATE: 20 BRPM | HEART RATE: 59 BPM | DIASTOLIC BLOOD PRESSURE: 94 MMHG

## 2021-11-08 VITALS
OXYGEN SATURATION: 97 % | TEMPERATURE: 97.1 F | DIASTOLIC BLOOD PRESSURE: 86 MMHG | SYSTOLIC BLOOD PRESSURE: 156 MMHG | RESPIRATION RATE: 18 BRPM | HEART RATE: 71 BPM

## 2021-11-08 DIAGNOSIS — L97.416 NON-PRESSURE CHRONIC ULCER OF RIGHT HEEL AND MIDFOOT WITH BONE INVOLVEMENT WITHOUT EVIDENCE OF NECROSIS: ICD-10-CM

## 2021-11-08 DIAGNOSIS — E11.621 TYPE 2 DIABETES MELLITUS WITH FOOT ULCER: ICD-10-CM

## 2021-11-08 DIAGNOSIS — L97.516 NON-PRESSURE CHRONIC ULCER OF OTHER PART OF RIGHT FOOT WITH BONE INVOLVEMENT WITHOUT EVIDENCE OF NECROSIS: ICD-10-CM

## 2021-11-08 LAB — SARS-COV-2 RNA SPEC QL NAA+PROBE: SIGNIFICANT CHANGE UP

## 2021-11-08 PROCEDURE — 82962 GLUCOSE BLOOD TEST: CPT

## 2021-11-08 PROCEDURE — U0003: CPT

## 2021-11-08 PROCEDURE — U0005: CPT

## 2021-11-08 PROCEDURE — 99183 HYPERBARIC OXYGEN THERAPY: CPT

## 2021-11-08 PROCEDURE — G0277: CPT

## 2021-11-09 ENCOUNTER — APPOINTMENT (OUTPATIENT)
Dept: WOUND CARE | Facility: HOSPITAL | Age: 61
End: 2021-11-09
Payer: MEDICAID

## 2021-11-09 ENCOUNTER — OUTPATIENT (OUTPATIENT)
Dept: OUTPATIENT SERVICES | Facility: HOSPITAL | Age: 61
LOS: 1 days | Discharge: HOME | End: 2021-11-09
Payer: MEDICAID

## 2021-11-09 VITALS
HEART RATE: 66 BPM | BODY MASS INDEX: 31.84 KG/M2 | OXYGEN SATURATION: 97 % | DIASTOLIC BLOOD PRESSURE: 83 MMHG | TEMPERATURE: 97.3 F | SYSTOLIC BLOOD PRESSURE: 169 MMHG | RESPIRATION RATE: 18 BRPM | HEIGHT: 69 IN | WEIGHT: 215 LBS

## 2021-11-09 DIAGNOSIS — L97.512 NON-PRESSURE CHRONIC ULCER OF OTHER PART OF RIGHT FOOT WITH FAT LAYER EXPOSED: ICD-10-CM

## 2021-11-09 DIAGNOSIS — E11.621 TYPE 2 DIABETES MELLITUS WITH FOOT ULCER: ICD-10-CM

## 2021-11-09 PROCEDURE — ZZZZZ: CPT

## 2021-11-09 PROCEDURE — 11042 DBRDMT SUBQ TIS 1ST 20SQCM/<: CPT

## 2021-11-09 NOTE — PROCEDURE
[Outpatient] : Outpatient [Ambulatory] : Patient is ambulatory. [THIS CHAMBER HAS BEEN CLEANED / DISINFECTED] : This chamber has been cleaned / disinfected according to local and hospital policy and procedure prior to this treatment. [Patient demonstrated and verbalized proper technique for using air break mask] : Patient demonstrated and verbalized proper technique for using air break mask [Patient educated on the risks of SMOKING prior to HBOT with understanding] : Patient educated on the risks of SMOKING prior to HBOT with understanding [Patient educated on the risks of CONSUMING ALCOHOL prior to HBOT with understanding] : Patient educated on the risks of CONSUMING ALCOHOL prior to HBOT with understanding [100% Cotton] : 100% cotton [Empty all pockets] : empty all pockets [No hair oils, wigs, hairpieces, pins] : no hair oils, wigs, hairpieces, pins  [Pre tx medications] : pre tx medications  [No make-up, creams] : no make-up, creams  [No jewelry] : no jewelry  [No matches, cigarettes, lighters] : no matches, cigarettes, lighters  [Hearing aid removed] : hearing aid removed [Dentures removed] : dentures removed [Ground bracelet on pt's wrist] : ground bracelet on pt's wrist  [Contacts removed] : contacts removed  [Remove nail polish] : remove nail polish  [No reading material] : no reading material  [Bra, undergarments removed] : bra, undergarments removed  [No contraindicated dressings] : no contraindicated dressings [Ground Wire - VISUAL Verification - Intact/Free of Obstruction] : Ground Wire - VISUAL Verification - Intact/Free of Obstruction  [Ground Continuity - Verified < 1 ohm w/ Wrist Strap Mila] : Ground Continuity - Verified < 1 ohm w/ Wrist Strap Mila [Clear all fields] : clear all fields [Number: ___] : Number: [unfilled] [Diagnosis: ___] : Diagnosis: [unfilled] [____] : Post-Dive: Time - [unfilled] [___] : Post-Dive: Value - [unfilled] mg/dL [] : No [FreeTextEntry4] : 100 [FreeTextEntry6] : 1708 [FreeTextEntry8] : 1352 [de-identified] : 0971 [de-identified] : 6188 [de-identified] : 0905 [de-identified] : 0974 [de-identified] : 1016 [de-identified] : 1025 [de-identified] : 120 MINUTES

## 2021-11-09 NOTE — REVIEW OF SYSTEMS
[Arthralgias] : arthralgias [Joint Swelling] : joint swelling [Joint Stiffness] : joint stiffness [Skin Wound] : skin wound [FreeTextEntry1] : 5621 [Fever] : no fever [Chills] : no chills [Eye Pain] : no eye pain [Loss Of Hearing] : no hearing loss [Shortness Of Breath] : no shortness of breath [Wheezing] : no wheezing [Abdominal Pain] : no abdominal pain [Anxiety] : no anxiety [Easy Bleeding] : no tendency for easy bleeding [FreeTextEntry5] : HTN [FreeTextEntry9] : Gouty arthropathy  [de-identified] : s/p right foot surgery , no so , no pain  [de-identified] : Diabetic neuropathy  [de-identified] : NIDDM , severe gouty arthropathy

## 2021-11-09 NOTE — ADDENDUM
[FreeTextEntry1] : PT ARRIVED A&OX3\par ALL VITALS WITHIN PARAMETERS FOR HBOT\par PT RECEIVED WOUND CARE PRE HBOT\par PT DESCENDED TO 2.4 DAVID @ 2.2 PSI/MIN IN CHAMBER #1 WITHOUT INCIDENT\par PT RESTING AT TX DEPTH WITH VISIBLE CHEST RISE AND FALL OBSERVED CHAMBER SIDE\par @ 0938 PT C/O OF MILD DIZZINESS AND NAUSEA, PT WITHOUT LETTING TECHNICIAN KNOW, TOOK 1, 4 GRAM GLUCOSE TABLET\par DPM NOTIFIED\par PHYSICIAN STATED IF THE PATIENT DID NOT HAVE ANY FURTHER S/S, HE COULD REMAIN IN THE CHAMBER\par PT TOLERATED AIR BREAKS WELL\par PT ASCENDED FROM 2.4 DAVID @ 2.2 PSI/MIN WITHOUT INCIDENT\par PT TOLERATED TX WELL

## 2021-11-09 NOTE — ADDENDUM
[FreeTextEntry1] : Pt descended to 2.4 DAVID @ 2.2 PSI/MIN without incident in chamber # 1\par Pt's resting at tx depth with chest rise and fall observed chamber side. \par Pt tolerated air breaks well.\par \par ** PT MADE AWARE OF ASSESSMENT TOMORROW, 11/9/21 WITH APPROPRIATE ARRIVAL TIME **\par \par \par TRANSFER OF CARE TO T DURING ASCENT.\par PT ASCENT WAS WITHOUT INCIDENT. PT TOLERATED HBOT WELL.\par WOUND CARE AND COVID SCREENING COMPLETED BY NURSE /TREATING TECHNICIAN.\par \par T WILLIAM DONATO 11/08/21

## 2021-11-09 NOTE — PROCEDURE
[Saline] : saline [Fibrotic] : fibrotic [Slough] : slough [Necrotic] : necrotic [Scalpel] : scalpel [Sharp scissors] : sharp scissors [Skin] : skin [Fat] : fat [Subcutaneous tissue] : subcutaneous tissue [Clean] : clean [Pressure] : pressure [FreeTextEntry2] : right DFU lateral base 5th metatarsal  [FreeTextEntry1] : silver alginate [] : No [de-identified] : DFU 3 lateral base 5th metatarsal right foot  [FreeTextEntry9] : 5096 [de-identified] : Devin [FreeTextEntry6] : DFU 3 right foot  [FreeTextEntry7] : same [de-identified] : 0 [de-identified] : 5cc [de-identified] : necrotic skin, subcutaneous and fat

## 2021-11-09 NOTE — REVIEW OF SYSTEMS
[Arthralgias] : arthralgias [Joint Swelling] : joint swelling [Joint Stiffness] : joint stiffness [Skin Wound] : skin wound [FreeTextEntry1] : 7999 [Fever] : no fever [Chills] : no chills [Eye Pain] : no eye pain [Loss Of Hearing] : no hearing loss [Shortness Of Breath] : no shortness of breath [Wheezing] : no wheezing [Abdominal Pain] : no abdominal pain [Anxiety] : no anxiety [Easy Bleeding] : no tendency for easy bleeding [FreeTextEntry5] : HTN [FreeTextEntry9] : Gouty arthropathy  [de-identified] : s/p right foot surgery , no so , no pain  [de-identified] : Diabetic neuropathy  [de-identified] : NIDDM , severe gouty arthropathy

## 2021-11-09 NOTE — PROCEDURE
[Outpatient] : Outpatient [Ambulatory] : Patient is ambulatory. [THIS CHAMBER HAS BEEN CLEANED / DISINFECTED] : This chamber has been cleaned / disinfected according to local and hospital policy and procedure prior to this treatment. [Patient demonstrated and verbalized proper technique for using air break mask] : Patient demonstrated and verbalized proper technique for using air break mask [Patient educated on the risks of SMOKING prior to HBOT with understanding] : Patient educated on the risks of SMOKING prior to HBOT with understanding [Patient educated on the risks of CONSUMING ALCOHOL prior to HBOT with understanding] : Patient educated on the risks of CONSUMING ALCOHOL prior to HBOT with understanding [100% Cotton] : 100% cotton [Empty all pockets] : empty all pockets [No hair oils, wigs, hairpieces, pins] : no hair oils, wigs, hairpieces, pins  [Pre tx medications] : pre tx medications  [No make-up, creams] : no make-up, creams  [No jewelry] : no jewelry  [No matches, cigarettes, lighters] : no matches, cigarettes, lighters  [Hearing aid removed] : hearing aid removed [Dentures removed] : dentures removed [Ground bracelet on pt's wrist] : ground bracelet on pt's wrist  [Contacts removed] : contacts removed  [Remove nail polish] : remove nail polish  [No reading material] : no reading material  [Bra, undergarments removed] : bra, undergarments removed  [No contraindicated dressings] : no contraindicated dressings [Ground Wire - VISUAL Verification - Intact/Free of Obstruction] : Ground Wire - VISUAL Verification - Intact/Free of Obstruction  [Ground Continuity - Verified < 1 ohm w/ Wrist Strap Mila] : Ground Continuity - Verified < 1 ohm w/ Wrist Strap Mila [Number: ___] : Number: [unfilled] [Diagnosis: ___] : Diagnosis: [unfilled] [____] : Post-Dive: Time - [unfilled] [___] : Post-Dive: Value - [unfilled] mg/dL [Clear all fields] : clear all fields [] : No [FreeTextEntry4] : 100 [FreeTextEntry6] : 8:22 [FreeTextEntry8] : 8:32 [de-identified] : 9:02 [de-identified] : 9:07 [de-identified] : 9:42 [de-identified] : 9:37 [de-identified] : 10:12 [de-identified] : 10:22 [de-identified] : 120 MIN

## 2021-11-09 NOTE — PROCEDURE
[Saline] : saline [Fibrotic] : fibrotic [Slough] : slough [Necrotic] : necrotic [Scalpel] : scalpel [Sharp scissors] : sharp scissors [Skin] : skin [Fat] : fat [Subcutaneous tissue] : subcutaneous tissue [Clean] : clean [Pressure] : pressure [FreeTextEntry2] : right DFU lateral base 5th metatarsal  [FreeTextEntry1] : silver alginate [] : No [de-identified] : DFU 3 lateral base 5th metatarsal right foot  [FreeTextEntry9] : 7497 [de-identified] : Devin [FreeTextEntry6] : DFU 3 right foot  [FreeTextEntry7] : same [de-identified] : 0 [de-identified] : necrotic skin, subcutaneous and fat  [de-identified] : 5cc

## 2021-11-10 ENCOUNTER — NON-APPOINTMENT (OUTPATIENT)
Age: 61
End: 2021-11-10

## 2021-11-10 ENCOUNTER — APPOINTMENT (OUTPATIENT)
Dept: HYPERBARIC MEDICINE | Facility: HOSPITAL | Age: 61
End: 2021-11-10
Payer: MEDICAID

## 2021-11-10 ENCOUNTER — OUTPATIENT (OUTPATIENT)
Dept: OUTPATIENT SERVICES | Facility: HOSPITAL | Age: 61
LOS: 1 days | Discharge: HOME | End: 2021-11-10
Payer: MEDICAID

## 2021-11-10 VITALS
OXYGEN SATURATION: 98 % | DIASTOLIC BLOOD PRESSURE: 80 MMHG | HEART RATE: 62 BPM | TEMPERATURE: 97 F | RESPIRATION RATE: 20 BRPM | SYSTOLIC BLOOD PRESSURE: 138 MMHG

## 2021-11-10 VITALS
DIASTOLIC BLOOD PRESSURE: 93 MMHG | SYSTOLIC BLOOD PRESSURE: 160 MMHG | RESPIRATION RATE: 18 BRPM | OXYGEN SATURATION: 100 % | HEART RATE: 59 BPM | TEMPERATURE: 97.2 F

## 2021-11-10 DIAGNOSIS — N32.81 OVERACTIVE BLADDER: ICD-10-CM

## 2021-11-10 DIAGNOSIS — N40.1 BENIGN PROSTATIC HYPERPLASIA WITH LOWER URINARY TRACT SYMPTOMS: ICD-10-CM

## 2021-11-10 DIAGNOSIS — N52.9 MALE ERECTILE DYSFUNCTION, UNSPECIFIED: ICD-10-CM

## 2021-11-10 DIAGNOSIS — Z98.890 OTHER SPECIFIED POSTPROCEDURAL STATES: ICD-10-CM

## 2021-11-10 DIAGNOSIS — Z98.1 ARTHRODESIS STATUS: ICD-10-CM

## 2021-11-10 DIAGNOSIS — Z87.81 PERSONAL HISTORY OF (HEALED) TRAUMATIC FRACTURE: ICD-10-CM

## 2021-11-10 DIAGNOSIS — L97.416 NON-PRESSURE CHRONIC ULCER OF RIGHT HEEL AND MIDFOOT WITH BONE INVOLVEMENT WITHOUT EVIDENCE OF NECROSIS: ICD-10-CM

## 2021-11-10 DIAGNOSIS — Z79.899 OTHER LONG TERM (CURRENT) DRUG THERAPY: ICD-10-CM

## 2021-11-10 DIAGNOSIS — E78.1 PURE HYPERGLYCERIDEMIA: ICD-10-CM

## 2021-11-10 DIAGNOSIS — E11.621 TYPE 2 DIABETES MELLITUS WITH FOOT ULCER: ICD-10-CM

## 2021-11-10 DIAGNOSIS — Z79.84 LONG TERM (CURRENT) USE OF ORAL HYPOGLYCEMIC DRUGS: ICD-10-CM

## 2021-11-10 DIAGNOSIS — I10 ESSENTIAL (PRIMARY) HYPERTENSION: ICD-10-CM

## 2021-11-10 DIAGNOSIS — L97.512 NON-PRESSURE CHRONIC ULCER OF OTHER PART OF RIGHT FOOT WITH FAT LAYER EXPOSED: ICD-10-CM

## 2021-11-10 DIAGNOSIS — Z87.442 PERSONAL HISTORY OF URINARY CALCULI: ICD-10-CM

## 2021-11-10 DIAGNOSIS — F41.8 OTHER SPECIFIED ANXIETY DISORDERS: ICD-10-CM

## 2021-11-10 DIAGNOSIS — L97.412 NON-PRESSURE CHRONIC ULCER OF RIGHT HEEL AND MIDFOOT WITH FAT LAYER EXPOSED: ICD-10-CM

## 2021-11-10 DIAGNOSIS — L97.516 NON-PRESSURE CHRONIC ULCER OF OTHER PART OF RIGHT FOOT WITH BONE INVOLVEMENT WITHOUT EVIDENCE OF NECROSIS: ICD-10-CM

## 2021-11-10 DIAGNOSIS — L84 CORNS AND CALLOSITIES: ICD-10-CM

## 2021-11-10 DIAGNOSIS — R35.1 NOCTURIA: ICD-10-CM

## 2021-11-10 DIAGNOSIS — M10.071 IDIOPATHIC GOUT, RIGHT ANKLE AND FOOT: ICD-10-CM

## 2021-11-10 PROCEDURE — 82962 GLUCOSE BLOOD TEST: CPT

## 2021-11-10 PROCEDURE — G0277: CPT

## 2021-11-10 PROCEDURE — 99183 HYPERBARIC OXYGEN THERAPY: CPT

## 2021-11-10 NOTE — PROCEDURE
[Outpatient] : Outpatient [Ambulatory] : Patient is ambulatory. [THIS CHAMBER HAS BEEN CLEANED / DISINFECTED] : This chamber has been cleaned / disinfected according to local and hospital policy and procedure prior to this treatment. [Patient demonstrated and verbalized proper technique for using air break mask] : Patient demonstrated and verbalized proper technique for using air break mask [Patient educated on the risks of SMOKING prior to HBOT with understanding] : Patient educated on the risks of SMOKING prior to HBOT with understanding [Patient educated on the risks of CONSUMING ALCOHOL prior to HBOT with understanding] : Patient educated on the risks of CONSUMING ALCOHOL prior to HBOT with understanding [100% Cotton] : 100% cotton [Empty all pockets] : empty all pockets [No hair oils, wigs, hairpieces, pins] : no hair oils, wigs, hairpieces, pins  [Pre tx medications] : pre tx medications  [No make-up, creams] : no make-up, creams  [No jewelry] : no jewelry  [No matches, cigarettes, lighters] : no matches, cigarettes, lighters  [Hearing aid removed] : hearing aid removed [Dentures removed] : dentures removed [Ground bracelet on pt's wrist] : ground bracelet on pt's wrist  [Contacts removed] : contacts removed  [Remove nail polish] : remove nail polish  [No reading material] : no reading material  [Bra, undergarments removed] : bra, undergarments removed  [No contraindicated dressings] : no contraindicated dressings [Ground Wire - VISUAL Verification - Intact/Free of Obstruction] : Ground Wire - VISUAL Verification - Intact/Free of Obstruction  [Ground Continuity - Verified < 1 ohm w/ Wrist Strap Mila] : Ground Continuity - Verified < 1 ohm w/ Wrist Strap Mila [Number: ___] : Number: [unfilled] [Diagnosis: ___] : Diagnosis: [unfilled] [____] : Post-Dive: Time - [unfilled] [___] : Post-Dive: Value - [unfilled] mg/dL [Clear all fields] : clear all fields [] : No [FreeTextEntry4] : 100 [FreeTextEntry6] : 8:12 [FreeTextEntry8] : 8:22 [de-identified] : 08 : 52 [de-identified] : 08 : 57 [de-identified] : 09 : 27 [de-identified] : 09 : 32 [de-identified] : 10 : 02 [de-identified] : 10 : 12 [de-identified] : 120 min.

## 2021-11-10 NOTE — REASON FOR VISIT
[FreeTextEntry5] : Right Lateral Foot 5th met [FreeTextEntry4] : DFU 3 lateral right foot , base 5th metatarsal  [FreeTextEntry3] : fibrotic , necrotic  [FreeTextEntry1] : PLEASE INVALIDATE THIS NOTE, THANKS!

## 2021-11-10 NOTE — ADDENDUM
[FreeTextEntry1] : PT ARRIVED AMBULATORY A&OX3.\par ALL VITALS WITHIN PARAMETERS FOR HBOT.\par DRAINAGE EVALUATED BY NURSE PRIOR TO HBOT. DRESSING TO BE CHANGED FOLLOWING HBOT.\par PT DESCENT TO RX TX DEPTH IN CHAMBER 1 WAS WITHOUT INCIDENT, PT RESTING AT DEPTH, CHEST RISE AND FALL OBSERVED\par \par TRANSFER OF CARE TO [facility] upon pt's arrival at tx. depth. \par The pt. was observed with visible chest motion and without incident for the duration of observed HBOT tx.\par The pt. was administered intermittent med. air over course of HBOT tx. without incident.\par The pt. ascended from tx. depth to surface without incident.\par The pt. tolerated HBOT tx. without incident.\par The pt. received wound care x RN post-HBOT. \par

## 2021-11-10 NOTE — PHYSICAL EXAM
[Abdominal Pad] : Abdominal Pad [4 x 4] : 4 x 4  [1+] : left 1+ [Ankle Swelling (On Exam)] : present [Ankle Swelling Bilaterally] : bilaterally  [Varicose Veins Of Lower Extremities] : bilaterally [Ankle Swelling On The Left] : moderate [] : bilaterally [Ankle Swelling On The Right] : mild [Skin Ulcer] : ulcer [Alert] : alert [Oriented to Person] : oriented to person [Oriented to Place] : oriented to place [Calm] : calm [Purpura] : no purpura  [Petechiae] : no petechiae [Skin Induration] : no induration [de-identified] : A&Ox3, NAD [de-identified] : HTN [de-identified] : severe gout with associated foot deformities  [de-identified] : right medial incision healed, right 2nd dorsal toe incision dehiscence with wound down to skin, subcutnaeous tissue, and fat. right lateral foot incision dehiscence with ulcer down to skin, subcutaneous tissue, fat,  [de-identified] : Diabetic neuropathy  [de-identified] : callus shaved by DPM\par AgNo3\par small amount of blood loss during procedure, hemostasis achieved. [de-identified] : AgNo3 [TWNoteComboBox7] : Pao [de-identified] : Debridement performed of all devitalized tissue to bleeding viable tissue [FreeTextEntry1] : Right lateral foot [FreeTextEntry2] : 0.5 [FreeTextEntry3] : 2.5 [FreeTextEntry4] : 0.3 [de-identified] : serosanguineous [de-identified] : callus [de-identified] : 100% [de-identified] : Adaptic Touch and Silver Alginate [de-identified] : Cleansed with NS\par \par DD\par  [FreeTextEntry7] : Right foot 2nd digit [FreeTextEntry8] : 1.0 [FreeTextEntry9] : 1.0 [de-identified] : <0.1 [de-identified] : small serosanguineous  [de-identified] : Adaptic Touch and silver alginate [de-identified] : NSC\par DD [TWNoteComboBox4] : Small [TWNoteComboBox6] : Diabetic [de-identified] : other [de-identified] : None [de-identified] : None [de-identified] : No [de-identified] : 3x Weekly [de-identified] : Primary Dressing [de-identified] : Moderate [de-identified] : No [de-identified] : Diabetic [de-identified] : No [de-identified] : Erythema [de-identified] : None [de-identified] : None [de-identified] : 100% [de-identified] : No [de-identified] : 3x Weekly [de-identified] : Primary Dressing

## 2021-11-10 NOTE — PHYSICAL EXAM
[Abdominal Pad] : Abdominal Pad [4 x 4] : 4 x 4  [1+] : left 1+ [Ankle Swelling (On Exam)] : present [Ankle Swelling Bilaterally] : bilaterally  [Varicose Veins Of Lower Extremities] : bilaterally [Ankle Swelling On The Left] : moderate [] : bilaterally [Ankle Swelling On The Right] : mild [Skin Ulcer] : ulcer [Alert] : alert [Oriented to Person] : oriented to person [Oriented to Place] : oriented to place [Calm] : calm [Purpura] : no purpura  [Petechiae] : no petechiae [Skin Induration] : no induration [de-identified] : A&Ox3, NAD [de-identified] : HTN [de-identified] : severe gout with associated foot deformities  [de-identified] : right medial incision healed, right 2nd dorsal toe incision dehiscence with wound down to skin, subcutnaeous tissue, and fat. right lateral foot incision dehiscence with ulcer down to skin, subcutaneous tissue, fat,  [de-identified] : Diabetic neuropathy  [de-identified] : callus shaved by DPM\par AgNo3\par small amount of blood loss during procedure, hemostasis achieved. [de-identified] : AgNo3 [TWNoteComboBox7] : Pao [de-identified] : Debridement performed of all devitalized tissue to bleeding viable tissue [FreeTextEntry1] : Right lateral foot [FreeTextEntry2] : 0.5 [FreeTextEntry3] : 2.5 [FreeTextEntry4] : 0.3 [de-identified] : serosanguineous [de-identified] : callus [de-identified] : 100% [de-identified] : Adaptic Touch and Silver Alginate [de-identified] : Cleansed with NS\par \par DD\par  [FreeTextEntry7] : Right foot 2nd digit [FreeTextEntry8] : 1.0 [FreeTextEntry9] : 1.0 [de-identified] : <0.1 [de-identified] : small serosanguineous  [de-identified] : Adaptic Touch and silver alginate [de-identified] : NSC\par DD [TWNoteComboBox4] : Small [TWNoteComboBox6] : Diabetic [de-identified] : other [de-identified] : None [de-identified] : None [de-identified] : No [de-identified] : 3x Weekly [de-identified] : Primary Dressing [de-identified] : Moderate [de-identified] : No [de-identified] : Diabetic [de-identified] : No [de-identified] : Erythema [de-identified] : None [de-identified] : None [de-identified] : 100% [de-identified] : No [de-identified] : 3x Weekly [de-identified] : Primary Dressing

## 2021-11-10 NOTE — ASSESSMENT
[Verbal] : Verbal [Written] : Written [Demo] : Demo [Patient] : Patient [Spouse] : Spouse [Good - alert, interested, motivated] : Good - alert, interested, motivated [Verbalizes knowledge/Understanding] : Verbalizes knowledge/understanding [Dressing changes] : dressing changes [Foot Care] : foot care [Skin Care] : skin care [Pressure relief] : pressure relief [Signs and symptoms of infection] : sign and symptoms of infection [Nutrition] : nutrition [How and When to Call] : how and when to call [Hyperbaric Therapy] : hyperbaric therapy [Off-loading] : off-loading [Patient responsibility to plan of care] : patient responsibility to plan of care [Glycemic Control] : glycemic control [] : Yes [Stable] : stable [Home] : Home [Ambulatory] : Ambulatory [Not Applicable - Long Term Care/Home Health Agency] : Long Term Care/Home Health Agency: Not Applicable [FreeTextEntry2] : Infection prevention \par Wound care (dressing changes)\par Maintain optimal skin integrity to high pressure areas\par Weight reduction strategies.\par HBOT\par Encourage Glycemic Control\par \par  [FreeTextEntry4] : F/U 2 weeks for assessment and daily for HBOT\par \par 29/30 HBOT completed thus far

## 2021-11-11 ENCOUNTER — APPOINTMENT (OUTPATIENT)
Dept: HYPERBARIC MEDICINE | Facility: HOSPITAL | Age: 61
End: 2021-11-11

## 2021-11-12 ENCOUNTER — APPOINTMENT (OUTPATIENT)
Dept: HYPERBARIC MEDICINE | Facility: HOSPITAL | Age: 61
End: 2021-11-12
Payer: MEDICAID

## 2021-11-12 ENCOUNTER — OUTPATIENT (OUTPATIENT)
Dept: OUTPATIENT SERVICES | Facility: HOSPITAL | Age: 61
LOS: 1 days | Discharge: HOME | End: 2021-11-12
Payer: MEDICAID

## 2021-11-12 VITALS
SYSTOLIC BLOOD PRESSURE: 160 MMHG | HEART RATE: 60 BPM | DIASTOLIC BLOOD PRESSURE: 92 MMHG | RESPIRATION RATE: 18 BRPM | TEMPERATURE: 97.3 F | OXYGEN SATURATION: 98 %

## 2021-11-12 VITALS
DIASTOLIC BLOOD PRESSURE: 82 MMHG | TEMPERATURE: 97.2 F | HEART RATE: 67 BPM | OXYGEN SATURATION: 96 % | RESPIRATION RATE: 18 BRPM | SYSTOLIC BLOOD PRESSURE: 136 MMHG

## 2021-11-12 DIAGNOSIS — L97.416 NON-PRESSURE CHRONIC ULCER OF RIGHT HEEL AND MIDFOOT WITH BONE INVOLVEMENT WITHOUT EVIDENCE OF NECROSIS: ICD-10-CM

## 2021-11-12 DIAGNOSIS — E11.621 TYPE 2 DIABETES MELLITUS WITH FOOT ULCER: ICD-10-CM

## 2021-11-12 DIAGNOSIS — L97.516 NON-PRESSURE CHRONIC ULCER OF OTHER PART OF RIGHT FOOT WITH BONE INVOLVEMENT WITHOUT EVIDENCE OF NECROSIS: ICD-10-CM

## 2021-11-12 PROCEDURE — G0277: CPT

## 2021-11-12 PROCEDURE — 99183 HYPERBARIC OXYGEN THERAPY: CPT

## 2021-11-12 PROCEDURE — 82962 GLUCOSE BLOOD TEST: CPT

## 2021-11-12 NOTE — PROCEDURE
[Outpatient] : Outpatient [Ambulatory] : Patient is ambulatory. [THIS CHAMBER HAS BEEN CLEANED / DISINFECTED] : This chamber has been cleaned / disinfected according to local and hospital policy and procedure prior to this treatment. [Patient demonstrated and verbalized proper technique for using air break mask] : Patient demonstrated and verbalized proper technique for using air break mask [Patient educated on the risks of SMOKING prior to HBOT with understanding] : Patient educated on the risks of SMOKING prior to HBOT with understanding [Patient educated on the risks of CONSUMING ALCOHOL prior to HBOT with understanding] : Patient educated on the risks of CONSUMING ALCOHOL prior to HBOT with understanding [100% Cotton] : 100% cotton [Empty all pockets] : empty all pockets [No hair oils, wigs, hairpieces, pins] : no hair oils, wigs, hairpieces, pins  [Pre tx medications] : pre tx medications  [No make-up, creams] : no make-up, creams  [No jewelry] : no jewelry  [No matches, cigarettes, lighters] : no matches, cigarettes, lighters  [Hearing aid removed] : hearing aid removed [Dentures removed] : dentures removed [Ground bracelet on pt's wrist] : ground bracelet on pt's wrist  [Contacts removed] : contacts removed  [Remove nail polish] : remove nail polish  [No reading material] : no reading material  [Bra, undergarments removed] : bra, undergarments removed  [No contraindicated dressings] : no contraindicated dressings [Ground Wire - VISUAL Verification - Intact/Free of Obstruction] : Ground Wire - VISUAL Verification - Intact/Free of Obstruction  [Ground Continuity - Verified < 1 ohm w/ Wrist Strap Mila] : Ground Continuity - Verified < 1 ohm w/ Wrist Strap Mila [Number: ___] : Number: [unfilled] [Diagnosis: ___] : Diagnosis: [unfilled] [____] : Post-Dive: Time - [unfilled] [___] : Post-Dive: Value - [unfilled] mg/dL [Clear all fields] : clear all fields [] : No [FreeTextEntry4] : 100 [FreeTextEntry6] : 0147 [FreeTextEntry8] : 6625 [de-identified] : 0923 [de-identified] : 0957 [de-identified] : 3362 [de-identified] : 0990 [de-identified] : 1010 [de-identified] : 1021 [de-identified] : 120 MINUTES

## 2021-11-12 NOTE — ADDENDUM
[FreeTextEntry1] : PT ARRIVED A&OX3 \par ALL VITALS WITHIN PARAMETERS FOR HBOT\par PT DESCENDED TO 2.4 DAVID @ 2.2 PSI/MIN IN CHAMBER #1 WITHOUT INCIDENT\par PT RESTING AT TX DEPTH WITH VISIBLE CHEST RISE AND FALL OBSERVED CHAMBER SIDE \par PT TOLERATED AIR BREAKS WELL\par PT ASCENDED FROM 2.4 DAVID @ 2.2 PSI/MIN WITHOUT INCIDENT \par PT TOLERATED TX WELL\par

## 2021-11-13 ENCOUNTER — APPOINTMENT (OUTPATIENT)
Dept: HYPERBARIC MEDICINE | Facility: HOSPITAL | Age: 61
End: 2021-11-13

## 2021-11-15 ENCOUNTER — APPOINTMENT (OUTPATIENT)
Dept: HYPERBARIC MEDICINE | Facility: HOSPITAL | Age: 61
End: 2021-11-15
Payer: MEDICAID

## 2021-11-15 ENCOUNTER — NON-APPOINTMENT (OUTPATIENT)
Age: 61
End: 2021-11-15

## 2021-11-15 ENCOUNTER — OUTPATIENT (OUTPATIENT)
Dept: OUTPATIENT SERVICES | Facility: HOSPITAL | Age: 61
LOS: 1 days | Discharge: HOME | End: 2021-11-15
Payer: MEDICAID

## 2021-11-15 VITALS
OXYGEN SATURATION: 99 % | TEMPERATURE: 97.4 F | SYSTOLIC BLOOD PRESSURE: 172 MMHG | RESPIRATION RATE: 18 BRPM | HEART RATE: 55 BPM | DIASTOLIC BLOOD PRESSURE: 98 MMHG

## 2021-11-15 VITALS
RESPIRATION RATE: 18 BRPM | SYSTOLIC BLOOD PRESSURE: 156 MMHG | OXYGEN SATURATION: 98 % | TEMPERATURE: 97.4 F | HEART RATE: 70 BPM | DIASTOLIC BLOOD PRESSURE: 88 MMHG

## 2021-11-15 DIAGNOSIS — E11.621 TYPE 2 DIABETES MELLITUS WITH FOOT ULCER: ICD-10-CM

## 2021-11-15 PROCEDURE — G0277: CPT

## 2021-11-15 PROCEDURE — 82962 GLUCOSE BLOOD TEST: CPT

## 2021-11-15 PROCEDURE — U0003: CPT

## 2021-11-15 PROCEDURE — 99183 HYPERBARIC OXYGEN THERAPY: CPT

## 2021-11-15 PROCEDURE — U0005: CPT

## 2021-11-15 NOTE — PROCEDURE
[Outpatient] : Outpatient [Ambulatory] : Patient is ambulatory. [THIS CHAMBER HAS BEEN CLEANED / DISINFECTED] : This chamber has been cleaned / disinfected according to local and hospital policy and procedure prior to this treatment. [Patient demonstrated and verbalized proper technique for using air break mask] : Patient demonstrated and verbalized proper technique for using air break mask [Patient educated on the risks of SMOKING prior to HBOT with understanding] : Patient educated on the risks of SMOKING prior to HBOT with understanding [Patient educated on the risks of CONSUMING ALCOHOL prior to HBOT with understanding] : Patient educated on the risks of CONSUMING ALCOHOL prior to HBOT with understanding [100% Cotton] : 100% cotton [Empty all pockets] : empty all pockets [No hair oils, wigs, hairpieces, pins] : no hair oils, wigs, hairpieces, pins  [Pre tx medications] : pre tx medications  [No make-up, creams] : no make-up, creams  [No jewelry] : no jewelry  [No matches, cigarettes, lighters] : no matches, cigarettes, lighters  [Hearing aid removed] : hearing aid removed [Dentures removed] : dentures removed [Ground bracelet on pt's wrist] : ground bracelet on pt's wrist  [Contacts removed] : contacts removed  [Remove nail polish] : remove nail polish  [No reading material] : no reading material  [Bra, undergarments removed] : bra, undergarments removed  [No contraindicated dressings] : no contraindicated dressings [Ground Wire - VISUAL Verification - Intact/Free of Obstruction] : Ground Wire - VISUAL Verification - Intact/Free of Obstruction  [Ground Continuity - Verified < 1 ohm w/ Wrist Strap Mila] : Ground Continuity - Verified < 1 ohm w/ Wrist Strap Mila [Number: ___] : Number: [unfilled] [Diagnosis: ___] : Diagnosis: [unfilled] [____] : Post-Dive: Time - [unfilled] [___] : Post-Dive: Value - [unfilled] mg/dL [Clear all fields] : clear all fields [] : No [FreeTextEntry4] : 100 [FreeTextEntry6] : 2714 [FreeTextEntry8] : 5219 [de-identified] : 8294 [de-identified] : 0894 [de-identified] : 0946 [de-identified] : 8452 [de-identified] : 1001 [de-identified] : 1016 [de-identified] : 120 MINUTES

## 2021-11-15 NOTE — ADDENDUM
[FreeTextEntry1] : PT ARRIVED A&OX3 \par ALL VITALS WITHIN PARAMETERS FOR HBOT\par PT DESCENDED TO 2.4 DAVID @ 2.2 PSI/MIN IN CHAMBER #1 WITHOUT INCIDENT\par PT RESTING AT TX DEPTH WITH VISIBLE CHEST RISE AND FALL OBSERVED CHAMBER SIDE \par PT TOLERATED AIR BREAKS WELL\par PT ASCENDED FROM 2.4 DAVID @ 2.2 PSI/MIN WITHOUT INCIDNET\par PT TOLERATED TX WELL\par PT RECEIVED WOUND CARE POST HBOT\par PT RECEIVED ROUTINE COVID SWAB POST HBOT

## 2021-11-16 ENCOUNTER — OUTPATIENT (OUTPATIENT)
Dept: OUTPATIENT SERVICES | Facility: HOSPITAL | Age: 61
LOS: 1 days | Discharge: HOME | End: 2021-11-16
Payer: MEDICAID

## 2021-11-16 ENCOUNTER — APPOINTMENT (OUTPATIENT)
Dept: HYPERBARIC MEDICINE | Facility: HOSPITAL | Age: 61
End: 2021-11-16
Payer: MEDICAID

## 2021-11-16 VITALS
HEART RATE: 61 BPM | SYSTOLIC BLOOD PRESSURE: 167 MMHG | DIASTOLIC BLOOD PRESSURE: 90 MMHG | TEMPERATURE: 97.2 F | RESPIRATION RATE: 20 BRPM | OXYGEN SATURATION: 100 %

## 2021-11-16 VITALS
DIASTOLIC BLOOD PRESSURE: 91 MMHG | HEART RATE: 59 BPM | SYSTOLIC BLOOD PRESSURE: 183 MMHG | RESPIRATION RATE: 20 BRPM | TEMPERATURE: 97 F | OXYGEN SATURATION: 99 %

## 2021-11-16 DIAGNOSIS — Z20.822 CONTACT WITH AND (SUSPECTED) EXPOSURE TO COVID-19: ICD-10-CM

## 2021-11-16 DIAGNOSIS — L97.416 NON-PRESSURE CHRONIC ULCER OF RIGHT HEEL AND MIDFOOT WITH BONE INVOLVEMENT WITHOUT EVIDENCE OF NECROSIS: ICD-10-CM

## 2021-11-16 DIAGNOSIS — E11.621 TYPE 2 DIABETES MELLITUS WITH FOOT ULCER: ICD-10-CM

## 2021-11-16 DIAGNOSIS — L97.516 NON-PRESSURE CHRONIC ULCER OF OTHER PART OF RIGHT FOOT WITH BONE INVOLVEMENT WITHOUT EVIDENCE OF NECROSIS: ICD-10-CM

## 2021-11-16 PROCEDURE — 82962 GLUCOSE BLOOD TEST: CPT

## 2021-11-16 PROCEDURE — G0277: CPT

## 2021-11-16 PROCEDURE — 99183 HYPERBARIC OXYGEN THERAPY: CPT

## 2021-11-17 ENCOUNTER — APPOINTMENT (OUTPATIENT)
Dept: HYPERBARIC MEDICINE | Facility: HOSPITAL | Age: 61
End: 2021-11-17
Payer: MEDICAID

## 2021-11-17 ENCOUNTER — OUTPATIENT (OUTPATIENT)
Dept: OUTPATIENT SERVICES | Facility: HOSPITAL | Age: 61
LOS: 1 days | Discharge: HOME | End: 2021-11-17
Payer: MEDICAID

## 2021-11-17 VITALS
HEART RATE: 64 BPM | DIASTOLIC BLOOD PRESSURE: 99 MMHG | OXYGEN SATURATION: 99 % | SYSTOLIC BLOOD PRESSURE: 155 MMHG | RESPIRATION RATE: 20 BRPM | TEMPERATURE: 97 F

## 2021-11-17 VITALS
SYSTOLIC BLOOD PRESSURE: 134 MMHG | OXYGEN SATURATION: 97 % | DIASTOLIC BLOOD PRESSURE: 81 MMHG | HEART RATE: 66 BPM | TEMPERATURE: 97 F | RESPIRATION RATE: 20 BRPM

## 2021-11-17 DIAGNOSIS — L97.416 NON-PRESSURE CHRONIC ULCER OF RIGHT HEEL AND MIDFOOT WITH BONE INVOLVEMENT WITHOUT EVIDENCE OF NECROSIS: ICD-10-CM

## 2021-11-17 DIAGNOSIS — L97.516 NON-PRESSURE CHRONIC ULCER OF OTHER PART OF RIGHT FOOT WITH BONE INVOLVEMENT WITHOUT EVIDENCE OF NECROSIS: ICD-10-CM

## 2021-11-17 DIAGNOSIS — E11.621 TYPE 2 DIABETES MELLITUS WITH FOOT ULCER: ICD-10-CM

## 2021-11-17 PROCEDURE — G0277: CPT

## 2021-11-17 PROCEDURE — 99183 HYPERBARIC OXYGEN THERAPY: CPT

## 2021-11-17 PROCEDURE — 82962 GLUCOSE BLOOD TEST: CPT

## 2021-11-17 NOTE — ADDENDUM
[FreeTextEntry1] : Pt descended to 2.4 DAVID @ 2.2 PSI/MIN without incident in chamber #1.\par Pt resting comfortably @ depth, equal chest rise observed throughout tx.\par Pt tolerated both air breaks well.\par Pt ascended from 2.4 DAVID @ 2.2 PSI/MIN without incident in chamber #1.\par Pt tolerated treatment well.\par

## 2021-11-17 NOTE — PROCEDURE
[Outpatient] : Outpatient [Ambulatory] : Patient is ambulatory. [THIS CHAMBER HAS BEEN CLEANED / DISINFECTED] : This chamber has been cleaned / disinfected according to local and hospital policy and procedure prior to this treatment. [____] : Post-Dive: Time - [unfilled] [___] : Post-Dive: Value - [unfilled] mg/dL [Patient demonstrated and verbalized proper technique for using air break mask] : Patient demonstrated and verbalized proper technique for using air break mask [Patient educated on the risks of SMOKING prior to HBOT with understanding] : Patient educated on the risks of SMOKING prior to HBOT with understanding [Patient educated on the risks of CONSUMING ALCOHOL prior to HBOT with understanding] : Patient educated on the risks of CONSUMING ALCOHOL prior to HBOT with understanding [100% Cotton] : 100% cotton [Empty all pockets] : empty all pockets [No hair oils, wigs, hairpieces, pins] : no hair oils, wigs, hairpieces, pins  [Pre tx medications] : pre tx medications  [No make-up, creams] : no make-up, creams  [No jewelry] : no jewelry  [No matches, cigarettes, lighters] : no matches, cigarettes, lighters  [Hearing aid removed] : hearing aid removed [Dentures removed] : dentures removed [Ground bracelet on pt's wrist] : ground bracelet on pt's wrist  [Contacts removed] : contacts removed  [Remove nail polish] : remove nail polish  [No reading material] : no reading material  [Bra, undergarments removed] : bra, undergarments removed  [No contraindicated dressings] : no contraindicated dressings [Ground Wire - VISUAL Verification - Intact/Free of Obstruction] : Ground Wire - VISUAL Verification - Intact/Free of Obstruction  [Ground Continuity - Verified < 1 ohm w/ Wrist Strap Mila] : Ground Continuity - Verified < 1 ohm w/ Wrist Strap Mila [Clear all fields] : clear all fields [Number: ___] : Number: [unfilled] [Diagnosis: ___] : Diagnosis: [unfilled] [] : No [FreeTextEntry4] : 100 [FreeTextEntry6] : 6093 [FreeTextEntry8] : 9712 [de-identified] : 4871 [de-identified] : 3947 [de-identified] : 0930 [de-identified] : 1789 [de-identified] : 1002 [de-identified] : 1018 [de-identified] : 120mins

## 2021-11-17 NOTE — PROCEDURE
[Outpatient] : Outpatient [Ambulatory] : Patient is ambulatory. [THIS CHAMBER HAS BEEN CLEANED / DISINFECTED] : This chamber has been cleaned / disinfected according to local and hospital policy and procedure prior to this treatment. [Patient demonstrated and verbalized proper technique for using air break mask] : Patient demonstrated and verbalized proper technique for using air break mask [Patient educated on the risks of SMOKING prior to HBOT with understanding] : Patient educated on the risks of SMOKING prior to HBOT with understanding [Patient educated on the risks of CONSUMING ALCOHOL prior to HBOT with understanding] : Patient educated on the risks of CONSUMING ALCOHOL prior to HBOT with understanding [100% Cotton] : 100% cotton [Empty all pockets] : empty all pockets [No hair oils, wigs, hairpieces, pins] : no hair oils, wigs, hairpieces, pins  [Pre tx medications] : pre tx medications  [No make-up, creams] : no make-up, creams  [No jewelry] : no jewelry  [No matches, cigarettes, lighters] : no matches, cigarettes, lighters  [Hearing aid removed] : hearing aid removed [Dentures removed] : dentures removed [Ground bracelet on pt's wrist] : ground bracelet on pt's wrist  [Contacts removed] : contacts removed  [Remove nail polish] : remove nail polish  [No reading material] : no reading material  [Bra, undergarments removed] : bra, undergarments removed  [No contraindicated dressings] : no contraindicated dressings [Ground Wire - VISUAL Verification - Intact/Free of Obstruction] : Ground Wire - VISUAL Verification - Intact/Free of Obstruction  [Ground Continuity - Verified < 1 ohm w/ Wrist Strap Mila] : Ground Continuity - Verified < 1 ohm w/ Wrist Strap Mila [Number: ___] : Number: [unfilled] [Diagnosis: ___] : Diagnosis: [unfilled] [____] : Post-Dive: Time - [unfilled] [___] : Post-Dive: Value - [unfilled] mg/dL [Clear all fields] : clear all fields [] : No [FreeTextEntry4] : 100 [FreeTextEntry6] : 08 : 21 [FreeTextEntry8] : 08 : 31 [de-identified] : 09 : 01 [de-identified] : 09 : 06 [de-identified] : 09 : 36 [de-identified] : 09 : 41 [de-identified] : 10 : 11 [de-identified] : 10 : 21 [de-identified] : 120 min.

## 2021-11-17 NOTE — ED PROVIDER NOTE - IV ALTEPLASE ADMIN OUTSIDE HIDDEN
Department of Internal Medicine  Nephrology Jacqueline Michaud MD Progress Note    Reason for consultation: Management of end-stage renal disease. Consulting physician: Porter Lundborg, MD.    Interval history: Patient was seen and examined today. She does not have any shortness of breath fever or nausea. Patient is tolerating physical therapy. Last dialysis was on Monday where she had  catheter dysfunction which improved with activase. History of present illness: This is a 63 y.o. female with a significant past medical history of Chronic atrial fibrillation [on Eliquis], Type 2 diabetes mellitus, essential systemic hypertension, coronary artery disease [s/p CABG in 2004 and PTCA in 2019], heart failure with preserved ejection fraction [LVEF 55%] and end-stage renal disease secondary to diabetic and hypertensive nephropathy [on routine hemodialysis on a Monday/Wednesday/Friday schedule at [address] dialysis unit on [facility] under the care of Dr. Suad Godfrey since August 2001 using IJ tunneled dialysis catheter], who presented to the hospital for further evaluation of acute on chronic right-sided weakness, Slurred speech and confusion. She was initially diagnosed with acute/subacute stroke in the left frontal lobe with right-sided weakness 4 months ago. CT scan of the brain performed at presentation showed no acute finding but CT angiogram of the head and neck showed 70% stenosis in the cavernous/supraclinoid segment of the right internal carotid artery. There was also minimal punctate calcification along the posterior aspect of the right globe.     Scheduled Meds:   busPIRone  10 mg Oral TID    insulin lispro  10 Units SubCUTAneous TID WC    apixaban  5 mg Oral BID    aspirin  81 mg Oral Daily    atorvastatin  80 mg Oral Daily    carvedilol  6.25 mg Oral BID    docusate sodium  100 mg Oral BID    febuxostat  40 mg Oral Daily    ferrous sulfate  325 mg Oral BID WC    furosemide  80 mg Oral BID    gabapentin  300 mg Oral BID    insulin glargine  65 Units SubCUTAneous BID    insulin lispro  0-18 Units SubCUTAneous TID WC    insulin lispro  0-9 Units SubCUTAneous Nightly    pantoprazole  40 mg Oral QAM AC    psyllium  1 packet Oral Daily    ranolazine  1,000 mg Oral BID    tamsulosin  0.4 mg Oral Daily    traZODone  75 mg Oral Nightly     Continuous Infusions:   sodium chloride      dextrose       PRN Meds:.sodium chloride, ondansetron **OR** [DISCONTINUED] ondansetron, dextrose, dextrose, glucagon (rDNA), glucose, lactulose, sodium chloride flush, acetaminophen, senna, bisacodyl    Physical Exam:    VITALS:  BP (!) 142/59   Pulse 64   Temp 98.2 °F (36.8 °C) (Oral)   Resp 18   Ht 5' 5\" (1.651 m)   Wt 246 lb 7.6 oz (111.8 kg)   SpO2 92%   BMI 41.02 kg/m²   24HR INTAKE/OUTPUT:      Intake/Output Summary (Last 24 hours) at 11/17/2021 1552  Last data filed at 11/17/2021 1207  Gross per 24 hour   Intake 960 ml   Output --   Net 960 ml     Constitutional: alert, appears stated age and cooperative    Skin: Skin color, texture, turgor normal. No rashes or lesions    Head: Normocephalic, without obvious abnormality, atraumatic     Cardiovascular/Edema: irregularly irregular rhythm    Respiratory: clear to auscultation bilaterally    Abdomen: soft, non-tender; bowel sounds normal; no masses,  no organomegaly    Back: symmetric, no curvature. ROM normal. No CVA tenderness. Extremities: edema +    Neuro:  Awake but with slurred speech; muscle power 1/5 in right upper and lower extremity.     CBC:   Recent Labs     11/15/21  0847 11/17/21  0920   WBC 5.6 6.5   HGB 11.8* 11.7*    140*     BMP:    Recent Labs     11/15/21  0847 11/17/21  0920   * 137   K 4.9 4.5   CL 99 102   CO2 24 22   BUN 49* 50*   CREATININE 3.20* 3.15*   GLUCOSE 365* 285*     Lab Results   Component Value Date    NITRU NEGATIVE 11/14/2021    COLORU Yellow 11/14/2021    PHUR 5.0 11/14/2021    WBCUA 5 TO 10 11/14/2021    RBCUA 0 TO 2 11/14/2021    MUCUS NOT REPORTED 11/14/2021    TRICHOMONAS NOT REPORTED 11/14/2021    YEAST FEW 11/14/2021    BACTERIA MANY 11/14/2021    SPECGRAV 1.014 11/14/2021    LEUKOCYTESUR TRACE 11/14/2021    UROBILINOGEN Normal 11/14/2021    BILIRUBINUR NEGATIVE 11/14/2021    GLUCOSEU 3+ 11/14/2021    KETUA NEGATIVE 11/14/2021    AMORPHOUS NOT REPORTED 11/14/2021     Urine Creatinine:     Lab Results   Component Value Date    LABCREA 72.0 11/14/2021     IMPRESSION/RECOMMENDATIONS:    1. Acute kidney injury [secondary to acute tubular necrosis and dialysis dependent since August 2021] - will maintain MWF hemodialysis schedule. She is scheduled for AV fistula placement in 2 weeks at [facility] vascular access center with Dr. Sejal Matamoros. Renal diet,i.e 2-gram sodium,2-gram potassium,1500 ml fluid restriction,1-gram phosphorus, 1800 KCal and 1.2 gram protein per day. 2.  Acute on chronic right-sided weakness - Neurology input noted. 3.  Systemic hypertension - blood pressure control is fair. Continue current medications. 4.  Mineral bone disease profile - Serum phosphorus 3.3 mg/dL is within target range. Next dialysis 11/18/2021    Prognosis is guarded.      MD LESIA Aguirre  Attending Nephrologist  11/17/2021 3:52 PM show

## 2021-11-17 NOTE — ADDENDUM
[FreeTextEntry1] : PT ARRIVED AMBULATORY A&OX3\par ALL VITALS WITHIN PARAMETERS. DRAINAGE ASSESSED BY NURSE PRIOR TO DESCENT. WOUND CARE TO FOLLOW HBOT.\par PT DESCENT TO RX TX DEPTH IN CHAMBER 1 WAS WITHOUT INCIDENT. PT RESTING AT DEPTH, CHEST RISE AND FALL OBSERVED\par TRANSFER OF CARE AT 8:33\par The pt. was observed with visible chest motion and without incident for the duration of observed HBOT tx.\par The pt. was administered intermittent med. air over course of HBOT tx. without incident.\par The pt. ascended from tx. depth to surface without incident.\par The pt. tolerated HBOT without incident.\par The pt. received wound care x RN post-HBOT tx.\par \par The pt. advised staff that he would not receive HBOT tx. tomorrow due to personal obligations r/t warm weather. Pt. offered and accepted Saturday 11/20/2021 HBOT contingent upon staff confirmation of physician availability on FRI 11/19/21. Pt. acknowledged and agreed to same.

## 2021-11-18 ENCOUNTER — APPOINTMENT (OUTPATIENT)
Dept: HYPERBARIC MEDICINE | Facility: HOSPITAL | Age: 61
End: 2021-11-18

## 2021-11-19 ENCOUNTER — APPOINTMENT (OUTPATIENT)
Dept: HYPERBARIC MEDICINE | Facility: HOSPITAL | Age: 61
End: 2021-11-19
Payer: MEDICAID

## 2021-11-19 ENCOUNTER — RX RENEWAL (OUTPATIENT)
Age: 61
End: 2021-11-19

## 2021-11-19 ENCOUNTER — OUTPATIENT (OUTPATIENT)
Dept: OUTPATIENT SERVICES | Facility: HOSPITAL | Age: 61
LOS: 1 days | Discharge: HOME | End: 2021-11-19
Payer: MEDICAID

## 2021-11-19 VITALS
OXYGEN SATURATION: 98 % | HEART RATE: 67 BPM | TEMPERATURE: 97.6 F | RESPIRATION RATE: 18 BRPM | SYSTOLIC BLOOD PRESSURE: 152 MMHG | DIASTOLIC BLOOD PRESSURE: 92 MMHG

## 2021-11-19 VITALS
DIASTOLIC BLOOD PRESSURE: 94 MMHG | TEMPERATURE: 97 F | HEART RATE: 57 BPM | OXYGEN SATURATION: 100 % | SYSTOLIC BLOOD PRESSURE: 175 MMHG | RESPIRATION RATE: 20 BRPM

## 2021-11-19 DIAGNOSIS — E11.621 TYPE 2 DIABETES MELLITUS WITH FOOT ULCER: ICD-10-CM

## 2021-11-19 PROCEDURE — 99183 HYPERBARIC OXYGEN THERAPY: CPT

## 2021-11-19 PROCEDURE — 82962 GLUCOSE BLOOD TEST: CPT

## 2021-11-19 PROCEDURE — G0277: CPT

## 2021-11-19 NOTE — ADDENDUM
[FreeTextEntry1] : The pt. presented to [facility] ambulatory and A&Ox3 for scheduled HBOT tx.\par The pt's pre-dive screening found the pt. to be within acceptable limits to begin HBOT tx.\par The pt's wound dressing was observed to be dry and intact without drainage prior to start of HBOT tx.\par Pt. to receive wound care as ordered post-HBOT tx.\par The pt. descended @ 2.2 PSI/min. to Rx'd HBOT tx. depth of 2.4 DAVID in chamber # 1 without incident.\par The pt. was observed with visible chest motion and without incident for the duration of HBOT tx.\par The pt. was administered intermittent med. air over course of HBOT tx. without incident. \par Transfer of Observation from CHT to RN at the start of the pt's ascent.\par The pt. ascended from tx. depth to surface without incident.\par The pt. received wound care post-HBOT tx.\par The pt. will be contacted re. status of HBOT on SAT. 11/20/2021 once physician coverage is determined.

## 2021-11-19 NOTE — PROCEDURE
[Outpatient] : Outpatient [Ambulatory] : Patient is ambulatory. [THIS CHAMBER HAS BEEN CLEANED / DISINFECTED] : This chamber has been cleaned / disinfected according to local and hospital policy and procedure prior to this treatment. [Patient demonstrated and verbalized proper technique for using air break mask] : Patient demonstrated and verbalized proper technique for using air break mask [Patient educated on the risks of CONSUMING ALCOHOL prior to HBOT with understanding] : Patient educated on the risks of CONSUMING ALCOHOL prior to HBOT with understanding [Patient educated on the risks of SMOKING prior to HBOT with understanding] : Patient educated on the risks of SMOKING prior to HBOT with understanding [100% Cotton] : 100% cotton [Empty all pockets] : empty all pockets [No hair oils, wigs, hairpieces, pins] : no hair oils, wigs, hairpieces, pins  [Pre tx medications] : pre tx medications  [No make-up, creams] : no make-up, creams  [No jewelry] : no jewelry  [No matches, cigarettes, lighters] : no matches, cigarettes, lighters  [Hearing aid removed] : hearing aid removed [Dentures removed] : dentures removed [Ground bracelet on pt's wrist] : ground bracelet on pt's wrist  [Contacts removed] : contacts removed  [Remove nail polish] : remove nail polish  [No reading material] : no reading material  [Bra, undergarments removed] : bra, undergarments removed  [No contraindicated dressings] : no contraindicated dressings [Ground Wire - VISUAL Verification - Intact/Free of Obstruction] : Ground Wire - VISUAL Verification - Intact/Free of Obstruction  [Ground Continuity - Verified < 1 ohm w/ Wrist Strap Mila] : Ground Continuity - Verified < 1 ohm w/ Wrist Strap Mila [Number: ___] : Number: [unfilled] [Diagnosis: ___] : Diagnosis: [unfilled] [____] : Post-Dive: Time - [unfilled] [___] : Post-Dive: Value - [unfilled] mg/dL [Clear all fields] : clear all fields [] : No [FreeTextEntry4] : 100 [FreeTextEntry6] : 8:29 [FreeTextEntry8] : 8 :39 [de-identified] : 9 :09 [de-identified] : 9 :14 [de-identified] : 9 :44 [de-identified] : 9 :49 [de-identified] : 10 : 19 [de-identified] : 10:29 [de-identified] : 120 MIN

## 2021-11-20 ENCOUNTER — APPOINTMENT (OUTPATIENT)
Dept: HYPERBARIC MEDICINE | Facility: HOSPITAL | Age: 61
End: 2021-11-20
Payer: MEDICAID

## 2021-11-20 ENCOUNTER — OUTPATIENT (OUTPATIENT)
Dept: OUTPATIENT SERVICES | Facility: HOSPITAL | Age: 61
LOS: 1 days | Discharge: HOME | End: 2021-11-20
Payer: MEDICAID

## 2021-11-20 VITALS
SYSTOLIC BLOOD PRESSURE: 162 MMHG | TEMPERATURE: 98 F | DIASTOLIC BLOOD PRESSURE: 88 MMHG | RESPIRATION RATE: 16 BRPM | HEART RATE: 86 BPM | OXYGEN SATURATION: 99 %

## 2021-11-20 VITALS
OXYGEN SATURATION: 99 % | RESPIRATION RATE: 18 BRPM | HEART RATE: 62 BPM | DIASTOLIC BLOOD PRESSURE: 88 MMHG | SYSTOLIC BLOOD PRESSURE: 148 MMHG | TEMPERATURE: 98 F

## 2021-11-20 DIAGNOSIS — E11.621 TYPE 2 DIABETES MELLITUS WITH FOOT ULCER: ICD-10-CM

## 2021-11-20 DIAGNOSIS — L97.416 NON-PRESSURE CHRONIC ULCER OF RIGHT HEEL AND MIDFOOT WITH BONE INVOLVEMENT WITHOUT EVIDENCE OF NECROSIS: ICD-10-CM

## 2021-11-20 DIAGNOSIS — L97.516 NON-PRESSURE CHRONIC ULCER OF OTHER PART OF RIGHT FOOT WITH BONE INVOLVEMENT WITHOUT EVIDENCE OF NECROSIS: ICD-10-CM

## 2021-11-20 PROCEDURE — 99183 HYPERBARIC OXYGEN THERAPY: CPT

## 2021-11-20 PROCEDURE — G0277: CPT

## 2021-11-20 NOTE — PROCEDURE
[Outpatient] : Outpatient [Ambulatory] : Patient is ambulatory. [THIS CHAMBER HAS BEEN CLEANED / DISINFECTED] : This chamber has been cleaned / disinfected according to local and hospital policy and procedure prior to this treatment. [Patient demonstrated and verbalized proper technique for using air break mask] : Patient demonstrated and verbalized proper technique for using air break mask [Patient educated on the risks of SMOKING prior to HBOT with understanding] : Patient educated on the risks of SMOKING prior to HBOT with understanding [Patient educated on the risks of CONSUMING ALCOHOL prior to HBOT with understanding] : Patient educated on the risks of CONSUMING ALCOHOL prior to HBOT with understanding [100% Cotton] : 100% cotton [Empty all pockets] : empty all pockets [No hair oils, wigs, hairpieces, pins] : no hair oils, wigs, hairpieces, pins  [Pre tx medications] : pre tx medications  [No make-up, creams] : no make-up, creams  [No jewelry] : no jewelry  [No matches, cigarettes, lighters] : no matches, cigarettes, lighters  [Hearing aid removed] : hearing aid removed [Dentures removed] : dentures removed [Ground bracelet on pt's wrist] : ground bracelet on pt's wrist  [Contacts removed] : contacts removed  [Remove nail polish] : remove nail polish  [No reading material] : no reading material  [Bra, undergarments removed] : bra, undergarments removed  [No contraindicated dressings] : no contraindicated dressings [Ground Wire - VISUAL Verification - Intact/Free of Obstruction] : Ground Wire - VISUAL Verification - Intact/Free of Obstruction  [Ground Continuity - Verified < 1 ohm w/ Wrist Strap Mila] : Ground Continuity - Verified < 1 ohm w/ Wrist Strap Mila [Diagnosis: ___] : Diagnosis: [unfilled] [____] : Post-Dive: Time - [unfilled] [___] : Post-Dive: Value - [unfilled] mg/dL [Number: ___] : Number: [unfilled] [Clear all fields] : clear all fields [] : No [FreeTextEntry4] : 100 [FreeTextEntry6] : 3612 [FreeTextEntry8] : 4287 [de-identified] : 6882 [de-identified] : 0836 [de-identified] : 0945 [de-identified] : 1000 [de-identified] : 8943 [de-identified] : 1015 [de-identified] : 120 MIN

## 2021-11-20 NOTE — ADDENDUM
[FreeTextEntry1] : Pt descended to 2.4 DAVID @ 2.2 PSI/min without incident in chamber #2\par Pt resting @ depth with chest rise and fall observed throughout tx. \par Pt tolerated air breaks well. \par Pt ascended from 2.4 DAVID @ 2.2 PSI/min without incident. \par Pt tolerated tx well.\par

## 2021-11-22 ENCOUNTER — NON-APPOINTMENT (OUTPATIENT)
Age: 61
End: 2021-11-22

## 2021-11-22 ENCOUNTER — OUTPATIENT (OUTPATIENT)
Dept: OUTPATIENT SERVICES | Facility: HOSPITAL | Age: 61
LOS: 1 days | Discharge: HOME | End: 2021-11-22
Payer: MEDICAID

## 2021-11-22 ENCOUNTER — APPOINTMENT (OUTPATIENT)
Dept: HYPERBARIC MEDICINE | Facility: HOSPITAL | Age: 61
End: 2021-11-22
Payer: MEDICAID

## 2021-11-22 VITALS
DIASTOLIC BLOOD PRESSURE: 96 MMHG | TEMPERATURE: 97.3 F | OXYGEN SATURATION: 97 % | RESPIRATION RATE: 18 BRPM | SYSTOLIC BLOOD PRESSURE: 168 MMHG | HEART RATE: 70 BPM

## 2021-11-22 VITALS
RESPIRATION RATE: 18 BRPM | TEMPERATURE: 96.9 F | DIASTOLIC BLOOD PRESSURE: 99 MMHG | SYSTOLIC BLOOD PRESSURE: 180 MMHG | OXYGEN SATURATION: 99 % | HEART RATE: 64 BPM

## 2021-11-22 DIAGNOSIS — E11.621 TYPE 2 DIABETES MELLITUS WITH FOOT ULCER: ICD-10-CM

## 2021-11-22 LAB — SARS-COV-2 RNA SPEC QL NAA+PROBE: SIGNIFICANT CHANGE UP

## 2021-11-22 PROCEDURE — 82962 GLUCOSE BLOOD TEST: CPT

## 2021-11-22 PROCEDURE — U0005: CPT

## 2021-11-22 PROCEDURE — G0277: CPT

## 2021-11-22 PROCEDURE — 99183 HYPERBARIC OXYGEN THERAPY: CPT

## 2021-11-22 PROCEDURE — U0003: CPT

## 2021-11-22 NOTE — PROCEDURE
[Outpatient] : Outpatient [Ambulatory] : Patient is ambulatory. [THIS CHAMBER HAS BEEN CLEANED / DISINFECTED] : This chamber has been cleaned / disinfected according to local and hospital policy and procedure prior to this treatment. [Patient demonstrated and verbalized proper technique for using air break mask] : Patient demonstrated and verbalized proper technique for using air break mask [Patient educated on the risks of SMOKING prior to HBOT with understanding] : Patient educated on the risks of SMOKING prior to HBOT with understanding [Patient educated on the risks of CONSUMING ALCOHOL prior to HBOT with understanding] : Patient educated on the risks of CONSUMING ALCOHOL prior to HBOT with understanding [100% Cotton] : 100% cotton [Empty all pockets] : empty all pockets [No hair oils, wigs, hairpieces, pins] : no hair oils, wigs, hairpieces, pins  [Pre tx medications] : pre tx medications  [No make-up, creams] : no make-up, creams  [No jewelry] : no jewelry  [No matches, cigarettes, lighters] : no matches, cigarettes, lighters  [Hearing aid removed] : hearing aid removed [Dentures removed] : dentures removed [Ground bracelet on pt's wrist] : ground bracelet on pt's wrist  [Contacts removed] : contacts removed  [Remove nail polish] : remove nail polish  [No reading material] : no reading material  [Bra, undergarments removed] : bra, undergarments removed  [No contraindicated dressings] : no contraindicated dressings [Ground Wire - VISUAL Verification - Intact/Free of Obstruction] : Ground Wire - VISUAL Verification - Intact/Free of Obstruction  [Ground Continuity - Verified < 1 ohm w/ Wrist Strap Mila] : Ground Continuity - Verified < 1 ohm w/ Wrist Strap Mila [Number: ___] : Number: [unfilled] [Diagnosis: ___] : Diagnosis: [unfilled] [____] : Post-Dive: Time - [unfilled] [___] : Post-Dive: Value - [unfilled] mg/dL [Clear all fields] : clear all fields [] : No [FreeTextEntry4] : 100 [FreeTextEntry6] : 1937 [FreeTextEntry8] : 8812 [de-identified] : 1144 [de-identified] : 0925 [de-identified] : 0968 [de-identified] : 0966 [de-identified] : 1007 [de-identified] : 1016 [de-identified] : 120 MIN

## 2021-11-22 NOTE — ADDENDUM
[FreeTextEntry1] : Pt descended to 2.4 DAVID @ 2.2 PSI/MIN without incident in chamber # 1\par Pt's resting at tx depth with chest rise and fall observed chamber side. \par Pt tolerated air breaks well. \par Pt ascended from 2.4 DAVID @ 2.2 PSI/MIN without incident. Pt tolerated tx well.\par COVID 19 PCR SWAB OBTAINED POST HBOT.\par

## 2021-11-22 NOTE — ASSESSMENT
[No change from previous assessment] : No change from previous assessment [Patient prepared for dive] : Patient prepared for dive [Patient undergoing HBO treatment for __________] : Patient undergoing HBO treatment for [unfilled] [Tolerating dive well] : Tolerating dive well [No chest pain, shortness of breath, or ear pain] :  No chest pain, shortness of breath, or ear pain  [No] : No [0] : 0 out of 10

## 2021-11-23 ENCOUNTER — APPOINTMENT (OUTPATIENT)
Dept: HYPERBARIC MEDICINE | Facility: HOSPITAL | Age: 61
End: 2021-11-23
Payer: MEDICAID

## 2021-11-23 ENCOUNTER — NON-APPOINTMENT (OUTPATIENT)
Age: 61
End: 2021-11-23

## 2021-11-23 ENCOUNTER — OUTPATIENT (OUTPATIENT)
Dept: OUTPATIENT SERVICES | Facility: HOSPITAL | Age: 61
LOS: 1 days | Discharge: HOME | End: 2021-11-23
Payer: MEDICAID

## 2021-11-23 VITALS
RESPIRATION RATE: 18 BRPM | HEART RATE: 70 BPM | SYSTOLIC BLOOD PRESSURE: 153 MMHG | TEMPERATURE: 97.3 F | WEIGHT: 215 LBS | OXYGEN SATURATION: 96 % | BODY MASS INDEX: 31.84 KG/M2 | HEIGHT: 69 IN | DIASTOLIC BLOOD PRESSURE: 85 MMHG

## 2021-11-23 DIAGNOSIS — Z20.822 CONTACT WITH AND (SUSPECTED) EXPOSURE TO COVID-19: ICD-10-CM

## 2021-11-23 DIAGNOSIS — L97.516 NON-PRESSURE CHRONIC ULCER OF OTHER PART OF RIGHT FOOT WITH BONE INVOLVEMENT WITHOUT EVIDENCE OF NECROSIS: ICD-10-CM

## 2021-11-23 DIAGNOSIS — L97.416 NON-PRESSURE CHRONIC ULCER OF RIGHT HEEL AND MIDFOOT WITH BONE INVOLVEMENT WITHOUT EVIDENCE OF NECROSIS: ICD-10-CM

## 2021-11-23 DIAGNOSIS — E11.621 TYPE 2 DIABETES MELLITUS WITH FOOT ULCER: ICD-10-CM

## 2021-11-23 PROCEDURE — G0463: CPT

## 2021-11-23 PROCEDURE — 99024 POSTOP FOLLOW-UP VISIT: CPT

## 2021-11-23 RX ORDER — COLCHICINE 0.6 MG/1
0.6 TABLET ORAL TWICE DAILY
Qty: 60 | Refills: 0 | Status: COMPLETED | COMMUNITY
Start: 2021-11-23 | End: 2021-12-23

## 2021-11-24 ENCOUNTER — OUTPATIENT (OUTPATIENT)
Dept: OUTPATIENT SERVICES | Facility: HOSPITAL | Age: 61
LOS: 1 days | Discharge: HOME | End: 2021-11-24
Payer: MEDICAID

## 2021-11-24 ENCOUNTER — APPOINTMENT (OUTPATIENT)
Dept: HYPERBARIC MEDICINE | Facility: HOSPITAL | Age: 61
End: 2021-11-24
Payer: MEDICAID

## 2021-11-24 VITALS
SYSTOLIC BLOOD PRESSURE: 155 MMHG | TEMPERATURE: 97.2 F | HEART RATE: 61 BPM | DIASTOLIC BLOOD PRESSURE: 89 MMHG | OXYGEN SATURATION: 100 % | RESPIRATION RATE: 18 BRPM

## 2021-11-24 VITALS
TEMPERATURE: 97.1 F | OXYGEN SATURATION: 98 % | HEART RATE: 71 BPM | RESPIRATION RATE: 20 BRPM | SYSTOLIC BLOOD PRESSURE: 138 MMHG | DIASTOLIC BLOOD PRESSURE: 83 MMHG

## 2021-11-24 DIAGNOSIS — E11.621 TYPE 2 DIABETES MELLITUS WITH FOOT ULCER: ICD-10-CM

## 2021-11-24 PROCEDURE — G0277: CPT

## 2021-11-24 PROCEDURE — 99183 HYPERBARIC OXYGEN THERAPY: CPT

## 2021-11-24 PROCEDURE — 82962 GLUCOSE BLOOD TEST: CPT

## 2021-11-24 NOTE — PROCEDURE
[Outpatient] : Outpatient [Ambulatory] : Patient is ambulatory. [THIS CHAMBER HAS BEEN CLEANED / DISINFECTED] : This chamber has been cleaned / disinfected according to local and hospital policy and procedure prior to this treatment. [Patient demonstrated and verbalized proper technique for using air break mask] : Patient demonstrated and verbalized proper technique for using air break mask [Patient educated on the risks of SMOKING prior to HBOT with understanding] : Patient educated on the risks of SMOKING prior to HBOT with understanding [Patient educated on the risks of CONSUMING ALCOHOL prior to HBOT with understanding] : Patient educated on the risks of CONSUMING ALCOHOL prior to HBOT with understanding [100% Cotton] : 100% cotton [Empty all pockets] : empty all pockets [No hair oils, wigs, hairpieces, pins] : no hair oils, wigs, hairpieces, pins  [Pre tx medications] : pre tx medications  [No make-up, creams] : no make-up, creams  [No jewelry] : no jewelry  [No matches, cigarettes, lighters] : no matches, cigarettes, lighters  [Hearing aid removed] : hearing aid removed [Dentures removed] : dentures removed [Ground bracelet on pt's wrist] : ground bracelet on pt's wrist  [Contacts removed] : contacts removed  [Remove nail polish] : remove nail polish  [No reading material] : no reading material  [Bra, undergarments removed] : bra, undergarments removed  [No contraindicated dressings] : no contraindicated dressings [Ground Wire - VISUAL Verification - Intact/Free of Obstruction] : Ground Wire - VISUAL Verification - Intact/Free of Obstruction  [Ground Continuity - Verified < 1 ohm w/ Wrist Strap Mila] : Ground Continuity - Verified < 1 ohm w/ Wrist Strap Mila [Number: ___] : Number: [unfilled] [Diagnosis: ___] : Diagnosis: [unfilled] [____] : Post-Dive: Time - [unfilled] [___] : Post-Dive: Value - [unfilled] mg/dL [Clear all fields] : clear all fields [] : No [FreeTextEntry4] : 100 [FreeTextEntry6] : 8:23 [FreeTextEntry8] : 8:33 [de-identified] : 09 : 03 [de-identified] : 09 : 08 [de-identified] : 09 : 38 [de-identified] : 09 : 43 [de-identified] : 10 : 13 [de-identified] : 10 : 23 [de-identified] : 120 min.

## 2021-11-24 NOTE — ADDENDUM
[FreeTextEntry1] : Pt arrived ambulatory A&Ox3.\par All vital within parameters for hbot.\par Wound care provided pre tx due to drainage and ill fitting dressing.\par Pt descent to Rx tx depth in chamber 1 was without incident.\par Pt resting at depth, chest rise and fall observed.\par Transfer of care to [facility].\par The pt. was observed with visible chest motion and without incident for the duration of observed HBOT tx.\par The pt. was administered intermittent med. air over course of HBOT tx. without incident.\par The pt. ascended from tx. depth to surface without incident.\par The pt. tolerated HBOT tx. without incident.\par Post-HBOT tx., pt. advised he will not receive HBOT tx. on FRI., 11/26/2021 due to personal familial obligations. Pt. was offered and declined tx. on SAT., 11/27/2021, citing same reasons. Pt. to resume HBOT tx. on Mon., 11/29/2021.

## 2021-11-25 DIAGNOSIS — I10 ESSENTIAL (PRIMARY) HYPERTENSION: ICD-10-CM

## 2021-11-25 DIAGNOSIS — E78.1 PURE HYPERGLYCERIDEMIA: ICD-10-CM

## 2021-11-25 DIAGNOSIS — N52.9 MALE ERECTILE DYSFUNCTION, UNSPECIFIED: ICD-10-CM

## 2021-11-25 DIAGNOSIS — L84 CORNS AND CALLOSITIES: ICD-10-CM

## 2021-11-25 DIAGNOSIS — L97.516 NON-PRESSURE CHRONIC ULCER OF OTHER PART OF RIGHT FOOT WITH BONE INVOLVEMENT WITHOUT EVIDENCE OF NECROSIS: ICD-10-CM

## 2021-11-25 DIAGNOSIS — Z79.84 LONG TERM (CURRENT) USE OF ORAL HYPOGLYCEMIC DRUGS: ICD-10-CM

## 2021-11-25 DIAGNOSIS — F41.8 OTHER SPECIFIED ANXIETY DISORDERS: ICD-10-CM

## 2021-11-25 DIAGNOSIS — N32.81 OVERACTIVE BLADDER: ICD-10-CM

## 2021-11-25 DIAGNOSIS — E11.621 TYPE 2 DIABETES MELLITUS WITH FOOT ULCER: ICD-10-CM

## 2021-11-25 DIAGNOSIS — Z87.442 PERSONAL HISTORY OF URINARY CALCULI: ICD-10-CM

## 2021-11-25 DIAGNOSIS — Z79.899 OTHER LONG TERM (CURRENT) DRUG THERAPY: ICD-10-CM

## 2021-11-25 DIAGNOSIS — L97.416 NON-PRESSURE CHRONIC ULCER OF RIGHT HEEL AND MIDFOOT WITH BONE INVOLVEMENT WITHOUT EVIDENCE OF NECROSIS: ICD-10-CM

## 2021-11-25 DIAGNOSIS — Z98.890 OTHER SPECIFIED POSTPROCEDURAL STATES: ICD-10-CM

## 2021-11-25 DIAGNOSIS — N40.1 BENIGN PROSTATIC HYPERPLASIA WITH LOWER URINARY TRACT SYMPTOMS: ICD-10-CM

## 2021-11-25 DIAGNOSIS — Z98.1 ARTHRODESIS STATUS: ICD-10-CM

## 2021-11-25 DIAGNOSIS — M10.071 IDIOPATHIC GOUT, RIGHT ANKLE AND FOOT: ICD-10-CM

## 2021-11-25 DIAGNOSIS — R35.1 NOCTURIA: ICD-10-CM

## 2021-11-25 DIAGNOSIS — Z87.81 PERSONAL HISTORY OF (HEALED) TRAUMATIC FRACTURE: ICD-10-CM

## 2021-11-26 ENCOUNTER — APPOINTMENT (OUTPATIENT)
Dept: HYPERBARIC MEDICINE | Facility: HOSPITAL | Age: 61
End: 2021-11-26

## 2021-11-26 NOTE — HISTORY OF PRESENT ILLNESS
[FreeTextEntry1] : Pt seen s/p gout / bone resection at the 5th met base healing well, bony resection of medial 1st metatarsal healed, and arthroplasty 2nd toe right foot healing well. no soi. currently in HBOT.

## 2021-11-26 NOTE — ASSESSMENT
[Verbal] : Verbal [Written] : Written [Demo] : Demo [Patient] : Patient [Spouse] : Spouse [Good - alert, interested, motivated] : Good - alert, interested, motivated [Verbalizes knowledge/Understanding] : Verbalizes knowledge/understanding [Dressing changes] : dressing changes [Foot Care] : foot care [Skin Care] : skin care [Pressure relief] : pressure relief [Signs and symptoms of infection] : sign and symptoms of infection [Nutrition] : nutrition [How and When to Call] : how and when to call [Hyperbaric Therapy] : hyperbaric therapy [Off-loading] : off-loading [Patient responsibility to plan of care] : patient responsibility to plan of care [Glycemic Control] : glycemic control [] : Yes [Stable] : stable [Home] : Home [Ambulatory] : Ambulatory [Not Applicable - Long Term Care/Home Health Agency] : Long Term Care/Home Health Agency: Not Applicable [FreeTextEntry2] : Infection prevention \par Wound care (dressing changes)\par Maintain optimal skin integrity to high pressure areas\par Weight reduction strategies.\par HBOT\par Encourage Glycemic Control\par \par  [FreeTextEntry4] : F/U 2 weeks for assessment and daily for HBOT\par 37/40 HBOT completed, 10 additional requested by DPM\par [facility] Rx sent to pharmacy\par \par

## 2021-11-26 NOTE — PHYSICAL EXAM
[Abdominal Pad] : Abdominal Pad [4 x 4] : 4 x 4  [1+] : left 1+ [Ankle Swelling (On Exam)] : present [Ankle Swelling Bilaterally] : bilaterally  [Varicose Veins Of Lower Extremities] : bilaterally [Ankle Swelling On The Left] : moderate [] : bilaterally [Ankle Swelling On The Right] : mild [Skin Ulcer] : ulcer [Alert] : alert [Oriented to Person] : oriented to person [Oriented to Place] : oriented to place [Calm] : calm [Purpura] : no purpura  [Petechiae] : no petechiae [Skin Induration] : no induration [de-identified] : A&Ox3, NAD [de-identified] : HTN [de-identified] : severe gout with associated foot deformities  [de-identified] : right medial incision healed, right 2nd dorsal toe incision dehiscence with wound down to skin, subcutnaeous tissue, and fat. right lateral foot incision dehiscence with ulcer down to skin, subcutaneous tissue, fat, and bone. [de-identified] : Diabetic neuropathy  [FreeTextEntry1] : Right lateral foot [FreeTextEntry2] : 0.3 [FreeTextEntry3] : 1.4 [FreeTextEntry4] : 0.6 [de-identified] : serosanguineous [de-identified] : callus [de-identified] : Abby [de-identified] : Cleansed with NS\par  [FreeTextEntry7] : Right foot 2nd digit- two wounds within measurement [FreeTextEntry8] : 0.5 [FreeTextEntry9] : 1.5 [de-identified] : 0.1 [de-identified] : small serosanguineous  [de-identified] : Abby [de-identified] : NSC\par DD [TWNoteComboBox4] : Small [TWNoteComboBox5] : No [TWNoteComboBox6] : Diabetic [de-identified] : other [de-identified] : None [de-identified] : None [de-identified] : 100% [de-identified] : No [TWNoteComboBox7] : False [de-identified] : False [de-identified] : 3x Weekly [de-identified] : Primary Dressing [de-identified] : No [de-identified] : Diabetic [de-identified] : No [de-identified] : Erythema [de-identified] : None [de-identified] : None [de-identified] : 100% [de-identified] : No [de-identified] : 3x Weekly [de-identified] : Primary Dressing

## 2021-11-26 NOTE — REVIEW OF SYSTEMS
[Arthralgias] : arthralgias [Joint Swelling] : joint swelling [Joint Stiffness] : joint stiffness [Skin Wound] : skin wound [Fever] : no fever [Chills] : no chills [Eye Pain] : no eye pain [Loss Of Hearing] : no hearing loss [Shortness Of Breath] : no shortness of breath [Wheezing] : no wheezing [Abdominal Pain] : no abdominal pain [Anxiety] : no anxiety [Easy Bleeding] : no tendency for easy bleeding [FreeTextEntry5] : HTN [FreeTextEntry9] : Gouty arthropathy  [de-identified] : s/p right foot surgery , no so , no pain  [de-identified] : Diabetic neuropathy  [de-identified] : NIDDM , severe gouty arthropathy

## 2021-11-26 NOTE — PLAN
[FreeTextEntry1] : Patient examined and evaluated at this time.\par Continue local wound care and offloading.\par Patient to continue HBOT.\par Spent 20 minutes for patient care and medical decision making.\par Patient to follow up in 1 week.

## 2021-11-29 ENCOUNTER — APPOINTMENT (OUTPATIENT)
Dept: HYPERBARIC MEDICINE | Facility: HOSPITAL | Age: 61
End: 2021-11-29
Payer: MEDICAID

## 2021-11-29 ENCOUNTER — OUTPATIENT (OUTPATIENT)
Dept: OUTPATIENT SERVICES | Facility: HOSPITAL | Age: 61
LOS: 1 days | Discharge: HOME | End: 2021-11-29
Payer: MEDICAID

## 2021-11-29 VITALS
RESPIRATION RATE: 18 BRPM | DIASTOLIC BLOOD PRESSURE: 93 MMHG | SYSTOLIC BLOOD PRESSURE: 178 MMHG | HEART RATE: 70 BPM | TEMPERATURE: 96.9 F | OXYGEN SATURATION: 97 %

## 2021-11-29 VITALS
DIASTOLIC BLOOD PRESSURE: 99 MMHG | TEMPERATURE: 97.1 F | RESPIRATION RATE: 18 BRPM | OXYGEN SATURATION: 96 % | HEART RATE: 83 BPM | SYSTOLIC BLOOD PRESSURE: 175 MMHG

## 2021-11-29 DIAGNOSIS — L97.416 NON-PRESSURE CHRONIC ULCER OF RIGHT HEEL AND MIDFOOT WITH BONE INVOLVEMENT WITHOUT EVIDENCE OF NECROSIS: ICD-10-CM

## 2021-11-29 DIAGNOSIS — L97.516 NON-PRESSURE CHRONIC ULCER OF OTHER PART OF RIGHT FOOT WITH BONE INVOLVEMENT WITHOUT EVIDENCE OF NECROSIS: ICD-10-CM

## 2021-11-29 DIAGNOSIS — E11.621 TYPE 2 DIABETES MELLITUS WITH FOOT ULCER: ICD-10-CM

## 2021-11-29 LAB — SARS-COV-2 RNA SPEC QL NAA+PROBE: SIGNIFICANT CHANGE UP

## 2021-11-29 PROCEDURE — U0005: CPT

## 2021-11-29 PROCEDURE — U0003: CPT

## 2021-11-29 PROCEDURE — 99183 HYPERBARIC OXYGEN THERAPY: CPT

## 2021-11-29 PROCEDURE — G0277: CPT

## 2021-11-29 PROCEDURE — 82962 GLUCOSE BLOOD TEST: CPT

## 2021-11-29 NOTE — ADDENDUM
[FreeTextEntry1] : PT ARRIVED A&OX3 \par ALL VITALS WITHIN PARAMETERS FOR HBOT\par PT DESCENDED TO 2.4 DAVID @ 2.2 PSI/MIN IN CHAMBER #1 WITHOUT INCIDENT\par PT RESTING AT TX DEPTH WITH VISIBLE CHEST RISE AND FALL OBSERVED CHAMBER SIDE \par PT TOLERATED AIR BREAKS WELL\par PT ASCENDED FROM 2.4 DAVID @ 2.2 PSI/MIN WITHOUT INCIDENT\par PT TOLERATED TX WELL\par PT RECEIVED WOUND CARE POST HBOT

## 2021-11-29 NOTE — PROCEDURE
[Outpatient] : Outpatient [Ambulatory] : Patient is ambulatory. [THIS CHAMBER HAS BEEN CLEANED / DISINFECTED] : This chamber has been cleaned / disinfected according to local and hospital policy and procedure prior to this treatment. [Patient demonstrated and verbalized proper technique for using air break mask] : Patient demonstrated and verbalized proper technique for using air break mask [Patient educated on the risks of SMOKING prior to HBOT with understanding] : Patient educated on the risks of SMOKING prior to HBOT with understanding [Patient educated on the risks of CONSUMING ALCOHOL prior to HBOT with understanding] : Patient educated on the risks of CONSUMING ALCOHOL prior to HBOT with understanding [100% Cotton] : 100% cotton [Empty all pockets] : empty all pockets [No hair oils, wigs, hairpieces, pins] : no hair oils, wigs, hairpieces, pins  [Pre tx medications] : pre tx medications  [No make-up, creams] : no make-up, creams  [No jewelry] : no jewelry  [No matches, cigarettes, lighters] : no matches, cigarettes, lighters  [Hearing aid removed] : hearing aid removed [Dentures removed] : dentures removed [Ground bracelet on pt's wrist] : ground bracelet on pt's wrist  [Contacts removed] : contacts removed  [Remove nail polish] : remove nail polish  [No reading material] : no reading material  [Bra, undergarments removed] : bra, undergarments removed  [No contraindicated dressings] : no contraindicated dressings [Ground Wire - VISUAL Verification - Intact/Free of Obstruction] : Ground Wire - VISUAL Verification - Intact/Free of Obstruction  [Ground Continuity - Verified < 1 ohm w/ Wrist Strap Mila] : Ground Continuity - Verified < 1 ohm w/ Wrist Strap Mila [Number: ___] : Number: [unfilled] [Diagnosis: ___] : Diagnosis: [unfilled] [____] : Post-Dive: Time - [unfilled] [___] : Post-Dive: Value - [unfilled] mg/dL [Clear all fields] : clear all fields [] : No [FreeTextEntry4] : 100 [FreeTextEntry6] : 1373 [FreeTextEntry8] : 8284 [de-identified] : 6529 [de-identified] : 0988 [de-identified] : 0918 [de-identified] : 1389 [de-identified] : 1006 [de-identified] : 1017 [de-identified] : 120  MINUTES

## 2021-11-30 ENCOUNTER — APPOINTMENT (OUTPATIENT)
Dept: HYPERBARIC MEDICINE | Facility: HOSPITAL | Age: 61
End: 2021-11-30

## 2021-12-01 ENCOUNTER — OUTPATIENT (OUTPATIENT)
Dept: OUTPATIENT SERVICES | Facility: HOSPITAL | Age: 61
LOS: 1 days | Discharge: HOME | End: 2021-12-01
Payer: MEDICAID

## 2021-12-01 ENCOUNTER — APPOINTMENT (OUTPATIENT)
Dept: HYPERBARIC MEDICINE | Facility: HOSPITAL | Age: 61
End: 2021-12-01
Payer: MEDICAID

## 2021-12-01 VITALS
HEART RATE: 90 BPM | TEMPERATURE: 97.5 F | RESPIRATION RATE: 20 BRPM | SYSTOLIC BLOOD PRESSURE: 137 MMHG | OXYGEN SATURATION: 99 % | DIASTOLIC BLOOD PRESSURE: 86 MMHG

## 2021-12-01 VITALS
DIASTOLIC BLOOD PRESSURE: 83 MMHG | RESPIRATION RATE: 18 BRPM | SYSTOLIC BLOOD PRESSURE: 154 MMHG | HEART RATE: 84 BPM | TEMPERATURE: 97.4 F | OXYGEN SATURATION: 96 %

## 2021-12-01 DIAGNOSIS — E11.621 TYPE 2 DIABETES MELLITUS WITH FOOT ULCER: ICD-10-CM

## 2021-12-01 DIAGNOSIS — L97.516 NON-PRESSURE CHRONIC ULCER OF OTHER PART OF RIGHT FOOT WITH BONE INVOLVEMENT WITHOUT EVIDENCE OF NECROSIS: ICD-10-CM

## 2021-12-01 DIAGNOSIS — L97.416 NON-PRESSURE CHRONIC ULCER OF RIGHT HEEL AND MIDFOOT WITH BONE INVOLVEMENT WITHOUT EVIDENCE OF NECROSIS: ICD-10-CM

## 2021-12-01 PROCEDURE — 82962 GLUCOSE BLOOD TEST: CPT

## 2021-12-01 PROCEDURE — 99183 HYPERBARIC OXYGEN THERAPY: CPT

## 2021-12-01 PROCEDURE — G0277: CPT

## 2021-12-01 NOTE — ADDENDUM
[FreeTextEntry1] : PT ARRIVED AMBULATORY A&OX3.\par ALL VITALS WITHIN PARAMETERS FOR HBOT.\par NO DRAINAGE NOTED ON DRESSING PRIOR TO DESCENT.\par WOUND CARE TO FOLLOW HBOT.\par PT DESCENT TO RX TX DEPTH IN CHAMBER 1 WAS WITHOUT INCIDENT.\par PT RESTING AT DEPTH, CHEST RISE AND FALL OBSERVED.\par PT ASCENDED FROM 2.4 DAVID @ 2.2 PSI/MIN WITHOUT INCIDENT\par PT TOLERATED TX WELL\par PT RECEIVED WOUND CARE POST HBOT

## 2021-12-01 NOTE — PROCEDURE
[Outpatient] : Outpatient [Ambulatory] : Patient is ambulatory. [THIS CHAMBER HAS BEEN CLEANED / DISINFECTED] : This chamber has been cleaned / disinfected according to local and hospital policy and procedure prior to this treatment. [Patient demonstrated and verbalized proper technique for using air break mask] : Patient demonstrated and verbalized proper technique for using air break mask [Patient educated on the risks of SMOKING prior to HBOT with understanding] : Patient educated on the risks of SMOKING prior to HBOT with understanding [Patient educated on the risks of CONSUMING ALCOHOL prior to HBOT with understanding] : Patient educated on the risks of CONSUMING ALCOHOL prior to HBOT with understanding [100% Cotton] : 100% cotton [Empty all pockets] : empty all pockets [No hair oils, wigs, hairpieces, pins] : no hair oils, wigs, hairpieces, pins  [Pre tx medications] : pre tx medications  [No make-up, creams] : no make-up, creams  [No jewelry] : no jewelry  [No matches, cigarettes, lighters] : no matches, cigarettes, lighters  [Hearing aid removed] : hearing aid removed [Dentures removed] : dentures removed [Ground bracelet on pt's wrist] : ground bracelet on pt's wrist  [Contacts removed] : contacts removed  [Remove nail polish] : remove nail polish  [No reading material] : no reading material  [Bra, undergarments removed] : bra, undergarments removed  [No contraindicated dressings] : no contraindicated dressings [Ground Wire - VISUAL Verification - Intact/Free of Obstruction] : Ground Wire - VISUAL Verification - Intact/Free of Obstruction  [Ground Continuity - Verified < 1 ohm w/ Wrist Strap Mila] : Ground Continuity - Verified < 1 ohm w/ Wrist Strap Mila [Number: ___] : Number: [unfilled] [Diagnosis: ___] : Diagnosis: [unfilled] [____] : Post-Dive: Time - [unfilled] [___] : Post-Dive: Value - [unfilled] mg/dL [Clear all fields] : clear all fields [] : No [FreeTextEntry4] : 100 [FreeTextEntry6] : 8:11 [FreeTextEntry8] : 8:21 [de-identified] : 8:51 [de-identified] : 4505 [de-identified] : 0943 [de-identified] : 7298 [de-identified] : 1000 [de-identified] : 1017 [de-identified] : 120 MINUTES

## 2021-12-01 NOTE — PROCEDURE
[Outpatient] : Outpatient [Ambulatory] : Patient is ambulatory. [THIS CHAMBER HAS BEEN CLEANED / DISINFECTED] : This chamber has been cleaned / disinfected according to local and hospital policy and procedure prior to this treatment. [Patient demonstrated and verbalized proper technique for using air break mask] : Patient demonstrated and verbalized proper technique for using air break mask [Patient educated on the risks of SMOKING prior to HBOT with understanding] : Patient educated on the risks of SMOKING prior to HBOT with understanding [Patient educated on the risks of CONSUMING ALCOHOL prior to HBOT with understanding] : Patient educated on the risks of CONSUMING ALCOHOL prior to HBOT with understanding [100% Cotton] : 100% cotton [Empty all pockets] : empty all pockets [No hair oils, wigs, hairpieces, pins] : no hair oils, wigs, hairpieces, pins  [Pre tx medications] : pre tx medications  [No make-up, creams] : no make-up, creams  [No jewelry] : no jewelry  [No matches, cigarettes, lighters] : no matches, cigarettes, lighters  [Hearing aid removed] : hearing aid removed [Dentures removed] : dentures removed [Ground bracelet on pt's wrist] : ground bracelet on pt's wrist  [Contacts removed] : contacts removed  [Remove nail polish] : remove nail polish  [No reading material] : no reading material  [Bra, undergarments removed] : bra, undergarments removed  [No contraindicated dressings] : no contraindicated dressings [Ground Wire - VISUAL Verification - Intact/Free of Obstruction] : Ground Wire - VISUAL Verification - Intact/Free of Obstruction  [Ground Continuity - Verified < 1 ohm w/ Wrist Strap Mila] : Ground Continuity - Verified < 1 ohm w/ Wrist Strap Mila [Number: ___] : Number: [unfilled] [Diagnosis: ___] : Diagnosis: [unfilled] [____] : Post-Dive: Time - [unfilled] [___] : Post-Dive: Value - [unfilled] mg/dL [Clear all fields] : clear all fields [] : No [FreeTextEntry4] : 100 [FreeTextEntry6] : 8:11 [FreeTextEntry8] : 8:21 [de-identified] : 8:51 [de-identified] : 3039 [de-identified] : 0930 [de-identified] : 2144 [de-identified] : 1006 [de-identified] : 1013 [de-identified] : 120 MINUTES

## 2021-12-02 ENCOUNTER — OUTPATIENT (OUTPATIENT)
Dept: OUTPATIENT SERVICES | Facility: HOSPITAL | Age: 61
LOS: 1 days | Discharge: HOME | End: 2021-12-02
Payer: MEDICAID

## 2021-12-02 ENCOUNTER — APPOINTMENT (OUTPATIENT)
Dept: HYPERBARIC MEDICINE | Facility: HOSPITAL | Age: 61
End: 2021-12-02
Payer: MEDICAID

## 2021-12-02 VITALS
HEART RATE: 70 BPM | TEMPERATURE: 97.4 F | SYSTOLIC BLOOD PRESSURE: 150 MMHG | DIASTOLIC BLOOD PRESSURE: 91 MMHG | RESPIRATION RATE: 18 BRPM | OXYGEN SATURATION: 99 %

## 2021-12-02 VITALS
TEMPERATURE: 97.3 F | RESPIRATION RATE: 18 BRPM | SYSTOLIC BLOOD PRESSURE: 180 MMHG | OXYGEN SATURATION: 98 % | HEART RATE: 65 BPM | DIASTOLIC BLOOD PRESSURE: 101 MMHG

## 2021-12-02 DIAGNOSIS — E11.621 TYPE 2 DIABETES MELLITUS WITH FOOT ULCER: ICD-10-CM

## 2021-12-02 DIAGNOSIS — L97.516 NON-PRESSURE CHRONIC ULCER OF OTHER PART OF RIGHT FOOT WITH BONE INVOLVEMENT WITHOUT EVIDENCE OF NECROSIS: ICD-10-CM

## 2021-12-02 DIAGNOSIS — L97.416 NON-PRESSURE CHRONIC ULCER OF RIGHT HEEL AND MIDFOOT WITH BONE INVOLVEMENT WITHOUT EVIDENCE OF NECROSIS: ICD-10-CM

## 2021-12-02 PROCEDURE — 82962 GLUCOSE BLOOD TEST: CPT

## 2021-12-02 PROCEDURE — 99183 HYPERBARIC OXYGEN THERAPY: CPT

## 2021-12-02 PROCEDURE — G0277: CPT

## 2021-12-02 NOTE — PROCEDURE
[Outpatient] : Outpatient [Ambulatory] : Patient is ambulatory. [THIS CHAMBER HAS BEEN CLEANED / DISINFECTED] : This chamber has been cleaned / disinfected according to local and hospital policy and procedure prior to this treatment. [Patient demonstrated and verbalized proper technique for using air break mask] : Patient demonstrated and verbalized proper technique for using air break mask [Patient educated on the risks of SMOKING prior to HBOT with understanding] : Patient educated on the risks of SMOKING prior to HBOT with understanding [Patient educated on the risks of CONSUMING ALCOHOL prior to HBOT with understanding] : Patient educated on the risks of CONSUMING ALCOHOL prior to HBOT with understanding [100% Cotton] : 100% cotton [Empty all pockets] : empty all pockets [No hair oils, wigs, hairpieces, pins] : no hair oils, wigs, hairpieces, pins  [Pre tx medications] : pre tx medications  [No make-up, creams] : no make-up, creams  [No jewelry] : no jewelry  [No matches, cigarettes, lighters] : no matches, cigarettes, lighters  [Hearing aid removed] : hearing aid removed [Dentures removed] : dentures removed [Ground bracelet on pt's wrist] : ground bracelet on pt's wrist  [Contacts removed] : contacts removed  [Remove nail polish] : remove nail polish  [No reading material] : no reading material  [Bra, undergarments removed] : bra, undergarments removed  [No contraindicated dressings] : no contraindicated dressings [Ground Wire - VISUAL Verification - Intact/Free of Obstruction] : Ground Wire - VISUAL Verification - Intact/Free of Obstruction  [Ground Continuity - Verified < 1 ohm w/ Wrist Strap Mila] : Ground Continuity - Verified < 1 ohm w/ Wrist Strap Mila [Number: ___] : Number: [unfilled] [Diagnosis: ___] : Diagnosis: [unfilled] [____] : Post-Dive: Time - [unfilled] [___] : Post-Dive: Value - [unfilled] mg/dL [Clear all fields] : clear all fields [] : No [FreeTextEntry4] : 100 [FreeTextEntry6] : 4429 [FreeTextEntry8] : 7570 [de-identified] : 0992 [de-identified] : 0940 [de-identified] : 0907 [de-identified] : 0771 [de-identified] : 1014 [de-identified] : 1021 [de-identified] : 120 MINUTES

## 2021-12-03 ENCOUNTER — OUTPATIENT (OUTPATIENT)
Dept: OUTPATIENT SERVICES | Facility: HOSPITAL | Age: 61
LOS: 1 days | Discharge: HOSPICE | End: 2021-12-03
Payer: MEDICAID

## 2021-12-03 ENCOUNTER — APPOINTMENT (OUTPATIENT)
Dept: HYPERBARIC MEDICINE | Facility: HOSPITAL | Age: 61
End: 2021-12-03
Payer: MEDICAID

## 2021-12-03 VITALS
DIASTOLIC BLOOD PRESSURE: 92 MMHG | SYSTOLIC BLOOD PRESSURE: 155 MMHG | RESPIRATION RATE: 20 BRPM | OXYGEN SATURATION: 98 % | HEART RATE: 63 BPM | TEMPERATURE: 97 F

## 2021-12-03 VITALS
SYSTOLIC BLOOD PRESSURE: 160 MMHG | OXYGEN SATURATION: 100 % | TEMPERATURE: 96.9 F | RESPIRATION RATE: 20 BRPM | HEART RATE: 58 BPM | DIASTOLIC BLOOD PRESSURE: 88 MMHG

## 2021-12-03 DIAGNOSIS — E11.621 TYPE 2 DIABETES MELLITUS WITH FOOT ULCER: ICD-10-CM

## 2021-12-03 DIAGNOSIS — L97.516 NON-PRESSURE CHRONIC ULCER OF OTHER PART OF RIGHT FOOT WITH BONE INVOLVEMENT WITHOUT EVIDENCE OF NECROSIS: ICD-10-CM

## 2021-12-03 DIAGNOSIS — L97.416 NON-PRESSURE CHRONIC ULCER OF RIGHT HEEL AND MIDFOOT WITH BONE INVOLVEMENT WITHOUT EVIDENCE OF NECROSIS: ICD-10-CM

## 2021-12-03 PROCEDURE — G0277: CPT

## 2021-12-03 PROCEDURE — 82962 GLUCOSE BLOOD TEST: CPT

## 2021-12-03 PROCEDURE — 99183 HYPERBARIC OXYGEN THERAPY: CPT

## 2021-12-03 NOTE — ADDENDUM
[FreeTextEntry1] : PT ARRIVED AMBULATORY A&OX3\par ALL VITALS WITHIN PARAMETERS FOR HBOT.\par NO DRAINAGE NOTED ON DRESSING PRIOR TO DESCENT.\par WOUND CARE TO FOLLOW HBOT.\par PT DESCENT TO RX TX DEPTH IN CHAMBER 1 WAS WITHOUT INCIDENT.\par PT RESTING AT DEPTH, CHEST RISE AND FALL OBSERVED.\par PT TOLERATED AIR BREAKS WELL.\par PT ASCENT WAS WITHOUT INCIDENT. \par PT TOLERATED HBOT WELL.\par \par CHT WILLIAM ANDRADE 12/03/21

## 2021-12-03 NOTE — PROCEDURE
[Outpatient] : Outpatient [Ambulatory] : Patient is ambulatory. [THIS CHAMBER HAS BEEN CLEANED / DISINFECTED] : This chamber has been cleaned / disinfected according to local and hospital policy and procedure prior to this treatment. [Patient demonstrated and verbalized proper technique for using air break mask] : Patient demonstrated and verbalized proper technique for using air break mask [Patient educated on the risks of SMOKING prior to HBOT with understanding] : Patient educated on the risks of SMOKING prior to HBOT with understanding [Patient educated on the risks of CONSUMING ALCOHOL prior to HBOT with understanding] : Patient educated on the risks of CONSUMING ALCOHOL prior to HBOT with understanding [100% Cotton] : 100% cotton [Empty all pockets] : empty all pockets [No hair oils, wigs, hairpieces, pins] : no hair oils, wigs, hairpieces, pins  [Pre tx medications] : pre tx medications  [No make-up, creams] : no make-up, creams  [No jewelry] : no jewelry  [No matches, cigarettes, lighters] : no matches, cigarettes, lighters  [Hearing aid removed] : hearing aid removed [Dentures removed] : dentures removed [Ground bracelet on pt's wrist] : ground bracelet on pt's wrist  [Contacts removed] : contacts removed  [Remove nail polish] : remove nail polish  [No reading material] : no reading material  [Bra, undergarments removed] : bra, undergarments removed  [No contraindicated dressings] : no contraindicated dressings [Ground Wire - VISUAL Verification - Intact/Free of Obstruction] : Ground Wire - VISUAL Verification - Intact/Free of Obstruction  [Ground Continuity - Verified < 1 ohm w/ Wrist Strap Mila] : Ground Continuity - Verified < 1 ohm w/ Wrist Strap Mila [Diagnosis: ___] : Diagnosis: [unfilled] [Number: ___] : Number: [unfilled] [____] : Post-Dive: Time - [unfilled] [___] : Post-Dive: Value - [unfilled] mg/dL [Clear all fields] : clear all fields [] : No [FreeTextEntry4] : 100 [FreeTextEntry6] : 8:08 [FreeTextEntry8] : 8:18 [de-identified] : 8:48 [de-identified] : 8:53 [de-identified] : 9:23 [de-identified] : 9:28 [de-identified] : 10:08 [de-identified] : 10:18 [de-identified] : 120 MIN

## 2021-12-06 ENCOUNTER — OUTPATIENT (OUTPATIENT)
Dept: OUTPATIENT SERVICES | Facility: HOSPITAL | Age: 61
LOS: 1 days | Discharge: HOME | End: 2021-12-06
Payer: MEDICAID

## 2021-12-06 ENCOUNTER — APPOINTMENT (OUTPATIENT)
Dept: HYPERBARIC MEDICINE | Facility: HOSPITAL | Age: 61
End: 2021-12-06
Payer: MEDICAID

## 2021-12-06 VITALS
DIASTOLIC BLOOD PRESSURE: 96 MMHG | OXYGEN SATURATION: 99 % | HEART RATE: 66 BPM | SYSTOLIC BLOOD PRESSURE: 158 MMHG | TEMPERATURE: 97.2 F | RESPIRATION RATE: 16 BRPM

## 2021-12-06 VITALS
DIASTOLIC BLOOD PRESSURE: 84 MMHG | RESPIRATION RATE: 20 BRPM | SYSTOLIC BLOOD PRESSURE: 140 MMHG | HEART RATE: 75 BPM | TEMPERATURE: 97.1 F | OXYGEN SATURATION: 96 %

## 2021-12-06 DIAGNOSIS — E11.621 TYPE 2 DIABETES MELLITUS WITH FOOT ULCER: ICD-10-CM

## 2021-12-06 DIAGNOSIS — L97.516 NON-PRESSURE CHRONIC ULCER OF OTHER PART OF RIGHT FOOT WITH BONE INVOLVEMENT WITHOUT EVIDENCE OF NECROSIS: ICD-10-CM

## 2021-12-06 DIAGNOSIS — L97.416 NON-PRESSURE CHRONIC ULCER OF RIGHT HEEL AND MIDFOOT WITH BONE INVOLVEMENT WITHOUT EVIDENCE OF NECROSIS: ICD-10-CM

## 2021-12-06 LAB — SARS-COV-2 RNA SPEC QL NAA+PROBE: SIGNIFICANT CHANGE UP

## 2021-12-06 PROCEDURE — U0003: CPT

## 2021-12-06 PROCEDURE — U0005: CPT

## 2021-12-06 PROCEDURE — G0277: CPT

## 2021-12-06 PROCEDURE — 82962 GLUCOSE BLOOD TEST: CPT

## 2021-12-06 PROCEDURE — 99183 HYPERBARIC OXYGEN THERAPY: CPT

## 2021-12-06 NOTE — ADDENDUM
[FreeTextEntry1] : PT ARRIVED AMBULATORY A&OX3\par ALL VITALS WITHIN PARAMETERS FOR HBOT.\par DRESSING CHANGED PRE TX\par COVID-19 SCREENING COMPLETED PRE TX.\par PT DESCENT TO RX TX DEPTH IN CHAMBER 1 WAS WITHOUT INCIDENT.\par PT RESTING AT DEPTH, CHEST RISE AND FALL OBSERVED.\par PT TOLERATED AIR BREAKS WELL.\par PT ASCENT FROM DEPTH WAS WITHOUT INCIDENT. PT TOLERATED HBOT WELL.\par PT WAS REMINDED OF ASSESSMENT ON 12/07/21 AND ARRIVAL TIME 8:45.\par \par \par CHRENY DONATO 12/07/21\par

## 2021-12-06 NOTE — PROCEDURE
[Outpatient] : Outpatient [Ambulatory] : Patient is ambulatory. [THIS CHAMBER HAS BEEN CLEANED / DISINFECTED] : This chamber has been cleaned / disinfected according to local and hospital policy and procedure prior to this treatment. [Patient demonstrated and verbalized proper technique for using air break mask] : Patient demonstrated and verbalized proper technique for using air break mask [Patient educated on the risks of SMOKING prior to HBOT with understanding] : Patient educated on the risks of SMOKING prior to HBOT with understanding [Patient educated on the risks of CONSUMING ALCOHOL prior to HBOT with understanding] : Patient educated on the risks of CONSUMING ALCOHOL prior to HBOT with understanding [100% Cotton] : 100% cotton [Empty all pockets] : empty all pockets [No hair oils, wigs, hairpieces, pins] : no hair oils, wigs, hairpieces, pins  [Pre tx medications] : pre tx medications  [No make-up, creams] : no make-up, creams  [No jewelry] : no jewelry  [No matches, cigarettes, lighters] : no matches, cigarettes, lighters  [Hearing aid removed] : hearing aid removed [Dentures removed] : dentures removed [Ground bracelet on pt's wrist] : ground bracelet on pt's wrist  [Contacts removed] : contacts removed  [Remove nail polish] : remove nail polish  [No reading material] : no reading material  [Bra, undergarments removed] : bra, undergarments removed  [No contraindicated dressings] : no contraindicated dressings [Ground Wire - VISUAL Verification - Intact/Free of Obstruction] : Ground Wire - VISUAL Verification - Intact/Free of Obstruction  [Ground Continuity - Verified < 1 ohm w/ Wrist Strap Mila] : Ground Continuity - Verified < 1 ohm w/ Wrist Strap Mila [Number: ___] : Number: [unfilled] [Diagnosis: ___] : Diagnosis: [unfilled] [____] : Post-Dive: Time - [unfilled] [___] : Post-Dive: Value - [unfilled] mg/dL [Clear all fields] : clear all fields [] : No [FreeTextEntry4] : 100 [FreeTextEntry6] : 8:30 [FreeTextEntry8] : 8:40 [de-identified] : 9:10 [de-identified] : 9:15 [de-identified] : 9:45 [de-identified] : 9:50 [de-identified] : 10:20 [de-identified] : 10:30 [de-identified] : 120 min

## 2021-12-07 ENCOUNTER — OUTPATIENT (OUTPATIENT)
Dept: OUTPATIENT SERVICES | Facility: HOSPITAL | Age: 61
LOS: 1 days | Discharge: HOME | End: 2021-12-07
Payer: MEDICAID

## 2021-12-07 ENCOUNTER — APPOINTMENT (OUTPATIENT)
Dept: WOUND CARE | Facility: HOSPITAL | Age: 61
End: 2021-12-07

## 2021-12-07 VITALS
DIASTOLIC BLOOD PRESSURE: 87 MMHG | WEIGHT: 215 LBS | SYSTOLIC BLOOD PRESSURE: 138 MMHG | HEART RATE: 64 BPM | HEIGHT: 69 IN | BODY MASS INDEX: 31.84 KG/M2 | RESPIRATION RATE: 18 BRPM | TEMPERATURE: 97.8 F | OXYGEN SATURATION: 97 %

## 2021-12-07 DIAGNOSIS — E11.621 TYPE 2 DIABETES MELLITUS WITH FOOT ULCER: ICD-10-CM

## 2021-12-07 PROCEDURE — 17250 CHEM CAUT OF GRANLTJ TISSUE: CPT | Mod: XS

## 2021-12-08 ENCOUNTER — APPOINTMENT (OUTPATIENT)
Dept: HYPERBARIC MEDICINE | Facility: HOSPITAL | Age: 61
End: 2021-12-08
Payer: MEDICAID

## 2021-12-08 ENCOUNTER — OUTPATIENT (OUTPATIENT)
Dept: OUTPATIENT SERVICES | Facility: HOSPITAL | Age: 61
LOS: 1 days | Discharge: HOME | End: 2021-12-08
Payer: MEDICAID

## 2021-12-08 VITALS
OXYGEN SATURATION: 100 % | TEMPERATURE: 97.4 F | RESPIRATION RATE: 18 BRPM | HEART RATE: 65 BPM | DIASTOLIC BLOOD PRESSURE: 101 MMHG | SYSTOLIC BLOOD PRESSURE: 164 MMHG

## 2021-12-08 VITALS
DIASTOLIC BLOOD PRESSURE: 87 MMHG | OXYGEN SATURATION: 98 % | SYSTOLIC BLOOD PRESSURE: 163 MMHG | TEMPERATURE: 97.3 F | HEART RATE: 66 BPM | RESPIRATION RATE: 18 BRPM

## 2021-12-08 DIAGNOSIS — E11.621 TYPE 2 DIABETES MELLITUS WITH FOOT ULCER: ICD-10-CM

## 2021-12-08 PROCEDURE — 82962 GLUCOSE BLOOD TEST: CPT

## 2021-12-08 PROCEDURE — G0277: CPT

## 2021-12-08 PROCEDURE — 99183 HYPERBARIC OXYGEN THERAPY: CPT

## 2021-12-08 NOTE — PROCEDURE
[Outpatient] : Outpatient [Ambulatory] : Patient is ambulatory. [THIS CHAMBER HAS BEEN CLEANED / DISINFECTED] : This chamber has been cleaned / disinfected according to local and hospital policy and procedure prior to this treatment. [Patient demonstrated and verbalized proper technique for using air break mask] : Patient demonstrated and verbalized proper technique for using air break mask [Patient educated on the risks of SMOKING prior to HBOT with understanding] : Patient educated on the risks of SMOKING prior to HBOT with understanding [Patient educated on the risks of CONSUMING ALCOHOL prior to HBOT with understanding] : Patient educated on the risks of CONSUMING ALCOHOL prior to HBOT with understanding [100% Cotton] : 100% cotton [Empty all pockets] : empty all pockets [No hair oils, wigs, hairpieces, pins] : no hair oils, wigs, hairpieces, pins  [Pre tx medications] : pre tx medications  [No make-up, creams] : no make-up, creams  [No jewelry] : no jewelry  [No matches, cigarettes, lighters] : no matches, cigarettes, lighters  [Hearing aid removed] : hearing aid removed [Dentures removed] : dentures removed [Ground bracelet on pt's wrist] : ground bracelet on pt's wrist  [Contacts removed] : contacts removed  [Remove nail polish] : remove nail polish  [No reading material] : no reading material  [Bra, undergarments removed] : bra, undergarments removed  [No contraindicated dressings] : no contraindicated dressings [Ground Wire - VISUAL Verification - Intact/Free of Obstruction] : Ground Wire - VISUAL Verification - Intact/Free of Obstruction  [Ground Continuity - Verified < 1 ohm w/ Wrist Strap Mila] : Ground Continuity - Verified < 1 ohm w/ Wrist Strap Mila [Number: ___] : Number: [unfilled] [Diagnosis: ___] : Diagnosis: [unfilled] [____] : Post-Dive: Time - [unfilled] [___] : Post-Dive: Value - [unfilled] mg/dL [Clear all fields] : clear all fields [] : No [FreeTextEntry4] : 100 [FreeTextEntry6] : 4068 [FreeTextEntry8] : 7885 [de-identified] : 0378 [de-identified] : 5528 [de-identified] : 0961 [de-identified] : 9725 [de-identified] : 1010 [de-identified] : 1021 [de-identified] : 120 MINUTES

## 2021-12-09 ENCOUNTER — NON-APPOINTMENT (OUTPATIENT)
Age: 61
End: 2021-12-09

## 2021-12-09 ENCOUNTER — APPOINTMENT (OUTPATIENT)
Dept: HYPERBARIC MEDICINE | Facility: HOSPITAL | Age: 61
End: 2021-12-09

## 2021-12-09 DIAGNOSIS — E78.1 PURE HYPERGLYCERIDEMIA: ICD-10-CM

## 2021-12-09 DIAGNOSIS — Z98.1 ARTHRODESIS STATUS: ICD-10-CM

## 2021-12-09 DIAGNOSIS — N32.81 OVERACTIVE BLADDER: ICD-10-CM

## 2021-12-09 DIAGNOSIS — L97.416 NON-PRESSURE CHRONIC ULCER OF RIGHT HEEL AND MIDFOOT WITH BONE INVOLVEMENT WITHOUT EVIDENCE OF NECROSIS: ICD-10-CM

## 2021-12-09 DIAGNOSIS — M10.071 IDIOPATHIC GOUT, RIGHT ANKLE AND FOOT: ICD-10-CM

## 2021-12-09 DIAGNOSIS — L92.9 GRANULOMATOUS DISORDER OF THE SKIN AND SUBCUTANEOUS TISSUE, UNSPECIFIED: ICD-10-CM

## 2021-12-09 DIAGNOSIS — Z87.81 PERSONAL HISTORY OF (HEALED) TRAUMATIC FRACTURE: ICD-10-CM

## 2021-12-09 DIAGNOSIS — I10 ESSENTIAL (PRIMARY) HYPERTENSION: ICD-10-CM

## 2021-12-09 DIAGNOSIS — L97.516 NON-PRESSURE CHRONIC ULCER OF OTHER PART OF RIGHT FOOT WITH BONE INVOLVEMENT WITHOUT EVIDENCE OF NECROSIS: ICD-10-CM

## 2021-12-09 DIAGNOSIS — Z87.442 PERSONAL HISTORY OF URINARY CALCULI: ICD-10-CM

## 2021-12-09 DIAGNOSIS — R35.1 NOCTURIA: ICD-10-CM

## 2021-12-09 DIAGNOSIS — E11.621 TYPE 2 DIABETES MELLITUS WITH FOOT ULCER: ICD-10-CM

## 2021-12-09 DIAGNOSIS — N52.9 MALE ERECTILE DYSFUNCTION, UNSPECIFIED: ICD-10-CM

## 2021-12-09 DIAGNOSIS — Z79.84 LONG TERM (CURRENT) USE OF ORAL HYPOGLYCEMIC DRUGS: ICD-10-CM

## 2021-12-09 DIAGNOSIS — F41.8 OTHER SPECIFIED ANXIETY DISORDERS: ICD-10-CM

## 2021-12-09 DIAGNOSIS — N40.1 BENIGN PROSTATIC HYPERPLASIA WITH LOWER URINARY TRACT SYMPTOMS: ICD-10-CM

## 2021-12-09 DIAGNOSIS — L84 CORNS AND CALLOSITIES: ICD-10-CM

## 2021-12-09 DIAGNOSIS — Z79.899 OTHER LONG TERM (CURRENT) DRUG THERAPY: ICD-10-CM

## 2021-12-09 DIAGNOSIS — Z98.890 OTHER SPECIFIED POSTPROCEDURAL STATES: ICD-10-CM

## 2021-12-10 ENCOUNTER — APPOINTMENT (OUTPATIENT)
Dept: HYPERBARIC MEDICINE | Facility: HOSPITAL | Age: 61
End: 2021-12-10

## 2021-12-11 ENCOUNTER — TRANSCRIPTION ENCOUNTER (OUTPATIENT)
Age: 61
End: 2021-12-11

## 2021-12-13 ENCOUNTER — NON-APPOINTMENT (OUTPATIENT)
Age: 61
End: 2021-12-13

## 2021-12-13 ENCOUNTER — APPOINTMENT (OUTPATIENT)
Dept: HYPERBARIC MEDICINE | Facility: HOSPITAL | Age: 61
End: 2021-12-13

## 2021-12-14 ENCOUNTER — APPOINTMENT (OUTPATIENT)
Dept: WOUND CARE | Facility: HOSPITAL | Age: 61
End: 2021-12-14
Payer: MEDICAID

## 2021-12-14 ENCOUNTER — OUTPATIENT (OUTPATIENT)
Dept: OUTPATIENT SERVICES | Facility: HOSPITAL | Age: 61
LOS: 1 days | Discharge: HOME | End: 2021-12-14
Payer: MEDICAID

## 2021-12-14 VITALS
HEART RATE: 74 BPM | WEIGHT: 215 LBS | TEMPERATURE: 97.4 F | SYSTOLIC BLOOD PRESSURE: 132 MMHG | BODY MASS INDEX: 31.84 KG/M2 | HEIGHT: 69 IN | OXYGEN SATURATION: 96 % | RESPIRATION RATE: 18 BRPM | DIASTOLIC BLOOD PRESSURE: 77 MMHG

## 2021-12-14 DIAGNOSIS — E11.621 TYPE 2 DIABETES MELLITUS WITH FOOT ULCER: ICD-10-CM

## 2021-12-14 PROCEDURE — 99213 OFFICE O/P EST LOW 20 MIN: CPT

## 2021-12-14 PROCEDURE — G0463: CPT

## 2021-12-14 NOTE — PLAN
[FreeTextEntry1] : continue wound care and HBOT , patient doing well otherwise  Spent 20 minutes for patient care and medical decision making.\par dry protective dressing to right foot\par

## 2021-12-14 NOTE — REVIEW OF SYSTEMS
[Fever] : no fever [Chills] : no chills [Eye Pain] : no eye pain [Loss Of Hearing] : no hearing loss [Shortness Of Breath] : no shortness of breath [Wheezing] : no wheezing [Abdominal Pain] : no abdominal pain [Arthralgias] : arthralgias [Joint Swelling] : joint swelling [Joint Stiffness] : joint stiffness [Skin Wound] : skin wound [Anxiety] : no anxiety [Easy Bleeding] : no tendency for easy bleeding [FreeTextEntry5] : HTN [FreeTextEntry9] : Gouty arthropathy  [de-identified] : small granuloma dorsal 2nd toe and small open area base of the 5th metatarsal right foot , clean , no soi  [de-identified] : Diabetic neuropathy  [de-identified] : NIDDM , severe gouty arthropathy

## 2021-12-14 NOTE — HISTORY OF PRESENT ILLNESS
[FreeTextEntry1] : s/p reduction of bone right 2nd toe and lateral base of the 1st metatarsal right foot , 2nd toe has small granuloma which was cauterized with silver nitrate and the base of the 5th metatarsal is smaller but still open , no soi , no pain\par \par 12/14/21 right foot wounds dry and clean

## 2021-12-14 NOTE — ASSESSMENT
[Verbal] : Verbal [Written] : Written [Demo] : Demo [Patient] : Patient [Spouse] : Spouse [Good - alert, interested, motivated] : Good - alert, interested, motivated [Verbalizes knowledge/Understanding] : Verbalizes knowledge/understanding [Dressing changes] : dressing changes [Foot Care] : foot care [Skin Care] : skin care [Pressure relief] : pressure relief [Signs and symptoms of infection] : sign and symptoms of infection [Nutrition] : nutrition [How and When to Call] : how and when to call [Hyperbaric Therapy] : hyperbaric therapy [Off-loading] : off-loading [Patient responsibility to plan of care] : patient responsibility to plan of care [Glycemic Control] : glycemic control [] : Yes [Stable] : stable [Home] : Home [Ambulatory] : Ambulatory [Not Applicable - Long Term Care/Home Health Agency] : Long Term Care/Home Health Agency: Not Applicable [FreeTextEntry2] : Infection prevention \par Wound care (dressing changes)\par Maintain optimal skin integrity to high pressure areas\par Weight reduction strategies.\par HBOT\par Encourage Glycemic Control\par  [FreeTextEntry4] : Pt had covid exposure 12/3/21. Pt self-quarantined until 12/13/21 where Pt tested negative. Negative test results secured by RN and scanned into file.\par 44/60 HBOT completed. No additional treatments ordered. \par F/u tomorrow, 12/15/21 for HBOT. Assessment in 2 weeks.\par

## 2021-12-14 NOTE — PHYSICAL EXAM
[4 x 4] : 4 x 4  [1+] : left 1+ [Ankle Swelling (On Exam)] : present [Ankle Swelling Bilaterally] : bilaterally  [Varicose Veins Of Lower Extremities] : bilaterally [Ankle Swelling On The Left] : moderate [] : bilaterally [Ankle Swelling On The Right] : mild [Purpura] : no purpura  [Petechiae] : no petechiae [Skin Ulcer] : ulcer [Skin Induration] : no induration [Alert] : alert [Oriented to Person] : oriented to person [Oriented to Place] : oriented to place [Calm] : calm [de-identified] : A&Ox3, NAD [de-identified] : HTN [de-identified] : severe gout with associated foot deformities  [de-identified] : right medial incision healed, right 2nd dorsal toe dorsal granuloma, and small opening on the lateral base of the 5th metatarsal right foot   [de-identified] : Diabetic neuropathy  [FreeTextEntry1] : Right lateral foot - Scabbed & Callused  [de-identified] : callused [de-identified] : Dry Dressing [de-identified] : NSC\par DD\par  [FreeTextEntry7] : Right foot 2nd digit - Scabbed Over [de-identified] : Dry Dressing [de-identified] : NSC\par DD [TWNoteComboBox4] : None [TWNoteComboBox6] : Diabetic [TWNoteComboBox5] : No [de-identified] : No [de-identified] : other [de-identified] : None [de-identified] : None [de-identified] : 100% [de-identified] : No [de-identified] : 3x Weekly [de-identified] : Primary Dressing [de-identified] : None [de-identified] : No [de-identified] : Diabetic [de-identified] : No [de-identified] : Erythema [de-identified] : None [de-identified] : None [de-identified] : No [de-identified] : 100% [de-identified] : 3x Weekly [de-identified] : Primary Dressing

## 2021-12-15 ENCOUNTER — APPOINTMENT (OUTPATIENT)
Dept: HYPERBARIC MEDICINE | Facility: HOSPITAL | Age: 61
End: 2021-12-15
Payer: MEDICAID

## 2021-12-15 ENCOUNTER — OUTPATIENT (OUTPATIENT)
Dept: OUTPATIENT SERVICES | Facility: HOSPITAL | Age: 61
LOS: 1 days | Discharge: HOME | End: 2021-12-15
Payer: MEDICAID

## 2021-12-15 VITALS
OXYGEN SATURATION: 98 % | SYSTOLIC BLOOD PRESSURE: 170 MMHG | DIASTOLIC BLOOD PRESSURE: 91 MMHG | TEMPERATURE: 97.3 F | HEART RATE: 81 BPM | RESPIRATION RATE: 18 BRPM

## 2021-12-15 VITALS
SYSTOLIC BLOOD PRESSURE: 162 MMHG | DIASTOLIC BLOOD PRESSURE: 89 MMHG | RESPIRATION RATE: 18 BRPM | HEART RATE: 71 BPM | TEMPERATURE: 97.4 F | OXYGEN SATURATION: 99 %

## 2021-12-15 DIAGNOSIS — E11.621 TYPE 2 DIABETES MELLITUS WITH FOOT ULCER: ICD-10-CM

## 2021-12-15 DIAGNOSIS — M10.071 IDIOPATHIC GOUT, RIGHT ANKLE AND FOOT: ICD-10-CM

## 2021-12-15 DIAGNOSIS — Z87.81 PERSONAL HISTORY OF (HEALED) TRAUMATIC FRACTURE: ICD-10-CM

## 2021-12-15 DIAGNOSIS — N40.1 BENIGN PROSTATIC HYPERPLASIA WITH LOWER URINARY TRACT SYMPTOMS: ICD-10-CM

## 2021-12-15 DIAGNOSIS — L97.416 NON-PRESSURE CHRONIC ULCER OF RIGHT HEEL AND MIDFOOT WITH BONE INVOLVEMENT WITHOUT EVIDENCE OF NECROSIS: ICD-10-CM

## 2021-12-15 DIAGNOSIS — L97.516 NON-PRESSURE CHRONIC ULCER OF OTHER PART OF RIGHT FOOT WITH BONE INVOLVEMENT WITHOUT EVIDENCE OF NECROSIS: ICD-10-CM

## 2021-12-15 DIAGNOSIS — I10 ESSENTIAL (PRIMARY) HYPERTENSION: ICD-10-CM

## 2021-12-15 DIAGNOSIS — Z87.442 PERSONAL HISTORY OF URINARY CALCULI: ICD-10-CM

## 2021-12-15 DIAGNOSIS — Z98.890 OTHER SPECIFIED POSTPROCEDURAL STATES: ICD-10-CM

## 2021-12-15 DIAGNOSIS — N52.9 MALE ERECTILE DYSFUNCTION, UNSPECIFIED: ICD-10-CM

## 2021-12-15 DIAGNOSIS — E78.1 PURE HYPERGLYCERIDEMIA: ICD-10-CM

## 2021-12-15 DIAGNOSIS — N32.81 OVERACTIVE BLADDER: ICD-10-CM

## 2021-12-15 DIAGNOSIS — F41.8 OTHER SPECIFIED ANXIETY DISORDERS: ICD-10-CM

## 2021-12-15 DIAGNOSIS — L84 CORNS AND CALLOSITIES: ICD-10-CM

## 2021-12-15 DIAGNOSIS — Z98.1 ARTHRODESIS STATUS: ICD-10-CM

## 2021-12-15 DIAGNOSIS — Z79.84 LONG TERM (CURRENT) USE OF ORAL HYPOGLYCEMIC DRUGS: ICD-10-CM

## 2021-12-15 DIAGNOSIS — Z79.899 OTHER LONG TERM (CURRENT) DRUG THERAPY: ICD-10-CM

## 2021-12-15 DIAGNOSIS — R35.1 NOCTURIA: ICD-10-CM

## 2021-12-15 PROCEDURE — 99183 HYPERBARIC OXYGEN THERAPY: CPT

## 2021-12-15 PROCEDURE — 82962 GLUCOSE BLOOD TEST: CPT

## 2021-12-15 PROCEDURE — G0277: CPT

## 2021-12-15 NOTE — PROCEDURE
[Outpatient] : Outpatient [Ambulatory] : Patient is ambulatory. [THIS CHAMBER HAS BEEN CLEANED / DISINFECTED] : This chamber has been cleaned / disinfected according to local and hospital policy and procedure prior to this treatment. [Patient demonstrated and verbalized proper technique for using air break mask] : Patient demonstrated and verbalized proper technique for using air break mask [Patient educated on the risks of SMOKING prior to HBOT with understanding] : Patient educated on the risks of SMOKING prior to HBOT with understanding [Patient educated on the risks of CONSUMING ALCOHOL prior to HBOT with understanding] : Patient educated on the risks of CONSUMING ALCOHOL prior to HBOT with understanding [100% Cotton] : 100% cotton [Empty all pockets] : empty all pockets [No hair oils, wigs, hairpieces, pins] : no hair oils, wigs, hairpieces, pins  [Pre tx medications] : pre tx medications  [No make-up, creams] : no make-up, creams  [No jewelry] : no jewelry  [No matches, cigarettes, lighters] : no matches, cigarettes, lighters  [Hearing aid removed] : hearing aid removed [Dentures removed] : dentures removed [Ground bracelet on pt's wrist] : ground bracelet on pt's wrist  [Contacts removed] : contacts removed  [Remove nail polish] : remove nail polish  [No reading material] : no reading material  [Bra, undergarments removed] : bra, undergarments removed  [No contraindicated dressings] : no contraindicated dressings [Ground Wire - VISUAL Verification - Intact/Free of Obstruction] : Ground Wire - VISUAL Verification - Intact/Free of Obstruction  [Ground Continuity - Verified < 1 ohm w/ Wrist Strap Mila] : Ground Continuity - Verified < 1 ohm w/ Wrist Strap Mila [Number: ___] : Number: [unfilled] [Diagnosis: ___] : Diagnosis: [unfilled] [____] : Post-Dive: Time - [unfilled] [___] : Post-Dive: Value - [unfilled] mg/dL [Clear all fields] : clear all fields [] : No [FreeTextEntry4] : 100 [FreeTextEntry6] : 3201 [FreeTextEntry8] : 3117 [de-identified] : 6080 [de-identified] : 0921 [de-identified] : 0954 [de-identified] : 8641 [de-identified] : 1007 [de-identified] : 4399 [de-identified] : 120 MINUTES

## 2021-12-16 ENCOUNTER — APPOINTMENT (OUTPATIENT)
Dept: HYPERBARIC MEDICINE | Facility: HOSPITAL | Age: 61
End: 2021-12-16

## 2021-12-16 ENCOUNTER — NON-APPOINTMENT (OUTPATIENT)
Age: 61
End: 2021-12-16

## 2021-12-17 ENCOUNTER — APPOINTMENT (OUTPATIENT)
Dept: HYPERBARIC MEDICINE | Facility: HOSPITAL | Age: 61
End: 2021-12-17
Payer: MEDICAID

## 2021-12-17 ENCOUNTER — OUTPATIENT (OUTPATIENT)
Dept: OUTPATIENT SERVICES | Facility: HOSPITAL | Age: 61
LOS: 1 days | Discharge: HOME | End: 2021-12-17
Payer: MEDICAID

## 2021-12-17 VITALS
HEART RATE: 60 BPM | DIASTOLIC BLOOD PRESSURE: 86 MMHG | RESPIRATION RATE: 20 BRPM | SYSTOLIC BLOOD PRESSURE: 168 MMHG | TEMPERATURE: 97 F | OXYGEN SATURATION: 100 %

## 2021-12-17 VITALS
SYSTOLIC BLOOD PRESSURE: 159 MMHG | RESPIRATION RATE: 20 BRPM | HEART RATE: 58 BPM | DIASTOLIC BLOOD PRESSURE: 90 MMHG | OXYGEN SATURATION: 98 % | TEMPERATURE: 96.9 F

## 2021-12-17 DIAGNOSIS — E11.621 TYPE 2 DIABETES MELLITUS WITH FOOT ULCER: ICD-10-CM

## 2021-12-17 PROCEDURE — 99183 HYPERBARIC OXYGEN THERAPY: CPT

## 2021-12-17 PROCEDURE — G0277: CPT

## 2021-12-17 PROCEDURE — 82962 GLUCOSE BLOOD TEST: CPT

## 2021-12-20 ENCOUNTER — OUTPATIENT (OUTPATIENT)
Dept: OUTPATIENT SERVICES | Facility: HOSPITAL | Age: 61
LOS: 1 days | Discharge: HOME | End: 2021-12-20
Payer: MEDICAID

## 2021-12-20 ENCOUNTER — APPOINTMENT (OUTPATIENT)
Dept: HYPERBARIC MEDICINE | Facility: HOSPITAL | Age: 61
End: 2021-12-20
Payer: MEDICAID

## 2021-12-20 VITALS
OXYGEN SATURATION: 98 % | SYSTOLIC BLOOD PRESSURE: 172 MMHG | RESPIRATION RATE: 18 BRPM | HEART RATE: 60 BPM | DIASTOLIC BLOOD PRESSURE: 93 MMHG | TEMPERATURE: 97.4 F

## 2021-12-20 VITALS
SYSTOLIC BLOOD PRESSURE: 180 MMHG | DIASTOLIC BLOOD PRESSURE: 99 MMHG | OXYGEN SATURATION: 99 % | RESPIRATION RATE: 18 BRPM | HEART RATE: 61 BPM | TEMPERATURE: 97 F

## 2021-12-20 DIAGNOSIS — L97.516 NON-PRESSURE CHRONIC ULCER OF OTHER PART OF RIGHT FOOT WITH BONE INVOLVEMENT WITHOUT EVIDENCE OF NECROSIS: ICD-10-CM

## 2021-12-20 DIAGNOSIS — E11.621 TYPE 2 DIABETES MELLITUS WITH FOOT ULCER: ICD-10-CM

## 2021-12-20 DIAGNOSIS — L97.416 NON-PRESSURE CHRONIC ULCER OF RIGHT HEEL AND MIDFOOT WITH BONE INVOLVEMENT WITHOUT EVIDENCE OF NECROSIS: ICD-10-CM

## 2021-12-20 LAB — SARS-COV-2 RNA SPEC QL NAA+PROBE: SIGNIFICANT CHANGE UP

## 2021-12-20 PROCEDURE — U0003: CPT

## 2021-12-20 PROCEDURE — 99183 HYPERBARIC OXYGEN THERAPY: CPT

## 2021-12-20 PROCEDURE — U0005: CPT

## 2021-12-20 PROCEDURE — G0277: CPT

## 2021-12-20 PROCEDURE — 82962 GLUCOSE BLOOD TEST: CPT

## 2021-12-20 NOTE — PROCEDURE
[Outpatient] : Outpatient [Ambulatory] : Patient is ambulatory. [THIS CHAMBER HAS BEEN CLEANED / DISINFECTED] : This chamber has been cleaned / disinfected according to local and hospital policy and procedure prior to this treatment. [Patient demonstrated and verbalized proper technique for using air break mask] : Patient demonstrated and verbalized proper technique for using air break mask [Patient educated on the risks of SMOKING prior to HBOT with understanding] : Patient educated on the risks of SMOKING prior to HBOT with understanding [Patient educated on the risks of CONSUMING ALCOHOL prior to HBOT with understanding] : Patient educated on the risks of CONSUMING ALCOHOL prior to HBOT with understanding [100% Cotton] : 100% cotton [Empty all pockets] : empty all pockets [No hair oils, wigs, hairpieces, pins] : no hair oils, wigs, hairpieces, pins  [Pre tx medications] : pre tx medications  [No make-up, creams] : no make-up, creams  [No jewelry] : no jewelry  [No matches, cigarettes, lighters] : no matches, cigarettes, lighters  [Hearing aid removed] : hearing aid removed [Dentures removed] : dentures removed [Ground bracelet on pt's wrist] : ground bracelet on pt's wrist  [Contacts removed] : contacts removed  [Remove nail polish] : remove nail polish  [No reading material] : no reading material  [Bra, undergarments removed] : bra, undergarments removed  [No contraindicated dressings] : no contraindicated dressings [Ground Wire - VISUAL Verification - Intact/Free of Obstruction] : Ground Wire - VISUAL Verification - Intact/Free of Obstruction  [Ground Continuity - Verified < 1 ohm w/ Wrist Strap Mila] : Ground Continuity - Verified < 1 ohm w/ Wrist Strap Mila [Number: ___] : Number: [unfilled] [Diagnosis: ___] : Diagnosis: [unfilled] [____] : Post-Dive: Time - [unfilled] [___] : Post-Dive: Value - [unfilled] mg/dL [Clear all fields] : clear all fields [] : No [FreeTextEntry4] : 100 [FreeTextEntry6] : 2420 [FreeTextEntry8] : 4894 [de-identified] : 3051 [de-identified] : 5459 [de-identified] : 0936 [de-identified] : 4349 [de-identified] : 1008 [de-identified] : 1010 [de-identified] : 120 MINUTES

## 2021-12-20 NOTE — ADDENDUM
[FreeTextEntry1] : PT ARRIVED A&OX3\par ALL VITALS WITHIN PARAMETERS FOR HBOT\par PT DESCENDED TO 2.4 DAVID @ 2.2 PSI/MIN IN CHAMBER #1 WITHOUT INCIDENT\par PT RESTING AT TX DEPTH WITH VISIBLE CHEST RISE AND FALL OBSERVED CHAMBER SIDE \par PT TOLERATED AIR BREAKS WELL\par PT ASCENDED FROM 2.4 DAVID @ 2.2 PSI/MIN WITHOUT INCIDENT\par PT TOLERATED TX WELL

## 2021-12-20 NOTE — PROCEDURE
[Outpatient] : Outpatient [THIS CHAMBER HAS BEEN CLEANED / DISINFECTED] : This chamber has been cleaned / disinfected according to local and hospital policy and procedure prior to this treatment. [Patient demonstrated and verbalized proper technique for using air break mask] : Patient demonstrated and verbalized proper technique for using air break mask [Patient educated on the risks of SMOKING prior to HBOT with understanding] : Patient educated on the risks of SMOKING prior to HBOT with understanding [Patient educated on the risks of CONSUMING ALCOHOL prior to HBOT with understanding] : Patient educated on the risks of CONSUMING ALCOHOL prior to HBOT with understanding [100% Cotton] : 100% cotton [Empty all pockets] : empty all pockets [No hair oils, wigs, hairpieces, pins] : no hair oils, wigs, hairpieces, pins  [Pre tx medications] : pre tx medications  [No make-up, creams] : no make-up, creams  [No jewelry] : no jewelry  [No matches, cigarettes, lighters] : no matches, cigarettes, lighters  [Hearing aid removed] : hearing aid removed [Dentures removed] : dentures removed [Ground bracelet on pt's wrist] : ground bracelet on pt's wrist  [Contacts removed] : contacts removed  [Remove nail polish] : remove nail polish  [No reading material] : no reading material  [Bra, undergarments removed] : bra, undergarments removed  [Ground Wire - VISUAL Verification - Intact/Free of Obstruction] : Ground Wire - VISUAL Verification - Intact/Free of Obstruction  [No contraindicated dressings] : no contraindicated dressings [Ground Continuity - Verified < 1 ohm w/ Wrist Strap Mila] : Ground Continuity - Verified < 1 ohm w/ Wrist Strap Mila [Number: ___] : Number: [unfilled] [Diagnosis: ___] : Diagnosis: [unfilled] [____] : Post-Dive: Time - [unfilled] [___] : Post-Dive: Value - [unfilled] mg/dL [Clear all fields] : clear all fields [] : No [FreeTextEntry4] : 100 [FreeTextEntry6] : 8:07 [FreeTextEntry8] : 8:17 [de-identified] : 8:47 [de-identified] : 8:52 [de-identified] : 9:22 [de-identified] : 9:27 [de-identified] : 9:57 [de-identified] : 10:07 [de-identified] : 120 MIN

## 2021-12-20 NOTE — ADDENDUM
[FreeTextEntry1] : PT ARRIVED AMBULATORY A&OX3.\par ALL VITALS WITHIN PARAMETERS FOR HBOT.\par NO DRAINAGE NOTED ON DRESSING PRIOR TO DESCENT. WOUND CARE TO FOLLOW HBOT\par PT DESCENT TO RX TX DEPTH IN CHAMBER 1 WAS WITHOUT INCIDENT.\par PT RESTING AT DEPTH, CHEST RISE AND FALL OBSERVED.\par PT TOLERATED AIR BREAKS WELL.\par PT ASCENT WAS WITHOUT INCIDENT.\par PT TOLERATED HBOT WELL.\par WOUND CARE PROVIDED FOLLOWING HBOT\par \par \par CHT WILLIAM ANDRADE 12/17/21

## 2021-12-21 ENCOUNTER — OUTPATIENT (OUTPATIENT)
Dept: OUTPATIENT SERVICES | Facility: HOSPITAL | Age: 61
LOS: 1 days | Discharge: HOME | End: 2021-12-21
Payer: MEDICAID

## 2021-12-21 ENCOUNTER — APPOINTMENT (OUTPATIENT)
Dept: HYPERBARIC MEDICINE | Facility: HOSPITAL | Age: 61
End: 2021-12-21
Payer: MEDICAID

## 2021-12-21 ENCOUNTER — APPOINTMENT (OUTPATIENT)
Dept: HYPERBARIC MEDICINE | Facility: HOSPITAL | Age: 61
End: 2021-12-21

## 2021-12-21 VITALS
OXYGEN SATURATION: 98 % | RESPIRATION RATE: 18 BRPM | DIASTOLIC BLOOD PRESSURE: 88 MMHG | SYSTOLIC BLOOD PRESSURE: 153 MMHG | HEART RATE: 69 BPM | TEMPERATURE: 97.3 F

## 2021-12-21 VITALS
HEART RATE: 61 BPM | DIASTOLIC BLOOD PRESSURE: 94 MMHG | RESPIRATION RATE: 18 BRPM | OXYGEN SATURATION: 99 % | TEMPERATURE: 97.4 F | SYSTOLIC BLOOD PRESSURE: 180 MMHG

## 2021-12-21 DIAGNOSIS — E11.621 TYPE 2 DIABETES MELLITUS WITH FOOT ULCER: ICD-10-CM

## 2021-12-21 DIAGNOSIS — L97.416 NON-PRESSURE CHRONIC ULCER OF RIGHT HEEL AND MIDFOOT WITH BONE INVOLVEMENT WITHOUT EVIDENCE OF NECROSIS: ICD-10-CM

## 2021-12-21 DIAGNOSIS — L97.516 NON-PRESSURE CHRONIC ULCER OF OTHER PART OF RIGHT FOOT WITH BONE INVOLVEMENT WITHOUT EVIDENCE OF NECROSIS: ICD-10-CM

## 2021-12-21 PROCEDURE — 99183 HYPERBARIC OXYGEN THERAPY: CPT

## 2021-12-21 PROCEDURE — 82962 GLUCOSE BLOOD TEST: CPT

## 2021-12-21 PROCEDURE — G0277: CPT

## 2021-12-21 NOTE — PHYSICAL EXAM
[Purpura] : no purpura  [Petechiae] : no petechiae [Skin Induration] : no induration [de-identified] : A&Ox3, NAD [de-identified] : HTN [de-identified] : severe gout with associated foot deformities  [de-identified] : right medial incision healed, right 2nd dorsal toe dorsal granuloma, and small opening on the lateral base of the 5th metatarsal right foot   [de-identified] : Diabetic neuropathy  [de-identified] : DPM cauterized with (1) AgNO3 stick [FreeTextEntry1] : Right lateral foot [FreeTextEntry2] : 1.0 [FreeTextEntry3] : 0.3 [FreeTextEntry4] : 0.6 [de-identified] : callused - DPM shaved callus [de-identified] : Alginate Ag [de-identified] : NSC\par DD\par  [de-identified] : DPM cauterized with (1) AgNO3 stick [FreeTextEntry7] : Right foot 2nd digit [FreeTextEntry8] : 0.6 [FreeTextEntry9] : 0.6 [de-identified] : 0.1 [de-identified] : small dried serosanguineous  [de-identified] : Alginate Ag [de-identified] : NSC\par DD [TWNoteComboBox4] : None [TWNoteComboBox5] : No [TWNoteComboBox6] : Diabetic [de-identified] : No [de-identified] : other [de-identified] : None [de-identified] : None [de-identified] : 100% [de-identified] : No [de-identified] : 3x Weekly [de-identified] : Primary Dressing [de-identified] : No [de-identified] : Diabetic [de-identified] : No [de-identified] : Erythema [de-identified] : None [de-identified] : None [de-identified] : 100% [de-identified] : No [de-identified] : 3x Weekly [de-identified] : Primary Dressing

## 2021-12-21 NOTE — REVIEW OF SYSTEMS
[Fever] : no fever [Chills] : no chills [Eye Pain] : no eye pain [Loss Of Hearing] : no hearing loss [Shortness Of Breath] : no shortness of breath [Wheezing] : no wheezing [Abdominal Pain] : no abdominal pain [Anxiety] : no anxiety [Easy Bleeding] : no tendency for easy bleeding [FreeTextEntry5] : HTN [FreeTextEntry9] : Gouty arthropathy  [de-identified] : small granuloma dorsal 2nd toe and small open area base of the 5th metatarsal right foot , clean , no soi  [de-identified] : Diabetic neuropathy  [de-identified] : NIDDM , severe gouty arthropathy

## 2021-12-21 NOTE — HISTORY OF PRESENT ILLNESS
[FreeTextEntry1] : s/p reduction of bone right 2nd toe and lateral base of the 1st metatarsal right foot , 2nd toe has small granuloma which was cauterized with silver nitrate and the base of the 5th metatarsal is smaller but still open , no soi , no pain

## 2021-12-21 NOTE — PROCEDURE
[Outpatient] : Outpatient [Ambulatory] : Patient is ambulatory. [THIS CHAMBER HAS BEEN CLEANED / DISINFECTED] : This chamber has been cleaned / disinfected according to local and hospital policy and procedure prior to this treatment. [Patient demonstrated and verbalized proper technique for using air break mask] : Patient demonstrated and verbalized proper technique for using air break mask [Patient educated on the risks of SMOKING prior to HBOT with understanding] : Patient educated on the risks of SMOKING prior to HBOT with understanding [Patient educated on the risks of CONSUMING ALCOHOL prior to HBOT with understanding] : Patient educated on the risks of CONSUMING ALCOHOL prior to HBOT with understanding [100% Cotton] : 100% cotton [Empty all pockets] : empty all pockets [No hair oils, wigs, hairpieces, pins] : no hair oils, wigs, hairpieces, pins  [Pre tx medications] : pre tx medications  [No make-up, creams] : no make-up, creams  [No jewelry] : no jewelry  [No matches, cigarettes, lighters] : no matches, cigarettes, lighters  [Hearing aid removed] : hearing aid removed [Dentures removed] : dentures removed [Ground bracelet on pt's wrist] : ground bracelet on pt's wrist  [Contacts removed] : contacts removed  [Remove nail polish] : remove nail polish  [No reading material] : no reading material  [Bra, undergarments removed] : bra, undergarments removed  [No contraindicated dressings] : no contraindicated dressings [Ground Wire - VISUAL Verification - Intact/Free of Obstruction] : Ground Wire - VISUAL Verification - Intact/Free of Obstruction  [Ground Continuity - Verified < 1 ohm w/ Wrist Strap Mila] : Ground Continuity - Verified < 1 ohm w/ Wrist Strap Mila [Number: ___] : Number: [unfilled] [Diagnosis: ___] : Diagnosis: [unfilled] [____] : Post-Dive: Time - [unfilled] [___] : Post-Dive: Value - [unfilled] mg/dL [Clear all fields] : clear all fields [] : No [FreeTextEntry4] : 100 [FreeTextEntry6] : 4026 [FreeTextEntry8] : 2842 [de-identified] : 0221 [de-identified] : 0960 [de-identified] : 6791 [de-identified] : 7643 [de-identified] : 1017 [de-identified] : 1028 [de-identified] : 120 MINUTES

## 2021-12-21 NOTE — PLAN
[FreeTextEntry1] : continue wound care and HBOT , patient doing well otherwise  Spent 20 minutes for patient care and medical decision making.\par

## 2021-12-21 NOTE — ASSESSMENT
[FreeTextEntry2] : Infection prevention \par Wound care (dressing changes)\par Maintain optimal skin integrity to high pressure areas\par Weight reduction strategies.\par HBOT\par Encourage Glycemic Control\par  [FreeTextEntry4] : 43/50 HBOT completed. Auth submitted for +10 treatments = 60 total. \par DPM requested weekly assessment for wound monitoring \par Pt to f/u tomorrow, 12/8/21 for HBOT. Assessment in 1 week\par

## 2021-12-21 NOTE — ADDENDUM
[FreeTextEntry1] : PT ARRIVED A&OX3 \par ALL VITALS WITHIN PARAMETERS FOR HBOT\par PT DESCENDED TO 2.4 DAVID @ 2.2 PSI/MIN IN CHAMBER #1 WITHOUT INCIDNET\par PT RESTING AT TX DEPTH WITH VISIBLE CHEST RISE AND FALL OBSERVED CHAMBER SIDE \par PT TOLERATED AIR BREAKS WELL\par PT ASCENDED FROM 2.4 DAVID @ 2.2 PSI/MIN WITHOUT INCIDENT\par PT TOLERATED TX WELL\par

## 2021-12-22 ENCOUNTER — APPOINTMENT (OUTPATIENT)
Dept: HYPERBARIC MEDICINE | Facility: HOSPITAL | Age: 61
End: 2021-12-22

## 2021-12-22 DIAGNOSIS — L97.516 NON-PRESSURE CHRONIC ULCER OF OTHER PART OF RIGHT FOOT WITH BONE INVOLVEMENT WITHOUT EVIDENCE OF NECROSIS: ICD-10-CM

## 2021-12-22 DIAGNOSIS — E11.621 TYPE 2 DIABETES MELLITUS WITH FOOT ULCER: ICD-10-CM

## 2021-12-22 DIAGNOSIS — L97.416 NON-PRESSURE CHRONIC ULCER OF RIGHT HEEL AND MIDFOOT WITH BONE INVOLVEMENT WITHOUT EVIDENCE OF NECROSIS: ICD-10-CM

## 2021-12-23 ENCOUNTER — NON-APPOINTMENT (OUTPATIENT)
Age: 61
End: 2021-12-23

## 2021-12-23 ENCOUNTER — APPOINTMENT (OUTPATIENT)
Dept: HYPERBARIC MEDICINE | Facility: HOSPITAL | Age: 61
End: 2021-12-23

## 2021-12-27 ENCOUNTER — NON-APPOINTMENT (OUTPATIENT)
Age: 61
End: 2021-12-27

## 2021-12-27 ENCOUNTER — APPOINTMENT (OUTPATIENT)
Dept: HYPERBARIC MEDICINE | Facility: HOSPITAL | Age: 61
End: 2021-12-27

## 2021-12-28 ENCOUNTER — NON-APPOINTMENT (OUTPATIENT)
Age: 61
End: 2021-12-28

## 2021-12-28 ENCOUNTER — APPOINTMENT (OUTPATIENT)
Dept: HYPERBARIC MEDICINE | Facility: HOSPITAL | Age: 61
End: 2021-12-28

## 2021-12-29 ENCOUNTER — APPOINTMENT (OUTPATIENT)
Dept: HYPERBARIC MEDICINE | Facility: HOSPITAL | Age: 61
End: 2021-12-29
Payer: MEDICAID

## 2021-12-29 ENCOUNTER — OUTPATIENT (OUTPATIENT)
Dept: OUTPATIENT SERVICES | Facility: HOSPITAL | Age: 61
LOS: 1 days | Discharge: HOME | End: 2021-12-29
Payer: MEDICAID

## 2021-12-29 VITALS
SYSTOLIC BLOOD PRESSURE: 159 MMHG | DIASTOLIC BLOOD PRESSURE: 93 MMHG | RESPIRATION RATE: 18 BRPM | HEART RATE: 57 BPM | TEMPERATURE: 97.6 F | OXYGEN SATURATION: 100 %

## 2021-12-29 VITALS
OXYGEN SATURATION: 98 % | DIASTOLIC BLOOD PRESSURE: 89 MMHG | TEMPERATURE: 97.4 F | HEART RATE: 66 BPM | SYSTOLIC BLOOD PRESSURE: 173 MMHG | RESPIRATION RATE: 18 BRPM

## 2021-12-29 DIAGNOSIS — E11.621 TYPE 2 DIABETES MELLITUS WITH FOOT ULCER: ICD-10-CM

## 2021-12-29 LAB — SARS-COV-2 RNA SPEC QL NAA+PROBE: SIGNIFICANT CHANGE UP

## 2021-12-29 PROCEDURE — 99183 HYPERBARIC OXYGEN THERAPY: CPT

## 2021-12-29 PROCEDURE — G0277: CPT

## 2021-12-29 PROCEDURE — U0003: CPT

## 2021-12-29 PROCEDURE — 82962 GLUCOSE BLOOD TEST: CPT

## 2021-12-29 PROCEDURE — U0005: CPT

## 2021-12-29 NOTE — PROCEDURE
[Outpatient] : Outpatient [Ambulatory] : Patient is ambulatory. [THIS CHAMBER HAS BEEN CLEANED / DISINFECTED] : This chamber has been cleaned / disinfected according to local and hospital policy and procedure prior to this treatment. [Patient demonstrated and verbalized proper technique for using air break mask] : Patient demonstrated and verbalized proper technique for using air break mask [Patient educated on the risks of SMOKING prior to HBOT with understanding] : Patient educated on the risks of SMOKING prior to HBOT with understanding [Patient educated on the risks of CONSUMING ALCOHOL prior to HBOT with understanding] : Patient educated on the risks of CONSUMING ALCOHOL prior to HBOT with understanding [100% Cotton] : 100% cotton [Empty all pockets] : empty all pockets [No hair oils, wigs, hairpieces, pins] : no hair oils, wigs, hairpieces, pins  [Pre tx medications] : pre tx medications  [No make-up, creams] : no make-up, creams  [No jewelry] : no jewelry  [No matches, cigarettes, lighters] : no matches, cigarettes, lighters  [Hearing aid removed] : hearing aid removed [Dentures removed] : dentures removed [Ground bracelet on pt's wrist] : ground bracelet on pt's wrist  [Contacts removed] : contacts removed  [Remove nail polish] : remove nail polish  [No reading material] : no reading material  [Bra, undergarments removed] : bra, undergarments removed  [No contraindicated dressings] : no contraindicated dressings [Ground Wire - VISUAL Verification - Intact/Free of Obstruction] : Ground Wire - VISUAL Verification - Intact/Free of Obstruction  [Ground Continuity - Verified < 1 ohm w/ Wrist Strap Mila] : Ground Continuity - Verified < 1 ohm w/ Wrist Strap Mila [Number: ___] : Number: [unfilled] [Diagnosis: ___] : Diagnosis: [unfilled] [____] : Post-Dive: Time - [unfilled] [___] : Post-Dive: Value - [unfilled] mg/dL [Clear all fields] : clear all fields [] : No [FreeTextEntry4] : 100 [FreeTextEntry6] : 8898 [FreeTextEntry8] : 3543 [de-identified] : 8545 [de-identified] : 3558 [de-identified] : 0905 [de-identified] : 4005 [de-identified] : 0212 [de-identified] : 3691 [de-identified] : 120 MINUTES

## 2021-12-29 NOTE — ADDENDUM
[FreeTextEntry1] : PT ARRIVED A&OX3 \par ALL VITALS WITHIN PARAMETERS FOR HBOT\par PT DESCENDED TO 2.4 DAVID @ 2.2 PSI/MIN IN CHAMBER #1 WITHOUT INCIDENT\par PT RESTING AT TX DEPTH WITH VISIBLE CHEST RISE AND FALL OBSERVED CHAMBER SIDE \par PT TOLERATED AIR BREAKS WELL\par PT ASCENDED FROM 2.4 DAVID @ 2.2 PSI/MIN WITHOUT INCIDENT\par PT TOLERATED TX WELL

## 2021-12-30 ENCOUNTER — APPOINTMENT (OUTPATIENT)
Dept: HYPERBARIC MEDICINE | Facility: HOSPITAL | Age: 61
End: 2021-12-30
Payer: MEDICAID

## 2021-12-30 ENCOUNTER — OUTPATIENT (OUTPATIENT)
Dept: OUTPATIENT SERVICES | Facility: HOSPITAL | Age: 61
LOS: 1 days | Discharge: HOME | End: 2021-12-30
Payer: MEDICAID

## 2021-12-30 VITALS
TEMPERATURE: 97.4 F | RESPIRATION RATE: 18 BRPM | SYSTOLIC BLOOD PRESSURE: 177 MMHG | HEART RATE: 64 BPM | DIASTOLIC BLOOD PRESSURE: 91 MMHG | OXYGEN SATURATION: 96 %

## 2021-12-30 VITALS
TEMPERATURE: 97.4 F | RESPIRATION RATE: 18 BRPM | HEART RATE: 71 BPM | OXYGEN SATURATION: 99 % | SYSTOLIC BLOOD PRESSURE: 180 MMHG | DIASTOLIC BLOOD PRESSURE: 98 MMHG

## 2021-12-30 DIAGNOSIS — E11.621 TYPE 2 DIABETES MELLITUS WITH FOOT ULCER: ICD-10-CM

## 2021-12-30 DIAGNOSIS — L97.516 NON-PRESSURE CHRONIC ULCER OF OTHER PART OF RIGHT FOOT WITH BONE INVOLVEMENT WITHOUT EVIDENCE OF NECROSIS: ICD-10-CM

## 2021-12-30 DIAGNOSIS — L97.416 NON-PRESSURE CHRONIC ULCER OF RIGHT HEEL AND MIDFOOT WITH BONE INVOLVEMENT WITHOUT EVIDENCE OF NECROSIS: ICD-10-CM

## 2021-12-30 DIAGNOSIS — Z20.822 CONTACT WITH AND (SUSPECTED) EXPOSURE TO COVID-19: ICD-10-CM

## 2021-12-30 PROCEDURE — G0277: CPT

## 2021-12-30 PROCEDURE — 99183 HYPERBARIC OXYGEN THERAPY: CPT

## 2021-12-30 PROCEDURE — 82962 GLUCOSE BLOOD TEST: CPT

## 2021-12-30 NOTE — ADDENDUM
[FreeTextEntry1] : PT ARRIVED A&XO3\par ALL VITALS WITHIN PARAMETERS FOR HBOT\par PT DESCENDED TO 2.4 DAVID @ 2.2 PSI/MIN IN CHAMBER #3 WITHOUT INCIDENT\par PT RESTING AT TX DEPTH WITH VISIBLE CHEST RISE AND FALL OBSERVED CHAMBER SIDE \par PT TOLERATED AIR BREAKS WELL\par PT ASCENDED FROM 2.4 DAVID @ 2.2 PSI/MIN WITHOUT INCIDENT\par PT TOLERATED TX WELL

## 2021-12-30 NOTE — PROCEDURE
[Outpatient] : Outpatient [Ambulatory] : Patient is ambulatory. [THIS CHAMBER HAS BEEN CLEANED / DISINFECTED] : This chamber has been cleaned / disinfected according to local and hospital policy and procedure prior to this treatment. [Patient demonstrated and verbalized proper technique for using air break mask] : Patient demonstrated and verbalized proper technique for using air break mask [Patient educated on the risks of SMOKING prior to HBOT with understanding] : Patient educated on the risks of SMOKING prior to HBOT with understanding [Patient educated on the risks of CONSUMING ALCOHOL prior to HBOT with understanding] : Patient educated on the risks of CONSUMING ALCOHOL prior to HBOT with understanding [100% Cotton] : 100% cotton [Empty all pockets] : empty all pockets [No hair oils, wigs, hairpieces, pins] : no hair oils, wigs, hairpieces, pins  [Pre tx medications] : pre tx medications  [No make-up, creams] : no make-up, creams  [No jewelry] : no jewelry  [No matches, cigarettes, lighters] : no matches, cigarettes, lighters  [Hearing aid removed] : hearing aid removed [Dentures removed] : dentures removed [Ground bracelet on pt's wrist] : ground bracelet on pt's wrist  [Contacts removed] : contacts removed  [Remove nail polish] : remove nail polish  [No reading material] : no reading material  [Bra, undergarments removed] : bra, undergarments removed  [No contraindicated dressings] : no contraindicated dressings [Ground Wire - VISUAL Verification - Intact/Free of Obstruction] : Ground Wire - VISUAL Verification - Intact/Free of Obstruction  [Ground Continuity - Verified < 1 ohm w/ Wrist Strap Mila] : Ground Continuity - Verified < 1 ohm w/ Wrist Strap Mila [Number: ___] : Number: [unfilled] [Diagnosis: ___] : Diagnosis: [unfilled] [____] : Post-Dive: Time - [unfilled] [___] : Post-Dive: Value - [unfilled] mg/dL [Clear all fields] : clear all fields [] : No [FreeTextEntry4] : 100 [FreeTextEntry6] : 2549 [FreeTextEntry8] : 8773 [de-identified] : 9307 [de-identified] : 0984 [de-identified] : 0990 [de-identified] : 6469 [de-identified] : 1005 [de-identified] : 1016 [de-identified] : 120 MINUTES

## 2021-12-31 DIAGNOSIS — E11.621 TYPE 2 DIABETES MELLITUS WITH FOOT ULCER: ICD-10-CM

## 2021-12-31 DIAGNOSIS — L97.516 NON-PRESSURE CHRONIC ULCER OF OTHER PART OF RIGHT FOOT WITH BONE INVOLVEMENT WITHOUT EVIDENCE OF NECROSIS: ICD-10-CM

## 2021-12-31 DIAGNOSIS — L97.416 NON-PRESSURE CHRONIC ULCER OF RIGHT HEEL AND MIDFOOT WITH BONE INVOLVEMENT WITHOUT EVIDENCE OF NECROSIS: ICD-10-CM

## 2022-01-03 ENCOUNTER — NON-APPOINTMENT (OUTPATIENT)
Age: 62
End: 2022-01-03

## 2022-01-03 ENCOUNTER — APPOINTMENT (OUTPATIENT)
Dept: HYPERBARIC MEDICINE | Facility: HOSPITAL | Age: 62
End: 2022-01-03

## 2022-01-04 ENCOUNTER — OUTPATIENT (OUTPATIENT)
Dept: OUTPATIENT SERVICES | Facility: HOSPITAL | Age: 62
LOS: 1 days | Discharge: HOME | End: 2022-01-04
Payer: MEDICAID

## 2022-01-04 ENCOUNTER — APPOINTMENT (OUTPATIENT)
Dept: HYPERBARIC MEDICINE | Facility: HOSPITAL | Age: 62
End: 2022-01-04

## 2022-01-04 ENCOUNTER — NON-APPOINTMENT (OUTPATIENT)
Age: 62
End: 2022-01-04

## 2022-01-04 VITALS
HEART RATE: 95 BPM | HEIGHT: 69 IN | BODY MASS INDEX: 31.84 KG/M2 | DIASTOLIC BLOOD PRESSURE: 85 MMHG | TEMPERATURE: 97.2 F | OXYGEN SATURATION: 96 % | SYSTOLIC BLOOD PRESSURE: 153 MMHG | RESPIRATION RATE: 20 BRPM | WEIGHT: 215 LBS

## 2022-01-04 DIAGNOSIS — E11.621 TYPE 2 DIABETES MELLITUS WITH FOOT ULCER: ICD-10-CM

## 2022-01-04 PROCEDURE — U0005: CPT

## 2022-01-04 PROCEDURE — U0003: CPT

## 2022-01-04 PROCEDURE — G0463: CPT

## 2022-01-05 ENCOUNTER — APPOINTMENT (OUTPATIENT)
Dept: HYPERBARIC MEDICINE | Facility: HOSPITAL | Age: 62
End: 2022-01-05
Payer: MEDICAID

## 2022-01-05 ENCOUNTER — OUTPATIENT (OUTPATIENT)
Dept: OUTPATIENT SERVICES | Facility: HOSPITAL | Age: 62
LOS: 1 days | Discharge: HOME | End: 2022-01-05
Payer: MEDICAID

## 2022-01-05 VITALS
OXYGEN SATURATION: 98 % | SYSTOLIC BLOOD PRESSURE: 156 MMHG | HEART RATE: 66 BPM | RESPIRATION RATE: 20 BRPM | TEMPERATURE: 97.4 F | DIASTOLIC BLOOD PRESSURE: 91 MMHG

## 2022-01-05 VITALS
RESPIRATION RATE: 16 BRPM | HEART RATE: 63 BPM | DIASTOLIC BLOOD PRESSURE: 96 MMHG | TEMPERATURE: 97.2 F | SYSTOLIC BLOOD PRESSURE: 161 MMHG | OXYGEN SATURATION: 100 %

## 2022-01-05 DIAGNOSIS — E11.621 TYPE 2 DIABETES MELLITUS WITH FOOT ULCER: ICD-10-CM

## 2022-01-05 DIAGNOSIS — L97.416 NON-PRESSURE CHRONIC ULCER OF RIGHT HEEL AND MIDFOOT WITH BONE INVOLVEMENT WITHOUT EVIDENCE OF NECROSIS: ICD-10-CM

## 2022-01-05 DIAGNOSIS — L97.516 NON-PRESSURE CHRONIC ULCER OF OTHER PART OF RIGHT FOOT WITH BONE INVOLVEMENT WITHOUT EVIDENCE OF NECROSIS: ICD-10-CM

## 2022-01-05 LAB — SARS-COV-2 RNA SPEC QL NAA+PROBE: SIGNIFICANT CHANGE UP

## 2022-01-05 PROCEDURE — G0277: CPT

## 2022-01-05 PROCEDURE — 82962 GLUCOSE BLOOD TEST: CPT

## 2022-01-05 PROCEDURE — 99183 HYPERBARIC OXYGEN THERAPY: CPT

## 2022-01-05 NOTE — ADDENDUM
[FreeTextEntry1] : The pt. presented to [facility] ambulatory and A&Ox3 for scheduled HBOT tx. \par The pt's pre-dive screening was found to be within acceptable limits to begin HBOT tx.\par The pt. descended @ 2.2 PSI/min. to Rx'd HBOT tx. depth of 2.4 DAVID in chamber # 1 without incident.\par [facility] during the pt's descent.\par The pt. was observed with visible chest motion and without incident for the duration of observed HBOT tx.\par The pt. was administered intermittent med. air over course of HBOT tx. without incident.\par The pt's ascent from tx. depth to surface was without incident.\par The pt. tolerated HBOT without incident.\par \par

## 2022-01-05 NOTE — PROCEDURE
[Patient demonstrated and verbalized proper technique for using air break mask] : Patient demonstrated and verbalized proper technique for using air break mask [Patient educated on the risks of SMOKING prior to HBOT with understanding] : Patient educated on the risks of SMOKING prior to HBOT with understanding [Patient educated on the risks of CONSUMING ALCOHOL prior to HBOT with understanding] : Patient educated on the risks of CONSUMING ALCOHOL prior to HBOT with understanding [100% Cotton] : 100% cotton [Empty all pockets] : empty all pockets [No hair oils, wigs, hairpieces, pins] : no hair oils, wigs, hairpieces, pins  [Pre tx medications] : pre tx medications  [No make-up, creams] : no make-up, creams  [No jewelry] : no jewelry  [No matches, cigarettes, lighters] : no matches, cigarettes, lighters  [Hearing aid removed] : hearing aid removed [Dentures removed] : dentures removed [Ground bracelet on pt's wrist] : ground bracelet on pt's wrist  [Contacts removed] : contacts removed  [Remove nail polish] : remove nail polish  [No reading material] : no reading material  [Bra, undergarments removed] : bra, undergarments removed  [No contraindicated dressings] : no contraindicated dressings [Ground Wire - VISUAL Verification - Intact/Free of Obstruction] : Ground Wire - VISUAL Verification - Intact/Free of Obstruction  [Ground Continuity - Verified < 1 ohm w/ Wrist Strap Mila] : Ground Continuity - Verified < 1 ohm w/ Wrist Strap Mila [Number: ___] : Number: [unfilled] [Diagnosis: ___] : Diagnosis: [unfilled] [Outpatient] : Outpatient [Ambulatory] : Patient is ambulatory. [THIS CHAMBER HAS BEEN CLEANED / DISINFECTED] : This chamber has been cleaned / disinfected according to local and hospital policy and procedure prior to this treatment. [____] : Post-Dive: Time - [unfilled] [___] : Post-Dive: Value - [unfilled] mg/dL [Clear all fields] : clear all fields [] : No [FreeTextEntry4] : 100 [FreeTextEntry6] : 08 : 21 [FreeTextEntry8] : 08 : 31 [de-identified] : 09 : 01 [de-identified] : 09 : 06 [de-identified] : 09 : 36 [de-identified] : 09 : 41 [de-identified] : 10 : 11 [de-identified] : 10 : 21 [de-identified] : 120 min.

## 2022-01-06 ENCOUNTER — OUTPATIENT (OUTPATIENT)
Dept: OUTPATIENT SERVICES | Facility: HOSPITAL | Age: 62
LOS: 1 days | Discharge: HOME | End: 2022-01-06
Payer: MEDICAID

## 2022-01-06 ENCOUNTER — APPOINTMENT (OUTPATIENT)
Dept: HYPERBARIC MEDICINE | Facility: HOSPITAL | Age: 62
End: 2022-01-06
Payer: MEDICAID

## 2022-01-06 VITALS
RESPIRATION RATE: 18 BRPM | DIASTOLIC BLOOD PRESSURE: 96 MMHG | TEMPERATURE: 97.2 F | HEART RATE: 65 BPM | OXYGEN SATURATION: 97 % | SYSTOLIC BLOOD PRESSURE: 159 MMHG

## 2022-01-06 VITALS
SYSTOLIC BLOOD PRESSURE: 156 MMHG | HEART RATE: 62 BPM | RESPIRATION RATE: 18 BRPM | OXYGEN SATURATION: 99 % | TEMPERATURE: 97.4 F | DIASTOLIC BLOOD PRESSURE: 91 MMHG

## 2022-01-06 DIAGNOSIS — L97.416 NON-PRESSURE CHRONIC ULCER OF RIGHT HEEL AND MIDFOOT WITH BONE INVOLVEMENT WITHOUT EVIDENCE OF NECROSIS: ICD-10-CM

## 2022-01-06 DIAGNOSIS — L97.516 NON-PRESSURE CHRONIC ULCER OF OTHER PART OF RIGHT FOOT WITH BONE INVOLVEMENT WITHOUT EVIDENCE OF NECROSIS: ICD-10-CM

## 2022-01-06 DIAGNOSIS — E11.621 TYPE 2 DIABETES MELLITUS WITH FOOT ULCER: ICD-10-CM

## 2022-01-06 PROCEDURE — 82962 GLUCOSE BLOOD TEST: CPT

## 2022-01-06 PROCEDURE — 99183 HYPERBARIC OXYGEN THERAPY: CPT

## 2022-01-06 PROCEDURE — G0277: CPT

## 2022-01-06 NOTE — ADDENDUM
[FreeTextEntry1] : The pt. presented to [facility] ambulatory and A&Ox3 for scheduled HBOT tx.\par The pt's pre-dive screening was found to be within acceptable limits to begin HBOT tx.\par During PDS, the pt. requested to speak to DPM post-tx. r/t desire to d/c HBOT tx.\par Pt. was advised that Physician will be made available post-HBOT tx. for discussion.\par The pt. descended @ 2.2 PSI/min. to Rx'd HBOT tx. depth of 2.4 DAVID in chamber # 1 without incident.\par The pt. was observed with visible chest motion and without incident for the duration of HBOT.\par The pt. was administered intermittent med. air over course of HBOT tx. without incident. \par PT ASCENDED FROM 2.4 DAVID @ 2.2 PSI/MIN WITHOUT INCIDENT\par PT TOLERATED TX WELL

## 2022-01-06 NOTE — PROCEDURE
[Outpatient] : Outpatient [Ambulatory] : Patient is ambulatory. [THIS CHAMBER HAS BEEN CLEANED / DISINFECTED] : This chamber has been cleaned / disinfected according to local and hospital policy and procedure prior to this treatment. [Patient demonstrated and verbalized proper technique for using air break mask] : Patient demonstrated and verbalized proper technique for using air break mask [Patient educated on the risks of SMOKING prior to HBOT with understanding] : Patient educated on the risks of SMOKING prior to HBOT with understanding [Patient educated on the risks of CONSUMING ALCOHOL prior to HBOT with understanding] : Patient educated on the risks of CONSUMING ALCOHOL prior to HBOT with understanding [100% Cotton] : 100% cotton [Empty all pockets] : empty all pockets [No hair oils, wigs, hairpieces, pins] : no hair oils, wigs, hairpieces, pins  [Pre tx medications] : pre tx medications  [No make-up, creams] : no make-up, creams  [No jewelry] : no jewelry  [No matches, cigarettes, lighters] : no matches, cigarettes, lighters  [Hearing aid removed] : hearing aid removed [Dentures removed] : dentures removed [Ground bracelet on pt's wrist] : ground bracelet on pt's wrist  [Contacts removed] : contacts removed  [Remove nail polish] : remove nail polish  [No reading material] : no reading material  [Bra, undergarments removed] : bra, undergarments removed  [No contraindicated dressings] : no contraindicated dressings [Ground Wire - VISUAL Verification - Intact/Free of Obstruction] : Ground Wire - VISUAL Verification - Intact/Free of Obstruction  [Ground Continuity - Verified < 1 ohm w/ Wrist Strap Mila] : Ground Continuity - Verified < 1 ohm w/ Wrist Strap Mila [Number: ___] : Number: [unfilled] [Diagnosis: ___] : Diagnosis: [unfilled] [____] : Post-Dive: Time - [unfilled] [___] : Post-Dive: Value - [unfilled] mg/dL [Clear all fields] : clear all fields [] : No [FreeTextEntry4] : 100 [FreeTextEntry6] : 08 : 20 [FreeTextEntry8] : 08 : 30 [de-identified] : 09 : 00 [de-identified] : 09 : 05 [de-identified] : 0982 [de-identified] : 9923 [de-identified] : 1010 [de-identified] : 1023 [de-identified] : 120 MINUTES

## 2022-01-07 ENCOUNTER — NON-APPOINTMENT (OUTPATIENT)
Age: 62
End: 2022-01-07

## 2022-01-07 ENCOUNTER — APPOINTMENT (OUTPATIENT)
Dept: HYPERBARIC MEDICINE | Facility: HOSPITAL | Age: 62
End: 2022-01-07

## 2022-01-09 DIAGNOSIS — Z20.822 CONTACT WITH AND (SUSPECTED) EXPOSURE TO COVID-19: ICD-10-CM

## 2022-01-10 ENCOUNTER — APPOINTMENT (OUTPATIENT)
Dept: HYPERBARIC MEDICINE | Facility: HOSPITAL | Age: 62
End: 2022-01-10
Payer: MEDICAID

## 2022-01-10 ENCOUNTER — OUTPATIENT (OUTPATIENT)
Dept: OUTPATIENT SERVICES | Facility: HOSPITAL | Age: 62
LOS: 1 days | Discharge: HOME | End: 2022-01-10
Payer: MEDICAID

## 2022-01-10 VITALS
RESPIRATION RATE: 20 BRPM | DIASTOLIC BLOOD PRESSURE: 96 MMHG | TEMPERATURE: 97.2 F | OXYGEN SATURATION: 95 % | HEART RATE: 71 BPM | SYSTOLIC BLOOD PRESSURE: 159 MMHG

## 2022-01-10 VITALS
DIASTOLIC BLOOD PRESSURE: 94 MMHG | TEMPERATURE: 97.2 F | HEART RATE: 60 BPM | SYSTOLIC BLOOD PRESSURE: 160 MMHG | OXYGEN SATURATION: 100 % | RESPIRATION RATE: 20 BRPM

## 2022-01-10 DIAGNOSIS — E11.621 TYPE 2 DIABETES MELLITUS WITH FOOT ULCER: ICD-10-CM

## 2022-01-10 PROCEDURE — U0005: CPT

## 2022-01-10 PROCEDURE — 99183 HYPERBARIC OXYGEN THERAPY: CPT

## 2022-01-10 PROCEDURE — U0003: CPT

## 2022-01-10 PROCEDURE — G0277: CPT

## 2022-01-10 PROCEDURE — 82962 GLUCOSE BLOOD TEST: CPT

## 2022-01-10 NOTE — ADDENDUM
[FreeTextEntry1] : PT ARRIVED AMBULATORY A&OX3\par ALL VITALS WITHIN PARAMETERS  FOR HBOT.\par PT WAS REMINDED THAT HBOT WILL NOT BE RECEIVED ON  01/12/22 DUE TO SCHEDULED ASSESSMENT. PT WAS REMINDED OF ARRIVAL TIME ON THAT DATE.\par COVID-19 SCREENING TO BE COMPLETED POST HBOT.\par PT DESCENT TO RX TX DEPTH IN CHAMBER 1 WAS WITHOUT INCIDENT.\par PT RESTING AT DEPTH, CHEST RISE AND FALL OBSERVED.\par PT TOLERATED AIR BREAKS WELL.\par PT ASCENT WAS WITHOUT INCIDENT.\par PT TOLERATED HBOT WELL.\par \par \par CHT ANTHONYDomingo ANDRADE 01/10/22

## 2022-01-10 NOTE — PROCEDURE
[Outpatient] : Outpatient [Ambulatory] : Patient is ambulatory. [THIS CHAMBER HAS BEEN CLEANED / DISINFECTED] : This chamber has been cleaned / disinfected according to local and hospital policy and procedure prior to this treatment. [Patient demonstrated and verbalized proper technique for using air break mask] : Patient demonstrated and verbalized proper technique for using air break mask [Patient educated on the risks of SMOKING prior to HBOT with understanding] : Patient educated on the risks of SMOKING prior to HBOT with understanding [Patient educated on the risks of CONSUMING ALCOHOL prior to HBOT with understanding] : Patient educated on the risks of CONSUMING ALCOHOL prior to HBOT with understanding [100% Cotton] : 100% cotton [Empty all pockets] : empty all pockets [No hair oils, wigs, hairpieces, pins] : no hair oils, wigs, hairpieces, pins  [Pre tx medications] : pre tx medications  [No make-up, creams] : no make-up, creams  [No jewelry] : no jewelry  [No matches, cigarettes, lighters] : no matches, cigarettes, lighters  [Hearing aid removed] : hearing aid removed [Dentures removed] : dentures removed [Ground bracelet on pt's wrist] : ground bracelet on pt's wrist  [Contacts removed] : contacts removed  [Remove nail polish] : remove nail polish  [No reading material] : no reading material  [Bra, undergarments removed] : bra, undergarments removed  [No contraindicated dressings] : no contraindicated dressings [Ground Wire - VISUAL Verification - Intact/Free of Obstruction] : Ground Wire - VISUAL Verification - Intact/Free of Obstruction  [Ground Continuity - Verified < 1 ohm w/ Wrist Strap Mila] : Ground Continuity - Verified < 1 ohm w/ Wrist Strap Mila [Number: ___] : Number: [unfilled] [Diagnosis: ___] : Diagnosis: [unfilled] [Clear all fields] : clear all fields [____] : Post-Dive: Time - [unfilled] [___] : Post-Dive: Value - [unfilled] mg/dL [] : No [FreeTextEntry4] : 100 [FreeTextEntry6] : 8:17 [FreeTextEntry8] : 8:27 [de-identified] : 8:57 [de-identified] : 9:02 [de-identified] : 9:32 [de-identified] : 9:37 [de-identified] : 10:07 [de-identified] : 10:17 [de-identified] : 120 MIN

## 2022-01-11 ENCOUNTER — APPOINTMENT (OUTPATIENT)
Dept: HYPERBARIC MEDICINE | Facility: HOSPITAL | Age: 62
End: 2022-01-11
Payer: MEDICAID

## 2022-01-11 ENCOUNTER — NON-APPOINTMENT (OUTPATIENT)
Age: 62
End: 2022-01-11

## 2022-01-11 ENCOUNTER — OUTPATIENT (OUTPATIENT)
Dept: OUTPATIENT SERVICES | Facility: HOSPITAL | Age: 62
LOS: 1 days | Discharge: HOME | End: 2022-01-11
Payer: MEDICAID

## 2022-01-11 VITALS
TEMPERATURE: 97.6 F | OXYGEN SATURATION: 100 % | RESPIRATION RATE: 18 BRPM | DIASTOLIC BLOOD PRESSURE: 98 MMHG | HEART RATE: 63 BPM | SYSTOLIC BLOOD PRESSURE: 178 MMHG

## 2022-01-11 VITALS
RESPIRATION RATE: 18 BRPM | TEMPERATURE: 97.4 F | HEART RATE: 73 BPM | DIASTOLIC BLOOD PRESSURE: 90 MMHG | SYSTOLIC BLOOD PRESSURE: 156 MMHG | OXYGEN SATURATION: 98 %

## 2022-01-11 DIAGNOSIS — L97.516 NON-PRESSURE CHRONIC ULCER OF OTHER PART OF RIGHT FOOT WITH BONE INVOLVEMENT WITHOUT EVIDENCE OF NECROSIS: ICD-10-CM

## 2022-01-11 DIAGNOSIS — E11.621 TYPE 2 DIABETES MELLITUS WITH FOOT ULCER: ICD-10-CM

## 2022-01-11 DIAGNOSIS — Z20.822 CONTACT WITH AND (SUSPECTED) EXPOSURE TO COVID-19: ICD-10-CM

## 2022-01-11 DIAGNOSIS — L97.416 NON-PRESSURE CHRONIC ULCER OF RIGHT HEEL AND MIDFOOT WITH BONE INVOLVEMENT WITHOUT EVIDENCE OF NECROSIS: ICD-10-CM

## 2022-01-11 LAB — SARS-COV-2 RNA SPEC QL NAA+PROBE: SIGNIFICANT CHANGE UP

## 2022-01-11 PROCEDURE — G0277: CPT

## 2022-01-11 PROCEDURE — 99183 HYPERBARIC OXYGEN THERAPY: CPT

## 2022-01-11 PROCEDURE — 82962 GLUCOSE BLOOD TEST: CPT

## 2022-01-11 NOTE — ADDENDUM
[FreeTextEntry1] : PT ARRIVED A&OX3 \par ALL VITALS WITHIN PARAMETERS FOR HBOT \par PT DESCENDED TO 2.4 DAVID @ 2.2 PSI/MIN IN CHAMBER #_ WITHOUT INCIDENT\par PT RESTING AT TX DEPTH WITH VISIBLE CHEST RISE AND FALL OBSERVED CHAMBER SIDE\par PT TOLERATED AIR BREAKS WELL\par PT ASCENDED FROM 2.4 DAVID @ 2 2 PSI/MIN WITHOUT INCIDENT\par PT TOLERATED TX WELL

## 2022-01-11 NOTE — PROCEDURE
[Outpatient] : Outpatient [Ambulatory] : Patient is ambulatory. [THIS CHAMBER HAS BEEN CLEANED / DISINFECTED] : This chamber has been cleaned / disinfected according to local and hospital policy and procedure prior to this treatment. [Patient demonstrated and verbalized proper technique for using air break mask] : Patient demonstrated and verbalized proper technique for using air break mask [Patient educated on the risks of SMOKING prior to HBOT with understanding] : Patient educated on the risks of SMOKING prior to HBOT with understanding [Patient educated on the risks of CONSUMING ALCOHOL prior to HBOT with understanding] : Patient educated on the risks of CONSUMING ALCOHOL prior to HBOT with understanding [100% Cotton] : 100% cotton [Empty all pockets] : empty all pockets [No hair oils, wigs, hairpieces, pins] : no hair oils, wigs, hairpieces, pins  [Pre tx medications] : pre tx medications  [No make-up, creams] : no make-up, creams  [No jewelry] : no jewelry  [No matches, cigarettes, lighters] : no matches, cigarettes, lighters  [Hearing aid removed] : hearing aid removed [Dentures removed] : dentures removed [Ground bracelet on pt's wrist] : ground bracelet on pt's wrist  [Contacts removed] : contacts removed  [Remove nail polish] : remove nail polish  [No reading material] : no reading material  [Bra, undergarments removed] : bra, undergarments removed  [No contraindicated dressings] : no contraindicated dressings [Ground Wire - VISUAL Verification - Intact/Free of Obstruction] : Ground Wire - VISUAL Verification - Intact/Free of Obstruction  [Ground Continuity - Verified < 1 ohm w/ Wrist Strap Mila] : Ground Continuity - Verified < 1 ohm w/ Wrist Strap Mila [Number: ___] : Number: [unfilled] [Diagnosis: ___] : Diagnosis: [unfilled] [____] : Post-Dive: Time - [unfilled] [___] : Post-Dive: Value - [unfilled] mg/dL [Clear all fields] : clear all fields [] : No [FreeTextEntry4] : 100 [FreeTextEntry6] : 0779 [FreeTextEntry8] : 9007 [de-identified] : 7272 [de-identified] : 0376 [de-identified] : 9219 [de-identified] : 0914 [de-identified] : 1002 [de-identified] : 101 [de-identified] : 120 MINUTES

## 2022-01-12 ENCOUNTER — APPOINTMENT (OUTPATIENT)
Dept: HYPERBARIC MEDICINE | Facility: HOSPITAL | Age: 62
End: 2022-01-12
Payer: MEDICAID

## 2022-01-12 ENCOUNTER — RESULT REVIEW (OUTPATIENT)
Age: 62
End: 2022-01-12

## 2022-01-12 ENCOUNTER — OUTPATIENT (OUTPATIENT)
Dept: OUTPATIENT SERVICES | Facility: HOSPITAL | Age: 62
LOS: 1 days | Discharge: HOME | End: 2022-01-12
Payer: MEDICAID

## 2022-01-12 VITALS
TEMPERATURE: 98.3 F | OXYGEN SATURATION: 97 % | DIASTOLIC BLOOD PRESSURE: 95 MMHG | WEIGHT: 210 LBS | HEIGHT: 69 IN | RESPIRATION RATE: 18 BRPM | BODY MASS INDEX: 31.1 KG/M2 | SYSTOLIC BLOOD PRESSURE: 156 MMHG | HEART RATE: 74 BPM

## 2022-01-12 VITALS
TEMPERATURE: 97.3 F | WEIGHT: 210 LBS | HEART RATE: 72 BPM | OXYGEN SATURATION: 97 % | BODY MASS INDEX: 31.1 KG/M2 | DIASTOLIC BLOOD PRESSURE: 90 MMHG | RESPIRATION RATE: 18 BRPM | HEIGHT: 69 IN | SYSTOLIC BLOOD PRESSURE: 160 MMHG

## 2022-01-12 DIAGNOSIS — E11.621 TYPE 2 DIABETES MELLITUS WITH FOOT ULCER: ICD-10-CM

## 2022-01-12 PROCEDURE — 99213 OFFICE O/P EST LOW 20 MIN: CPT

## 2022-01-12 PROCEDURE — 73630 X-RAY EXAM OF FOOT: CPT | Mod: 26,LT,RT

## 2022-01-12 PROCEDURE — 73630 X-RAY EXAM OF FOOT: CPT

## 2022-01-12 PROCEDURE — G0463: CPT

## 2022-01-12 PROCEDURE — 82962 GLUCOSE BLOOD TEST: CPT

## 2022-01-12 NOTE — REVIEW OF SYSTEMS
[Fever] : no fever [Chills] : no chills [Eye Pain] : no eye pain [Loss Of Hearing] : no hearing loss [Shortness Of Breath] : no shortness of breath [Wheezing] : no wheezing [Abdominal Pain] : no abdominal pain [Arthralgias] : arthralgias [Joint Swelling] : joint swelling [Joint Stiffness] : joint stiffness [Skin Wound] : skin wound [Anxiety] : no anxiety [Easy Bleeding] : no tendency for easy bleeding [FreeTextEntry5] : HTN [FreeTextEntry9] : Gouty arthropathy  [de-identified] : small granuloma dorsal 2nd toe and small open area base of the 5th metatarsal right foot healed at this time [de-identified] : Diabetic neuropathy  [de-identified] : NIDDM , severe gouty arthropathy

## 2022-01-12 NOTE — PLAN
[FreeTextEntry1] : Patient examined and evaluated at this time.\par Pt happy with outcome of treatment.\par New radiographs reviewed with patient. Discussed surgical vs conservative treatment. Orthotist referral at this time.\par Spent 20 minutes for patient care and medical decision making.\par Patient to follow up in 4 weeks.

## 2022-01-12 NOTE — ASSESSMENT
[Verbal] : Verbal [Written] : Written [Patient] : Patient [Good - alert, interested, motivated] : Good - alert, interested, motivated [Needs reinforcement] : needs reinforcement [Foot Care] : foot care [Skin Care] : skin care [Signs and symptoms of infection] : sign and symptoms of infection [Nutrition] : nutrition [How and When to Call] : how and when to call [Pain Management] : pain management [Hyperbaric Therapy] : hyperbaric therapy [Off-loading] : off-loading [Patient responsibility to plan of care] : patient responsibility to plan of care [Glycemic Control] : glycemic control [] : Yes [Stable] : stable [Home] : Home [Ambulatory] : Ambulatory [Not Applicable - Long Term Care/Home Health Agency] : Long Term Care/Home Health Agency: Not Applicable [FreeTextEntry2] : Daily foot inspections \par Infection prevention\par Goal remaining pain free\par Low glycemic diet  [FreeTextEntry4] : 53/60 HBOT completed, no additional ordered. HBOT completed. \par script provided for x - ray bilateral feet, having completed today.  Update: Reviewed by DPM with patient \par IMS information provided for pt\par Follow up in 4 weeks

## 2022-01-12 NOTE — HISTORY OF PRESENT ILLNESS
[FreeTextEntry1] : s/p reduction of bone right 2nd toe and lateral base of the 1st metatarsal right foot, healed at this time

## 2022-01-12 NOTE — PHYSICAL EXAM
[1+] : left 1+ [Ankle Swelling (On Exam)] : present [Ankle Swelling Bilaterally] : bilaterally  [Varicose Veins Of Lower Extremities] : bilaterally [Ankle Swelling On The Left] : moderate [] : bilaterally [Ankle Swelling On The Right] : mild [Purpura] : no purpura  [Petechiae] : no petechiae [Skin Ulcer] : ulcer [Skin Induration] : no induration [Alert] : alert [Oriented to Person] : oriented to person [Oriented to Place] : oriented to place [Calm] : calm [de-identified] : A&Ox3, NAD [de-identified] : HTN [de-identified] : severe gout with associated foot deformities  [de-identified] : right medial incision healed, right 2nd dorsal toe dorsal granuloma, and small opening on the lateral base of the 5th metatarsal right foot healed at this time [de-identified] : Diabetic neuropathy  [FreeTextEntry1] : Right lateral foot - callus - no open wound  [de-identified] : Callus  [de-identified] : Callus shaved by DPM\par No other treatment  [TWNoteComboBox4] : None [de-identified] : other [de-identified] : None [de-identified] : None [de-identified] : No

## 2022-01-13 ENCOUNTER — APPOINTMENT (OUTPATIENT)
Dept: HYPERBARIC MEDICINE | Facility: HOSPITAL | Age: 62
End: 2022-01-13

## 2022-01-13 DIAGNOSIS — Z79.84 LONG TERM (CURRENT) USE OF ORAL HYPOGLYCEMIC DRUGS: ICD-10-CM

## 2022-01-13 DIAGNOSIS — Z87.442 PERSONAL HISTORY OF URINARY CALCULI: ICD-10-CM

## 2022-01-13 DIAGNOSIS — E78.1 PURE HYPERGLYCERIDEMIA: ICD-10-CM

## 2022-01-13 DIAGNOSIS — L84 CORNS AND CALLOSITIES: ICD-10-CM

## 2022-01-13 DIAGNOSIS — Z98.890 OTHER SPECIFIED POSTPROCEDURAL STATES: ICD-10-CM

## 2022-01-13 DIAGNOSIS — N40.1 BENIGN PROSTATIC HYPERPLASIA WITH LOWER URINARY TRACT SYMPTOMS: ICD-10-CM

## 2022-01-13 DIAGNOSIS — M10.071 IDIOPATHIC GOUT, RIGHT ANKLE AND FOOT: ICD-10-CM

## 2022-01-13 DIAGNOSIS — N32.81 OVERACTIVE BLADDER: ICD-10-CM

## 2022-01-13 DIAGNOSIS — I10 ESSENTIAL (PRIMARY) HYPERTENSION: ICD-10-CM

## 2022-01-13 DIAGNOSIS — E11.621 TYPE 2 DIABETES MELLITUS WITH FOOT ULCER: ICD-10-CM

## 2022-01-13 DIAGNOSIS — Z87.81 PERSONAL HISTORY OF (HEALED) TRAUMATIC FRACTURE: ICD-10-CM

## 2022-01-13 DIAGNOSIS — R35.1 NOCTURIA: ICD-10-CM

## 2022-01-13 DIAGNOSIS — L97.516 NON-PRESSURE CHRONIC ULCER OF OTHER PART OF RIGHT FOOT WITH BONE INVOLVEMENT WITHOUT EVIDENCE OF NECROSIS: ICD-10-CM

## 2022-01-13 DIAGNOSIS — Z79.899 OTHER LONG TERM (CURRENT) DRUG THERAPY: ICD-10-CM

## 2022-01-13 DIAGNOSIS — L97.416 NON-PRESSURE CHRONIC ULCER OF RIGHT HEEL AND MIDFOOT WITH BONE INVOLVEMENT WITHOUT EVIDENCE OF NECROSIS: ICD-10-CM

## 2022-01-13 DIAGNOSIS — Z98.1 ARTHRODESIS STATUS: ICD-10-CM

## 2022-01-13 DIAGNOSIS — F41.8 OTHER SPECIFIED ANXIETY DISORDERS: ICD-10-CM

## 2022-01-13 DIAGNOSIS — N52.9 MALE ERECTILE DYSFUNCTION, UNSPECIFIED: ICD-10-CM

## 2022-01-14 ENCOUNTER — APPOINTMENT (OUTPATIENT)
Dept: HYPERBARIC MEDICINE | Facility: HOSPITAL | Age: 62
End: 2022-01-14

## 2022-02-08 ENCOUNTER — RX RENEWAL (OUTPATIENT)
Age: 62
End: 2022-02-08

## 2022-02-09 ENCOUNTER — OUTPATIENT (OUTPATIENT)
Dept: OUTPATIENT SERVICES | Facility: HOSPITAL | Age: 62
LOS: 1 days | Discharge: HOME | End: 2022-02-09
Payer: MEDICAID

## 2022-02-09 ENCOUNTER — APPOINTMENT (OUTPATIENT)
Dept: WOUND CARE | Facility: HOSPITAL | Age: 62
End: 2022-02-09
Payer: MEDICAID

## 2022-02-09 VITALS
RESPIRATION RATE: 18 BRPM | TEMPERATURE: 96.9 F | SYSTOLIC BLOOD PRESSURE: 170 MMHG | OXYGEN SATURATION: 97 % | DIASTOLIC BLOOD PRESSURE: 92 MMHG | HEART RATE: 100 BPM

## 2022-02-09 DIAGNOSIS — R35.1 NOCTURIA: ICD-10-CM

## 2022-02-09 DIAGNOSIS — E78.1 PURE HYPERGLYCERIDEMIA: ICD-10-CM

## 2022-02-09 DIAGNOSIS — L97.416 NON-PRESSURE CHRONIC ULCER OF RIGHT HEEL AND MIDFOOT WITH BONE INVOLVEMENT WITHOUT EVIDENCE OF NECROSIS: ICD-10-CM

## 2022-02-09 DIAGNOSIS — N52.9 MALE ERECTILE DYSFUNCTION, UNSPECIFIED: ICD-10-CM

## 2022-02-09 DIAGNOSIS — M10.071 IDIOPATHIC GOUT, RIGHT ANKLE AND FOOT: ICD-10-CM

## 2022-02-09 DIAGNOSIS — Z79.899 OTHER LONG TERM (CURRENT) DRUG THERAPY: ICD-10-CM

## 2022-02-09 DIAGNOSIS — Z87.442 PERSONAL HISTORY OF URINARY CALCULI: ICD-10-CM

## 2022-02-09 DIAGNOSIS — N32.81 OVERACTIVE BLADDER: ICD-10-CM

## 2022-02-09 DIAGNOSIS — I10 ESSENTIAL (PRIMARY) HYPERTENSION: ICD-10-CM

## 2022-02-09 DIAGNOSIS — L97.516 NON-PRESSURE CHRONIC ULCER OF OTHER PART OF RIGHT FOOT WITH BONE INVOLVEMENT WITHOUT EVIDENCE OF NECROSIS: ICD-10-CM

## 2022-02-09 DIAGNOSIS — E11.621 TYPE 2 DIABETES MELLITUS WITH FOOT ULCER: ICD-10-CM

## 2022-02-09 DIAGNOSIS — N40.1 BENIGN PROSTATIC HYPERPLASIA WITH LOWER URINARY TRACT SYMPTOMS: ICD-10-CM

## 2022-02-09 DIAGNOSIS — Z98.1 ARTHRODESIS STATUS: ICD-10-CM

## 2022-02-09 DIAGNOSIS — L84 CORNS AND CALLOSITIES: ICD-10-CM

## 2022-02-09 DIAGNOSIS — Z87.81 PERSONAL HISTORY OF (HEALED) TRAUMATIC FRACTURE: ICD-10-CM

## 2022-02-09 DIAGNOSIS — Z79.84 LONG TERM (CURRENT) USE OF ORAL HYPOGLYCEMIC DRUGS: ICD-10-CM

## 2022-02-09 DIAGNOSIS — Z98.890 OTHER SPECIFIED POSTPROCEDURAL STATES: ICD-10-CM

## 2022-02-09 PROCEDURE — G0463: CPT

## 2022-02-09 PROCEDURE — 99213 OFFICE O/P EST LOW 20 MIN: CPT

## 2022-02-09 NOTE — PLAN
[FreeTextEntry1] : x rays reviewed with the patient and his wife , patient has no open wounds . Will get orthotist consult for orthotics to try to control the weight bearing surface of the foot . And use of bag balm to maintain ski  integrity Spent 20 minutes for patient care and medical decision making.\par

## 2022-02-09 NOTE — REVIEW OF SYSTEMS
[Fever] : no fever [Chills] : no chills [Eye Pain] : no eye pain [Loss Of Hearing] : no hearing loss [Shortness Of Breath] : no shortness of breath [Wheezing] : no wheezing [Abdominal Pain] : no abdominal pain [Arthralgias] : arthralgias [Joint Swelling] : joint swelling [Joint Stiffness] : joint stiffness [Skin Wound] : skin wound [Anxiety] : no anxiety [Easy Bleeding] : no tendency for easy bleeding [FreeTextEntry5] : HTN [FreeTextEntry9] : Gouty arthropathy  [de-identified] : dorsal 2nd toe healed and small open area base of the 5th metatarsal right foot healed . Callus formation at the base of the 5th metatarsal left foot , not associated with gout  [de-identified] : Diabetic neuropathy  [de-identified] : NIDDM , severe gouty arthropathy

## 2022-02-09 NOTE — HISTORY OF PRESENT ILLNESS
[FreeTextEntry1] : s/p DFU and gout right foor , all areas closed . Patient has callus build up along the left lateral  5th metatarsal base . Patient has met adductus and this is not associated with gout but is related to his weigh bearing abnormalities . Patient has no open wounds . no soi , patient happy with the out come of the surgery on his right foot

## 2022-02-09 NOTE — PHYSICAL EXAM
[1+] : left 1+ [Ankle Swelling (On Exam)] : present [Ankle Swelling Bilaterally] : bilaterally  [Varicose Veins Of Lower Extremities] : bilaterally [Ankle Swelling On The Left] : moderate [] : bilaterally [Ankle Swelling On The Right] : mild [Purpura] : no purpura  [Petechiae] : no petechiae [Skin Ulcer] : ulcer [Skin Induration] : no induration [Alert] : alert [Oriented to Person] : oriented to person [Oriented to Place] : oriented to place [Calm] : calm [de-identified] : A&Ox3, NAD [de-identified] : HTN [de-identified] : severe gout with associated foot deformities  [de-identified] : right medial incision healed, right 2nd dorsal toe dorsal healed ,opening on the lateral base of the 5th metatarsal right foot closed  [de-identified] : Diabetic neuropathy  [FreeTextEntry1] : Right Foot 2nd Digit [de-identified] : 0 [FreeTextEntry2] : 0.2 [FreeTextEntry3] : 0.2 [FreeTextEntry4] : 0.1 [de-identified] : serosanguineous [de-identified] : none [de-identified] : 100% [de-identified] : 0% [de-identified] : No product [FreeTextEntry7] : Left Lateral Foot - no open wounds [de-identified] : callused [de-identified] : none [TWNoteComboBox4] : Small [TWNoteComboBox5] : No [TWNoteComboBox6] : Diabetic [de-identified] : No [de-identified] : Normal [de-identified] : None [de-identified] : None [de-identified] : No [de-identified] : Diabetic [de-identified] : No [de-identified] : other [de-identified] : None

## 2022-02-09 NOTE — ASSESSMENT
[Verbal] : Verbal [Written] : Written [Demo] : Demo [Patient] : Patient [Spouse] : Spouse [Good - alert, interested, motivated] : Good - alert, interested, motivated [Demonstrates independently] : demonstrates independently [Foot Care] : foot care [Skin Care] : skin care [Signs and symptoms of infection] : sign and symptoms of infection [Nutrition] : nutrition [How and When to Call] : how and when to call [Off-loading] : off-loading [Patient responsibility to plan of care] : patient responsibility to plan of care [Glycemic Control] : glycemic control [] : Yes [Stable] : stable [Home] : Home [Ambulatory] : Ambulatory [Not Applicable - Long Term Care/Home Health Agency] : Long Term Care/Home Health Agency: Not Applicable [FreeTextEntry2] : Infection prevention \par Wound care (dressing changes)\par Elevation and low sodium compliance.\par Glycemic control\par Orthotics [FreeTextEntry4] : Orthotics consult submitted\par F/u in 6 weeks

## 2022-02-14 ENCOUNTER — RX RENEWAL (OUTPATIENT)
Age: 62
End: 2022-02-14

## 2022-02-24 ENCOUNTER — APPOINTMENT (OUTPATIENT)
Dept: INTERNAL MEDICINE | Facility: CLINIC | Age: 62
End: 2022-02-24
Payer: MEDICAID

## 2022-02-24 VITALS
TEMPERATURE: 98 F | WEIGHT: 208 LBS | DIASTOLIC BLOOD PRESSURE: 118 MMHG | HEART RATE: 73 BPM | BODY MASS INDEX: 30.81 KG/M2 | HEIGHT: 69 IN | SYSTOLIC BLOOD PRESSURE: 203 MMHG | OXYGEN SATURATION: 99 %

## 2022-02-24 VITALS — SYSTOLIC BLOOD PRESSURE: 169 MMHG | DIASTOLIC BLOOD PRESSURE: 109 MMHG

## 2022-02-24 PROCEDURE — 99214 OFFICE O/P EST MOD 30 MIN: CPT

## 2022-02-24 RX ORDER — COLCHICINE 0.6 MG/1
0.6 TABLET ORAL TWICE DAILY
Qty: 60 | Refills: 1 | Status: DISCONTINUED | COMMUNITY
Start: 2021-09-07 | End: 2022-02-24

## 2022-02-24 RX ORDER — METFORMIN ER 750 MG 750 MG/1
750 TABLET ORAL DAILY
Qty: 180 | Refills: 0 | Status: DISCONTINUED | COMMUNITY
Start: 2021-02-03 | End: 2022-02-24

## 2022-02-24 RX ORDER — AMOXICILLIN AND CLAVULANATE POTASSIUM 875; 125 MG/1; MG/1
875-125 TABLET, COATED ORAL
Qty: 30 | Refills: 2 | Status: DISCONTINUED | COMMUNITY
Start: 2021-09-01 | End: 2022-02-24

## 2022-02-24 RX ORDER — ALPRAZOLAM 0.5 MG/1
0.5 TABLET ORAL
Qty: 30 | Refills: 0 | Status: DISCONTINUED | COMMUNITY
Start: 2021-09-23 | End: 2022-02-24

## 2022-02-24 NOTE — HISTORY OF PRESENT ILLNESS
[de-identified] : 61 year M with PMH multiple medical issues presents for medication refills and elevated BP. He requests referrals for tophi, to have them removed and rheum f/u. Pt denies CP/SOB, fever/chills, n/v/d/c.

## 2022-02-24 NOTE — PHYSICAL EXAM
[No Acute Distress] : no acute distress [Well Nourished] : well nourished [Well Developed] : well developed [Well-Appearing] : well-appearing [No Respiratory Distress] : no respiratory distress  [No Accessory Muscle Use] : no accessory muscle use [Clear to Auscultation] : lungs were clear to auscultation bilaterally [Normal Rate] : normal rate  [Regular Rhythm] : with a regular rhythm [Normal S1, S2] : normal S1 and S2 [No Murmur] : no murmur heard [Coordination Grossly Intact] : coordination grossly intact [No Focal Deficits] : no focal deficits [Normal Gait] : normal gait [Normal Affect] : the affect was normal [Normal Insight/Judgement] : insight and judgment were intact [de-identified] : 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

## 2022-02-24 NOTE — PLAN
[FreeTextEntry1] : Restart amlodipine at higher dose. Refill meds. Referrals placed. Pt advised to sign up for [facility] portal to review labs and communicate any questions or concerns directly. Yearly physical and return as needed for illness, medication refills, and new or existing complaints. CPE to schedule.

## 2022-03-10 ENCOUNTER — NON-APPOINTMENT (OUTPATIENT)
Age: 62
End: 2022-03-10

## 2022-03-10 ENCOUNTER — APPOINTMENT (OUTPATIENT)
Dept: INTERNAL MEDICINE | Facility: CLINIC | Age: 62
End: 2022-03-10
Payer: MEDICAID

## 2022-03-10 VITALS
TEMPERATURE: 98 F | BODY MASS INDEX: 31.16 KG/M2 | HEART RATE: 79 BPM | WEIGHT: 210.38 LBS | OXYGEN SATURATION: 99 % | HEIGHT: 69 IN | DIASTOLIC BLOOD PRESSURE: 81 MMHG | RESPIRATION RATE: 16 BRPM | SYSTOLIC BLOOD PRESSURE: 147 MMHG

## 2022-03-10 PROCEDURE — 99396 PREV VISIT EST AGE 40-64: CPT | Mod: 25

## 2022-03-10 PROCEDURE — 93000 ELECTROCARDIOGRAM COMPLETE: CPT

## 2022-03-10 RX ORDER — METFORMIN HYDROCHLORIDE 500 MG/1
500 TABLET, COATED ORAL TWICE DAILY
Qty: 120 | Refills: 2 | Status: DISCONTINUED | COMMUNITY
Start: 2021-02-04 | End: 2022-03-10

## 2022-03-10 NOTE — PLAN
[FreeTextEntry1] : Routine blood work and urine today. ECG today. Pt advised to sign up for [facility] portal to review labs and communicate any questions or concerns directly. Yearly physical and return as needed for illness, medication refills, and new or existing complaints.

## 2022-03-10 NOTE — HEALTH RISK ASSESSMENT
[Good] : ~his/her~  mood as  good [Never] : Never [No] : In the past 12 months have you used drugs other than those required for medical reasons? No [0] : 2) Feeling down, depressed, or hopeless: Not at all (0) [PHQ-2 Negative - No further assessment needed] : PHQ-2 Negative - No further assessment needed [Patient reported colonoscopy was normal] : Patient reported colonoscopy was normal [None] : None [With Family] : lives with family [] :  [Audit-CScore] : 0 [ANI9Bdued] : 0 [ColonoscopyComments] : Due

## 2022-03-10 NOTE — PHYSICAL EXAM
[No Acute Distress] : no acute distress [Well Nourished] : well nourished [Well Developed] : well developed [Well-Appearing] : well-appearing [Normal Sclera/Conjunctiva] : normal sclera/conjunctiva [PERRL] : pupils equal round and reactive to light [EOMI] : extraocular movements intact [Normal Outer Ear/Nose] : the outer ears and nose were normal in appearance [Normal Oropharynx] : the oropharynx was normal [Normal TMs] : both tympanic membranes were normal [No JVD] : no jugular venous distention [No Lymphadenopathy] : no lymphadenopathy [Supple] : supple [Thyroid Normal, No Nodules] : the thyroid was normal and there were no nodules present [No Respiratory Distress] : no respiratory distress  [No Accessory Muscle Use] : no accessory muscle use [Clear to Auscultation] : lungs were clear to auscultation bilaterally [Normal Rate] : normal rate  [Regular Rhythm] : with a regular rhythm [Normal S1, S2] : normal S1 and S2 [No Murmur] : no murmur heard [No Abdominal Bruit] : a ~M bruit was not heard ~T in the abdomen [No Varicosities] : no varicosities [No Edema] : there was no peripheral edema [No Palpable Aorta] : no palpable aorta [No Extremity Clubbing/Cyanosis] : no extremity clubbing/cyanosis [Soft] : abdomen soft [Non Tender] : non-tender [Non-distended] : non-distended [No Masses] : no abdominal mass palpated [No HSM] : no HSM [Normal Bowel Sounds] : normal bowel sounds [Normal Posterior Cervical Nodes] : no posterior cervical lymphadenopathy [Normal Anterior Cervical Nodes] : no anterior cervical lymphadenopathy [No CVA Tenderness] : no CVA  tenderness [No Spinal Tenderness] : no spinal tenderness [No Joint Swelling] : no joint swelling [Grossly Normal Strength/Tone] : grossly normal strength/tone [No Rash] : no rash [Coordination Grossly Intact] : coordination grossly intact [No Focal Deficits] : no focal deficits [Normal Gait] : normal gait [Normal Affect] : the affect was normal [Normal Insight/Judgement] : insight and judgment were intact [de-identified] : cervical spine surgical scar well healed

## 2022-03-10 NOTE — HISTORY OF PRESENT ILLNESS
[de-identified] : 61 year M with PMH multiple medical issues presents for CPE. Pt denies CP/SOB, fever/chills, n/v/d/c.

## 2022-03-14 LAB
ALBUMIN SERPL ELPH-MCNC: 4.3 G/DL
ALP BLD-CCNC: 89 U/L
ALT SERPL-CCNC: 26 U/L
ANION GAP SERPL CALC-SCNC: 13 MMOL/L
AST SERPL-CCNC: 21 U/L
BASOPHILS # BLD AUTO: 0.05 K/UL
BASOPHILS NFR BLD AUTO: 0.9 %
BILIRUB SERPL-MCNC: 0.5 MG/DL
BUN SERPL-MCNC: 19 MG/DL
CALCIUM SERPL-MCNC: 9.4 MG/DL
CHLORIDE SERPL-SCNC: 103 MMOL/L
CHOLEST SERPL-MCNC: 119 MG/DL
CO2 SERPL-SCNC: 26 MMOL/L
CREAT SERPL-MCNC: 1.01 MG/DL
EGFR: 86 ML/MIN/1.73M2
EOSINOPHIL # BLD AUTO: 0.15 K/UL
EOSINOPHIL NFR BLD AUTO: 2.8 %
ESTIMATED AVERAGE GLUCOSE: 160 MG/DL
GLUCOSE SERPL-MCNC: 272 MG/DL
HBA1C MFR BLD HPLC: 7.2 %
HCT VFR BLD CALC: 47.3 %
HDLC SERPL-MCNC: 32 MG/DL
HGB BLD-MCNC: 15.9 G/DL
IMM GRANULOCYTES NFR BLD AUTO: 0.2 %
LDLC SERPL CALC-MCNC: 25 MG/DL
LYMPHOCYTES # BLD AUTO: 1.65 K/UL
LYMPHOCYTES NFR BLD AUTO: 30.4 %
MAN DIFF?: NORMAL
MCHC RBC-ENTMCNC: 31.3 PG
MCHC RBC-ENTMCNC: 33.6 GM/DL
MCV RBC AUTO: 93.1 FL
MONOCYTES # BLD AUTO: 0.36 K/UL
MONOCYTES NFR BLD AUTO: 6.6 %
NEUTROPHILS # BLD AUTO: 3.2 K/UL
NEUTROPHILS NFR BLD AUTO: 59.1 %
NONHDLC SERPL-MCNC: 87 MG/DL
PLATELET # BLD AUTO: 226 K/UL
POTASSIUM SERPL-SCNC: 5 MMOL/L
PROT SERPL-MCNC: 6.6 G/DL
PSA SERPL-MCNC: 0.61 NG/ML
RBC # BLD: 5.08 M/UL
RBC # FLD: 12.9 %
SODIUM SERPL-SCNC: 142 MMOL/L
TRIGL SERPL-MCNC: 309 MG/DL
TSH SERPL-ACNC: 0.99 UIU/ML
URATE SERPL-MCNC: 6 MG/DL
WBC # FLD AUTO: 5.42 K/UL

## 2022-05-13 ENCOUNTER — APPOINTMENT (OUTPATIENT)
Dept: INTERNAL MEDICINE | Facility: CLINIC | Age: 62
End: 2022-05-13
Payer: MEDICAID

## 2022-05-13 VITALS
WEIGHT: 208 LBS | HEART RATE: 71 BPM | SYSTOLIC BLOOD PRESSURE: 146 MMHG | BODY MASS INDEX: 30.81 KG/M2 | DIASTOLIC BLOOD PRESSURE: 82 MMHG | OXYGEN SATURATION: 99 % | HEIGHT: 69 IN

## 2022-05-13 PROCEDURE — 99212 OFFICE O/P EST SF 10 MIN: CPT

## 2022-05-13 NOTE — PLAN
[FreeTextEntry1] : Renew meds. I-STOP checked. Pt advised to sign up for [facility] portal to review labs and communicate any questions or concerns directly. Yearly physical and return as needed for illness, medication refills, and new or existing complaints. f/u 1 month TTM.

## 2022-05-13 NOTE — HISTORY OF PRESENT ILLNESS
[de-identified] : 61 year M with PMH multiple medical issues presents for medication refills. Pt denies CP/SOB, fever/chills, n/v/d/c.

## 2022-05-23 ENCOUNTER — RX RENEWAL (OUTPATIENT)
Age: 62
End: 2022-05-23

## 2022-06-10 ENCOUNTER — APPOINTMENT (OUTPATIENT)
Dept: INTERNAL MEDICINE | Facility: CLINIC | Age: 62
End: 2022-06-10
Payer: MEDICAID

## 2022-06-10 DIAGNOSIS — R35.1 BENIGN PROSTATIC HYPERPLASIA WITH LOWER URINARY TRACT SYMPMS: ICD-10-CM

## 2022-06-10 DIAGNOSIS — N40.1 BENIGN PROSTATIC HYPERPLASIA WITH LOWER URINARY TRACT SYMPMS: ICD-10-CM

## 2022-06-10 PROCEDURE — 99441: CPT

## 2022-06-10 NOTE — REVIEW OF SYSTEMS
[Joint Pain] : joint pain [Joint Stiffness] : joint stiffness [Muscle Weakness] : no muscle weakness [Back Pain] : back pain [Joint Swelling] : no joint swelling [Suicidal] : not suicidal [Insomnia] : no insomnia [Anxiety] : no anxiety [Depression] : depression [Negative] : Neurological

## 2022-06-10 NOTE — PLAN
[FreeTextEntry1] : Refill meds. I-STOP checked. Pt advised to sign up for [facility] portal to review labs and communicate any questions or concerns directly. Yearly physical and return as needed for illness, medication refills, and new or existing complaints.

## 2022-06-10 NOTE — HISTORY OF PRESENT ILLNESS
[Home] : at home, [unfilled] , at the time of the visit. [Medical Office: ([facility])___] : at the medical office located in  [Verbal consent obtained from patient] : the patient, [unfilled] [de-identified] : 61 year M with PMH multiple medical issues presents via TEB for medication refills. Pt denies CP/SOB, fever/chills, n/v/d/c.

## 2022-07-05 ENCOUNTER — EMERGENCY (EMERGENCY)
Facility: HOSPITAL | Age: 62
LOS: 1 days | Discharge: HOME | End: 2022-07-05
Attending: EMERGENCY MEDICINE | Admitting: EMERGENCY MEDICINE
Payer: MEDICAID

## 2022-07-05 VITALS
DIASTOLIC BLOOD PRESSURE: 60 MMHG | OXYGEN SATURATION: 98 % | SYSTOLIC BLOOD PRESSURE: 110 MMHG | RESPIRATION RATE: 16 BRPM | HEART RATE: 64 BPM

## 2022-07-05 VITALS
HEART RATE: 64 BPM | DIASTOLIC BLOOD PRESSURE: 84 MMHG | TEMPERATURE: 98 F | RESPIRATION RATE: 18 BRPM | SYSTOLIC BLOOD PRESSURE: 167 MMHG | HEIGHT: 69 IN | OXYGEN SATURATION: 98 %

## 2022-07-05 LAB
ANION GAP SERPL CALC-SCNC: 7 MMOL/L — SIGNIFICANT CHANGE UP (ref 5–17)
APTT BLD: 30.6 SEC — SIGNIFICANT CHANGE UP (ref 27.5–35.5)
BASOPHILS # BLD AUTO: 0.04 K/UL — SIGNIFICANT CHANGE UP (ref 0–0.2)
BASOPHILS NFR BLD AUTO: 0.9 % — SIGNIFICANT CHANGE UP (ref 0–2)
BUN SERPL-MCNC: 25 MG/DL — HIGH (ref 7–23)
CALCIUM SERPL-MCNC: 8.8 MG/DL — SIGNIFICANT CHANGE UP (ref 8.5–10.1)
CHLORIDE SERPL-SCNC: 108 MMOL/L — SIGNIFICANT CHANGE UP (ref 96–108)
CK MB BLD-MCNC: 2.3 % — SIGNIFICANT CHANGE UP (ref 0–3.5)
CK MB CFR SERPL CALC: 6.3 NG/ML — HIGH (ref 0–3.6)
CK SERPL-CCNC: 279 U/L — SIGNIFICANT CHANGE UP (ref 26–308)
CO2 SERPL-SCNC: 26 MMOL/L — SIGNIFICANT CHANGE UP (ref 22–31)
CREAT SERPL-MCNC: 1.1 MG/DL — SIGNIFICANT CHANGE UP (ref 0.5–1.3)
EGFR: 77 ML/MIN/1.73M2 — SIGNIFICANT CHANGE UP
EOSINOPHIL # BLD AUTO: 0.12 K/UL — SIGNIFICANT CHANGE UP (ref 0–0.5)
EOSINOPHIL NFR BLD AUTO: 2.6 % — SIGNIFICANT CHANGE UP (ref 0–6)
GLUCOSE SERPL-MCNC: 189 MG/DL — HIGH (ref 70–99)
HCT VFR BLD CALC: 38.1 % — LOW (ref 39–50)
HGB BLD-MCNC: 13.7 G/DL — SIGNIFICANT CHANGE UP (ref 13–17)
IMM GRANULOCYTES NFR BLD AUTO: 0.4 % — SIGNIFICANT CHANGE UP (ref 0–1.5)
LYMPHOCYTES # BLD AUTO: 1.21 K/UL — SIGNIFICANT CHANGE UP (ref 1–3.3)
LYMPHOCYTES # BLD AUTO: 25.9 % — SIGNIFICANT CHANGE UP (ref 13–44)
MCHC RBC-ENTMCNC: 31.7 PG — SIGNIFICANT CHANGE UP (ref 27–34)
MCHC RBC-ENTMCNC: 36 GM/DL — SIGNIFICANT CHANGE UP (ref 32–36)
MCV RBC AUTO: 88.2 FL — SIGNIFICANT CHANGE UP (ref 80–100)
MONOCYTES # BLD AUTO: 0.37 K/UL — SIGNIFICANT CHANGE UP (ref 0–0.9)
MONOCYTES NFR BLD AUTO: 7.9 % — SIGNIFICANT CHANGE UP (ref 2–14)
NEUTROPHILS # BLD AUTO: 2.91 K/UL — SIGNIFICANT CHANGE UP (ref 1.8–7.4)
NEUTROPHILS NFR BLD AUTO: 62.3 % — SIGNIFICANT CHANGE UP (ref 43–77)
NRBC # BLD: 0 /100 WBCS — SIGNIFICANT CHANGE UP (ref 0–0)
PLATELET # BLD AUTO: 200 K/UL — SIGNIFICANT CHANGE UP (ref 150–400)
POTASSIUM SERPL-MCNC: 4.1 MMOL/L — SIGNIFICANT CHANGE UP (ref 3.5–5.3)
POTASSIUM SERPL-SCNC: 4.1 MMOL/L — SIGNIFICANT CHANGE UP (ref 3.5–5.3)
RBC # BLD: 4.32 M/UL — SIGNIFICANT CHANGE UP (ref 4.2–5.8)
RBC # FLD: 12 % — SIGNIFICANT CHANGE UP (ref 10.3–14.5)
SODIUM SERPL-SCNC: 141 MMOL/L — SIGNIFICANT CHANGE UP (ref 135–145)
TROPONIN I, HIGH SENSITIVITY RESULT: 27.9 NG/L — SIGNIFICANT CHANGE UP
WBC # BLD: 4.67 K/UL — SIGNIFICANT CHANGE UP (ref 3.8–10.5)
WBC # FLD AUTO: 4.67 K/UL — SIGNIFICANT CHANGE UP (ref 3.8–10.5)

## 2022-07-05 PROCEDURE — 99284 EMERGENCY DEPT VISIT MOD MDM: CPT

## 2022-07-05 PROCEDURE — 99285 EMERGENCY DEPT VISIT HI MDM: CPT | Mod: 25

## 2022-07-05 PROCEDURE — 82550 ASSAY OF CK (CPK): CPT

## 2022-07-05 PROCEDURE — 73030 X-RAY EXAM OF SHOULDER: CPT | Mod: 26,LT

## 2022-07-05 PROCEDURE — 96374 THER/PROPH/DIAG INJ IV PUSH: CPT

## 2022-07-05 PROCEDURE — 84484 ASSAY OF TROPONIN QUANT: CPT

## 2022-07-05 PROCEDURE — 93010 ELECTROCARDIOGRAM REPORT: CPT

## 2022-07-05 PROCEDURE — 71046 X-RAY EXAM CHEST 2 VIEWS: CPT | Mod: 26

## 2022-07-05 PROCEDURE — 82553 CREATINE MB FRACTION: CPT

## 2022-07-05 PROCEDURE — 72125 CT NECK SPINE W/O DYE: CPT | Mod: 26,MA

## 2022-07-05 PROCEDURE — 85025 COMPLETE CBC W/AUTO DIFF WBC: CPT

## 2022-07-05 PROCEDURE — 80048 BASIC METABOLIC PNL TOTAL CA: CPT

## 2022-07-05 PROCEDURE — 36415 COLL VENOUS BLD VENIPUNCTURE: CPT

## 2022-07-05 PROCEDURE — 85730 THROMBOPLASTIN TIME PARTIAL: CPT

## 2022-07-05 PROCEDURE — 73030 X-RAY EXAM OF SHOULDER: CPT

## 2022-07-05 PROCEDURE — 93005 ELECTROCARDIOGRAM TRACING: CPT

## 2022-07-05 PROCEDURE — 85610 PROTHROMBIN TIME: CPT

## 2022-07-05 PROCEDURE — 72125 CT NECK SPINE W/O DYE: CPT | Mod: MA

## 2022-07-05 PROCEDURE — 71046 X-RAY EXAM CHEST 2 VIEWS: CPT

## 2022-07-05 RX ORDER — METHOCARBAMOL 500 MG/1
750 TABLET, FILM COATED ORAL ONCE
Refills: 0 | Status: COMPLETED | OUTPATIENT
Start: 2022-07-05 | End: 2022-07-05

## 2022-07-05 RX ORDER — KETOROLAC TROMETHAMINE 30 MG/ML
15 SYRINGE (ML) INJECTION ONCE
Refills: 0 | Status: DISCONTINUED | OUTPATIENT
Start: 2022-07-05 | End: 2022-07-05

## 2022-07-05 RX ORDER — MORPHINE SULFATE 50 MG/1
4 CAPSULE, EXTENDED RELEASE ORAL ONCE
Refills: 0 | Status: COMPLETED | OUTPATIENT
Start: 2022-07-05 | End: 2022-07-05

## 2022-07-05 RX ORDER — LIDOCAINE 4 G/100G
1 CREAM TOPICAL ONCE
Refills: 0 | Status: COMPLETED | OUTPATIENT
Start: 2022-07-05 | End: 2022-07-05

## 2022-07-05 RX ADMIN — LIDOCAINE 1 PATCH: 4 CREAM TOPICAL at 09:27

## 2022-07-05 RX ADMIN — METHOCARBAMOL 750 MILLIGRAM(S): 500 TABLET, FILM COATED ORAL at 09:27

## 2022-07-05 RX ADMIN — Medication 15 MILLIGRAM(S): at 09:27

## 2022-07-05 NOTE — ED PROVIDER NOTE - NS ED ATTENDING STATEMENT MOD
This was a shared visit with the CONNIE. I reviewed and verified the documentation and independently performed the documented:

## 2022-07-05 NOTE — ED PROVIDER NOTE - CLINICAL SUMMARY MEDICAL DECISION MAKING FREE TEXT BOX
pt is a 62 yo male who has hox f c2 fx, htn hld gout dm panic renal colic pmd dr herring ortho dr hernandez, injured left shoulder 2 weeks ago playing golf then fell from a ladder.  the pain in left shoulder got worse with numbness in left hand fingers #1/2/3 and up to arm shoulder neck and left side of chest for past 4 days  no mata no fever no chills no cough no other co  bib wife because he is unable to tolerate pain with otc meds and cant sleep  he is a daily smoker social drinker  on eval  wd wn male nad   heent nc at mmm perrla eomi anicteric no g f r  neck post scar well healed with modest forward flexiion no guard  chest cta co rrr  abd soft nt nd no hsm  ext pt has gouty tophi and swelling of knees elbows ther is full rom left shoulder and the left hand has pos tinnels with numbness neg phalens at wrist no motor weakness  neuro above  plan treat for possible hnp neck vs carpal tunnel with radicular sx ct imaging of neck sp c2 fx with revision and no hardware recent fall and worsening neuro sx. ro acs labs xray treat pain

## 2022-07-05 NOTE — ED ADULT NURSE NOTE - NSIMPLEMENTINTERV_GEN_ALL_ED
Implemented All Universal Safety Interventions:  [place] to call system. Call bell, personal items and telephone within reach. Instruct patient to call for assistance. Room bathroom lighting operational. Non-slip footwear when patient is off stretcher. Physically safe environment: no spills, clutter or unnecessary equipment. Stretcher in lowest position, wheels locked, appropriate side rails in place.
[FreeTextEntry1] : MIKI is a 14 year old boy who has acute viral URI, well appearing on exam \par Can start ProAir or Albuterol q 4-6 hours PRN if coughing worsen\par Nasal saline, cool mist humidifier\par Supportive care, fluids, fever management;  Return to clinic or to ER if persistent fever, ear pain, SOB, AMS, decreased PO intake/ UOP \par RTC for flu vaccine when well.

## 2022-07-05 NOTE — ED PROVIDER NOTE - CARE PROVIDER_API CALL
Rashad Braxton)  Rey Walker  [address]  [place] [address]  Phone: [phone]  Fax: [phone]  Follow Up Time:

## 2022-07-05 NOTE — ED PROVIDER NOTE - NSFOLLOWUPINSTRUCTIONS_ED_ALL_ED_FT
1) Follow up with orthopedics and your PCP in 1-2 days.  2) Return to the ED immediately for new or worsening symptoms as we discussed.      Shoulder Sprain       A shoulder sprain is a partial or complete tear in one of the tough, fiber-like tissues (ligaments) in the shoulder. The ligaments in the shoulder help to hold the shoulder in place.      What are the causes?    This condition may be caused by:  •A fall.      •A hit to the shoulder.      •A twist of the arm.        What increases the risk?    You are more likely to develop this condition if you:  •Play sports.      •Have problems with balance or coordination.        What are the signs or symptoms?    Symptoms of this condition include:  •Pain when moving the shoulder.      •Limited ability to move the shoulder.      •Swelling and tenderness on top of the shoulder.      •Warmth in the shoulder.      •A change in the shape of the shoulder.      •Redness or bruising on the shoulder.        How is this diagnosed?    This condition is diagnosed with:  •A physical exam. During the exam, you may be asked to do simple exercises with your shoulder.      •Imaging tests such as X-rays, MRI, or a CT scan. These tests can show how severe the sprain is.        How is this treated?    This condition may be treated with:  •Rest.      •Pain medicine.      •Ice.      •A sling or brace. This is used to keep the arm still while the shoulder is healing.      •Physical therapy or rehabilitation exercises. These help to improve the range of motion and strength of the shoulder.      •Surgery (rare). Surgery may be needed if the sprain caused a joint to become unstable. Surgery may also be needed to reduce pain.      Some people may develop ongoing shoulder pain or lose some range of motion in the shoulder. However, most people do not develop long-term problems.      Follow these instructions at home:     If you have a sling or brace:     •Wear the sling or brace as told by your health care provider. Remove it only as told by your health care provider.      •Loosen the sling or brace if your fingers tingle, become numb, or turn cold and blue.      •Keep the sling or brace clean.    •If the sling or brace is not waterproof:  •Do not let it get wet.      •Cover it with a watertight covering when you take a bath or shower.        Activity     •Rest your shoulder.      •Move your arm only as much as told by your health care provider, but move your hand and fingers often to prevent stiffness and swelling.      •Return to your normal activities as told by your health care provider. Ask your health care provider what activities are safe for you.      •Ask your health care provider when it is safe for you to drive if you have a sling or brace on your shoulder.      •If you were shown how to do any exercises, do them as told by your health care provider.      General instructions   •If directed, put ice on the affected area.  •Put ice in a plastic bag.      •Place a towel between your skin and the bag.      •Leave the ice on for 20 minutes, 2–3 times a day.        •Take over-the-counter and prescription medicines only as told by your health care provider.      • Do not use any products that contain nicotine or tobacco, such as cigarettes, e-cigarettes, and chewing tobacco. These can delay healing. If you need help quitting, ask your health care provider.      •Keep all follow-up visits as told by your health care provider. This is important.        Contact a health care provider if:    •Your pain gets worse.      •Your pain is not relieved with medicines.      •You have increased redness or swelling.        Get help right away if:    •You have a fever.      •You cannot move your arm or shoulder.      •You develop severe numbness or tingling in your arm, hand, or fingers.      •Your arm, hand, or fingers feel cold and turn blue, white, or gray.        Summary    •A shoulder sprain is a partial or complete tear in one of the tough, fiber-like tissues (ligaments) in the shoulder.      •This condition may be caused by a fall, a hit to the shoulder, or a twist of the arm.      •Treatment usually includes rest, ice, and pain medicine as needed.      •If you have a sling or brace, wear it as told by your health care provider. Remove it only as told by your health care provider.      This information is not intended to replace advice given to you by your health care provider. Make sure you discuss any questions you have with your health care provider.      Document Revised: 05/24/2019 Document Reviewed: 05/24/2019    [employer] Patient Education © 2022 [employer]

## 2022-07-05 NOTE — ED ADULT TRIAGE NOTE - CHIEF COMPLAINT QUOTE
62 y/o male received ambulatory to triage. Alert and oriented x4. C/o left shoulder pain and numbness in the left hand x1 1 week. Neuro intact. No facial droop noted. Denies any cp, sob, n/v/d, dizziness, headache, fever/chills.

## 2022-07-05 NOTE — ED PROVIDER NOTE - CARDIAC, MLM
Normal rate, regular rhythm.  Heart sounds S1, S2.  No murmurs, rubs or gallops. Left pectoral region tender to palpation

## 2022-07-05 NOTE — ED PROVIDER NOTE - OBJECTIVE STATEMENT
62 yo M PMHx HTN, DM, anxiety presents to ED c/o left shoulder and upper chest wall pain x 2 weeks. Pt states he swung a golf club 2 weeks ago, and immediately felt pain in his shoulder, later the same day pt slipped off step ladder and grabbed wall to prevent himself from falling and L shoulder pain worsened.

## 2022-07-05 NOTE — ED ADULT NURSE NOTE - OBJECTIVE STATEMENT
Patient is a 62yo male complaining of 61 received ambulatory to triage. Alert and oriented x4. C/o left shoulder pain and numbness in the left hand x1 1 week. Neuro intact. No facial droop noted. Denies any chest pain , shortness of breath , nausea vomiting diarrhea , dizziness, headache, fever/chills. Patient states he might of hurt the shoulder while playing golf or when he caught himself from slipping

## 2022-07-05 NOTE — ED PROVIDER NOTE - PROGRESS NOTE DETAILS
pt improved, workup unremarkable. Discussed the results of all diagnostic testing in ED and copies of all reports given.   Pt and wife (RN) given an opportunity to have all questions answered to satisfaction.  Discussed the importance of prompt, close medical follow-up. ED return precautions discussed at length.  Pt/wife verbalizes agreement and understanding of plan and ED return precautions. Pt well appearing, stable for DC home. No emergent concerns at this time. pt improved, workup unremarkable. Medrol rx sent as per discussion with attending. pt will watch BS levels. Discussed the results of all diagnostic testing in ED and copies of all reports given.   Pt and wife (RN) given an opportunity to have all questions answered to satisfaction.  Discussed the importance of prompt, close medical follow-up. ED return precautions discussed at length.  Pt/wife verbalizes agreement and understanding of plan and ED return precautions. Pt well appearing, stable for DC home. No emergent concerns at this time.
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
## 2022-07-22 ENCOUNTER — APPOINTMENT (OUTPATIENT)
Dept: INTERNAL MEDICINE | Facility: CLINIC | Age: 62
End: 2022-07-22

## 2022-07-22 PROCEDURE — 99441: CPT

## 2022-07-22 RX ORDER — LAMOTRIGINE 150 MG/1
150 TABLET ORAL
Qty: 60 | Refills: 0 | Status: DISCONTINUED | COMMUNITY
Start: 2022-01-16

## 2022-07-22 RX ORDER — METHYLPREDNISOLONE 4 MG/1
4 TABLET ORAL
Qty: 21 | Refills: 0 | Status: DISCONTINUED | COMMUNITY
Start: 2022-07-05

## 2022-07-22 NOTE — PHYSICAL EXAM
[No Acute Distress] : no acute distress [Well Nourished] : well nourished [Well Developed] : well developed [Well-Appearing] : well-appearing [No Respiratory Distress] : no respiratory distress  [Normal Affect] : the affect was normal [Normal Insight/Judgement] : insight and judgment were intact [de-identified] : Audio Only.

## 2022-07-22 NOTE — PLAN
[FreeTextEntry1] : Refill meds. I-STOP checked. Pt advised to sign up for [facility] portal to review labs and communicate any questions or concerns directly. Yearly physical and return as needed for illness, medication refills, and new or existing complaints.

## 2022-07-22 NOTE — HISTORY OF PRESENT ILLNESS
[Home] : at home, [unfilled] , at the time of the visit. [Medical Office: ([facility])___] : at the medical office located in  [Verbal consent obtained from patient] : the patient, [unfilled] [de-identified] : 61 year M with PMH multiple medical issues presents via TTM for medication refills. Pt denies CP/SOB, fever/chills, n/v/d/c.

## 2022-08-20 NOTE — ED ADULT NURSE NOTE - TOBACCO USE
Final [facility]:  [facility]        Chief Complaint   Patient presents with    Rash     Rash noted on ankles bilaterally, states she has had rash for past 3 days, does not itch or hurt  No other symptoms at present time  HPI  77 yo presents with bilateral ankle rash  (see media)  She has had for 3d but today looked worse  Examines like petechiae/purpura  Has been outside doing lots of gardening  No hives  Doesn't itch  Doesn't examine like contact dermatitis  Possibly heat rash but no papules  No epistaxis, other bleeding  H/o breast cancer but now just on anastazole  H/o shingles but bilateral  Nonpalpable  No fevers  Nontoxic      - No language barrier    - History obtained from patient  - There are no limitations to the history obtained  - Previous charting underwent limited review with attention to last ED visits, labs, ekgs, and prior imaging  PMH:   has a past medical history of Abnormal blood chemistry, Abnormal glucose, Breast cancer ([address] ) (2018), Cancer ([facility] [address] ), Cervical polyp, Fracture of ankle, Hemorrhoid, Herpes zoster, History of chemotherapy, History of radiation therapy, Hyperglycemia, Hyperlipidemia, Hypertension, Insomnia, Leiomyoma of uterus, Osteopenia, Seborrheic keratosis, Shingles, and Spider bite wound  PSH:   has a past surgical history that includes Cyst Removal; Dilation and curettage of uterus; pr perq device placemt breast loc 1st les w dominik (Left, 1/12/2018); pr biopsy/excision, lymph node(s) (Left, 1/12/2018); INTRAOPERATIVE RADIATION THERAPY (IORT) (Left, 1/12/2018); [place] lymph node biopsy (Left); Ankle surgery (Right); Fracture surgery; pr colonoscopy flx dx w/collj spec when pfrmd (N/A, 9/14/2018); US guided breast biopsy left complete (Left, 11/30/2017); Mammo needle localization left (all inc) (Left, 1/12/2018); Breast biopsy (Left); Breast cyst aspiration (Right, 1992); and Breast lumpectomy (Left, 01/01/2018)       Social History:    Tobacco Use: Medium Risk    Smoking Tobacco Use: Former Smoker    Smokeless Tobacco Use: Never Used     Alcohol Use: Not At Risk    Frequency of Alcohol Consumption: Never    Average Number of Drinks: Not on file    Frequency of Binge Drinking: Not on file     Patient with no illicit use    ROS:    Pertinent positives/negatives:   Review of Systems   Skin: Positive for rash  CONSTITUTIONAL:  No dizziness  No weakness  No unexpected weight loss  EYES:  No pain, erythema, or discharge  No loss of vision  ENT:  No tinnitus, decreased hearing  No epistaxis/purulent drainage  No voice change, airway closing, trismus  CARDIOVASCULAR:  No chest pain  No palpitations  No new lower extremity edema  RESPIRATORY:  No purulent cough  No hemoptysis  No dyspnea  No paroxysmal nocturnal dyspnea  No stridor, audible wheezing bedside  GASTROINTESTINAL:  Normal appetite  No vomiting, diarrhea  No pain  No bloating  No melena  GENITOURINARY:  No frequency, urgency, nocturia  No hematuria or dysuria  No discharge  No sores/adenopathy  MUSCULOSKELETAL:  No arthralgias or myalgias that are new  INTEGUMENTARY:  No swelling  No unexpected contusions  No abrasions  No lymphangitis  NEUROLOGIC:  No meningismus  No numbness of the extremities  No new focal weakness  No postural instability  PSYCHIATRIC:  No SI HI AVH  HEMATOLOGICAL:  No bleeding  No petechiae  No bruising  ALLERGIES:  No urticaria  No sudden abd cramping  No stridor  PE:     Physical exam highlights:   Physical Exam       Vitals:    08/20/22 0007 08/20/22 0107 08/20/22 0111   BP: (!) 177/81 122/59    BP Location: Right arm     Pulse: 93     Resp: 19     Temp: 97 6 °F (36 4 °C)     TempSrc: Oral  Oral   SpO2: 98%       Vitals reviewed by me  Nursing note reviewed  Chaperone present for all sensitive exam   Const: No acute distress  Alert  Nontoxic  Not diaphoretic  HEENT: External ears normal  No protrusion drainage swelling   Nose normal  No drainage/traumatic deformity  MMM  Mouth with baseline/symmetric movement  No trismus  Eyes: No squinting  No icterus  Tracks through the room with normal EOM  No tearing/swelling/drainage  Neck: ROM normal  No rigidity  No meningismus  Cards: Rate as per vitals  Compared to monitor sinus unless documented above  Regular  Well perfused  Pulm: able to verbalize without additional effort  Effort and excursion normal  No disress  No audible wheezing/ stridor  Normal resp rate  Abd: No distension beyond baseline  No fluctuant wave  Patient without peritoneal pain with shifting/bumping the bed  MSK: ROM normal and baseline  No deformity  Skin: No new rashes visible  Well perfused  Neuro: Nonfocal  Baseline  CN grossly intact  Moving all four with coordination  Psych: Normal behavior and affect  A:  - Nursing note reviewed  Ddx and MDM  Heat rash? Story fits, exam atypical    ITP? Check platelets    Contact rash? Could be but atpical exam      Possible crusting   Mupirocin if any worsening                        No orders to display     Orders Placed This Encounter   Procedures    CBC and differential    Comprehensive metabolic panel    Protime-INR     Labs Reviewed   PROTIME-INR - Normal       Result Value Ref Range Status    Protime 12 3  11 6 - 14 5 seconds Final    INR 0 90  0 84 - 1 19 Final   CBC AND DIFFERENTIAL    WBC 4 89  4 31 - 10 16 Thousand/uL Final    RBC 4 06  3 81 - 5 12 Million/uL Final    Hemoglobin 12 5  11 5 - 15 4 g/dL Final    Hematocrit 38 8  34 8 - 46 1 % Final    MCV 96  82 - 98 fL Final    MCH 30 8  26 8 - 34 3 pg Final    MCHC 32 2  31 4 - 37 4 g/dL Final    RDW 12 9  11 6 - 15 1 % Final    MPV 9 1  8 9 - 12 7 fL Final    Platelets 306  688 - 390 Thousands/uL Final    nRBC 0  /100 WBCs Final    Neutrophils Relative 51  43 - 75 % Final    Immat GRANS % 0  0 - 2 % Final    Lymphocytes Relative 38  14 - 44 % Final    Monocytes Relative 8  4 - 12 % Final    Eosinophils Relative 3  0 - 6 % Final Basophils Relative 0  0 - 1 % Final    Neutrophils Absolute 2 48  1 85 - 7 62 Thousands/µL Final    Immature Grans Absolute 0 01  0 00 - 0 20 Thousand/uL Final    Lymphocytes Absolute 1 87  0 60 - 4 47 Thousands/µL Final    Monocytes Absolute 0 39  0 17 - 1 22 Thousand/µL Final    Eosinophils Absolute 0 12  0 00 - 0 61 Thousand/µL Final    Basophils Absolute 0 02  0 00 - 0 10 Thousands/µL Final   COMPREHENSIVE METABOLIC PANEL    Sodium 567  135 - 147 mmol/L Final    Potassium 3 8  3 5 - 5 3 mmol/L Final    Chloride 105  96 - 108 mmol/L Final    CO2 27  21 - 32 mmol/L Final    ANION GAP 10  4 - 13 mmol/L Final    BUN 18  5 - 25 mg/dL Final    Creatinine 0 62  0 60 - 1 30 mg/dL Final    Comment: Standardized to IDMS reference method    Glucose 133  65 - 140 mg/dL Final    Comment: If the patient is fasting, the ADA then defines impaired fasting glucose as > 100 mg/dL and diabetes as > or equal to 123 mg/dL  Specimen collection should occur prior to Sulfasalazine administration due to the potential for falsely depressed results  Specimen collection should occur prior to Sulfapyridine administration due to the potential for falsely elevated results  Calcium 9 2  8 3 - 10 1 mg/dL Final    AST 22  5 - 45 U/L Final    Comment: Specimen collection should occur prior to Sulfasalazine administration due to the potential for falsely depressed results  ALT 31  12 - 78 U/L Final    Comment: Specimen collection should occur prior to Sulfasalazine administration due to the potential for falsely depressed results  Alkaline Phosphatase 104  46 - 116 U/L Final    Total Protein 6 7  6 4 - 8 4 g/dL Final    Albumin 4 0  3 5 - 5 0 g/dL Final    Total Bilirubin 0 28  0 20 - 1 00 mg/dL Final    Comment: Use of this assay is not recommended for patients undergoing treatment with eltrombopag due to the potential for falsely elevated results      eGFR 89  ml/min/1 73sq m Final    Narrative:     National Kidney Disease Foundation guidelines for Chronic Kidney Disease (CKD):     Stage 1 with normal or high GFR (GFR > 90 mL/min/1 73 square meters)    Stage 2 Mild CKD (GFR = 60-89 mL/min/1 73 square meters)    Stage 3A Moderate CKD (GFR = 45-59 mL/min/1 73 square meters)    Stage 3B Moderate CKD (GFR = 30-44 mL/min/1 73 square meters)    Stage 4 Severe CKD (GFR = 15-29 mL/min/1 73 square meters)    Stage 5 End Stage CKD (GFR <15 mL/min/1 73 square meters)  Note: GFR calculation is accurate only with a steady state creatinine       Final Diagnosis:  1  Purpura ([address] )        P:  - hospital tx includes   Medications - No data to display      - disposition  Time reflects when diagnosis was documented in both MDM as applicable and the Disposition within this note     Time User Action Codes Description Comment    8/20/2022 12:58 AM Winford Font Add [L74 0] Heat rash     8/20/2022 12:59 AM Winford Font Add [D69 2] Purpura ([address] )     8/20/2022 12:59 AM Winford Font Modify [L74 0] Heat rash     8/20/2022 12:59 AM Winford Font Modify [D69 2] Purpura ([address] )     8/20/2022  1:00 AM Winford Font Remove [L74 0] Heat rash       ED Disposition     ED Disposition   Discharge    Condition   Stable    Date/Time   Sat Aug 20, 2022 12:58 AM    Comment   [address] discharge to home/self care  Follow-up Information     Follow up With Specialties Details Why Contact Joann Nguyen DO Family Medicine, Wound Care   [address] [place] [address]  [phone]            - patient will call their PCP to let them know they were in the emergency department   We discuss return precautions       - additional tx intended, if consistent with primary provider:  - patient to follow with :      Discharge Medication List as of 8/20/2022  1:00 AM      START taking these medications    Details   mupirocin (BACTROBAN) 2 % ointment Apply topically 3 (three) times a day, Starting Sat 8/20/2022, Normal CONTINUE these medications which have NOT CHANGED    Details   amLODIPine-valsartan (EXFORGE) 5-160 MG per tablet TAKE ONE TABLET BY MOUTH EVERY DAY, Normal      anastrozole (ARIMIDEX) 1 mg tablet TAKE 1 TABLET(1 MG) BY MOUTH DAILY, Normal      Calcium Carb-Cholecalciferol 600-1000 MG-UNIT CAPS Take by mouth, Historical Med      simvastatin (ZOCOR) 40 mg tablet TAKE 1 TABLET BY MOUTH AT BEDTIME, Normal           No discharge procedures on file  Prior to Admission Medications   Prescriptions Last Dose Informant Patient Reported? Taking? Calcium Carb-Cholecalciferol 600-1000 MG-UNIT CAPS  Self Yes No   Sig: Take by mouth   amLODIPine-valsartan (EXFORGE) 5-160 MG per tablet   No No   Sig: TAKE ONE TABLET BY MOUTH EVERY DAY   anastrozole (ARIMIDEX) 1 mg tablet   No No   Sig: TAKE 1 TABLET(1 MG) BY MOUTH DAILY   simvastatin (ZOCOR) 40 mg tablet   No No   Sig: TAKE 1 TABLET BY MOUTH AT BEDTIME      Facility-Administered Medications: None       Portions of the record may have been created with voice recognition software  Occasional wrong word or "sound a like" substitutions may have occurred due to the inherent limitations of voice recognition software  Read the chart carefully and recognize, using context, where substitutions have occurred      Electronically signed by:  MD Warner Roy MD  08/20/22 5114 Current every day smoker

## 2022-09-26 ENCOUNTER — RX RENEWAL (OUTPATIENT)
Age: 62
End: 2022-09-26

## 2022-10-03 ENCOUNTER — NON-APPOINTMENT (OUTPATIENT)
Age: 62
End: 2022-10-03

## 2022-10-04 ENCOUNTER — OUTPATIENT (OUTPATIENT)
Dept: OUTPATIENT SERVICES | Facility: HOSPITAL | Age: 62
LOS: 1 days | Discharge: HOME | End: 2022-10-04
Payer: MEDICAID

## 2022-10-04 ENCOUNTER — APPOINTMENT (OUTPATIENT)
Dept: WOUND CARE | Facility: HOSPITAL | Age: 62
End: 2022-10-04

## 2022-10-04 VITALS
HEART RATE: 70 BPM | OXYGEN SATURATION: 98 % | BODY MASS INDEX: 29.62 KG/M2 | WEIGHT: 200 LBS | HEIGHT: 69 IN | RESPIRATION RATE: 18 BRPM | SYSTOLIC BLOOD PRESSURE: 125 MMHG | TEMPERATURE: 96.9 F | DIASTOLIC BLOOD PRESSURE: 82 MMHG

## 2022-10-04 DIAGNOSIS — L97.501 NON-PRESSURE CHRONIC ULCER OF OTHER PART OF UNSPECIFIED FOOT LIMITED TO BREAKDOWN OF SKIN: ICD-10-CM

## 2022-10-04 PROCEDURE — 99214 OFFICE O/P EST MOD 30 MIN: CPT

## 2022-10-04 PROCEDURE — G0463: CPT

## 2022-10-04 NOTE — HISTORY OF PRESENT ILLNESS
[FreeTextEntry1] : 61 year old male presents to the [facility] with no open wounds. The patient reports pain in his left foot when walking. The patient has hammered toes on the 3rd and 4th digit. He decided to follow up at the [facility] for care of his feet.  Patient has no open wounds but the 3rd toe is very deformed and putting mechanical pressure on the 4th toe > The 4th toe has a thickened callus area on the lateral side and the 5th toe has a callused area on the medial side , both of which have pain .

## 2022-10-04 NOTE — VITALS
[Pain related to present condition?] : The patient's  pain is not related to present condition. [] : No [de-identified] : 0/10

## 2022-10-04 NOTE — PHYSICAL EXAM
[1+] : left 1+ [Ankle Swelling (On Exam)] : present [Ankle Swelling Bilaterally] : bilaterally  [Varicose Veins Of Lower Extremities] : bilaterally [Ankle Swelling On The Left] : moderate [] : bilaterally [Ankle Swelling On The Right] : mild [Skin Ulcer] : ulcer [Alert] : alert [Oriented to Person] : oriented to person [Oriented to Place] : oriented to place [Calm] : calm [Purpura] : no purpura  [Petechiae] : no petechiae [Skin Induration] : no induration [de-identified] : A&Ox3, NAD [de-identified] : HTN [de-identified] : severe gout with associated foot deformities  [de-identified] : right medial incision healed, right 2nd dorsal toe dorsal healed ,opening on the lateral base of the 5th metatarsal right foot closed  [de-identified] : Diabetic neuropathy  [de-identified] : Callus shaved by ANTON [FreeTextEntry1] : Foot 4th and 5th Digit Interdigit- Callus- No Open Wounds [de-identified] : No treatment required [de-identified] : Mechanically cleansed with 0.9%normal saline and sterile gauze\par  [de-identified] : CIRCULATION \par \par Dorsalis pedis:  2+, bilaterally\par Posterior Tibialis:  2+, bilaterally \par Doppler Pulses:  Present, bilaterally \par Extremity Color: Pigmented, bilaterally \par Extremity Temperature: arm, bilaterally \par Capillary Refill:  <3sec, bilaterally \par DARIANA: Ordered by DPM \par  [TWNoteComboBox1] : Left

## 2022-10-04 NOTE — ASSESSMENT
[Verbal] : Verbal [Written] : Written [Demo] : Demo [Patient] : Patient [Fair - mild discomfort, physical impairment, low acceptance] : Fair - mild discomfort, physical impairment, low acceptance [Verbalizes knowledge/Understanding] : Verbalizes knowledge/understanding [Foot Care] : foot care [Signs and symptoms of infection] : sign and symptoms of infection [How and When to Call] : how and when to call [Glycemic Control] : glycemic control [Stable] : stable [Home] : Home [Ambulatory] : Ambulatory [Not Applicable - Long Term Care/Home Health Agency] : Long Term Care/Home Health Agency: Not Applicable [] : No [FreeTextEntry2] : Foot Care\par Glycemic Control\par Surgical procedure\par Vascular studies \par Vascular consult \par F/U 1 month  [FreeTextEntry4] : Patient admits that he does not test his blood glucose and does not monitor his diet.He has not followed up with his endocrinologist since his last visit to the [facility]. \par DPM recommended a hammer toe procedure of the left foot 3rd digit.  Patient requested the 3rd digit amputation. \par DPM shaved callus between 4th and 5th digit.\par DPM recommended patient follow up with his endocrinologist to have his A1C checked to ensure glycemic control before moving forward with any surgical interventions. Patient verbalized understanding. \par DPM ordered vascular studies and a vascular consult\par Authorization for vascular studies submitted today and request for vascular consult submitted today\par F/U 1 month

## 2022-10-04 NOTE — PLAN
[FreeTextEntry1] : Patient has no open wounds and will need corrective surgery of the toes to resolve his pain and problems . Discussed correction of toes , 3 and 4 left foot All risks , benefits , complications have been discussed with the patient . All his questions have been thoroughly answered . Patient agrees with the plan and understands all the risks and benefits . Patient will do surgery in the winter because of job considerations , PTR 1 month  Spent 30 minutes for patient care and medical decision making.\par \par

## 2022-10-04 NOTE — REVIEW OF SYSTEMS
[Arthralgias] : arthralgias [Joint Swelling] : joint swelling [Joint Stiffness] : joint stiffness [Skin Wound] : skin wound [Fever] : no fever [Chills] : no chills [Eye Pain] : no eye pain [Loss Of Hearing] : no hearing loss [Shortness Of Breath] : no shortness of breath [Wheezing] : no wheezing [Abdominal Pain] : no abdominal pain [Anxiety] : no anxiety [Easy Bleeding] : no tendency for easy bleeding [FreeTextEntry5] : HTN [FreeTextEntry9] : Gouty arthropathy , leftf oot hammer toes and painful deformity of the digits [de-identified] : Left foot 3rd toe deformed and deviated medially , causing painful pressure on the 4,5 toe left foot , with interdigital pressure calluses  [de-identified] : Diabetic neuropathy  [de-identified] : NIDDM , severe gouty arthropathy

## 2022-10-06 DIAGNOSIS — I10 ESSENTIAL (PRIMARY) HYPERTENSION: ICD-10-CM

## 2022-10-06 DIAGNOSIS — Z87.442 PERSONAL HISTORY OF URINARY CALCULI: ICD-10-CM

## 2022-10-06 DIAGNOSIS — E11.40 TYPE 2 DIABETES MELLITUS WITH DIABETIC NEUROPATHY, UNSPECIFIED: ICD-10-CM

## 2022-10-06 DIAGNOSIS — Z98.890 OTHER SPECIFIED POSTPROCEDURAL STATES: ICD-10-CM

## 2022-10-06 DIAGNOSIS — E78.1 PURE HYPERGLYCERIDEMIA: ICD-10-CM

## 2022-10-06 DIAGNOSIS — Z87.81 PERSONAL HISTORY OF (HEALED) TRAUMATIC FRACTURE: ICD-10-CM

## 2022-10-06 DIAGNOSIS — N40.1 BENIGN PROSTATIC HYPERPLASIA WITH LOWER URINARY TRACT SYMPTOMS: ICD-10-CM

## 2022-10-06 DIAGNOSIS — Z79.84 LONG TERM (CURRENT) USE OF ORAL HYPOGLYCEMIC DRUGS: ICD-10-CM

## 2022-10-06 DIAGNOSIS — M10.071 IDIOPATHIC GOUT, RIGHT ANKLE AND FOOT: ICD-10-CM

## 2022-10-06 DIAGNOSIS — Z79.899 OTHER LONG TERM (CURRENT) DRUG THERAPY: ICD-10-CM

## 2022-10-06 DIAGNOSIS — N32.81 OVERACTIVE BLADDER: ICD-10-CM

## 2022-10-06 DIAGNOSIS — R35.1 NOCTURIA: ICD-10-CM

## 2022-10-06 DIAGNOSIS — Z98.1 ARTHRODESIS STATUS: ICD-10-CM

## 2022-10-06 DIAGNOSIS — L84 CORNS AND CALLOSITIES: ICD-10-CM

## 2022-10-06 DIAGNOSIS — N52.9 MALE ERECTILE DYSFUNCTION, UNSPECIFIED: ICD-10-CM

## 2022-10-13 ENCOUNTER — OUTPATIENT (OUTPATIENT)
Dept: OUTPATIENT SERVICES | Facility: HOSPITAL | Age: 62
LOS: 1 days | End: 2022-10-13
Payer: MEDICAID

## 2022-10-13 DIAGNOSIS — E11.621 TYPE 2 DIABETES MELLITUS WITH FOOT ULCER: ICD-10-CM

## 2022-10-13 PROCEDURE — 93923 UPR/LXTR ART STDY 3+ LVLS: CPT

## 2022-10-13 PROCEDURE — 93923 UPR/LXTR ART STDY 3+ LVLS: CPT | Mod: 26

## 2022-10-17 ENCOUNTER — APPOINTMENT (OUTPATIENT)
Dept: WOUND CARE | Facility: HOSPITAL | Age: 62
End: 2022-10-17

## 2022-10-24 ENCOUNTER — OUTPATIENT (OUTPATIENT)
Dept: OUTPATIENT SERVICES | Facility: HOSPITAL | Age: 62
LOS: 1 days | Discharge: HOME | End: 2022-10-24
Payer: MEDICAID

## 2022-10-24 ENCOUNTER — APPOINTMENT (OUTPATIENT)
Dept: WOUND CARE | Facility: HOSPITAL | Age: 62
End: 2022-10-24

## 2022-10-24 VITALS
HEART RATE: 75 BPM | RESPIRATION RATE: 20 BRPM | HEIGHT: 69 IN | OXYGEN SATURATION: 95 % | SYSTOLIC BLOOD PRESSURE: 139 MMHG | DIASTOLIC BLOOD PRESSURE: 88 MMHG | BODY MASS INDEX: 29.62 KG/M2 | TEMPERATURE: 98.2 F | WEIGHT: 200 LBS

## 2022-10-24 DIAGNOSIS — I83.90 ASYMPTOMATIC VARICOSE VEINS OF UNSPECIFIED LOWER EXTREMITY: ICD-10-CM

## 2022-10-24 DIAGNOSIS — M10.071 IDIOPATHIC GOUT, RIGHT ANKLE AND FOOT: ICD-10-CM

## 2022-10-24 DIAGNOSIS — L84 CORNS AND CALLOSITIES: ICD-10-CM

## 2022-10-24 PROCEDURE — 99213 OFFICE O/P EST LOW 20 MIN: CPT

## 2022-10-24 PROCEDURE — G0463: CPT

## 2022-10-24 NOTE — HISTORY OF PRESENT ILLNESS
[FreeTextEntry1] : 62 yo M with DM and now healed R lateral foot DFU. Patient states it had been present for years. He does have severe foot deformity. He states his AIC is about 6.  He also has VV. \par Wife used to perform daily feet exam but had stopped when wound worsen.

## 2022-10-24 NOTE — ASSESSMENT
[FreeTextEntry1] : Wound healed but he does have weak pulses. His last A1C on record is 7.2. Asymptomatic VV

## 2022-10-24 NOTE — DATA REVIEWED
[FreeTextEntry1] : \par \par \par ACC: [id number] EXAM: PHYSIOL EXTREM LOW 3+ LEV BI\par \par PROCEDURE DATE: 10/13/2022\par \par \par \par INTERPRETATION: Clinical indication: Foot ulcer, evaluate arteries\par \par COMPARISON: None\par \par FINDINGS: There are normal pulse volume recordings bilaterally, aside from abnormally dampened waveforms at the left digit.\par \par There is no abnormal segmental pressure gradient.\par \par Right DARIANA = 1.32\par Left DARIANA = 1.25\par \par IMPRESSION: Mildly elevated right DARIANA which may be seen in the setting of calcified vessels. Normal left DARIANA.\par \par Dampened pulse volume recordings at the left digit.\par \par --- End of Report ---\par \par \par \par \par \par ISAEL MCRAE MD; Attending Radiologist\par This document has been electronically signed. Oct 13 2022 7:36PM

## 2022-10-24 NOTE — PHYSICAL EXAM
[1+] : right 1+ [Ankle Swelling (On Exam)] : not present [Varicose Veins Of Lower Extremities] : bilaterally [] : bilaterally [Ankle Swelling On The Left] : moderate [FreeTextEntry1] : doppler

## 2022-10-25 DIAGNOSIS — Z98.1 ARTHRODESIS STATUS: ICD-10-CM

## 2022-10-25 DIAGNOSIS — E11.621 TYPE 2 DIABETES MELLITUS WITH FOOT ULCER: ICD-10-CM

## 2022-10-25 DIAGNOSIS — Z98.890 OTHER SPECIFIED POSTPROCEDURAL STATES: ICD-10-CM

## 2022-10-25 DIAGNOSIS — M10.071 IDIOPATHIC GOUT, RIGHT ANKLE AND FOOT: ICD-10-CM

## 2022-10-25 DIAGNOSIS — Z79.84 LONG TERM (CURRENT) USE OF ORAL HYPOGLYCEMIC DRUGS: ICD-10-CM

## 2022-10-25 DIAGNOSIS — Z87.81 PERSONAL HISTORY OF (HEALED) TRAUMATIC FRACTURE: ICD-10-CM

## 2022-10-25 DIAGNOSIS — E11.40 TYPE 2 DIABETES MELLITUS WITH DIABETIC NEUROPATHY, UNSPECIFIED: ICD-10-CM

## 2022-10-25 DIAGNOSIS — Z79.899 OTHER LONG TERM (CURRENT) DRUG THERAPY: ICD-10-CM

## 2022-10-25 DIAGNOSIS — Z87.442 PERSONAL HISTORY OF URINARY CALCULI: ICD-10-CM

## 2022-10-31 ENCOUNTER — OUTPATIENT (OUTPATIENT)
Dept: OUTPATIENT SERVICES | Facility: HOSPITAL | Age: 62
LOS: 1 days | Discharge: HOME | End: 2022-10-31
Payer: MEDICAID

## 2022-10-31 ENCOUNTER — APPOINTMENT (OUTPATIENT)
Dept: WOUND CARE | Facility: HOSPITAL | Age: 62
End: 2022-10-31

## 2022-10-31 DIAGNOSIS — E11.621 TYPE 2 DIABETES MELLITUS WITH FOOT ULCER: ICD-10-CM

## 2022-10-31 PROCEDURE — 99213 OFFICE O/P EST LOW 20 MIN: CPT

## 2022-10-31 PROCEDURE — G0463: CPT

## 2022-10-31 NOTE — ASSESSMENT
[Verbal] : Verbal [Patient] : Patient [Good - alert, interested, motivated] : Good - alert, interested, motivated [Verbalizes knowledge/Understanding] : Verbalizes knowledge/understanding [Signs and symptoms of infection] : sign and symptoms of infection [How and When to Call] : how and when to call [Patient responsibility to plan of care] : patient responsibility to plan of care [Stable] : stable [Home] : Home [Ambulatory] : Ambulatory [Not Applicable - Long Term Care/Home Health Agency] : Long Term Care/Home Health Agency: Not Applicable [] : No [FreeTextEntry2] : maintain skin integrity\par glucose control  [FreeTextEntry4] : Per vascular no intervention required\par f/u one month\par Orthotist consult submitted.

## 2022-10-31 NOTE — HISTORY OF PRESENT ILLNESS
[FreeTextEntry1] : Patient is a 61 year old male presenting for follow up for painful callus to the right foot. Patient has hammered toes on the 3rd and 4th digit.  Patient has no open wounds but the 3rd toe is very deformed and putting mechanical pressure on the 4th toe. Patient has painful callus to the lateral aspect of the 4th toe and distal medial aspect of the  5th toe \par

## 2022-10-31 NOTE — PHYSICAL EXAM
[1+] : left 1+ [Ankle Swelling (On Exam)] : present [Ankle Swelling Bilaterally] : bilaterally  [Varicose Veins Of Lower Extremities] : bilaterally [Ankle Swelling On The Left] : moderate [] : bilaterally [Ankle Swelling On The Right] : mild [Purpura] : no purpura  [Petechiae] : no petechiae [Skin Ulcer] : ulcer [Skin Induration] : no induration [Alert] : alert [Oriented to Person] : oriented to person [Oriented to Place] : oriented to place [Calm] : calm [de-identified] : A&Ox3, NAD [de-identified] : HTN [de-identified] : severe gout with associated foot deformities  [de-identified] : Left foot 3rd toe deformed and deviated medially , causing painful pressure on the 4,5 toe left foot , with interdigital pressure calluses  [de-identified] : Diabetic neuropathy  [FreeTextEntry1] : interdigit 4th & 5th digit  [de-identified] : No dressing ordered  [FreeTextEntry2] : callus  [de-identified] : none

## 2022-10-31 NOTE — REVIEW OF SYSTEMS
[Fever] : no fever [Chills] : no chills [Eye Pain] : no eye pain [Loss Of Hearing] : no hearing loss [Shortness Of Breath] : no shortness of breath [Wheezing] : no wheezing [Abdominal Pain] : no abdominal pain [Arthralgias] : arthralgias [Joint Swelling] : joint swelling [Joint Stiffness] : joint stiffness [Skin Wound] : skin wound [Anxiety] : no anxiety [Easy Bleeding] : no tendency for easy bleeding [FreeTextEntry5] : HTN [FreeTextEntry9] : Gouty arthropathy , left foot hammer toes and painful deformity of the digits [de-identified] : Left foot 3rd toe deformed and deviated medially , causing painful pressure on the 4,5 toe left foot , with interdigital pressure calluses  [de-identified] : Diabetic neuropathy  [de-identified] : NIDDM , severe gouty arthropathy

## 2022-10-31 NOTE — PLAN
[FreeTextEntry1] : Patient has no open wounds and will need corrective surgery of the toes to resolve his pain and problems . Discussed correction of toes , 3 and 4 left foot All risks , benefits , complications have been discussed with the patient . All his questions have been thoroughly answered . Patient agrees with the plan and understands all the risks and benefits . Patient will do surgery in the winter because of job considerations. Patient was recently seen by Dr. Mirza,Right DARIANA = 1.32 Left DARIANA = 1.25.   PTR 1 month.  Spent 20 minutes for patient care and medical decision making.\par \par

## 2022-11-01 DIAGNOSIS — N32.81 OVERACTIVE BLADDER: ICD-10-CM

## 2022-11-01 DIAGNOSIS — E78.1 PURE HYPERGLYCERIDEMIA: ICD-10-CM

## 2022-11-01 DIAGNOSIS — Z98.1 ARTHRODESIS STATUS: ICD-10-CM

## 2022-11-01 DIAGNOSIS — I10 ESSENTIAL (PRIMARY) HYPERTENSION: ICD-10-CM

## 2022-11-01 DIAGNOSIS — R35.1 NOCTURIA: ICD-10-CM

## 2022-11-01 DIAGNOSIS — Z79.899 OTHER LONG TERM (CURRENT) DRUG THERAPY: ICD-10-CM

## 2022-11-01 DIAGNOSIS — Z98.890 OTHER SPECIFIED POSTPROCEDURAL STATES: ICD-10-CM

## 2022-11-01 DIAGNOSIS — N40.1 BENIGN PROSTATIC HYPERPLASIA WITH LOWER URINARY TRACT SYMPTOMS: ICD-10-CM

## 2022-11-01 DIAGNOSIS — Z87.81 PERSONAL HISTORY OF (HEALED) TRAUMATIC FRACTURE: ICD-10-CM

## 2022-11-01 DIAGNOSIS — E11.40 TYPE 2 DIABETES MELLITUS WITH DIABETIC NEUROPATHY, UNSPECIFIED: ICD-10-CM

## 2022-11-01 DIAGNOSIS — L84 CORNS AND CALLOSITIES: ICD-10-CM

## 2022-11-01 DIAGNOSIS — M10.071 IDIOPATHIC GOUT, RIGHT ANKLE AND FOOT: ICD-10-CM

## 2022-11-01 DIAGNOSIS — Z87.442 PERSONAL HISTORY OF URINARY CALCULI: ICD-10-CM

## 2022-11-01 DIAGNOSIS — N52.9 MALE ERECTILE DYSFUNCTION, UNSPECIFIED: ICD-10-CM

## 2022-11-01 DIAGNOSIS — Z79.84 LONG TERM (CURRENT) USE OF ORAL HYPOGLYCEMIC DRUGS: ICD-10-CM

## 2022-11-28 ENCOUNTER — APPOINTMENT (OUTPATIENT)
Dept: WOUND CARE | Facility: HOSPITAL | Age: 62
End: 2022-11-28

## 2022-11-28 RX ORDER — COLCHICINE 0.6 MG/1
0.6 TABLET ORAL TWICE DAILY
Qty: 60 | Refills: 0 | Status: DISCONTINUED | COMMUNITY
Start: 2022-11-23 | End: 2022-11-28

## 2022-11-28 RX ORDER — COLCHICINE 0.6 MG/1
0.6 TABLET ORAL TWICE DAILY
Qty: 60 | Refills: 0 | Status: COMPLETED | COMMUNITY
Start: 2022-11-28 | End: 2022-12-28

## 2022-12-03 ENCOUNTER — RX RENEWAL (OUTPATIENT)
Age: 62
End: 2022-12-03

## 2022-12-23 ENCOUNTER — RX RENEWAL (OUTPATIENT)
Age: 62
End: 2022-12-23

## 2023-01-15 NOTE — PATIENT PROFILE ADULT - NSPROSPHOSPCHAPLAINYN_GEN_A_NUR
no Helder Leon MD, PGY1  Pt feeling better with benadryl and famotidine, asking to be dc home. Will dc home with return precautions. Answered all questions. Pt in agreement with plan.

## 2023-01-30 RX ORDER — COLCHICINE 0.6 MG/1
0.6 TABLET ORAL TWICE DAILY
Qty: 60 | Refills: 1 | Status: COMPLETED | COMMUNITY
Start: 2023-01-30 | End: 2023-03-31

## 2023-02-06 NOTE — ADDENDUM
[FreeTextEntry1] : PT ARRIVED A&OX3 \par ALL VITALS WITHIN PARAMETERS FOR HBOT\par PT DESCENDED TO 2.4 DAVID @ 2.2 PSI/MIN IN CHAMBER #1 WITHOUT INCIDENT\par PT RESTING AT TX DEPTH WITH VISIBLE CHEST RISE AND FALL OBSERVED CHAMBER SIDE \par PT TOLERATED AIR BREAKS WELL\par PT ASCENDED FROM 2.4 DAVID @ 2.2 PSI/MIN WITHOUT INCIDENT\par PT TOLERATED TX WELL show

## 2023-02-07 ENCOUNTER — APPOINTMENT (OUTPATIENT)
Dept: WOUND CARE | Facility: HOSPITAL | Age: 63
End: 2023-02-07

## 2023-04-03 ENCOUNTER — RX RENEWAL (OUTPATIENT)
Age: 63
End: 2023-04-03

## 2023-04-06 ENCOUNTER — RX RENEWAL (OUTPATIENT)
Age: 63
End: 2023-04-06

## 2023-05-04 NOTE — PROGRESS NOTE ADULT - PROBLEM/PLAN-5
Jl Perez   is a   24   year old  male   with a past medical history of GERD who presents for follow up of abdominal pain in his right upper quadrant and left upper quadrant, GERD, and change in bowel habits..   Patient was seen in the ER on 12/17/2022 for abdominal pain at which time his blood work revealed WBC 7.8, hemoglobin 15.9, platelets 204, sodium 141, potassium 4.5, chloride 104, CO2 30, BUN 13, creatinine 0.91, T bili 1.0, AST 36, ALT 61, alk phos 60, total protein 8.2, and albumin 4.5.  He had a CT  appendix protocol at that time which was normal. Patient had follow up blood work on 01/27/2023 in Dr. Koo office which revealed WBC 7, hemoglobin 14.7, platelets 154, sodium 139, potassium 4.0, chloride 101, CO2 29, BUN 10, creatinine 0.8, T bili 1.1, AST 21, ALT 32, alk-phos 65, total protein 8.3, albumin of 5.1.  Patient reports today that he has abdominal pain in his left upper quadrant which radiates to his right upper quadrant. This started approximately 6 months ago.  He followed up with his primary care physician in January at which time he was referred here.  He was seen in the ER in December for the pain as above.  He does report he had acid reflux quite frequently for 1-2 years but  this has been resolved now for quite some time.  He denies any dysphagia, odynophagia, weight loss, vomiting, bright red blood per rectum, melena,or rectal pain.  He does have intermittent nausea.  He was having diarrhea more frequently but since recently walking more while on vacation this resolved.  He is now moving his bowels 1-2 times daily with formed stools.  He denies any nocturnal BMs or urgency with bowel movements.  He does report his abdominal pain improves after a bowel movement.  He does report  2 isolated episodes of stool leakage approximately 3 months ago.  This has not occurred again.  He denies smoking, EtOH use, illicit drug use, or NSAID use.  His only medication is a multivitamin daily.  He is from [place] and is studying criminal justice in hopes to become a .    Patient last visit was counseled on soluble fiber and attempting Bentyl. He h  ad a fecal calprotectin which was unremarkable.  He presents for follow-up today. 
br
brPatient reports he continues with occasional left upper quadrant discomfort which occurs every couple days and lasts for a few seconds. He also continues on the Bentyl which aborts the symptoms. He reports his bowel movements are regular and he does not believe there is any association with the pain and his bowel movements. He denies any bright red blood per rectum, melena, dysphagia, odynophagia, nocturnal bowel movements, change in bowel movements, or weight loss. Patient has not had any cross-sectional imaging. stephanie
DISPLAY PLAN FREE TEXT
DISPLAY PLAN FREE TEXT

## 2023-05-07 ENCOUNTER — NON-APPOINTMENT (OUTPATIENT)
Age: 63
End: 2023-05-07

## 2023-05-08 ENCOUNTER — EMERGENCY (EMERGENCY)
Facility: HOSPITAL | Age: 63
LOS: 1 days | Discharge: LEFT BEFORE DISCHARGE | End: 2023-05-08
Admitting: EMERGENCY MEDICINE
Payer: MEDICAID

## 2023-05-08 VITALS
DIASTOLIC BLOOD PRESSURE: 83 MMHG | RESPIRATION RATE: 18 BRPM | OXYGEN SATURATION: 93 % | SYSTOLIC BLOOD PRESSURE: 148 MMHG | HEART RATE: 82 BPM | TEMPERATURE: 98 F

## 2023-05-08 PROCEDURE — 93010 ELECTROCARDIOGRAM REPORT: CPT

## 2023-05-08 PROCEDURE — L9991: CPT

## 2023-05-12 ENCOUNTER — RX RENEWAL (OUTPATIENT)
Age: 63
End: 2023-05-12

## 2023-05-22 ENCOUNTER — LABORATORY RESULT (OUTPATIENT)
Age: 63
End: 2023-05-22

## 2023-05-22 ENCOUNTER — APPOINTMENT (OUTPATIENT)
Dept: INTERNAL MEDICINE | Facility: CLINIC | Age: 63
End: 2023-05-22
Payer: MEDICAID

## 2023-05-22 VITALS
DIASTOLIC BLOOD PRESSURE: 75 MMHG | TEMPERATURE: 98 F | HEIGHT: 69 IN | WEIGHT: 215 LBS | SYSTOLIC BLOOD PRESSURE: 125 MMHG | BODY MASS INDEX: 31.84 KG/M2 | OXYGEN SATURATION: 96 % | HEART RATE: 76 BPM

## 2023-05-22 DIAGNOSIS — M10.9 GOUT, UNSPECIFIED: ICD-10-CM

## 2023-05-22 PROCEDURE — 99396 PREV VISIT EST AGE 40-64: CPT | Mod: 25

## 2023-05-22 NOTE — REVIEW OF SYSTEMS
[Frequency] : frequency [Impotence] : impotence [Negative] : Heme/Lymph [Dysuria] : no dysuria [Incontinence] : no incontinence [Hesitancy] : no hesitancy [Nocturia] : no nocturia [Hematuria] : no hematuria [Poor Libido] : libido not poor

## 2023-05-22 NOTE — PLAN
[FreeTextEntry1] : Routine blood work drawn in office and urine collected today. Refer to cardiology. Rx sent. Pt advised to sign up for [facility] portal to review labs and communicate any questions or concerns directly. Yearly physical and return as needed for illness, medication refills, and new or existing complaints. f/u 3 months.

## 2023-05-22 NOTE — PHYSICAL EXAM
[No Acute Distress] : no acute distress [Well-Appearing] : well-appearing [Normal Sclera/Conjunctiva] : normal sclera/conjunctiva [PERRL] : pupils equal round and reactive to light [EOMI] : extraocular movements intact [Normal Outer Ear/Nose] : the outer ears and nose were normal in appearance [Normal Oropharynx] : the oropharynx was normal [Normal TMs] : both tympanic membranes were normal [No JVD] : no jugular venous distention [No Lymphadenopathy] : no lymphadenopathy [Supple] : supple [Thyroid Normal, No Nodules] : the thyroid was normal and there were no nodules present [No Respiratory Distress] : no respiratory distress  [No Accessory Muscle Use] : no accessory muscle use [Clear to Auscultation] : lungs were clear to auscultation bilaterally [Normal Rate] : normal rate  [Regular Rhythm] : with a regular rhythm [Normal S1, S2] : normal S1 and S2 [No Murmur] : no murmur heard [No Abdominal Bruit] : a ~M bruit was not heard ~T in the abdomen [No Varicosities] : no varicosities [No Edema] : there was no peripheral edema [No Palpable Aorta] : no palpable aorta [No Extremity Clubbing/Cyanosis] : no extremity clubbing/cyanosis [Soft] : abdomen soft [Non Tender] : non-tender [Non-distended] : non-distended [No Masses] : no abdominal mass palpated [No HSM] : no HSM [Normal Bowel Sounds] : normal bowel sounds [Normal Posterior Cervical Nodes] : no posterior cervical lymphadenopathy [Normal Anterior Cervical Nodes] : no anterior cervical lymphadenopathy [No CVA Tenderness] : no CVA  tenderness [No Spinal Tenderness] : no spinal tenderness [No Joint Swelling] : no joint swelling [Grossly Normal Strength/Tone] : grossly normal strength/tone [No Rash] : no rash [Coordination Grossly Intact] : coordination grossly intact [No Focal Deficits] : no focal deficits [Normal Gait] : normal gait [Normal Affect] : the affect was normal [Normal Insight/Judgement] : insight and judgment were intact [de-identified] : Overweight

## 2023-05-22 NOTE — HEALTH RISK ASSESSMENT
[Good] : ~his/her~  mood as  good [No] : In the past 12 months have you used drugs other than those required for medical reasons? No [0] : 2) Feeling down, depressed, or hopeless: Not at all (0) [PHQ-2 Negative - No further assessment needed] : PHQ-2 Negative - No further assessment needed [None] : None [With Family] : lives with family [Employed] : employed [] :  [Never] : Never [Audit-CScore] : 0 [FOK2Athxl] : 0 [ColonoscopyComments] : Cologuard ordered

## 2023-05-22 NOTE — HISTORY OF PRESENT ILLNESS
[de-identified] : 62 year M with PMH multiple medical issues presents for CPE. Overdue for blood work. Complains of ED. Had recent chest pains and went to UC and ER (but left before being seen). Pt denies CP/SOB, fever/chills, n/v/d/c.

## 2023-05-23 LAB
25(OH)D3 SERPL-MCNC: 26.1 NG/ML
ALBUMIN SERPL ELPH-MCNC: 4.6 G/DL
ALP BLD-CCNC: 105 U/L
ALT SERPL-CCNC: 27 U/L
ANION GAP SERPL CALC-SCNC: 15 MMOL/L
APPEARANCE: ABNORMAL
AST SERPL-CCNC: 29 U/L
BACTERIA: NEGATIVE /HPF
BILIRUB SERPL-MCNC: 0.3 MG/DL
BILIRUBIN URINE: NEGATIVE
BLOOD URINE: NEGATIVE
BUN SERPL-MCNC: 26 MG/DL
CALCIUM OXALATE CRYSTALS: PRESENT
CALCIUM SERPL-MCNC: 10.3 MG/DL
CAST: 9 /LPF
CHLORIDE SERPL-SCNC: 102 MMOL/L
CHOLEST SERPL-MCNC: 154 MG/DL
CO2 SERPL-SCNC: 20 MMOL/L
COLOR: YELLOW
CREAT SERPL-MCNC: 1.88 MG/DL
CREAT SPEC-SCNC: 284 MG/DL
EGFR: 40 ML/MIN/1.73M2
EPITHELIAL CELLS: 1 /HPF
ESTIMATED AVERAGE GLUCOSE: 166 MG/DL
GLUCOSE QUALITATIVE U: 500 MG/DL
GLUCOSE SERPL-MCNC: 115 MG/DL
HBA1C MFR BLD HPLC: 7.4 %
HDLC SERPL-MCNC: 30 MG/DL
HYALINE CASTS: PRESENT
KETONES URINE: ABNORMAL MG/DL
LDLC SERPL CALC-MCNC: NORMAL MG/DL
LEUKOCYTE ESTERASE URINE: NEGATIVE
MICROALBUMIN 24H UR DL<=1MG/L-MCNC: 11.3 MG/DL
MICROALBUMIN/CREAT 24H UR-RTO: 40 MG/G
MICROSCOPIC-UA: NORMAL
NITRITE URINE: NEGATIVE
NONHDLC SERPL-MCNC: 123 MG/DL
PH URINE: 5.5
POTASSIUM SERPL-SCNC: 4.5 MMOL/L
PROT SERPL-MCNC: 6.9 G/DL
PROTEIN URINE: 30 MG/DL
PSA SERPL-MCNC: 0.9 NG/ML
RED BLOOD CELLS URINE: 2 /HPF
REVIEW: NORMAL
SODIUM SERPL-SCNC: 138 MMOL/L
SPECIFIC GRAVITY URINE: 1.03
TESTOST SERPL-MCNC: 222 NG/DL
TRIGL SERPL-MCNC: 528 MG/DL
TSH SERPL-ACNC: 1.89 UIU/ML
URATE SERPL-MCNC: 7.5 MG/DL
UROBILINOGEN URINE: 1 MG/DL
WHITE BLOOD CELLS URINE: 0 /HPF

## 2023-05-25 ENCOUNTER — NON-APPOINTMENT (OUTPATIENT)
Age: 63
End: 2023-05-25

## 2023-05-25 ENCOUNTER — APPOINTMENT (OUTPATIENT)
Dept: CARDIOLOGY | Facility: CLINIC | Age: 63
End: 2023-05-25
Payer: MEDICAID

## 2023-05-25 VITALS
HEART RATE: 81 BPM | WEIGHT: 215 LBS | DIASTOLIC BLOOD PRESSURE: 82 MMHG | OXYGEN SATURATION: 96 % | SYSTOLIC BLOOD PRESSURE: 126 MMHG | BODY MASS INDEX: 31.84 KG/M2 | HEIGHT: 69 IN

## 2023-05-25 DIAGNOSIS — R94.31 ABNORMAL ELECTROCARDIOGRAM [ECG] [EKG]: ICD-10-CM

## 2023-05-25 PROCEDURE — 93000 ELECTROCARDIOGRAM COMPLETE: CPT

## 2023-05-25 PROCEDURE — 99214 OFFICE O/P EST MOD 30 MIN: CPT | Mod: 25

## 2023-05-25 NOTE — HISTORY OF PRESENT ILLNESS
[FreeTextEntry1] : 62M with CAD HTN DM presents to establish care\par \par Sent in by PMD: Dr ANTHONY Fried\par pt saw Dr Rose one time 3 years ago, lost to follow up. hasn’t been seen in cards since 2020\par \par pt state that 5/9/23, he had recent chest pains and went to , sent via ambulance to [facility] ER but left before being seen. pt denies sympoms since. pt states the cp was due from injusry at work. pt sent in by pmd for echo/stress\par \par pt denies CP, SOB, at rest or on exertion. Denies palpitations, dizziness, diaphoresis, syncope, LE edema, orthopnea\par \par \par Exercise: work as [employer], always walking, lifting things\par Diet: none\par \par \par Prev cardiac history: CAD\par Previous cardiac testing: none\par Recent labs:\par \par \par EKG: SR  75 ant q waves\par \par \par Med hx: CAD DM gout kidney stones, HTN\par Sx hx: neck, shoulder	\par Family hx: F: CAD\par Social hx:  lives in [place] with wife. (just got  last week) peterson. +cigars no etoh/drugs\par Meds: ritalin metformin glipizide cymbalta lamictal losartan 100 norvasc 10 colchicine tadalafil\par Allergies: nkda\par

## 2023-05-25 NOTE — DISCUSSION/SUMMARY
[FreeTextEntry1] : 62M with CAD HTN DM presents to establish care\par \par 1. CP\par -pt with his of cad, also with dm htn\par -ekg with q waves\par -will check TTE to r/o structural heart dz\par -will check pharm nuclear stress test to r/o ischemia\par \par 2. health maintenance\par -pt with several risk factors for arterial disease\par -will check carotid, AAA [EKG obtained to assist in diagnosis and management of assessed problem(s)] : EKG obtained to assist in diagnosis and management of assessed problem(s)

## 2023-05-31 ENCOUNTER — RX RENEWAL (OUTPATIENT)
Age: 63
End: 2023-05-31

## 2023-06-13 ENCOUNTER — APPOINTMENT (OUTPATIENT)
Dept: CARDIOLOGY | Facility: CLINIC | Age: 63
End: 2023-06-13
Payer: MEDICAID

## 2023-06-13 PROCEDURE — 93978 VASCULAR STUDY: CPT

## 2023-06-13 PROCEDURE — 93880 EXTRACRANIAL BILAT STUDY: CPT

## 2023-06-13 PROCEDURE — 93306 TTE W/DOPPLER COMPLETE: CPT

## 2023-07-03 ENCOUNTER — APPOINTMENT (OUTPATIENT)
Dept: CARDIOLOGY | Facility: CLINIC | Age: 63
End: 2023-07-03
Payer: MEDICAID

## 2023-07-03 VITALS — DIASTOLIC BLOOD PRESSURE: 70 MMHG | SYSTOLIC BLOOD PRESSURE: 118 MMHG | HEART RATE: 97 BPM

## 2023-07-03 PROCEDURE — 93015 CV STRESS TEST SUPVJ I&R: CPT

## 2023-07-03 PROCEDURE — 99213 OFFICE O/P EST LOW 20 MIN: CPT | Mod: 25

## 2023-07-05 ENCOUNTER — RX RENEWAL (OUTPATIENT)
Age: 63
End: 2023-07-05

## 2023-07-10 ENCOUNTER — APPOINTMENT (OUTPATIENT)
Dept: INTERNAL MEDICINE | Facility: CLINIC | Age: 63
End: 2023-07-10
Payer: MEDICAID

## 2023-07-10 VITALS
BODY MASS INDEX: 30.07 KG/M2 | TEMPERATURE: 97.9 F | SYSTOLIC BLOOD PRESSURE: 121 MMHG | HEART RATE: 96 BPM | WEIGHT: 203 LBS | HEIGHT: 69 IN | DIASTOLIC BLOOD PRESSURE: 76 MMHG | OXYGEN SATURATION: 97 % | RESPIRATION RATE: 19 BRPM

## 2023-07-10 DIAGNOSIS — R53.83 OTHER FATIGUE: ICD-10-CM

## 2023-07-10 PROCEDURE — 99214 OFFICE O/P EST MOD 30 MIN: CPT

## 2023-07-10 RX ORDER — GLIPIZIDE 2.5 MG/1
2.5 TABLET, FILM COATED, EXTENDED RELEASE ORAL
Refills: 0 | Status: DISCONTINUED | COMMUNITY
Start: 2021-10-20 | End: 2023-07-10

## 2023-07-10 NOTE — REVIEW OF SYSTEMS
[Fatigue] : fatigue [Recent Change In Weight] : ~T recent weight change [Impotence] : impotence [Negative] : Neurological [Fever] : no fever [Chills] : no chills [Hot Flashes] : no hot flashes [Night Sweats] : no night sweats [Dysuria] : no dysuria [Incontinence] : no incontinence [Hesitancy] : no hesitancy [Nocturia] : no nocturia [Hematuria] : no hematuria [Frequency] : no frequency [Poor Libido] : libido not poor

## 2023-07-10 NOTE — HISTORY OF PRESENT ILLNESS
[de-identified] : 62 year M with PMH multiple medical issues presents for follow up after starting Mounjaro. He is doing well. Pt denies CP/SOB, fever/chills, n/v/d/c.

## 2023-07-10 NOTE — PHYSICAL EXAM
[No Acute Distress] : no acute distress [Well-Appearing] : well-appearing [No Respiratory Distress] : no respiratory distress  [No Accessory Muscle Use] : no accessory muscle use [Coordination Grossly Intact] : coordination grossly intact [No Focal Deficits] : no focal deficits [Normal Gait] : normal gait [Normal Affect] : the affect was normal [Normal Insight/Judgement] : insight and judgment were intact

## 2023-07-10 NOTE — PLAN
[FreeTextEntry1] : Rx sent. Pt advised to sign up for [facility] portal to review labs and communicate any questions or concerns directly. Yearly physical and return as needed for illness, medication refills, and new or existing complaints. f/u 1 month for blood work.

## 2023-08-11 ENCOUNTER — RX RENEWAL (OUTPATIENT)
Age: 63
End: 2023-08-11

## 2023-08-14 ENCOUNTER — APPOINTMENT (OUTPATIENT)
Dept: INTERNAL MEDICINE | Facility: CLINIC | Age: 63
End: 2023-08-14
Payer: MEDICAID

## 2023-08-14 VITALS
OXYGEN SATURATION: 96 % | TEMPERATURE: 97.9 F | BODY MASS INDEX: 30.36 KG/M2 | DIASTOLIC BLOOD PRESSURE: 74 MMHG | HEIGHT: 69 IN | HEART RATE: 71 BPM | WEIGHT: 205 LBS | SYSTOLIC BLOOD PRESSURE: 130 MMHG

## 2023-08-14 DIAGNOSIS — F41.9 ANXIETY DISORDER, UNSPECIFIED: ICD-10-CM

## 2023-08-14 DIAGNOSIS — G56.02 CARPAL TUNNEL SYNDROME, LEFT UPPER LIMB: ICD-10-CM

## 2023-08-14 DIAGNOSIS — I77.810 THORACIC AORTIC ECTASIA: ICD-10-CM

## 2023-08-14 PROCEDURE — 99214 OFFICE O/P EST MOD 30 MIN: CPT

## 2023-08-14 NOTE — HISTORY OF PRESENT ILLNESS
[de-identified] : 62 year M with PMH multiple medical issues presents for follow up. Complains of left neck and hand pain. Pt denies CP/SOB, fever/chills, n/v/d/c.

## 2023-08-14 NOTE — PLAN
[FreeTextEntry1] : Renew meds. Pt advised to sign up for [facility] portal to review labs and communicate any questions or concerns directly. Yearly physical and return as needed for illness, medication refills, and new or existing complaints. f/u 1 month for blood work.

## 2023-08-14 NOTE — PHYSICAL EXAM
[No Acute Distress] : no acute distress [Well-Appearing] : well-appearing [No Respiratory Distress] : no respiratory distress  [No Accessory Muscle Use] : no accessory muscle use [Coordination Grossly Intact] : coordination grossly intact [No Focal Deficits] : no focal deficits [Normal Gait] : normal gait [Normal Affect] : the affect was normal [Normal Insight/Judgement] : insight and judgment were intact [de-identified] : Tinel test not so useful as patient having constant tingling

## 2023-08-14 NOTE — REVIEW OF SYSTEMS
[Joint Pain] : joint pain [Joint Stiffness] : joint stiffness [Muscle Pain] : muscle pain [Muscle Weakness] : no muscle weakness [Back Pain] : no back pain [Joint Swelling] : no joint swelling [Negative] : Heme/Lymph [de-identified] : numbness left hand

## 2023-09-06 ENCOUNTER — RX RENEWAL (OUTPATIENT)
Age: 63
End: 2023-09-06

## 2023-09-12 ENCOUNTER — RX RENEWAL (OUTPATIENT)
Age: 63
End: 2023-09-12

## 2023-09-26 ENCOUNTER — APPOINTMENT (OUTPATIENT)
Dept: INTERNAL MEDICINE | Facility: CLINIC | Age: 63
End: 2023-09-26
Payer: MEDICAID

## 2023-09-26 VITALS
HEIGHT: 69 IN | HEART RATE: 90 BPM | OXYGEN SATURATION: 92 % | WEIGHT: 199 LBS | DIASTOLIC BLOOD PRESSURE: 78 MMHG | BODY MASS INDEX: 29.47 KG/M2 | SYSTOLIC BLOOD PRESSURE: 172 MMHG | TEMPERATURE: 98.2 F

## 2023-09-26 VITALS — DIASTOLIC BLOOD PRESSURE: 78 MMHG | SYSTOLIC BLOOD PRESSURE: 124 MMHG

## 2023-09-26 DIAGNOSIS — M54.9 DORSALGIA, UNSPECIFIED: ICD-10-CM

## 2023-09-26 DIAGNOSIS — N52.9 MALE ERECTILE DYSFUNCTION, UNSPECIFIED: ICD-10-CM

## 2023-09-26 DIAGNOSIS — G89.29 DORSALGIA, UNSPECIFIED: ICD-10-CM

## 2023-09-26 PROCEDURE — 99214 OFFICE O/P EST MOD 30 MIN: CPT | Mod: 25

## 2023-09-27 LAB
ALBUMIN SERPL ELPH-MCNC: 4.8 G/DL
ALP BLD-CCNC: 99 U/L
ALT SERPL-CCNC: 26 U/L
ANION GAP SERPL CALC-SCNC: 13 MMOL/L
AST SERPL-CCNC: 27 U/L
BASOPHILS # BLD AUTO: 0.06 K/UL
BASOPHILS NFR BLD AUTO: 0.7 %
BILIRUB SERPL-MCNC: 0.7 MG/DL
BUN SERPL-MCNC: 17 MG/DL
CALCIUM SERPL-MCNC: 9.6 MG/DL
CHLORIDE SERPL-SCNC: 102 MMOL/L
CHOLEST SERPL-MCNC: 94 MG/DL
CO2 SERPL-SCNC: 27 MMOL/L
CREAT SERPL-MCNC: 1.23 MG/DL
EGFR: 67 ML/MIN/1.73M2
EOSINOPHIL # BLD AUTO: 0.1 K/UL
EOSINOPHIL NFR BLD AUTO: 1.1 %
ESTIMATED AVERAGE GLUCOSE: 128 MG/DL
GLUCOSE SERPL-MCNC: 127 MG/DL
HBA1C MFR BLD HPLC: 6.1 %
HCT VFR BLD CALC: 42.5 %
HDLC SERPL-MCNC: 32 MG/DL
HGB BLD-MCNC: 14.4 G/DL
IMM GRANULOCYTES NFR BLD AUTO: 0.5 %
LDLC SERPL CALC-MCNC: 23 MG/DL
LYMPHOCYTES # BLD AUTO: 1.29 K/UL
LYMPHOCYTES NFR BLD AUTO: 14.8 %
MAN DIFF?: NORMAL
MCHC RBC-ENTMCNC: 30.8 PG
MCHC RBC-ENTMCNC: 33.9 GM/DL
MCV RBC AUTO: 91 FL
MONOCYTES # BLD AUTO: 0.69 K/UL
MONOCYTES NFR BLD AUTO: 7.9 %
NEUTROPHILS # BLD AUTO: 6.54 K/UL
NEUTROPHILS NFR BLD AUTO: 75 %
NONHDLC SERPL-MCNC: 62 MG/DL
PLATELET # BLD AUTO: 238 K/UL
POTASSIUM SERPL-SCNC: 4.1 MMOL/L
PROT SERPL-MCNC: 7.1 G/DL
RBC # BLD: 4.67 M/UL
RBC # FLD: 13.8 %
SODIUM SERPL-SCNC: 142 MMOL/L
TRIGL SERPL-MCNC: 259 MG/DL
WBC # FLD AUTO: 8.72 K/UL

## 2023-10-04 ENCOUNTER — RX RENEWAL (OUTPATIENT)
Age: 63
End: 2023-10-04

## 2023-10-06 ENCOUNTER — OUTPATIENT (OUTPATIENT)
Dept: OUTPATIENT SERVICES | Facility: HOSPITAL | Age: 63
LOS: 1 days | Discharge: HOME | End: 2023-10-06
Payer: MEDICAID

## 2023-10-06 ENCOUNTER — APPOINTMENT (OUTPATIENT)
Dept: WOUND CARE | Facility: HOSPITAL | Age: 63
End: 2023-10-06
Payer: MEDICAID

## 2023-10-06 VITALS
SYSTOLIC BLOOD PRESSURE: 130 MMHG | TEMPERATURE: 98.8 F | BODY MASS INDEX: 28.88 KG/M2 | HEART RATE: 70 BPM | RESPIRATION RATE: 20 BRPM | DIASTOLIC BLOOD PRESSURE: 80 MMHG | HEIGHT: 69 IN | OXYGEN SATURATION: 98 % | WEIGHT: 195 LBS

## 2023-10-06 DIAGNOSIS — Z98.890 OTHER SPECIFIED POSTPROCEDURAL STATES: ICD-10-CM

## 2023-10-06 DIAGNOSIS — E78.1 PURE HYPERGLYCERIDEMIA: ICD-10-CM

## 2023-10-06 DIAGNOSIS — Z86.39 PERSONAL HISTORY OF OTHER ENDOCRINE, NUTRITIONAL AND METABOLIC DISEASE: ICD-10-CM

## 2023-10-06 DIAGNOSIS — N40.1 BENIGN PROSTATIC HYPERPLASIA WITH LOWER URINARY TRACT SYMPTOMS: ICD-10-CM

## 2023-10-06 DIAGNOSIS — E11.40 TYPE 2 DIABETES MELLITUS WITH DIABETIC NEUROPATHY, UNSPECIFIED: ICD-10-CM

## 2023-10-06 DIAGNOSIS — L97.412 NON-PRESSURE CHRONIC ULCER OF RIGHT HEEL AND MIDFOOT WITH FAT LAYER EXPOSED: ICD-10-CM

## 2023-10-06 DIAGNOSIS — L84 CORNS AND CALLOSITIES: ICD-10-CM

## 2023-10-06 DIAGNOSIS — Z86.59 PERSONAL HISTORY OF OTHER MENTAL AND BEHAVIORAL DISORDERS: ICD-10-CM

## 2023-10-06 DIAGNOSIS — Z87.81 PERSONAL HISTORY OF (HEALED) TRAUMATIC FRACTURE: ICD-10-CM

## 2023-10-06 DIAGNOSIS — E11.621 TYPE 2 DIABETES MELLITUS WITH FOOT ULCER: ICD-10-CM

## 2023-10-06 DIAGNOSIS — Z87.39 PERSONAL HISTORY OF OTHER DISEASES OF THE MUSCULOSKELETAL SYSTEM AND CONNECTIVE TISSUE: ICD-10-CM

## 2023-10-06 DIAGNOSIS — M1A.09X1 IDIOPATHIC CHRONIC GOUT, MULTIPLE SITES, WITH TOPHUS (TOPHI): ICD-10-CM

## 2023-10-06 DIAGNOSIS — Z87.442 PERSONAL HISTORY OF URINARY CALCULI: ICD-10-CM

## 2023-10-06 DIAGNOSIS — Z79.82 LONG TERM (CURRENT) USE OF ASPIRIN: ICD-10-CM

## 2023-10-06 DIAGNOSIS — R35.1 NOCTURIA: ICD-10-CM

## 2023-10-06 DIAGNOSIS — Z98.1 ARTHRODESIS STATUS: ICD-10-CM

## 2023-10-06 DIAGNOSIS — Z79.899 OTHER LONG TERM (CURRENT) DRUG THERAPY: ICD-10-CM

## 2023-10-06 DIAGNOSIS — Z79.84 LONG TERM (CURRENT) USE OF ORAL HYPOGLYCEMIC DRUGS: ICD-10-CM

## 2023-10-06 DIAGNOSIS — Z86.79 PERSONAL HISTORY OF OTHER DISEASES OF THE CIRCULATORY SYSTEM: ICD-10-CM

## 2023-10-06 DIAGNOSIS — N32.81 OVERACTIVE BLADDER: ICD-10-CM

## 2023-10-06 DIAGNOSIS — S91.309A UNSPECIFIED OPEN WOUND, UNSPECIFIED FOOT, INITIAL ENCOUNTER: ICD-10-CM

## 2023-10-06 DIAGNOSIS — I10 ESSENTIAL (PRIMARY) HYPERTENSION: ICD-10-CM

## 2023-10-06 DIAGNOSIS — N52.9 MALE ERECTILE DYSFUNCTION, UNSPECIFIED: ICD-10-CM

## 2023-10-06 PROCEDURE — G0463: CPT

## 2023-10-06 PROCEDURE — 99214 OFFICE O/P EST MOD 30 MIN: CPT

## 2023-10-06 PROCEDURE — 73630 X-RAY EXAM OF FOOT: CPT | Mod: 26,RT

## 2023-10-06 PROCEDURE — 73630 X-RAY EXAM OF FOOT: CPT

## 2023-10-06 RX ORDER — BLOOD-GLUCOSE METER
W/DEVICE EACH MISCELLANEOUS
Qty: 1 | Refills: 0 | Status: ACTIVE | COMMUNITY
Start: 2023-07-10

## 2023-10-06 RX ORDER — AMOXICILLIN AND CLAVULANATE POTASSIUM 875; 125 MG/1; MG/1
875-125 TABLET, COATED ORAL
Qty: 14 | Refills: 0 | Status: COMPLETED | COMMUNITY
Start: 2023-10-06 | End: 2023-10-13

## 2023-10-06 RX ORDER — LIDOCAINE 5% 700 MG/1
5 PATCH TOPICAL
Qty: 1 | Refills: 0 | Status: ACTIVE | COMMUNITY
Start: 2023-09-26

## 2023-10-06 RX ORDER — LAMOTRIGINE 300 MG/1
300 TABLET, EXTENDED RELEASE ORAL DAILY
Qty: 90 | Refills: 1 | Status: ACTIVE | COMMUNITY

## 2023-10-06 RX ORDER — BLOOD SUGAR DIAGNOSTIC
STRIP MISCELLANEOUS DAILY
Qty: 1 | Refills: 0 | Status: ACTIVE | COMMUNITY
Start: 2023-07-10

## 2023-10-06 RX ORDER — LANCETS 33 GAUGE
EACH MISCELLANEOUS
Qty: 1 | Refills: 0 | Status: ACTIVE | COMMUNITY
Start: 2023-07-10

## 2023-10-06 RX ORDER — METFORMIN ER 500 MG 500 MG/1
500 TABLET ORAL DAILY
Refills: 0 | Status: ACTIVE | COMMUNITY

## 2023-10-06 RX ORDER — DEXTROAMPHETAMINE SACCHARATE, AMPHETAMINE ASPARTATE, DEXTROAMPHETAMINE SULFATE AND AMPHETAMINE SULFATE 5; 5; 5; 5 MG/1; MG/1; MG/1; MG/1
20 TABLET ORAL
Qty: 60 | Refills: 0 | Status: ACTIVE | COMMUNITY

## 2023-10-06 RX ORDER — METHYLPHENIDATE HYDROCHLORIDE 20 MG/1
20 TABLET ORAL TWICE DAILY
Refills: 0 | Status: DISCONTINUED | COMMUNITY
End: 2023-10-06

## 2023-10-10 ENCOUNTER — OUTPATIENT (OUTPATIENT)
Dept: OUTPATIENT SERVICES | Facility: HOSPITAL | Age: 63
LOS: 1 days | Discharge: HOME | End: 2023-10-10
Payer: MEDICAID

## 2023-10-10 ENCOUNTER — APPOINTMENT (OUTPATIENT)
Dept: WOUND CARE | Facility: HOSPITAL | Age: 63
End: 2023-10-10
Payer: MEDICAID

## 2023-10-10 VITALS
DIASTOLIC BLOOD PRESSURE: 78 MMHG | OXYGEN SATURATION: 97 % | HEART RATE: 70 BPM | SYSTOLIC BLOOD PRESSURE: 137 MMHG | HEIGHT: 69 IN | RESPIRATION RATE: 17 BRPM | TEMPERATURE: 97.9 F | BODY MASS INDEX: 28.88 KG/M2 | WEIGHT: 195 LBS

## 2023-10-10 DIAGNOSIS — S91.309A UNSPECIFIED OPEN WOUND, UNSPECIFIED FOOT, INITIAL ENCOUNTER: ICD-10-CM

## 2023-10-10 PROCEDURE — G0463: CPT

## 2023-10-10 PROCEDURE — 99213 OFFICE O/P EST LOW 20 MIN: CPT

## 2023-10-11 DIAGNOSIS — N32.81 OVERACTIVE BLADDER: ICD-10-CM

## 2023-10-11 DIAGNOSIS — R35.1 NOCTURIA: ICD-10-CM

## 2023-10-11 DIAGNOSIS — N52.9 MALE ERECTILE DYSFUNCTION, UNSPECIFIED: ICD-10-CM

## 2023-10-11 DIAGNOSIS — I10 ESSENTIAL (PRIMARY) HYPERTENSION: ICD-10-CM

## 2023-10-11 DIAGNOSIS — Z79.899 OTHER LONG TERM (CURRENT) DRUG THERAPY: ICD-10-CM

## 2023-10-11 DIAGNOSIS — M1A.09X1 IDIOPATHIC CHRONIC GOUT, MULTIPLE SITES, WITH TOPHUS (TOPHI): ICD-10-CM

## 2023-10-11 DIAGNOSIS — Z79.84 LONG TERM (CURRENT) USE OF ORAL HYPOGLYCEMIC DRUGS: ICD-10-CM

## 2023-10-11 DIAGNOSIS — E11.621 TYPE 2 DIABETES MELLITUS WITH FOOT ULCER: ICD-10-CM

## 2023-10-11 DIAGNOSIS — N40.1 BENIGN PROSTATIC HYPERPLASIA WITH LOWER URINARY TRACT SYMPTOMS: ICD-10-CM

## 2023-10-11 DIAGNOSIS — Z98.890 OTHER SPECIFIED POSTPROCEDURAL STATES: ICD-10-CM

## 2023-10-11 DIAGNOSIS — Z87.442 PERSONAL HISTORY OF URINARY CALCULI: ICD-10-CM

## 2023-10-11 DIAGNOSIS — Z87.81 PERSONAL HISTORY OF (HEALED) TRAUMATIC FRACTURE: ICD-10-CM

## 2023-10-11 DIAGNOSIS — Z79.82 LONG TERM (CURRENT) USE OF ASPIRIN: ICD-10-CM

## 2023-10-11 DIAGNOSIS — Z98.1 ARTHRODESIS STATUS: ICD-10-CM

## 2023-10-11 DIAGNOSIS — E78.1 PURE HYPERGLYCERIDEMIA: ICD-10-CM

## 2023-10-11 DIAGNOSIS — L97.412 NON-PRESSURE CHRONIC ULCER OF RIGHT HEEL AND MIDFOOT WITH FAT LAYER EXPOSED: ICD-10-CM

## 2023-10-11 DIAGNOSIS — L84 CORNS AND CALLOSITIES: ICD-10-CM

## 2023-10-16 ENCOUNTER — OUTPATIENT (OUTPATIENT)
Dept: OUTPATIENT SERVICES | Facility: HOSPITAL | Age: 63
LOS: 1 days | End: 2023-10-16
Payer: MEDICAID

## 2023-10-16 DIAGNOSIS — E11.621 TYPE 2 DIABETES MELLITUS WITH FOOT ULCER: ICD-10-CM

## 2023-10-16 PROCEDURE — 73718 MRI LOWER EXTREMITY W/O DYE: CPT

## 2023-10-16 PROCEDURE — 73718 MRI LOWER EXTREMITY W/O DYE: CPT | Mod: 26,RT

## 2023-10-17 ENCOUNTER — APPOINTMENT (OUTPATIENT)
Dept: WOUND CARE | Facility: HOSPITAL | Age: 63
End: 2023-10-17
Payer: MEDICAID

## 2023-10-17 ENCOUNTER — OUTPATIENT (OUTPATIENT)
Dept: OUTPATIENT SERVICES | Facility: HOSPITAL | Age: 63
LOS: 1 days | Discharge: HOME | End: 2023-10-17
Payer: MEDICAID

## 2023-10-17 VITALS
OXYGEN SATURATION: 98 % | RESPIRATION RATE: 18 BRPM | SYSTOLIC BLOOD PRESSURE: 136 MMHG | DIASTOLIC BLOOD PRESSURE: 71 MMHG | HEART RATE: 70 BPM | TEMPERATURE: 97.9 F | HEIGHT: 69 IN | BODY MASS INDEX: 28.88 KG/M2 | WEIGHT: 195 LBS

## 2023-10-17 DIAGNOSIS — I10 ESSENTIAL (PRIMARY) HYPERTENSION: ICD-10-CM

## 2023-10-17 DIAGNOSIS — E78.1 PURE HYPERGLYCERIDEMIA: ICD-10-CM

## 2023-10-17 DIAGNOSIS — Z79.82 LONG TERM (CURRENT) USE OF ASPIRIN: ICD-10-CM

## 2023-10-17 DIAGNOSIS — Z79.84 LONG TERM (CURRENT) USE OF ORAL HYPOGLYCEMIC DRUGS: ICD-10-CM

## 2023-10-17 DIAGNOSIS — M1A.09X1 IDIOPATHIC CHRONIC GOUT, MULTIPLE SITES, WITH TOPHUS (TOPHI): ICD-10-CM

## 2023-10-17 DIAGNOSIS — L97.412 NON-PRESSURE CHRONIC ULCER OF RIGHT HEEL AND MIDFOOT WITH FAT LAYER EXPOSED: ICD-10-CM

## 2023-10-17 DIAGNOSIS — N52.9 MALE ERECTILE DYSFUNCTION, UNSPECIFIED: ICD-10-CM

## 2023-10-17 DIAGNOSIS — R35.1 NOCTURIA: ICD-10-CM

## 2023-10-17 DIAGNOSIS — N32.81 OVERACTIVE BLADDER: ICD-10-CM

## 2023-10-17 DIAGNOSIS — Z87.442 PERSONAL HISTORY OF URINARY CALCULI: ICD-10-CM

## 2023-10-17 DIAGNOSIS — L84 CORNS AND CALLOSITIES: ICD-10-CM

## 2023-10-17 DIAGNOSIS — E11.621 TYPE 2 DIABETES MELLITUS WITH FOOT ULCER: ICD-10-CM

## 2023-10-17 DIAGNOSIS — S91.309D UNSPECIFIED OPEN WOUND, UNSPECIFIED FOOT, SUBSEQUENT ENCOUNTER: ICD-10-CM

## 2023-10-17 DIAGNOSIS — N40.1 BENIGN PROSTATIC HYPERPLASIA WITH LOWER URINARY TRACT SYMPTOMS: ICD-10-CM

## 2023-10-17 DIAGNOSIS — Z98.890 OTHER SPECIFIED POSTPROCEDURAL STATES: ICD-10-CM

## 2023-10-17 DIAGNOSIS — Z87.81 PERSONAL HISTORY OF (HEALED) TRAUMATIC FRACTURE: ICD-10-CM

## 2023-10-17 DIAGNOSIS — Z79.899 OTHER LONG TERM (CURRENT) DRUG THERAPY: ICD-10-CM

## 2023-10-17 DIAGNOSIS — Z98.1 ARTHRODESIS STATUS: ICD-10-CM

## 2023-10-17 PROCEDURE — G0463: CPT

## 2023-10-17 PROCEDURE — 99213 OFFICE O/P EST LOW 20 MIN: CPT

## 2023-10-26 PROBLEM — E78.1 HIGH TRIGLYCERIDES: Status: RESOLVED | Noted: 2023-10-06 | Resolved: 2023-10-26

## 2023-10-26 PROBLEM — Z86.39 HISTORY OF TYPE 2 DIABETES MELLITUS: Status: RESOLVED | Noted: 2023-10-06 | Resolved: 2023-10-26

## 2023-10-26 PROBLEM — Z98.890 H/O EXCISION OF MASS: Status: ACTIVE | Noted: 2023-10-06

## 2023-10-26 PROBLEM — Z86.79 HISTORY OF HYPERTENSION: Status: RESOLVED | Noted: 2023-10-06 | Resolved: 2023-10-26

## 2023-10-26 PROBLEM — Z86.39 HISTORY OF HYPERLIPIDEMIA: Status: RESOLVED | Noted: 2023-10-06 | Resolved: 2023-10-26

## 2023-10-26 PROBLEM — Z86.59 HISTORY OF DEPRESSION: Status: RESOLVED | Noted: 2023-10-06 | Resolved: 2023-10-26

## 2023-10-26 PROBLEM — Z87.39 HISTORY OF GOUT: Status: RESOLVED | Noted: 2023-10-06 | Resolved: 2023-10-26

## 2023-11-06 ENCOUNTER — RX RENEWAL (OUTPATIENT)
Age: 63
End: 2023-11-06

## 2023-12-12 ENCOUNTER — APPOINTMENT (OUTPATIENT)
Dept: WOUND CARE | Facility: HOSPITAL | Age: 63
End: 2023-12-12
Payer: MEDICAID

## 2023-12-12 ENCOUNTER — OUTPATIENT (OUTPATIENT)
Dept: OUTPATIENT SERVICES | Facility: HOSPITAL | Age: 63
LOS: 1 days | Discharge: HOME | End: 2023-12-12
Payer: MEDICAID

## 2023-12-12 VITALS
RESPIRATION RATE: 18 BRPM | OXYGEN SATURATION: 96 % | TEMPERATURE: 98.6 F | HEART RATE: 84 BPM | HEIGHT: 69 IN | WEIGHT: 195 LBS | BODY MASS INDEX: 28.88 KG/M2 | SYSTOLIC BLOOD PRESSURE: 152 MMHG | DIASTOLIC BLOOD PRESSURE: 89 MMHG

## 2023-12-12 VITALS — WEIGHT: 196 LBS | HEIGHT: 69 IN | BODY MASS INDEX: 29.03 KG/M2

## 2023-12-12 DIAGNOSIS — E11.621 TYPE 2 DIABETES MELLITUS WITH FOOT ULCER: ICD-10-CM

## 2023-12-12 PROCEDURE — 99214 OFFICE O/P EST MOD 30 MIN: CPT

## 2023-12-12 PROCEDURE — G0463: CPT

## 2023-12-14 NOTE — REVIEW OF SYSTEMS
[Fever] : no fever [Chills] : no chills [Eye Pain] : no eye pain [Loss Of Hearing] : no hearing loss [Shortness Of Breath] : no shortness of breath [Wheezing] : no wheezing [Abdominal Pain] : no abdominal pain [Arthralgias] : arthralgias [Joint Swelling] : joint swelling [Joint Stiffness] : joint stiffness [Skin Wound] : skin wound [Anxiety] : no anxiety [Easy Bleeding] : no tendency for easy bleeding [FreeTextEntry5] : HTN [FreeTextEntry9] : Gouty arthropathy , left and right foot hammer toes and painful deformity of the digits [de-identified] : Right fifth metatarsal base plantar wound down to skin, subcutaneous tissue, fat [de-identified] : Diabetic neuropathy  [de-identified] : NIDDM , severe gouty arthropathy

## 2023-12-14 NOTE — HISTORY OF PRESENT ILLNESS
[FreeTextEntry1] : 62-year-old male seen with right fifth metatarsal base plantar foot wound.  Patient seen with patient's wife.  Patient's wife relates that the patient may have stepped on a piece of copper approximately 1 month ago and sustained a small cut in the area.  Patient relates that he developed a callus over the cut and has also used a belt mariama to grind down the callus.  Patient relates that he experienced gradual increase in pain to the right foot in the area and presents today for reevaluation.  Denies any fever, chills, nausea, vomiting, chest pain, shortness of breath, calf pain, or any other complaints at this time.  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  Patient underwent hyperbaric oxygen treatment and had healed all the areas and was discharged from the hyperbaric and wound care center in the past.  12/12/2023: Patient seen for follow-up of right fifth metatarsal base plantar wound.  Patient relates that the wound has not resolved and notes that the right foot has been getting more painful due to the prominence and the buildup of callus.

## 2023-12-14 NOTE — PLAN
[FreeTextEntry1] : Patient examined and evaluated at this time. Discussed conservative versus surgical intervention with patient and patient's wife at this time.  Advised regarding the risk, benefits, potential complications.  Patient and patient's wife related that they would like to proceed with a surgical intervention at this time.  Will tentatively plan for right fifth metatarsal base resection with peroneus brevis tendon transfer to the cuboid, as well as a right posterior tibial tendon lengthening with possible Achilles tendon lengthening.  Patient related that he would like to wait until after [place] to proceed with surgical intervention at this time.  Patient remains at high risk for infection, sepsis, limb loss, death.  Patient and patient's wife advised regarding the need for patient to immediately report to the nearest emergency room if any signs of infection are noted including redness, swelling, purulence, foul odor, increased pain, and other associated symptoms of infection.  Spent 30 minutes for patient care and medical decision making.

## 2023-12-14 NOTE — ASSESSMENT
[Stable] : stable [Home] : Home [Ambulatory] : Ambulatory [Not Applicable - Long Term Care/Home Health Agency] : Long Term Care/Home Health Agency: Not Applicable [] : No [FreeTextEntry2] : Infection Prevention Foot and nail care Nutrition and wound healing Pt Demonstrates use of both nonpharmacological and pharmacological pain relief strategies. [FreeTextEntry4] : DPM Discussed with patient future surgical intervention including but not limited to; length of stay, and post operative restrictions. Patient understanding of same. Patient's spouse is a registered nurse and performs Pt's Rx dressing changes.  F/U 1-2 Weeks

## 2023-12-14 NOTE — PHYSICAL EXAM
[4 x 4] : 4 x 4  [1+] : left 1+ [Ankle Swelling (On Exam)] : present [Ankle Swelling Bilaterally] : bilaterally  [Varicose Veins Of Lower Extremities] : bilaterally [Ankle Swelling On The Left] : moderate [] : bilaterally [Ankle Swelling On The Right] : mild [Petechiae] : no petechiae [Purpura] : no purpura  [Skin Ulcer] : ulcer [Skin Induration] : no induration [Alert] : alert [Oriented to Person] : oriented to person [Oriented to Place] : oriented to place [Calm] : calm [de-identified] : A&Ox3, NAD [de-identified] : HTN [de-identified] : Right foot prominent 5th metatarsal base with ankle joint dorsiflexion at 0deg with knees extended and flexed. [de-identified] : Right fifth metatarsal base plantar wound down to skin, subcutaneous tissue, fat [de-identified] : Diabetic neuropathy  [FreeTextEntry1] : right fifth metatarsal base plantar foot wound. [FreeTextEntry2] : 0.4 [FreeTextEntry3] : 0.7 [FreeTextEntry4] : 0.2 [de-identified] : small serous [de-identified] : none [de-identified] : diabetic [de-identified] : none [de-identified] : calloused [de-identified] : none [de-identified] : none [de-identified] : 100% [de-identified] : Alginate Ag [de-identified] : none [de-identified] : Mechanically cleansed with sterile gauze and normal saline 0.9% Dry Dressing [TWNoteComboBox5] : No [de-identified] : Every other day [de-identified] : Primary Dressing

## 2023-12-15 DIAGNOSIS — L97.412 NON-PRESSURE CHRONIC ULCER OF RIGHT HEEL AND MIDFOOT WITH FAT LAYER EXPOSED: ICD-10-CM

## 2023-12-15 DIAGNOSIS — R35.1 NOCTURIA: ICD-10-CM

## 2023-12-15 DIAGNOSIS — Z87.442 PERSONAL HISTORY OF URINARY CALCULI: ICD-10-CM

## 2023-12-15 DIAGNOSIS — Z79.899 OTHER LONG TERM (CURRENT) DRUG THERAPY: ICD-10-CM

## 2023-12-15 DIAGNOSIS — Z79.82 LONG TERM (CURRENT) USE OF ASPIRIN: ICD-10-CM

## 2023-12-15 DIAGNOSIS — Z98.890 OTHER SPECIFIED POSTPROCEDURAL STATES: ICD-10-CM

## 2023-12-15 DIAGNOSIS — I10 ESSENTIAL (PRIMARY) HYPERTENSION: ICD-10-CM

## 2023-12-15 DIAGNOSIS — E78.1 PURE HYPERGLYCERIDEMIA: ICD-10-CM

## 2023-12-15 DIAGNOSIS — Z98.1 ARTHRODESIS STATUS: ICD-10-CM

## 2023-12-15 DIAGNOSIS — M1A.09X1 IDIOPATHIC CHRONIC GOUT, MULTIPLE SITES, WITH TOPHUS (TOPHI): ICD-10-CM

## 2023-12-15 DIAGNOSIS — N32.81 OVERACTIVE BLADDER: ICD-10-CM

## 2023-12-15 DIAGNOSIS — E11.40 TYPE 2 DIABETES MELLITUS WITH DIABETIC NEUROPATHY, UNSPECIFIED: ICD-10-CM

## 2023-12-15 DIAGNOSIS — E11.621 TYPE 2 DIABETES MELLITUS WITH FOOT ULCER: ICD-10-CM

## 2023-12-15 DIAGNOSIS — Z87.81 PERSONAL HISTORY OF (HEALED) TRAUMATIC FRACTURE: ICD-10-CM

## 2023-12-15 DIAGNOSIS — L84 CORNS AND CALLOSITIES: ICD-10-CM

## 2023-12-15 DIAGNOSIS — Z79.84 LONG TERM (CURRENT) USE OF ORAL HYPOGLYCEMIC DRUGS: ICD-10-CM

## 2023-12-15 DIAGNOSIS — N40.1 BENIGN PROSTATIC HYPERPLASIA WITH LOWER URINARY TRACT SYMPTOMS: ICD-10-CM

## 2023-12-15 DIAGNOSIS — N52.9 MALE ERECTILE DYSFUNCTION, UNSPECIFIED: ICD-10-CM

## 2023-12-26 ENCOUNTER — EMERGENCY (EMERGENCY)
Facility: HOSPITAL | Age: 63
LOS: 1 days | Discharge: LEFT BEFORE DISCHARGE | End: 2023-12-26
Admitting: EMERGENCY MEDICINE
Payer: MEDICAID

## 2023-12-26 VITALS
SYSTOLIC BLOOD PRESSURE: 150 MMHG | HEART RATE: 82 BPM | TEMPERATURE: 97 F | WEIGHT: 195.11 LBS | RESPIRATION RATE: 15 BRPM | HEIGHT: 69 IN | DIASTOLIC BLOOD PRESSURE: 79 MMHG | OXYGEN SATURATION: 98 %

## 2023-12-26 DIAGNOSIS — S91.301A UNSPECIFIED OPEN WOUND, RIGHT FOOT, INITIAL ENCOUNTER: ICD-10-CM

## 2023-12-26 PROCEDURE — 99283 EMERGENCY DEPT VISIT LOW MDM: CPT | Mod: 57

## 2023-12-26 PROCEDURE — L9991: CPT

## 2023-12-26 NOTE — CONSULT NOTE ADULT - SUBJECTIVE AND OBJECTIVE BOX
HPI:  63-year-old male seen for right foot ulcer submet fifth metatarsal base down to skin, subcutaneous tissue, fat.  Patient was evaluated at the wound care center was advised to go to the hospital for admission and surgical intervention.  Of note is that patient has had the ulceration with no resolution and remains a high risk for infection, sepsis, limb loss, death.  Patient denies any other complaints at this time.      PAST MEDICAL & SURGICAL HISTORY:  HTN (hypertension)      Anxiety and depression      Panic attacks      Fall  at work 03/2008      C2 cervical fracture  with fusion      Kidney stones      C2 cervical fracture  3/2008      Hernia  in Infancy      Renal stone  removed -by ? blasting      Rotator cuff tear, left          Allergies    No Known Allergies    Intolerances        MEDICATIONS  (STANDING):    MEDICATIONS  (PRN):      Social History:      FAMILY HISTORY:      Vital Signs Last 24 Hrs  T(C): 36.2 (26 Dec 2023 12:49), Max: 36.2 (26 Dec 2023 12:49)  T(F): 97.2 (26 Dec 2023 12:49), Max: 97.2 (26 Dec 2023 12:49)  HR: 82 (26 Dec 2023 12:49) (82 - 82)  BP: 150/79 (26 Dec 2023 12:49) (150/79 - 150/79)  BP(mean): --  RR: 15 (26 Dec 2023 12:49) (15 - 15)  SpO2: 98% (26 Dec 2023 12:49) (98% - 98%)    Parameters below as of 26 Dec 2023 12:49  Patient On (Oxygen Delivery Method): room air        PHYSICAL EXAM:  Vascular: DP & PT palpable bilaterally, Capillary refill 3 seconds, Digital hair present bilaterally  Neurological: Light touch sensation diminished bilaterally  Musculoskeletal: 5 out of 5 strength in all quads bilaterally, ankle joint and subtalar joint range of motion intact, palpable bony prominence noted to the plantar aspect of the right fifth metatarsal base.  Dermatological: Right fifth metatarsal base plantar aspect wound on skin, subcutaneous tissue, fat, granular wound bed, no probe to bone, no periwound erythema, no fluctuance, no malodor, no proximal streaking at this time

## 2023-12-26 NOTE — CONSULT NOTE ADULT - PROBLEM SELECTOR RECOMMENDATION 9
Patient examined and evaluated this time.  Patient advised regarding possible etiology of symptoms.  Discussed conservative versus surgical intervention options with patient.  Patient to pursue surgical intervention at this time.  Discussed the risks, benefits, and potential complications with patient to satisfaction.  Will proceed with a right fifth metatarsal base resection with peroneus brevis tendon transfer to the cuboid, possible right posterior tibialis tendon lengthening and possible Achilles tendon lengthening for tomorrow 12/27/2023.  Patient will need medical and cardio optimization with risk stratification.

## 2023-12-26 NOTE — ED ADULT NURSE NOTE - NSFALLUNIVINTERV_ED_ALL_ED
Bed/Stretcher in lowest position, wheels locked, appropriate side rails in place/Call bell, personal items and telephone in reach/Instruct patient to call for assistance before getting out of bed/chair/stretcher/Non-slip footwear applied when patient is off stretcher/[place] to call system/Physically safe environment - no spills, clutter or unnecessary equipment/Purposeful proactive rounding/Room/bathroom lighting operational, light cord in reach Bed/Stretcher in lowest position, wheels locked, appropriate side rails in place/Call bell, personal items and telephone in reach/Instruct patient to call for assistance before getting out of bed/chair/stretcher/Non-slip footwear applied when patient is off stretcher/[place] to call system/Physically safe environment - no spills, clutter or unnecessary equipment/Purposeful proactive rounding/Room/bathroom lighting operational, light cord in reach

## 2023-12-26 NOTE — ED ADULT NURSE NOTE - OBJECTIVE STATEMENT
Pt observed upset and states he does not want to be seen anymore due to waiting time. Pt educated on reasons of wait time but pt continued to refuse to speak with staff and provider. pt states he is going to leave without being seen. Wife at bedside. Pt observed leaving the ED .

## 2023-12-27 DEVICE — SURGICEL NU-KNIT 6 X 9": Type: IMPLANTABLE DEVICE | Status: FUNCTIONAL

## 2024-01-02 ENCOUNTER — RX RENEWAL (OUTPATIENT)
Age: 64
End: 2024-01-02

## 2024-01-03 ENCOUNTER — APPOINTMENT (OUTPATIENT)
Dept: INTERNAL MEDICINE | Facility: CLINIC | Age: 64
End: 2024-01-03
Payer: MEDICAID

## 2024-01-03 ENCOUNTER — NON-APPOINTMENT (OUTPATIENT)
Age: 64
End: 2024-01-03

## 2024-01-03 VITALS
RESPIRATION RATE: 18 BRPM | HEIGHT: 69 IN | BODY MASS INDEX: 31.4 KG/M2 | OXYGEN SATURATION: 98 % | SYSTOLIC BLOOD PRESSURE: 136 MMHG | HEART RATE: 79 BPM | DIASTOLIC BLOOD PRESSURE: 76 MMHG | TEMPERATURE: 97.8 F | WEIGHT: 212 LBS

## 2024-01-03 DIAGNOSIS — Z01.818 ENCOUNTER FOR OTHER PREPROCEDURAL EXAMINATION: ICD-10-CM

## 2024-01-03 DIAGNOSIS — Z87.898 PERSONAL HISTORY OF OTHER SPECIFIED CONDITIONS: ICD-10-CM

## 2024-01-03 PROCEDURE — 93000 ELECTROCARDIOGRAM COMPLETE: CPT

## 2024-01-03 PROCEDURE — 99214 OFFICE O/P EST MOD 30 MIN: CPT | Mod: 25

## 2024-01-03 RX ORDER — ASPIRIN 81 MG
81 TABLET,CHEWABLE ORAL DAILY
Refills: 0 | Status: ACTIVE | COMMUNITY

## 2024-01-03 RX ORDER — ROSUVASTATIN CALCIUM 10 MG/1
10 TABLET, FILM COATED ORAL
Qty: 90 | Refills: 1 | Status: ACTIVE | COMMUNITY
Start: 2023-06-15

## 2024-01-04 LAB
ALBUMIN SERPL ELPH-MCNC: 4.3 G/DL
ALP BLD-CCNC: 90 U/L
ALT SERPL-CCNC: 29 U/L
ANION GAP SERPL CALC-SCNC: 14 MMOL/L
APTT BLD: 29.4 SEC
AST SERPL-CCNC: 29 U/L
BASOPHILS # BLD AUTO: 0.04 K/UL
BASOPHILS NFR BLD AUTO: 0.7 %
BILIRUB SERPL-MCNC: 0.3 MG/DL
BUN SERPL-MCNC: 24 MG/DL
CALCIUM SERPL-MCNC: 8.9 MG/DL
CHLORIDE SERPL-SCNC: 107 MMOL/L
CO2 SERPL-SCNC: 19 MMOL/L
CREAT SERPL-MCNC: 1.39 MG/DL
CRP SERPL-MCNC: <3 MG/L
EGFR: 58 ML/MIN/1.73M2
EOSINOPHIL # BLD AUTO: 0.18 K/UL
EOSINOPHIL NFR BLD AUTO: 3.3 %
ESTIMATED AVERAGE GLUCOSE: 126 MG/DL
GLUCOSE SERPL-MCNC: 188 MG/DL
HBA1C MFR BLD HPLC: 6 %
HCT VFR BLD CALC: 41.9 %
HGB BLD-MCNC: 14 G/DL
IMM GRANULOCYTES NFR BLD AUTO: 0.2 %
INR PPP: 0.85 RATIO
LYMPHOCYTES # BLD AUTO: 1.59 K/UL
LYMPHOCYTES NFR BLD AUTO: 28.8 %
MAN DIFF?: NORMAL
MCHC RBC-ENTMCNC: 29.9 PG
MCHC RBC-ENTMCNC: 33.4 GM/DL
MCV RBC AUTO: 89.5 FL
MONOCYTES # BLD AUTO: 0.43 K/UL
MONOCYTES NFR BLD AUTO: 7.8 %
NEUTROPHILS # BLD AUTO: 3.28 K/UL
NEUTROPHILS NFR BLD AUTO: 59.2 %
PLATELET # BLD AUTO: 207 K/UL
POTASSIUM SERPL-SCNC: 4.3 MMOL/L
PROT SERPL-MCNC: 6.4 G/DL
PT BLD: 9.7 SEC
RBC # BLD: 4.68 M/UL
RBC # FLD: 12.7 %
RHEUMATOID FACT SER QL: <10 IU/ML
SODIUM SERPL-SCNC: 141 MMOL/L
WBC # FLD AUTO: 5.53 K/UL

## 2024-01-17 ENCOUNTER — NON-APPOINTMENT (OUTPATIENT)
Age: 64
End: 2024-01-17

## 2024-01-29 ENCOUNTER — RX RENEWAL (OUTPATIENT)
Age: 64
End: 2024-01-29

## 2024-01-30 RX ORDER — TIRZEPATIDE 7.5 MG/.5ML
7.5 INJECTION, SOLUTION SUBCUTANEOUS
Qty: 3 | Refills: 0 | Status: ACTIVE | COMMUNITY
Start: 2023-05-22

## 2024-02-07 NOTE — CONSULT NOTE ADULT - PROBLEM SELECTOR RECOMMENDATION 2
Good but not overly tight diabetic control to avoid iatrogenic hypoglycemia.   Uncontrolled hyperglycemia makes treatment of infections potentially problematic.  OPD ophtho eval re: visual changes given broad DDx. No concern that they're on an infectious basis at this time.
no concerns

## 2024-02-08 ENCOUNTER — RX RENEWAL (OUTPATIENT)
Age: 64
End: 2024-02-08

## 2024-02-08 NOTE — ASSESSMENT
[Patient Optimized for Surgery] : Patient optimized for surgery [No Further Testing Recommended] : no further testing recommended [Modify anti-platelet treatment prior to procedure] : Modify anti-platelet treatment prior to procedure [Modify medications prior to procedure] : Modify medications prior to procedure [FreeTextEntry6] : Hold ASA 81mg for 3 days prior or per surgery request. [FreeTextEntry7] : Consider skipping Mounjaro for week of surgery. Avoid PRNs and supplements when reasonable. [FreeTextEntry2] : N/A

## 2024-02-08 NOTE — PHYSICAL EXAM
[No Acute Distress] : no acute distress [Well-Appearing] : well-appearing [No Respiratory Distress] : no respiratory distress  [No Accessory Muscle Use] : no accessory muscle use [Clear to Auscultation] : lungs were clear to auscultation bilaterally [Normal Rate] : normal rate  [Regular Rhythm] : with a regular rhythm [Normal S1, S2] : normal S1 and S2 [No Murmur] : no murmur heard [Coordination Grossly Intact] : coordination grossly intact [No Focal Deficits] : no focal deficits [Normal Gait] : normal gait [Normal Affect] : the affect was normal [de-identified] : obesity [de-identified] : ulcer right foot

## 2024-02-08 NOTE — ADDENDUM
[FreeTextEntry1] : 1/4/2024 - Blood work reviewed. Pt is medically optimized for surgery. - Fariha 2/4/2024 - Pt has surgical date proposed for 2/21/2024. There have been no significant changes in his health since his last evaluation. I feel his clearance can be extended to surgical date. See reports. Pt is medically optimized for surgery. - Fariha

## 2024-02-08 NOTE — PLAN
[FreeTextEntry1] : Pt is medically optimized for surgery.  Blood work is pending at the time of this note and will be forwarded to the surgeon with my notes when it becomes available. If I have any concerns with ECG or blood work, they will be discussed with the surgical team directly, prior to surgery date, in order to come up with a plan.  Contact my office if any issues or any part of the clearance is missing.

## 2024-02-08 NOTE — HISTORY OF PRESENT ILLNESS
[No Pertinent Pulmonary History] : no history of asthma, COPD, sleep apnea, or smoking [No Adverse Anesthesia Reaction] : no adverse anesthesia reaction in self or family member [Diabetes] : diabetes [(Patient denies any chest pain, claudication, dyspnea on exertion, orthopnea, palpitations or syncope)] : Patient denies any chest pain, claudication, dyspnea on exertion, orthopnea, palpitations or syncope [Anti-Platelet Agents: _____] : Anti-Platelet Agents: [unfilled] [Aortic Stenosis] : no aortic stenosis [Atrial Fibrillation] : no atrial fibrillation [Coronary Artery Disease] : no coronary artery disease [Recent Myocardial Infarction] : no recent myocardial infarction [Implantable Device/Pacemaker] : no implantable device/pacemaker [Chronic Anticoagulation] : no chronic anticoagulation [Chronic Kidney Disease] : no chronic kidney disease [FreeTextEntry1] : Right Fifth Metatarsal Base Resection with Peroneus Brevis Tendon Transfer to the Cuboid and Right Posterior Tibial Tendon Lengthening with Possible Achilles Tendon Lengthening [FreeTextEntry2] : Date TBD [FreeTextEntry3] : Dr. Sherwin Green [FreeTextEntry4] : 63 year old M with PMH multiple medical issues presents for medical clearance. Pt denies CP/SOB, fever/chills, n/v/d/c.  [FreeTextEntry7] : ECG

## 2024-02-08 NOTE — RESULTS/DATA
[] : results reviewed [de-identified] : Pending. [de-identified] : Pending. [de-identified] : Pending. [de-identified] : See ECG report - unchanged from previous findings. Normal stress echo on 7/2023.

## 2024-02-12 ENCOUNTER — NON-APPOINTMENT (OUTPATIENT)
Age: 64
End: 2024-02-12

## 2024-02-13 ENCOUNTER — APPOINTMENT (OUTPATIENT)
Dept: WOUND CARE | Facility: HOSPITAL | Age: 64
End: 2024-02-13
Payer: MEDICAID

## 2024-02-13 ENCOUNTER — OUTPATIENT (OUTPATIENT)
Dept: OUTPATIENT SERVICES | Facility: HOSPITAL | Age: 64
LOS: 1 days | End: 2024-02-13
Payer: MEDICAID

## 2024-02-13 ENCOUNTER — OUTPATIENT (OUTPATIENT)
Dept: OUTPATIENT SERVICES | Facility: HOSPITAL | Age: 64
LOS: 1 days | Discharge: HOME | End: 2024-02-13
Payer: MEDICAID

## 2024-02-13 VITALS
DIASTOLIC BLOOD PRESSURE: 90 MMHG | RESPIRATION RATE: 18 BRPM | WEIGHT: 212 LBS | OXYGEN SATURATION: 98 % | HEIGHT: 69 IN | HEART RATE: 64 BPM | BODY MASS INDEX: 31.4 KG/M2 | TEMPERATURE: 97.8 F | SYSTOLIC BLOOD PRESSURE: 145 MMHG

## 2024-02-13 VITALS
SYSTOLIC BLOOD PRESSURE: 126 MMHG | HEART RATE: 67 BPM | TEMPERATURE: 97 F | HEIGHT: 69 IN | WEIGHT: 205.91 LBS | RESPIRATION RATE: 16 BRPM | DIASTOLIC BLOOD PRESSURE: 82 MMHG | OXYGEN SATURATION: 97 %

## 2024-02-13 DIAGNOSIS — E11.621 TYPE 2 DIABETES MELLITUS WITH FOOT ULCER: ICD-10-CM

## 2024-02-13 DIAGNOSIS — Z01.818 ENCOUNTER FOR OTHER PREPROCEDURAL EXAMINATION: ICD-10-CM

## 2024-02-13 DIAGNOSIS — Z98.890 OTHER SPECIFIED POSTPROCEDURAL STATES: Chronic | ICD-10-CM

## 2024-02-13 LAB
ALBUMIN SERPL ELPH-MCNC: 4 G/DL — SIGNIFICANT CHANGE UP (ref 3.3–5)
ALP SERPL-CCNC: 98 U/L — SIGNIFICANT CHANGE UP (ref 40–120)
ALT FLD-CCNC: 36 U/L — SIGNIFICANT CHANGE UP (ref 12–78)
ANION GAP SERPL CALC-SCNC: 5 MMOL/L — SIGNIFICANT CHANGE UP (ref 5–17)
AST SERPL-CCNC: 25 U/L — SIGNIFICANT CHANGE UP (ref 15–37)
BILIRUB SERPL-MCNC: 0.6 MG/DL — SIGNIFICANT CHANGE UP (ref 0.2–1.2)
BUN SERPL-MCNC: 15 MG/DL — SIGNIFICANT CHANGE UP (ref 7–23)
CALCIUM SERPL-MCNC: 9.5 MG/DL — SIGNIFICANT CHANGE UP (ref 8.5–10.1)
CHLORIDE SERPL-SCNC: 109 MMOL/L — HIGH (ref 96–108)
CO2 SERPL-SCNC: 28 MMOL/L — SIGNIFICANT CHANGE UP (ref 22–31)
CREAT SERPL-MCNC: 1.2 MG/DL — SIGNIFICANT CHANGE UP (ref 0.5–1.3)
EGFR: 69 ML/MIN/1.73M2 — SIGNIFICANT CHANGE UP
GLUCOSE SERPL-MCNC: 144 MG/DL — HIGH (ref 70–99)
HCT VFR BLD CALC: 43.2 % — SIGNIFICANT CHANGE UP (ref 39–50)
HGB BLD-MCNC: 14.7 G/DL — SIGNIFICANT CHANGE UP (ref 13–17)
MCHC RBC-ENTMCNC: 29.8 PG — SIGNIFICANT CHANGE UP (ref 27–34)
MCHC RBC-ENTMCNC: 34 GM/DL — SIGNIFICANT CHANGE UP (ref 32–36)
MCV RBC AUTO: 87.4 FL — SIGNIFICANT CHANGE UP (ref 80–100)
NRBC # BLD: 0 /100 WBCS — SIGNIFICANT CHANGE UP (ref 0–0)
PLATELET # BLD AUTO: 210 K/UL — SIGNIFICANT CHANGE UP (ref 150–400)
POTASSIUM SERPL-MCNC: 3.9 MMOL/L — SIGNIFICANT CHANGE UP (ref 3.5–5.3)
POTASSIUM SERPL-SCNC: 3.9 MMOL/L — SIGNIFICANT CHANGE UP (ref 3.5–5.3)
PROT SERPL-MCNC: 7.3 G/DL — SIGNIFICANT CHANGE UP (ref 6–8.3)
RBC # BLD: 4.94 M/UL — SIGNIFICANT CHANGE UP (ref 4.2–5.8)
RBC # FLD: 12.7 % — SIGNIFICANT CHANGE UP (ref 10.3–14.5)
SODIUM SERPL-SCNC: 142 MMOL/L — SIGNIFICANT CHANGE UP (ref 135–145)
WBC # BLD: 5.6 K/UL — SIGNIFICANT CHANGE UP (ref 3.8–10.5)
WBC # FLD AUTO: 5.6 K/UL — SIGNIFICANT CHANGE UP (ref 3.8–10.5)

## 2024-02-13 PROCEDURE — 99213 OFFICE O/P EST LOW 20 MIN: CPT

## 2024-02-13 PROCEDURE — 85027 COMPLETE CBC AUTOMATED: CPT

## 2024-02-13 PROCEDURE — G0463: CPT

## 2024-02-13 PROCEDURE — 80053 COMPREHEN METABOLIC PANEL: CPT

## 2024-02-13 PROCEDURE — 36415 COLL VENOUS BLD VENIPUNCTURE: CPT

## 2024-02-13 RX ORDER — IBUPROFEN 200 MG
1 TABLET ORAL
Qty: 0 | Refills: 0 | DISCHARGE

## 2024-02-13 RX ORDER — DEXTROSE 50 % IN WATER 50 %
25 SYRINGE (ML) INTRAVENOUS ONCE
Refills: 0 | Status: DISCONTINUED | OUTPATIENT
Start: 2024-02-21 | End: 2024-03-07

## 2024-02-13 RX ORDER — DEXTROSE 50 % IN WATER 50 %
15 SYRINGE (ML) INTRAVENOUS ONCE
Refills: 0 | Status: DISCONTINUED | OUTPATIENT
Start: 2024-02-21 | End: 2024-03-07

## 2024-02-13 RX ORDER — COLCHICINE 0.6 MG
0 TABLET ORAL
Refills: 0 | DISCHARGE

## 2024-02-13 RX ORDER — DEXTROSE 50 % IN WATER 50 %
12.5 SYRINGE (ML) INTRAVENOUS ONCE
Refills: 0 | Status: DISCONTINUED | OUTPATIENT
Start: 2024-02-21 | End: 2024-03-07

## 2024-02-13 RX ORDER — GLUCAGON INJECTION, SOLUTION 0.5 MG/.1ML
1 INJECTION, SOLUTION SUBCUTANEOUS ONCE
Refills: 0 | Status: DISCONTINUED | OUTPATIENT
Start: 2024-02-21 | End: 2024-03-07

## 2024-02-13 RX ORDER — DEXTROAMPHETAMINE SACCHARATE, AMPHETAMINE ASPARTATE, DEXTROAMPHETAMINE SULFATE AND AMPHETAMINE SULFATE 1.875; 1.875; 1.875; 1.875 MG/1; MG/1; MG/1; MG/1
1 TABLET ORAL
Qty: 0 | Refills: 0 | DISCHARGE

## 2024-02-13 RX ORDER — AMLODIPINE BESYLATE 2.5 MG/1
1 TABLET ORAL
Qty: 0 | Refills: 0 | DISCHARGE

## 2024-02-13 NOTE — H&P PST ADULT - NSICDXPASTMEDICALHX_GEN_ALL_CORE_FT
PAST MEDICAL HISTORY:  Anxiety and depression     C2 cervical fracture with fusion    Fall at work 03/2008    HTN (hypertension)     Kidney stones     Panic attacks     Type 2 diabetes mellitus with foot ulcer      PAST MEDICAL HISTORY:  Anxiety and depression     C2 cervical fracture with fusion    Fall at work 03/2008    Gout     HTN (hypertension)     Kidney stones     Panic attacks     Type 2 diabetes mellitus with foot ulcer

## 2024-02-13 NOTE — HISTORY OF PRESENT ILLNESS
[FreeTextEntry1] : 62-year-old male seen with right fifth metatarsal base plantar foot wound.  Patient seen with patient's wife.  Patient's wife relates that the patient may have stepped on a piece of copper approximately 1 month ago and sustained a small cut in the area.  Patient relates that he developed a callus over the cut and has also used a belt mariama to grind down the callus.  Patient relates that he experienced gradual increase in pain to the right foot in the area and presents today for reevaluation.  Denies any fever, chills, nausea, vomiting, chest pain, shortness of breath, calf pain, or any other complaints at this time.  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  Patient underwent hyperbaric oxygen treatment and had healed all the areas and was discharged from the hyperbaric and wound care center in the past.  2/13/2023: Patient seen for follow-up of right fifth metatarsal base plantar wound.  Patient relates that the wound has not resolved and notes that the right foot has been getting more painful due to the prominence and the buildup of callus.

## 2024-02-13 NOTE — PHYSICAL EXAM
[4 x 4] : 4 x 4  [1+] : left 1+ [Ankle Swelling (On Exam)] : present [Ankle Swelling Bilaterally] : bilaterally  [Varicose Veins Of Lower Extremities] : bilaterally [Ankle Swelling On The Left] : moderate [] : bilaterally [Ankle Swelling On The Right] : mild [Purpura] : no purpura  [Petechiae] : no petechiae [Skin Ulcer] : ulcer [Skin Induration] : no induration [Alert] : alert [Oriented to Person] : oriented to person [Oriented to Place] : oriented to place [Calm] : calm [de-identified] : A&Ox3, NAD [de-identified] : Right foot prominent 5th metatarsal base with ankle joint dorsiflexion at 0deg with knees extended and flexed. [de-identified] : HTN [de-identified] : Right fifth metatarsal base plantar wound down to skin, subcutaneous tissue, fat [de-identified] : Diabetic neuropathy  [FreeTextEntry1] : right fifth metatarsal base plantar foot- callus [FreeTextEntry2] : 0.4 [FreeTextEntry3] : 0.5 [FreeTextEntry4] : 0.2 [de-identified] : none [de-identified] : none [de-identified] : none [de-identified] : diabetic [de-identified] : calloused [de-identified] : none [de-identified] : none [de-identified] : 100% pale granulation tissue  [de-identified] : none [de-identified] : Allevyn Ag [FreeTextEntry7] : right lateral foot 5th met- distal  callus- shaved by ANTON [de-identified] : Mechanically cleansed with sterile gauze and normal saline 0.9% Cloth Tape [FreeTextEntry8] : 0.5 [FreeTextEntry9] : 0.2 [de-identified] : 0.1 [de-identified] : callus  [de-identified] : Pink non granulated tissue  [de-identified] : Allevyn Ag [de-identified] : Mechanically cleansed with Sterile gauze and 0.9% Normal Saline Cloth Tape [TWNoteComboBox5] : No [de-identified] : Every other day [de-identified] : Primary Dressing [de-identified] : None [de-identified] : No [de-identified] : Diabetic [de-identified] : No [de-identified] : other [de-identified] : None [de-identified] : None [de-identified] : 100% [de-identified] : No [de-identified] : Every other day [de-identified] : Primary Dressing

## 2024-02-13 NOTE — H&P PST ADULT - PROBLEM SELECTOR PLAN 2
EKG, Coags and A1c on chart. CBC and CMP pending  Seen and cleared by PCP on 1/3/2024  Pre op and Hibiclens instructions reviewed and given. Take routine am meds, day of surgery with sip of water. Hold Mounjaro, last dose 2/5, hold Metformin, last dose 2/19. Hold ASA for 7 days. Instructed to hold and/or avoid other NSAIDs and OTC supplements. Tylenol is ok. Verbalized understanding

## 2024-02-13 NOTE — H&P PST ADULT - NSICDXPASTSURGICALHX_GEN_ALL_CORE_FT
PAST SURGICAL HISTORY:  C2 cervical fracture 3/2008    H/O foot surgery Right foot removed gouty tophi 2022    Hernia in Infancy    History of removal of retained hardware From C-spine 2013    Renal stone removed -by ? blasting    Rotator cuff tear, left

## 2024-02-13 NOTE — H&P PST ADULT - PROBLEM SELECTOR PLAN 1
scheduled for a right 5th metatarsal base resection with peroneus brevis tendon transfer to the cuboid and right posterior tibial tendon lengthening with possible achilles tendon lengthening on 2/21 with Dr. Green

## 2024-02-13 NOTE — H&P PST ADULT - HISTORY OF PRESENT ILLNESS
64 yo male with HTN, T2DM, HLD, Gout, Anxiety and Depression, presents to PST right 5th metatarsal base resection with peroneus brevis tendon transfer to the cuboid and right posterior tibial tendon lengthening with possible achilles tendon lengthening on 2/21 with Dr. Green. H/O right foot pain for the past 8 months secondary to a plantar wound. Reports H/O of previous right foot surgery s/p excision of gouty tophi in 2022. Reports his pain to be 2/10 but can go up to a 10. Denies diabetic neuropathy and PVD

## 2024-02-13 NOTE — H&P PST ADULT - NSANTHOSAYNRD_GEN_A_CORE
Denies XIMENA/No. XIMENA screening performed.  STOP BANG Legend: 0-2 = LOW Risk; 3-4 = INTERMEDIATE Risk; 5-8 = HIGH Risk

## 2024-02-13 NOTE — REVIEW OF SYSTEMS
[Fever] : no fever [Chills] : no chills [Loss Of Hearing] : no hearing loss [Eye Pain] : no eye pain [Shortness Of Breath] : no shortness of breath [Wheezing] : no wheezing [Abdominal Pain] : no abdominal pain [Arthralgias] : arthralgias [Joint Swelling] : joint swelling [Joint Stiffness] : joint stiffness [Skin Wound] : skin wound [Anxiety] : no anxiety [Easy Bleeding] : no tendency for easy bleeding [FreeTextEntry5] : HTN [FreeTextEntry9] : Gouty arthropathy , left and right foot hammer toes and painful deformity of the digits [de-identified] : Right fifth metatarsal base plantar wound down to skin, subcutaneous tissue, fat [de-identified] : Diabetic neuropathy  [de-identified] : NIDDM , severe gouty arthropathy

## 2024-02-13 NOTE — ASSESSMENT
[Verbal] : Verbal [Demo] : Demo [Patient] : Patient [Spouse] : Spouse [Good - alert, interested, motivated] : Good - alert, interested, motivated [Demonstrates independently] : demonstrates independently [Dressing changes] : dressing changes [Foot Care] : foot care [Skin Care] : skin care [Pressure relief] : pressure relief [Signs and symptoms of infection] : sign and symptoms of infection [Surgery] : surgery [Nutrition] : nutrition [How and When to Call] : how and when to call [Labs and Tests] : labs and tests [Pain Management] : pain management [Off-loading] : off-loading [Patient responsibility to plan of care] : patient responsibility to plan of care [Stable] : stable [Home] : Home [Ambulatory] : Ambulatory [Not Applicable - Long Term Care/Home Health Agency] : Long Term Care/Home Health Agency: Not Applicable [] : No [FreeTextEntry2] : Infection Prevention Foot and nail care Nutrition and wound healing Pt Demonstrates use of both nonpharmacological and pharmacological pain relief strategies. podiatric surgery  [FreeTextEntry3] : wound remains the same [FreeTextEntry4] : F/U after podiatric surgical procedure, DOS is set for 2/21/24. Pre op testing completed; Pt optimized for surgery as per Dr. Tio Fried (PCP). Pt provided with small amount of supplies until surgery, pt wife assists with wound care as needed.

## 2024-02-13 NOTE — PLAN
[FreeTextEntry1] : Patient examined and evaluated at this time. Discussed conservative versus surgical intervention with patient and patient's wife at this time.  Advised regarding the risk, benefits, potential complications.  Patient and patient's wife related that they would like to proceed with a surgical intervention at this time.  Will schedule for right fifth metatarsal base resection with peroneus brevis tendon transfer to the cuboid, as well as a right posterior tibial tendon lengthening with possible Achilles tendon lengthening.  Patient remains at high risk for infection, sepsis, limb loss, death.  Patient and patient's wife advised regarding the need for patient to immediately report to the nearest emergency room if any signs of infection are noted including redness, swelling, purulence, foul odor, increased pain, and other associated symptoms of infection.  Spent 20 minutes for patient care and medical decision making.

## 2024-02-14 ENCOUNTER — RX RENEWAL (OUTPATIENT)
Age: 64
End: 2024-02-14

## 2024-02-14 RX ORDER — COLCHICINE 0.6 MG/1
0.6 TABLET ORAL TWICE DAILY
Qty: 180 | Refills: 1 | Status: ACTIVE | COMMUNITY
Start: 2021-12-17 | End: 1900-01-01

## 2024-02-16 DIAGNOSIS — I10 ESSENTIAL (PRIMARY) HYPERTENSION: ICD-10-CM

## 2024-02-16 DIAGNOSIS — E78.1 PURE HYPERGLYCERIDEMIA: ICD-10-CM

## 2024-02-16 DIAGNOSIS — M1A.09X1 IDIOPATHIC CHRONIC GOUT, MULTIPLE SITES, WITH TOPHUS (TOPHI): ICD-10-CM

## 2024-02-16 DIAGNOSIS — Z98.1 ARTHRODESIS STATUS: ICD-10-CM

## 2024-02-16 DIAGNOSIS — Z79.899 OTHER LONG TERM (CURRENT) DRUG THERAPY: ICD-10-CM

## 2024-02-16 DIAGNOSIS — L97.412 NON-PRESSURE CHRONIC ULCER OF RIGHT HEEL AND MIDFOOT WITH FAT LAYER EXPOSED: ICD-10-CM

## 2024-02-16 DIAGNOSIS — E11.40 TYPE 2 DIABETES MELLITUS WITH DIABETIC NEUROPATHY, UNSPECIFIED: ICD-10-CM

## 2024-02-16 DIAGNOSIS — Z79.84 LONG TERM (CURRENT) USE OF ORAL HYPOGLYCEMIC DRUGS: ICD-10-CM

## 2024-02-16 DIAGNOSIS — N52.9 MALE ERECTILE DYSFUNCTION, UNSPECIFIED: ICD-10-CM

## 2024-02-16 DIAGNOSIS — L84 CORNS AND CALLOSITIES: ICD-10-CM

## 2024-02-16 DIAGNOSIS — R35.1 NOCTURIA: ICD-10-CM

## 2024-02-16 DIAGNOSIS — Z87.81 PERSONAL HISTORY OF (HEALED) TRAUMATIC FRACTURE: ICD-10-CM

## 2024-02-16 DIAGNOSIS — Z87.442 PERSONAL HISTORY OF URINARY CALCULI: ICD-10-CM

## 2024-02-16 DIAGNOSIS — Z79.82 LONG TERM (CURRENT) USE OF ASPIRIN: ICD-10-CM

## 2024-02-16 DIAGNOSIS — N40.1 BENIGN PROSTATIC HYPERPLASIA WITH LOWER URINARY TRACT SYMPTOMS: ICD-10-CM

## 2024-02-16 DIAGNOSIS — N32.81 OVERACTIVE BLADDER: ICD-10-CM

## 2024-02-16 DIAGNOSIS — Z98.890 OTHER SPECIFIED POSTPROCEDURAL STATES: ICD-10-CM

## 2024-02-16 DIAGNOSIS — E11.621 TYPE 2 DIABETES MELLITUS WITH FOOT ULCER: ICD-10-CM

## 2024-02-20 ENCOUNTER — TRANSCRIPTION ENCOUNTER (OUTPATIENT)
Age: 64
End: 2024-02-20

## 2024-02-20 RX ORDER — SODIUM CHLORIDE 9 MG/ML
1000 INJECTION, SOLUTION INTRAVENOUS
Refills: 0 | Status: DISCONTINUED | OUTPATIENT
Start: 2024-02-21 | End: 2024-02-21

## 2024-02-20 NOTE — ASU PATIENT PROFILE, ADULT - NSICDXPASTMEDICALHX_GEN_ALL_CORE_FT
PAST MEDICAL HISTORY:  Anxiety and depression     C2 cervical fracture with fusion    Fall at work 03/2008    Gout     HTN (hypertension)     Kidney stones     Panic attacks     Type 2 diabetes mellitus with foot ulcer

## 2024-02-21 ENCOUNTER — APPOINTMENT (OUTPATIENT)
Dept: WOUND CARE | Facility: HOSPITAL | Age: 64
End: 2024-02-21

## 2024-02-21 ENCOUNTER — OUTPATIENT (OUTPATIENT)
Dept: OUTPATIENT SERVICES | Facility: HOSPITAL | Age: 64
LOS: 1 days | End: 2024-02-21
Payer: MEDICAID

## 2024-02-21 VITALS
HEIGHT: 69 IN | WEIGHT: 205.91 LBS | RESPIRATION RATE: 15 BRPM | SYSTOLIC BLOOD PRESSURE: 129 MMHG | OXYGEN SATURATION: 95 % | HEART RATE: 65 BPM | TEMPERATURE: 98 F | DIASTOLIC BLOOD PRESSURE: 80 MMHG

## 2024-02-21 DIAGNOSIS — E11.621 TYPE 2 DIABETES MELLITUS WITH FOOT ULCER: ICD-10-CM

## 2024-02-21 DIAGNOSIS — Z01.818 ENCOUNTER FOR OTHER PREPROCEDURAL EXAMINATION: ICD-10-CM

## 2024-02-21 DIAGNOSIS — Z98.890 OTHER SPECIFIED POSTPROCEDURAL STATES: Chronic | ICD-10-CM

## 2024-02-21 PROCEDURE — 28122 PARTIAL REMOVAL OF FOOT BONE: CPT | Mod: T9

## 2024-02-21 PROCEDURE — 73630 X-RAY EXAM OF FOOT: CPT | Mod: 26,RT

## 2024-02-21 PROCEDURE — 88304 TISSUE EXAM BY PATHOLOGIST: CPT | Mod: 26

## 2024-02-21 PROCEDURE — 99222 1ST HOSP IP/OBS MODERATE 55: CPT

## 2024-02-21 PROCEDURE — 27690 REVISE LOWER LEG TENDON: CPT | Mod: RT

## 2024-02-21 PROCEDURE — 88311 DECALCIFY TISSUE: CPT | Mod: 26

## 2024-02-21 DEVICE — ANCHOR SUT TAK MINI-BIO COMP 2.4MM 5/BX: Type: IMPLANTABLE DEVICE | Site: RIGHT | Status: FUNCTIONAL

## 2024-02-21 DEVICE — K-WIRE S&N DOUBLE TROCAR 0.045" X 4": Type: IMPLANTABLE DEVICE | Site: RIGHT | Status: FUNCTIONAL

## 2024-02-21 DEVICE — SURGICEL NU-KNIT 6 X 9": Type: IMPLANTABLE DEVICE | Site: RIGHT | Status: FUNCTIONAL

## 2024-02-21 RX ORDER — LOSARTAN POTASSIUM 100 MG/1
100 TABLET, FILM COATED ORAL DAILY
Refills: 0 | Status: DISCONTINUED | OUTPATIENT
Start: 2024-02-21 | End: 2024-03-07

## 2024-02-21 RX ORDER — ASPIRIN/CALCIUM CARB/MAGNESIUM 324 MG
81 TABLET ORAL DAILY
Refills: 0 | Status: DISCONTINUED | OUTPATIENT
Start: 2024-02-21 | End: 2024-03-07

## 2024-02-21 RX ORDER — BUPIVACAINE 13.3 MG/ML
20 INJECTION, SUSPENSION, LIPOSOMAL INFILTRATION ONCE
Refills: 0 | Status: DISCONTINUED | OUTPATIENT
Start: 2024-02-21 | End: 2024-03-07

## 2024-02-21 RX ORDER — ACETAMINOPHEN 500 MG
1000 TABLET ORAL ONCE
Refills: 0 | Status: DISCONTINUED | OUTPATIENT
Start: 2024-02-21 | End: 2024-02-21

## 2024-02-21 RX ORDER — CEFAZOLIN SODIUM 1 G
2000 VIAL (EA) INJECTION ONCE
Refills: 0 | Status: DISCONTINUED | OUTPATIENT
Start: 2024-02-21 | End: 2024-02-21

## 2024-02-21 RX ORDER — ATORVASTATIN CALCIUM 80 MG/1
20 TABLET, FILM COATED ORAL AT BEDTIME
Refills: 0 | Status: DISCONTINUED | OUTPATIENT
Start: 2024-02-21 | End: 2024-03-07

## 2024-02-21 RX ORDER — LAMOTRIGINE 25 MG/1
300 TABLET, ORALLY DISINTEGRATING ORAL DAILY
Refills: 0 | Status: DISCONTINUED | OUTPATIENT
Start: 2024-02-21 | End: 2024-03-07

## 2024-02-21 RX ORDER — ONDANSETRON 8 MG/1
4 TABLET, FILM COATED ORAL EVERY 8 HOURS
Refills: 0 | Status: DISCONTINUED | OUTPATIENT
Start: 2024-02-21 | End: 2024-03-07

## 2024-02-21 RX ORDER — SODIUM CHLORIDE 9 MG/ML
1000 INJECTION, SOLUTION INTRAVENOUS
Refills: 0 | Status: DISCONTINUED | OUTPATIENT
Start: 2024-02-21 | End: 2024-02-21

## 2024-02-21 RX ORDER — DULOXETINE HYDROCHLORIDE 30 MG/1
60 CAPSULE, DELAYED RELEASE ORAL DAILY
Refills: 0 | Status: DISCONTINUED | OUTPATIENT
Start: 2024-02-21 | End: 2024-03-07

## 2024-02-21 RX ORDER — LANOLIN ALCOHOL/MO/W.PET/CERES
3 CREAM (GRAM) TOPICAL AT BEDTIME
Refills: 0 | Status: DISCONTINUED | OUTPATIENT
Start: 2024-02-21 | End: 2024-03-07

## 2024-02-21 RX ORDER — AMLODIPINE BESYLATE 2.5 MG/1
10 TABLET ORAL DAILY
Refills: 0 | Status: DISCONTINUED | OUTPATIENT
Start: 2024-02-21 | End: 2024-03-07

## 2024-02-21 RX ORDER — METFORMIN HYDROCHLORIDE 850 MG/1
500 TABLET ORAL DAILY
Refills: 0 | Status: DISCONTINUED | OUTPATIENT
Start: 2024-02-21 | End: 2024-03-07

## 2024-02-21 RX ORDER — HYDROMORPHONE HYDROCHLORIDE 2 MG/ML
0.5 INJECTION INTRAMUSCULAR; INTRAVENOUS; SUBCUTANEOUS ONCE
Refills: 0 | Status: DISCONTINUED | OUTPATIENT
Start: 2024-02-21 | End: 2024-02-21

## 2024-02-21 RX ADMIN — SODIUM CHLORIDE 75 MILLILITER(S): 9 INJECTION, SOLUTION INTRAVENOUS at 17:29

## 2024-02-21 RX ADMIN — ATORVASTATIN CALCIUM 20 MILLIGRAM(S): 80 TABLET, FILM COATED ORAL at 22:05

## 2024-02-21 NOTE — PATIENT PROFILE ADULT - FALL HARM RISK - HARM RISK INTERVENTIONS

## 2024-02-21 NOTE — H&P ADULT - ASSESSMENT
64 yo male with HTN, T2DM, HLD, Gout, Anxiety and Depression, Diabetic foot ulcer admitted to [facility] for observation    # Diabetic foot ulcer  # Hypertension  # Type 2 Diabetes Mellitus  # Hyperlipidemia  # Gout  # Anxiety   # Depression      s/p  right 5th metatarsal base resection with peroneus brevis tendon transfer to the cuboid and right posterior tibial tendon lengthening with possible achilles tendon lengthening  with Dr. Green.   Tolerated procedure well.  Vitals stable  Restart home meds gradually  Start amlodipine, losartan. monitor vitals  Start metformin. Hold Mounjaro  Start aspirin, statin  Start lamotrigine  Start tadalafil  Start colchicine  Diabetic diet  monitor vitals, labs  DVT prophy as per podiatry  Further disposition and plan will depend on clinical course during hospital stay  Follow podiatry recommendations

## 2024-02-21 NOTE — H&P ADULT - NSHPLABSRESULTS_GEN_ALL_CORE
Lactate Trend            CAPILLARY BLOOD GLUCOSE      POCT Blood Glucose.: 177 mg/dL (21 Feb 2024 16:59)

## 2024-02-21 NOTE — H&P ADULT - NSHPPHYSICALEXAM_GEN_ALL_CORE
T(C): 36.4 (02-21-24 @ 18:26), Max: 36.9 (02-21-24 @ 16:56)  HR: 77 (02-21-24 @ 18:56) (65 - 86)  BP: 106/55 (02-21-24 @ 18:26) (94/49 - 129/80)  RR: 11 (02-21-24 @ 18:56) (9 - 15)  SpO2: 98% (02-21-24 @ 18:56) (94% - 99%)    CONSTITUTIONAL: Well groomed, no apparent distress  EYES: PERRLA and symmetric, EOMI, No conjunctival or scleral injection, non-icteric  ENMT: Oral mucosa with moist membranes. Normal dentition; no pharyngeal injection or exudates             NECK: Supple, symmetric and without tracheal deviation   RESP: No respiratory distress, no use of accessory muscles; CTA b/l, no WRR  CV: RRR, +S1S2, no MRG; no JVD; no peripheral edema  GI: Soft, NT, ND, no rebound, no guarding; no palpable masses; no hepatosplenomegaly; no hernia palpated  LYMPH: No cervical LAD or tenderness; no axillary LAD or tenderness; no inguinal LAD or tenderness  MSK: Normal gait; No digital clubbing or cyanosis; examination of the (head/neck/spine/ribs/pelvis, RUE, LUE, RLE, LLE) without misalignment,            Normal ROM without pain, no spinal tenderness, normal muscle strength/tone  SKIN: right foot dressing c/d/i  NEURO: CN II-XII intact; normal reflexes in upper and lower extremities, sensation intact in upper and lower extremities b/l to light touch   PSYCH: Appropriate insight/judgment; A+O x 3, mood and affect appropriate, recent/remote memory intact

## 2024-02-21 NOTE — BRIEF OPERATIVE NOTE - NSICDXBRIEFPROCEDURE_GEN_ALL_CORE_FT
PROCEDURES:  Resection, metatarsal bone 21-Feb-2024 17:00:27  Earl Vanegas  Transfer, tendon, deep, foot or ankle 21-Feb-2024 17:01:14  Earl Vanegas  Posterior tibial tendon lengthening 21-Feb-2024 17:01:25  Earl Vanegas

## 2024-02-22 ENCOUNTER — TRANSCRIPTION ENCOUNTER (OUTPATIENT)
Age: 64
End: 2024-02-22

## 2024-02-22 VITALS
DIASTOLIC BLOOD PRESSURE: 73 MMHG | TEMPERATURE: 98 F | HEART RATE: 92 BPM | RESPIRATION RATE: 17 BRPM | OXYGEN SATURATION: 95 % | SYSTOLIC BLOOD PRESSURE: 123 MMHG

## 2024-02-22 DIAGNOSIS — S91.301A UNSPECIFIED OPEN WOUND, RIGHT FOOT, INITIAL ENCOUNTER: ICD-10-CM

## 2024-02-22 LAB
ALBUMIN SERPL ELPH-MCNC: 3.3 G/DL — SIGNIFICANT CHANGE UP (ref 3.3–5)
ALP SERPL-CCNC: 87 U/L — SIGNIFICANT CHANGE UP (ref 40–120)
ALT FLD-CCNC: 29 U/L — SIGNIFICANT CHANGE UP (ref 12–78)
ANION GAP SERPL CALC-SCNC: 7 MMOL/L — SIGNIFICANT CHANGE UP (ref 5–17)
AST SERPL-CCNC: 19 U/L — SIGNIFICANT CHANGE UP (ref 15–37)
BASOPHILS # BLD AUTO: 0.02 K/UL — SIGNIFICANT CHANGE UP (ref 0–0.2)
BASOPHILS NFR BLD AUTO: 0.1 % — SIGNIFICANT CHANGE UP (ref 0–2)
BILIRUB SERPL-MCNC: 0.5 MG/DL — SIGNIFICANT CHANGE UP (ref 0.2–1.2)
BUN SERPL-MCNC: 21 MG/DL — SIGNIFICANT CHANGE UP (ref 7–23)
CALCIUM SERPL-MCNC: 9.2 MG/DL — SIGNIFICANT CHANGE UP (ref 8.5–10.1)
CHLORIDE SERPL-SCNC: 108 MMOL/L — SIGNIFICANT CHANGE UP (ref 96–108)
CO2 SERPL-SCNC: 28 MMOL/L — SIGNIFICANT CHANGE UP (ref 22–31)
CREAT SERPL-MCNC: 1.1 MG/DL — SIGNIFICANT CHANGE UP (ref 0.5–1.3)
EGFR: 76 ML/MIN/1.73M2 — SIGNIFICANT CHANGE UP
EOSINOPHIL # BLD AUTO: 0 K/UL — SIGNIFICANT CHANGE UP (ref 0–0.5)
EOSINOPHIL NFR BLD AUTO: 0 % — SIGNIFICANT CHANGE UP (ref 0–6)
GLUCOSE SERPL-MCNC: 166 MG/DL — HIGH (ref 70–99)
GRAM STN FLD: SIGNIFICANT CHANGE UP
HCT VFR BLD CALC: 35.9 % — LOW (ref 39–50)
HCT VFR BLD CALC: 37.7 % — LOW (ref 39–50)
HGB BLD-MCNC: 12.9 G/DL — LOW (ref 13–17)
HGB BLD-MCNC: 12.9 G/DL — LOW (ref 13–17)
IMM GRANULOCYTES NFR BLD AUTO: 0.4 % — SIGNIFICANT CHANGE UP (ref 0–0.9)
LYMPHOCYTES # BLD AUTO: 1.16 K/UL — SIGNIFICANT CHANGE UP (ref 1–3.3)
LYMPHOCYTES # BLD AUTO: 8.3 % — LOW (ref 13–44)
MCHC RBC-ENTMCNC: 30.1 PG — SIGNIFICANT CHANGE UP (ref 27–34)
MCHC RBC-ENTMCNC: 34.2 GM/DL — SIGNIFICANT CHANGE UP (ref 32–36)
MCV RBC AUTO: 87.9 FL — SIGNIFICANT CHANGE UP (ref 80–100)
MONOCYTES # BLD AUTO: 0.78 K/UL — SIGNIFICANT CHANGE UP (ref 0–0.9)
MONOCYTES NFR BLD AUTO: 5.5 % — SIGNIFICANT CHANGE UP (ref 2–14)
NEUTROPHILS # BLD AUTO: 12.05 K/UL — HIGH (ref 1.8–7.4)
NEUTROPHILS NFR BLD AUTO: 85.7 % — HIGH (ref 43–77)
NRBC # BLD: 0 /100 WBCS — SIGNIFICANT CHANGE UP (ref 0–0)
PLATELET # BLD AUTO: 217 K/UL — SIGNIFICANT CHANGE UP (ref 150–400)
POTASSIUM SERPL-MCNC: 4 MMOL/L — SIGNIFICANT CHANGE UP (ref 3.5–5.3)
POTASSIUM SERPL-SCNC: 4 MMOL/L — SIGNIFICANT CHANGE UP (ref 3.5–5.3)
PROT SERPL-MCNC: 6.4 G/DL — SIGNIFICANT CHANGE UP (ref 6–8.3)
RBC # BLD: 4.29 M/UL — SIGNIFICANT CHANGE UP (ref 4.2–5.8)
RBC # FLD: 12.9 % — SIGNIFICANT CHANGE UP (ref 10.3–14.5)
SODIUM SERPL-SCNC: 143 MMOL/L — SIGNIFICANT CHANGE UP (ref 135–145)
SPECIMEN SOURCE: SIGNIFICANT CHANGE UP
WBC # BLD: 14.06 K/UL — HIGH (ref 3.8–10.5)
WBC # FLD AUTO: 14.06 K/UL — HIGH (ref 3.8–10.5)

## 2024-02-22 PROCEDURE — 27691 REVISE LOWER LEG TENDON: CPT | Mod: RT

## 2024-02-22 PROCEDURE — 88311 DECALCIFY TISSUE: CPT

## 2024-02-22 PROCEDURE — 88304 TISSUE EXAM BY PATHOLOGIST: CPT

## 2024-02-22 PROCEDURE — 73630 X-RAY EXAM OF FOOT: CPT

## 2024-02-22 PROCEDURE — 82962 GLUCOSE BLOOD TEST: CPT

## 2024-02-22 PROCEDURE — 80053 COMPREHEN METABOLIC PANEL: CPT

## 2024-02-22 PROCEDURE — 97162 PT EVAL MOD COMPLEX 30 MIN: CPT

## 2024-02-22 PROCEDURE — 99239 HOSP IP/OBS DSCHRG MGMT >30: CPT

## 2024-02-22 PROCEDURE — 85025 COMPLETE CBC W/AUTO DIFF WBC: CPT

## 2024-02-22 PROCEDURE — 85014 HEMATOCRIT: CPT

## 2024-02-22 PROCEDURE — 28140 REMOVAL OF METATARSAL: CPT | Mod: RT

## 2024-02-22 PROCEDURE — C1713: CPT

## 2024-02-22 PROCEDURE — 36415 COLL VENOUS BLD VENIPUNCTURE: CPT

## 2024-02-22 PROCEDURE — 27685 REVISION OF LOWER LEG TENDON: CPT | Mod: XS,RT

## 2024-02-22 PROCEDURE — 87070 CULTURE OTHR SPECIMN AEROBIC: CPT

## 2024-02-22 PROCEDURE — 87075 CULTR BACTERIA EXCEPT BLOOD: CPT

## 2024-02-22 PROCEDURE — 85018 HEMOGLOBIN: CPT

## 2024-02-22 RX ORDER — OXYCODONE HYDROCHLORIDE 5 MG/1
1 TABLET ORAL
Qty: 20 | Refills: 0
Start: 2024-02-22

## 2024-02-22 RX ADMIN — Medication 81 MILLIGRAM(S): at 12:37

## 2024-02-22 RX ADMIN — LAMOTRIGINE 300 MILLIGRAM(S): 25 TABLET, ORALLY DISINTEGRATING ORAL at 12:37

## 2024-02-22 RX ADMIN — AMLODIPINE BESYLATE 10 MILLIGRAM(S): 2.5 TABLET ORAL at 06:25

## 2024-02-22 RX ADMIN — LOSARTAN POTASSIUM 100 MILLIGRAM(S): 100 TABLET, FILM COATED ORAL at 06:25

## 2024-02-22 RX ADMIN — DULOXETINE HYDROCHLORIDE 60 MILLIGRAM(S): 30 CAPSULE, DELAYED RELEASE ORAL at 12:37

## 2024-02-22 NOTE — DISCHARGE NOTE PROVIDER - NSDCMRMEDTOKEN_GEN_ALL_CORE_FT
amLODIPine 10 mg oral tablet: 1 tab(s) orally once a day (in the morning)  aspirin 81 mg oral tablet: orally once a day (in the morning)  colchicine 0.6 mg oral tablet: 2 tab(s) orally once a day (in the morning)  dextroamphetamine 30 mg oral tablet: 1 tab(s) orally 2 times a day  DULoxetine 60 mg oral delayed release capsule: 2 cap(s) orally once a day (in the morning)  lamoTRIgine 150 mg oral tablet: 2 tab(s) orally once a day (in the morning)  losartan 100 mg oral tablet: 1 tab(s) orally once a day (in the morning)  metFORMIN 500 mg oral tablet: 1 tab(s) orally once a day (in the morning)  Mounjaro 7.5 mg/0.5 mL subcutaneous solution: 7.5 milligram(s) subcutaneously once a week MONDAY  oxyCODONE 5 mg oral tablet: 1 tab(s) orally every 6 hours as needed for  pain control MDD: 4  rosuvastatin 5 mg oral tablet: 1 tab(s) orally once a day (in the morning)  tadalafil 5 mg oral tablet: 1 tab(s) orally once a day (in the morning)  Tylenol 325 mg oral capsule: 2 cap(s) orally every 6 hours as needed for mild pain

## 2024-02-22 NOTE — PHYSICAL THERAPY INITIAL EVALUATION ADULT - NSPTDMEREC_GEN_A_CORE
The patient has a mobility limitation that significantly impairs their ability to participate in one or more mobility-related activities of daily living in the home. The patient is able to safely use a walker and the functional mobility deficit can be sufficiently resolved by use of a walker./rolling walker

## 2024-02-22 NOTE — PROGRESS NOTE ADULT - ASSESSMENT
s/p right foot 5th metatarsal base resection, peroneus brevis tendon transfer, posterior tibialis tendon lengthening

## 2024-02-22 NOTE — PROGRESS NOTE ADULT - PROBLEM SELECTOR PLAN 1
Patient seen and evaluated   Pt is s/p right foot 5th metatarsal base resection, peroneus brevis tendon transfer, posterior tibialis tendon lengthening  Dressings are dry, clean, and intact  NWB right lower extremity  PT stable for dsc from podiatry  Rec use of walker     Wound Care Instructions:  -Keep dressing clean, dry, and intact to the -right foot  -Patient to be nwb right foot in surgical shoe   -Monitor for any signs of infection including redness, swelling in the leg above the bandage, nausea/vomiting/fever/chills/chest pain/shortness of breath, if any are present patient must report to the emergency department immediately  -Patient is to follow up with Dr. Beltran/Dr. Green/ Dr. Moon  within 5 days after discharge at [facility]. Please call to make an appointment [phone]

## 2024-02-22 NOTE — DISCHARGE NOTE PROVIDER - HOSPITAL COURSE
62 yo male with HTN, T2DM, HLD, Gout, Anxiety and Depression, Diabetic foot ulcer admitted to [facility] for observation s/p  right 5th metatarsal base resection with peroneus brevis tendon transfer to the cuboid and right posterior tibial tendon lengthening with possible achilles tendon lengthening  with Dr. Green. Tolerated procedure well. Post op no acute complaints. Patient has H/O right foot pain for the past 8 months secondary to a plantar wound. Reports H/O of previous right foot surgery s/p excision of gouty tophi in 2022. Denies diabetic neuropathy and PVD.  Denies any chest pain, sob, headache, n/v/d or any other acute complaints.     Pt. was admitted for post op observation.  Pt. tolerated procedure well.  Denies having any significant pain in the foot.  Pt. has remained afebrile and hemodynamically stable.       62 yo male with HTN, T2DM, HLD, Gout, Anxiety and Depression, Diabetic foot ulcer admitted to [facility] for observation s/p  right 5th metatarsal base resection with peroneus brevis tendon transfer to the cuboid and right posterior tibial tendon lengthening with possible achilles tendon lengthening  with Dr. Green. Tolerated procedure well. Post op no acute complaints. Patient has H/O right foot pain for the past 8 months secondary to a plantar wound. Reports H/O of previous right foot surgery s/p excision of gouty tophi in 2022. Denies diabetic neuropathy and PVD.  Denies any chest pain, sob, headache, n/v/d or any other acute complaints.     Pt. was admitted for post op observation.  Pt. tolerated procedure well.  Denies having any significant pain in the foot.  Pt. has remained afebrile and hemodynamically stable.      Leukocytosis likely reactive to recent podiatric procedure.

## 2024-02-22 NOTE — DISCHARGE NOTE PROVIDER - CARE PROVIDER_API CALL
Sherwin Green-Khang  Foot Surgery  [address] [place] [id number]  Phone: [phone]  Fax: [phone]  Follow Up Time: 1 week

## 2024-02-22 NOTE — PROGRESS NOTE ADULT - SUBJECTIVE AND OBJECTIVE BOX
63y year old Male seen at [facility] 2NOR 238 W1 s/p  right foot 5th metatarsal base resection, peroneus brevis tendon transfer, posterior tibialis tendon lengthening. Patient relates no overnight events and states that they are doing well at this time.  Denies any fever, chills, nausea, vomiting, chest pain, shortness of breath, or calf pain at this time.    Allergies    No Known Allergies    Intolerances        MEDICATIONS  (STANDING):  amLODIPine   Tablet 10 milliGRAM(s) Oral daily  aspirin enteric coated 81 milliGRAM(s) Oral daily  atorvastatin 20 milliGRAM(s) Oral at bedtime  BUpivacaine liposome 1.3% Injectable (no eMAR) 20 milliLiter(s) Local Injection once  dextrose 50% Injectable 12.5 Gram(s) IV Push Once  dextrose 50% Injectable 25 Gram(s) IV Push Once  dextrose 50% Injectable 25 Gram(s) IV Push Once  dextrose Oral Gel 15 Gram(s) Oral Once  DULoxetine 60 milliGRAM(s) Oral daily  glucagon  Injectable 1 milliGRAM(s) IntraMuscular Once  lamoTRIgine 300 milliGRAM(s) Oral daily  losartan 100 milliGRAM(s) Oral daily  metFORMIN 500 milliGRAM(s) Oral daily    MEDICATIONS  (PRN):  aluminum hydroxide/magnesium hydroxide/simethicone Suspension 30 milliLiter(s) Oral every 4 hours PRN Dyspepsia  melatonin 3 milliGRAM(s) Oral at bedtime PRN Insomnia  ondansetron Injectable 4 milliGRAM(s) IV Push every 8 hours PRN Nausea and/or Vomiting      Vital Signs Last 24 Hrs  T(C): 36.9 (22 Feb 2024 13:46), Max: 36.9 (21 Feb 2024 16:56)  T(F): 98.5 (22 Feb 2024 13:46), Max: 98.5 (22 Feb 2024 13:46)  HR: 92 (22 Feb 2024 13:46) (68 - 92)  BP: 123/73 (22 Feb 2024 13:46) (94/49 - 125/74)  BP(mean): --  RR: 17 (22 Feb 2024 13:46) (9 - 17)  SpO2: 95% (22 Feb 2024 13:46) (94% - 99%)    Parameters below as of 22 Feb 2024 13:46  Patient On (Oxygen Delivery Method): room air        PHYSICAL EXAM:  Vascular: DP & PT pulses palpable b/l lower extremity, capillary refill less than 3 seconds b/l  Neurological: Epicritic sensation diminished b/l lower extremity  Musculoskeletal: 5/5 muscle strength noted to left lower extremity.  Not examined right lower extremity due to nature of procedure  Dermatological: Incision site of the right foot noted with intact sutures, no dehiscence, mild lin-incision erythema, no proximal streaking, no fluctuance, no malodor, no signs of infection at this time.                          12.9   x     )-----------( x        ( 22 Feb 2024 13:05 )             35.9       02-22    143  |  108  |  21  ----------------------------<  166<H>  4.0   |  28  |  1.10    Ca    9.2      22 Feb 2024 09:18    TPro  6.4  /  Alb  3.3  /  TBili  0.5  /  DBili  x   /  AST  19  /  ALT  29  /  AlkPhos  87  02-22            Culture - Tissue with Gram Stain (collected 21 Feb 2024 15:00)  Source: .Tissue Other, bone right foot  Gram Stain (22 Feb 2024 06:31):    No polymorphonuclear cells seen per low power field    No organisms seen per oil power field        Imaging: ----------

## 2024-02-22 NOTE — DISCHARGE NOTE NURSING/CASE MANAGEMENT/SOCIAL WORK - PATIENT PORTAL LINK FT
You can access the FollowMyHealth Patient Portal offered by [facility] by registering at the following website: [email or web address]. By joining [employer]s FollowMyHealth portal, you will also be able to view your health information using other applications (apps) compatible with our system.

## 2024-02-22 NOTE — CASE MANAGEMENT PROGRESS NOTE - NSCMPROGRESSNOTE_GEN_ALL_CORE
Patient 23 hr stay. Pt eval completed recommended a RW for patient upon discharge. RX obtained [facility] to be delivered to patients bedside.

## 2024-02-22 NOTE — DISCHARGE NOTE PROVIDER - ATTENDING DISCHARGE PHYSICAL EXAMINATION:
Vitals: T: 97.6  P: 74  BP: 125/74  RR: 17  SpO2: 95% RA  CONSTITUTIONAL: NAD  EYES: EOMI, PERRLA and symmetric, No conjunctival or scleral injection, non-icteric  ENMT: Oral mucosa with moist membranes.   NECK: Supple, symmetric and without tracheal deviation   RESP: No respiratory distress, no use of accessory muscles; CTA b/l, no WRR  CV: RRR, +S1S2, no MRG; no JVD; no peripheral edema  GI: Soft, NT, ND, no rebound, no guarding; no palpable masses; no hepatosplenomegaly; no hernia palpated  MSK: No digital clubbing or cyanosis; examination of the (head/neck/spine/ribs/pelvis, RUE, LUE, RLE, LLE) without misalignment,            Normal ROM without pain, no spinal tenderness, normal muscle strength/tone  SKIN: right foot dressing c/d/i  PSYCH: Appropriate insight/judgment; A+O x 3, mood and affect appropriate, recent/remote memory intact

## 2024-02-22 NOTE — PHYSICAL THERAPY INITIAL EVALUATION ADULT - ADDITIONAL COMMENTS
patient reports that he lives with his wife in a private house with 7 ABDOULAYE, 1 flight to bedroom, also has garage converted into a living space with 1 ABDOULAYE - plans to stay here until permitted to WB on (R) LE. Has cane at home.

## 2024-02-26 PROBLEM — M10.9 GOUT, UNSPECIFIED: Chronic | Status: ACTIVE | Noted: 2024-02-13

## 2024-02-26 LAB
CULTURE RESULTS: SIGNIFICANT CHANGE UP
SPECIMEN SOURCE: SIGNIFICANT CHANGE UP

## 2024-02-27 ENCOUNTER — APPOINTMENT (OUTPATIENT)
Dept: WOUND CARE | Facility: HOSPITAL | Age: 64
End: 2024-02-27
Payer: MEDICAID

## 2024-02-27 ENCOUNTER — OUTPATIENT (OUTPATIENT)
Dept: OUTPATIENT SERVICES | Facility: HOSPITAL | Age: 64
LOS: 1 days | Discharge: HOME | End: 2024-02-27
Payer: MEDICAID

## 2024-02-27 VITALS
OXYGEN SATURATION: 98 % | WEIGHT: 212 LBS | HEIGHT: 69 IN | DIASTOLIC BLOOD PRESSURE: 77 MMHG | RESPIRATION RATE: 18 BRPM | HEART RATE: 82 BPM | BODY MASS INDEX: 31.4 KG/M2 | TEMPERATURE: 97.9 F | SYSTOLIC BLOOD PRESSURE: 114 MMHG

## 2024-02-27 DIAGNOSIS — Z98.890 OTHER SPECIFIED POSTPROCEDURAL STATES: Chronic | ICD-10-CM

## 2024-02-27 DIAGNOSIS — E11.621 TYPE 2 DIABETES MELLITUS WITH FOOT ULCER: ICD-10-CM

## 2024-02-27 PROCEDURE — 99024 POSTOP FOLLOW-UP VISIT: CPT

## 2024-02-27 PROCEDURE — G0463: CPT

## 2024-02-28 NOTE — VITALS
[] : No [de-identified] : Patient reports pain 5/10 [FreeTextEntry1] : Ibuprofen  [FreeTextEntry3] : Right Foot  [FreeTextEntry2] : Non-specific  [FreeTextEntry4] : Bandage applied

## 2024-02-28 NOTE — ASSESSMENT
[] : Yes [Stable] : stable [Other: ____] : [unfilled] [Home] : Home [Not Applicable - Long Term Care/Home Health Agency] : Long Term Care/Home Health Agency: Not Applicable [FreeTextEntry2] : Infection prevention Localized wound care Promote optimal skin integrity  Maintain acceptable level of pain with use of pharmacological and nonpharmacological interventions Offloading / Pressure relief  Post surgical care Daily foot care / monitoring  [FreeTextEntry4] : Follow up for an assessment in one week. Patient to return 3/1/24 for a bandage change.  Wound care to be performed at the [facility]. Patient to continue being non-weight bearing on the right foot  Request submitted for consults with ID and with Abelardo from [facility] at next assessment.

## 2024-02-28 NOTE — PHYSICAL EXAM
[4 x 4] : 4 x 4  [Abdominal Pad] : Abdominal Pad [1+] : left 1+ [Ankle Swelling Bilaterally] : bilaterally  [Ankle Swelling (On Exam)] : present [Ankle Swelling On The Left] : moderate [Varicose Veins Of Lower Extremities] : bilaterally [] : bilaterally [Ankle Swelling On The Right] : mild [Purpura] : no purpura  [Petechiae] : no petechiae [Skin Ulcer] : ulcer [Skin Induration] : no induration [Alert] : alert [Oriented to Person] : oriented to person [Oriented to Place] : oriented to place [Calm] : calm [de-identified] : A&Ox3, NAD [de-identified] : HTN [de-identified] : Right foot status post fifth metatarsal base resection with.  Is present in transfer to the cuboid with posterior tibial tendon lengthening. [de-identified] : Right fifth metatarsal plantar wound epithelialized, right foot incision sites with intact sutures, no dehiscence, no purulence, no fluctuance, no proximal streaking, no fluctuance [de-identified] : Diabetic neuropathy  [FreeTextEntry1] : Medial Foot - s/p sx 2/21/24 - suture CDI [FreeTextEntry2] : 7.0 [FreeTextEntry3] : Approximated  [FreeTextEntry4] : 0.0 [de-identified] : Old sanguineous  [de-identified] : Intact [de-identified] : For support not compression.  No neuro/vascular deficit noted [de-identified] : Adaptic touch and Silver Alginate [de-identified] : Cleansed with 0.9% Normal Saline  Kerlix  [FreeTextEntry7] : Lateral Foot - s/p sx 2/21/24 - sutures CDI [FreeTextEntry8] : 12.1 [FreeTextEntry9] : Approximated  [de-identified] : 0.0 [de-identified] : Old and some josi sanguineous  [de-identified] : Intact  [de-identified] : For support not compression.  No neuro/vascular deficit noted [de-identified] : Adaptic touch and Silver Alginate  [de-identified] : Cleansed with 0.9% Normal Saline  Kerlix  [TWNoteComboBox1] : Right [TWNoteComboBox4] : Moderate [TWNoteComboBox5] : No [TWNoteComboBox6] : Surgical [de-identified] : No [de-identified] : None [de-identified] : None [de-identified] : None [de-identified] : No [de-identified] : Ace wraps [de-identified] : 2x Weekly [TWNoteComboBox9] : Right [de-identified] : Moderate [de-identified] : No [de-identified] : Diabetic [de-identified] : No [de-identified] : None [de-identified] : None [de-identified] : None [de-identified] : No [de-identified] : Ace wraps [de-identified] : 2x Weekly

## 2024-02-28 NOTE — HISTORY OF PRESENT ILLNESS
[FreeTextEntry1] : 62-year-old male seen with right fifth metatarsal base plantar foot wound.  Patient seen with patient's wife.  Patient's wife relates that the patient may have stepped on a piece of copper approximately 1 month ago and sustained a small cut in the area.  Patient relates that he developed a callus over the cut and has also used a belt mariama to grind down the callus.  Patient relates that he experienced gradual increase in pain to the right foot in the area and presents today for reevaluation.  Denies any fever, chills, nausea, vomiting, chest pain, shortness of breath, calf pain, or any other complaints at this time.  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  Patient underwent hyperbaric oxygen treatment and had healed all the areas and was discharged from the hyperbaric and wound care center in the past.  2/27/2023: Patient seen status post right fifth metatarsal base resection with peroneus brevis tendon transfer to the cuboid and posterior tibial tendon lengthening (DOS: 2/21/2024).  Patient relates that he has been trying to keep his right foot elevated is much as possible and notes that he has intermittently put pressure on his right heel since his surgery.  Patient relates that he has been ambulating utilizing a knee scooter.  Patient relates that his pain is controlled.  Denies any other complaints at this time.

## 2024-02-28 NOTE — PLAN
[FreeTextEntry1] : Patient examined and evaluated at this time. Discussed the postoperative healing process with the patient and patient's wife. Discussed the need for patient to remain nonweightbearing to the right foot at this time. Infectious disease consult at this time. All questions answered to satisfaction and patient and patient's wife verbalized understanding. Patient follow-up in 1 week.

## 2024-02-28 NOTE — REVIEW OF SYSTEMS
[Fever] : no fever [Chills] : no chills [Eye Pain] : no eye pain [Loss Of Hearing] : no hearing loss [Shortness Of Breath] : no shortness of breath [Wheezing] : no wheezing [Abdominal Pain] : no abdominal pain [Arthralgias] : arthralgias [Joint Swelling] : joint swelling [Joint Stiffness] : joint stiffness [Skin Wound] : skin wound [Anxiety] : no anxiety [Easy Bleeding] : no tendency for easy bleeding [FreeTextEntry5] : HTN [FreeTextEntry9] : Gouty arthropathy , left and right foot hammer toes and painful deformity of the digits [de-identified] : Right fifth metatarsal base plantar wound down to skin, subcutaneous tissue, fat [de-identified] : Diabetic neuropathy  [de-identified] : NIDDM , severe gouty arthropathy

## 2024-03-01 ENCOUNTER — OUTPATIENT (OUTPATIENT)
Dept: OUTPATIENT SERVICES | Facility: HOSPITAL | Age: 64
LOS: 1 days | Discharge: HOME | End: 2024-03-01
Payer: MEDICAID

## 2024-03-01 ENCOUNTER — APPOINTMENT (OUTPATIENT)
Dept: WOUND CARE | Facility: HOSPITAL | Age: 64
End: 2024-03-01
Payer: MEDICAID

## 2024-03-01 ENCOUNTER — NON-APPOINTMENT (OUTPATIENT)
Age: 64
End: 2024-03-01

## 2024-03-01 VITALS
WEIGHT: 212 LBS | HEART RATE: 86 BPM | TEMPERATURE: 97.9 F | SYSTOLIC BLOOD PRESSURE: 122 MMHG | OXYGEN SATURATION: 97 % | BODY MASS INDEX: 31.4 KG/M2 | DIASTOLIC BLOOD PRESSURE: 81 MMHG | HEIGHT: 69 IN | RESPIRATION RATE: 20 BRPM

## 2024-03-01 DIAGNOSIS — E11.621 TYPE 2 DIABETES MELLITUS WITH FOOT ULCER: ICD-10-CM

## 2024-03-01 DIAGNOSIS — Z98.890 OTHER SPECIFIED POSTPROCEDURAL STATES: Chronic | ICD-10-CM

## 2024-03-01 PROCEDURE — G0463: CPT

## 2024-03-01 PROCEDURE — 99024 POSTOP FOLLOW-UP VISIT: CPT

## 2024-03-01 RX ORDER — AMOXICILLIN AND CLAVULANATE POTASSIUM 875; 125 MG/1; MG/1
875-125 TABLET, COATED ORAL
Qty: 14 | Refills: 0 | Status: COMPLETED | COMMUNITY
Start: 2024-03-01 | End: 2024-03-08

## 2024-03-02 NOTE — VITALS
[Pain related to present condition?] : The patient's  pain is related to present condition. [Burning] : burning [Tender] : tender [Occasional] : occasional [] : No [de-identified] : Patient reports pain 4/10 [FreeTextEntry3] : Right Foot  [FreeTextEntry1] : Ibuprofen  [FreeTextEntry2] : Right lateral foot is more edematous, bandage was causing pain [FreeTextEntry4] : Removal of bandage and re-application of bandage

## 2024-03-02 NOTE — PHYSICAL EXAM
[4 x 4] : 4 x 4  [Abdominal Pad] : Abdominal Pad [1+] : left 1+ [Ankle Swelling (On Exam)] : present [Ankle Swelling Bilaterally] : bilaterally  [Varicose Veins Of Lower Extremities] : bilaterally [Ankle Swelling On The Left] : moderate [Ankle Swelling On The Right] : mild [] : bilaterally [Skin Ulcer] : ulcer [Oriented to Person] : oriented to person [Alert] : alert [Oriented to Place] : oriented to place [Calm] : calm [Purpura] : no purpura  [Petechiae] : no petechiae [Skin Induration] : no induration [de-identified] : A&Ox3, NAD [de-identified] : Right fifth metatarsal plantar wound epithelialized, right foot incision sites with mild central dehiscence to the lateral surgical site,  intact sutures, no purulence, no fluctuance, no proximal streaking, no fluctuance, medial surgical site intact with no dehiscence  [de-identified] : Right foot status post fifth metatarsal base resection with.  Is present in transfer to the cuboid with posterior tibial tendon lengthening. [de-identified] : HTN [de-identified] : Diabetic neuropathy  [FreeTextEntry2] : 7.0 [FreeTextEntry1] : Medial Foot - s/p sx 2/21/24 - suture CDI [FreeTextEntry4] : 0.0 [FreeTextEntry3] : Approximated  [de-identified] : For support not compression.  No neuro/vascular deficit noted [de-identified] : Adaptic touch and Silver Alginate [de-identified] : Cleansed with 0.9% Normal Saline  Kerlix  [FreeTextEntry7] : Lateral Foot - s/p sx 2/21/24 - sutures CDI [FreeTextEntry8] : 12.1 [FreeTextEntry9] : 0.1cm [de-identified] : 0.1cm [de-identified] : serosanguinous drainage noted from medial aspect of wound [de-identified] : erythema and edematous [de-identified] : For support not compression.  No neuro/vascular deficit noted [de-identified] : Adaptic touch and Silver Alginate  [de-identified] : Cleansed with 0.9% Normal Saline  Kerlix   Patient reports that he has been walking on it/putting pressure to get his balance. DPM educated patient on importance of not putting pressure on the surgical incision.  DPM is going to start patient on PO antibiotics  [TWNoteComboBox1] : Right [TWNoteComboBox4] : None [TWNoteComboBox6] : Surgical [TWNoteComboBox5] : No [de-identified] : No [de-identified] : Normal [de-identified] : None [de-identified] : None [de-identified] : None [de-identified] : No [de-identified] : Ace wraps [de-identified] : 2x Weekly [TWNoteComboBox9] : Right [de-identified] : Primary Dressing [de-identified] : Moderate [de-identified] : No [de-identified] : Surgical [de-identified] : No [de-identified] : Macerated [de-identified] : None [de-identified] : None [de-identified] : No [de-identified] : None [de-identified] : Ace wraps [de-identified] : Primary Dressing [de-identified] : 2x Weekly

## 2024-03-02 NOTE — REVIEW OF SYSTEMS
[Joint Swelling] : joint swelling [Arthralgias] : arthralgias [Skin Wound] : skin wound [Joint Stiffness] : joint stiffness [Fever] : no fever [Chills] : no chills [Eye Pain] : no eye pain [Loss Of Hearing] : no hearing loss [Shortness Of Breath] : no shortness of breath [Wheezing] : no wheezing [Abdominal Pain] : no abdominal pain [Anxiety] : no anxiety [Easy Bleeding] : no tendency for easy bleeding [FreeTextEntry5] : HTN [FreeTextEntry9] : Gouty arthropathy , left and right foot hammer toes and painful deformity of the digits [de-identified] : s/p right foot resection of 5th metatarsal base with transfer of peroneus brevis to the cuboid  [de-identified] : Diabetic neuropathy  [de-identified] : NIDDM , severe gouty arthropathy

## 2024-03-02 NOTE — PLAN
[FreeTextEntry1] : Patient examined and evaluated at this time. Discussed the postoperative healing process with the patient and patient's wife. Discussed the need for patient to remain nonweightbearing to the right foot at this time. RTC in one week. c/w local wound care and offloading  All questions answered to satisfaction and patient and patient's wife verbalized understanding. Patient follow-up in 1 week. Spent 20 minutes for patient care and medical decision making.

## 2024-03-02 NOTE — ASSESSMENT
[Verbal] : Verbal [Demo] : Demo [Patient] : Patient [Spouse] : Spouse [Good - alert, interested, motivated] : Good - alert, interested, motivated [Verbalizes knowledge/Understanding] : Verbalizes knowledge/understanding [Foot Care] : foot care [Dressing changes] : dressing changes [Skin Care] : skin care [Pressure relief] : pressure relief [Signs and symptoms of infection] : sign and symptoms of infection [Nutrition] : nutrition [How and When to Call] : how and when to call [Pain Management] : pain management [Off-loading] : off-loading [Patient responsibility to plan of care] : patient responsibility to plan of care [Glycemic Control] : glycemic control [Home] : Home [Stable] : stable [Not Applicable - Long Term Care/Home Health Agency] : Long Term Care/Home Health Agency: Not Applicable [Other: ____] : [unfilled] [] : No [FreeTextEntry2] : Infection prevention- started on PO antibiotics  Localized wound care- dressing changes at wound care center Promote optimal skin integrity  Maintain acceptable level of pain with use of pharmacological and nonpharmacological interventions Offloading / Pressure relief - non-weight bearing Post surgical care Daily foot care / monitoring  [FreeTextEntry3] : Right lateral foot edematous, erythema, maceration in medial aspect of surgical incision [FreeTextEntry4] : F/U Wednesday 3/6/24 for assessment and dressing change. Wound care is performed at [facility]. Patient was here today for dressing change, lateral foot with edema, erythema, maceration and drainage called ANTON Moon for a wound care visit to evaluate, and she started patient on PO antibiotics.  Patient to continue being non-weight bearing on the right foot (patient reports that he put weight on his foot a few times, as evidence from the soiled previous dressing the patient had placed weight/walked on his right foot) Request submitted for on previous visit for ID consult for Wednesday visit Patient seen during todays visit by Abelardo from [facility], will fit him for a boot next visit.

## 2024-03-02 NOTE — HISTORY OF PRESENT ILLNESS
[FreeTextEntry1] :  Patient is 63 year old male presenting for follow up   seen status post right fifth metatarsal base resection with peroneus brevis tendon transfer to the cuboid and posterior tibial tendon lengthening (DOS: 2/21/2024). Patient relates that he has been trying to keep his right foot elevated is much as possible and notes that he has intermittently put pressure on his right heel since his surgery. Patient relates that he has been ambulating utilizing a knee crutch and a walker . Patient denies any pain to the right brett. Patient is accompanied by his wife who states that the patient has been limiting ambulation using the right heel if needed.

## 2024-03-03 DIAGNOSIS — E78.1 PURE HYPERGLYCERIDEMIA: ICD-10-CM

## 2024-03-03 DIAGNOSIS — Z98.1 ARTHRODESIS STATUS: ICD-10-CM

## 2024-03-03 DIAGNOSIS — N32.81 OVERACTIVE BLADDER: ICD-10-CM

## 2024-03-03 DIAGNOSIS — Z87.81 PERSONAL HISTORY OF (HEALED) TRAUMATIC FRACTURE: ICD-10-CM

## 2024-03-03 DIAGNOSIS — N40.1 BENIGN PROSTATIC HYPERPLASIA WITH LOWER URINARY TRACT SYMPTOMS: ICD-10-CM

## 2024-03-03 DIAGNOSIS — T81.89XD OTHER COMPLICATIONS OF PROCEDURES, NOT ELSEWHERE CLASSIFIED, SUBSEQUENT ENCOUNTER: ICD-10-CM

## 2024-03-03 DIAGNOSIS — N52.9 MALE ERECTILE DYSFUNCTION, UNSPECIFIED: ICD-10-CM
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
## 2024-03-03 DIAGNOSIS — E11.40 TYPE 2 DIABETES MELLITUS WITH DIABETIC NEUROPATHY, UNSPECIFIED: ICD-10-CM

## 2024-03-03 DIAGNOSIS — I10 ESSENTIAL (PRIMARY) HYPERTENSION: ICD-10-CM

## 2024-03-03 DIAGNOSIS — R35.1 NOCTURIA: ICD-10-CM

## 2024-03-03 DIAGNOSIS — Z87.442 PERSONAL HISTORY OF URINARY CALCULI: ICD-10-CM

## 2024-03-03 DIAGNOSIS — Z79.84 LONG TERM (CURRENT) USE OF ORAL HYPOGLYCEMIC DRUGS: ICD-10-CM

## 2024-03-03 DIAGNOSIS — M1A.09X1 IDIOPATHIC CHRONIC GOUT, MULTIPLE SITES, WITH TOPHUS (TOPHI): ICD-10-CM

## 2024-03-03 DIAGNOSIS — Z79.82 LONG TERM (CURRENT) USE OF ASPIRIN: ICD-10-CM

## 2024-03-03 DIAGNOSIS — Z98.890 OTHER SPECIFIED POSTPROCEDURAL STATES: ICD-10-CM
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
## 2024-03-06 ENCOUNTER — OUTPATIENT (OUTPATIENT)
Dept: OUTPATIENT SERVICES | Facility: HOSPITAL | Age: 64
LOS: 1 days | Discharge: HOME | End: 2024-03-06
Payer: MEDICAID

## 2024-03-06 ENCOUNTER — APPOINTMENT (OUTPATIENT)
Dept: WOUND CARE | Facility: HOSPITAL | Age: 64
End: 2024-03-06
Payer: MEDICAID

## 2024-03-06 VITALS
OXYGEN SATURATION: 94 % | HEART RATE: 92 BPM | RESPIRATION RATE: 15 BRPM | SYSTOLIC BLOOD PRESSURE: 135 MMHG | HEIGHT: 69 IN | DIASTOLIC BLOOD PRESSURE: 77 MMHG | BODY MASS INDEX: 31.4 KG/M2 | WEIGHT: 212 LBS | TEMPERATURE: 98.1 F

## 2024-03-06 DIAGNOSIS — Z98.890 OTHER SPECIFIED POSTPROCEDURAL STATES: ICD-10-CM

## 2024-03-06 DIAGNOSIS — Z98.890 OTHER SPECIFIED POSTPROCEDURAL STATES: Chronic | ICD-10-CM
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
## 2024-03-06 DIAGNOSIS — Z79.82 LONG TERM (CURRENT) USE OF ASPIRIN: ICD-10-CM

## 2024-03-06 DIAGNOSIS — Z98.1 ARTHRODESIS STATUS: ICD-10-CM

## 2024-03-06 DIAGNOSIS — N32.81 OVERACTIVE BLADDER: ICD-10-CM

## 2024-03-06 DIAGNOSIS — T81.89XD OTHER COMPLICATIONS OF PROCEDURES, NOT ELSEWHERE CLASSIFIED, SUBSEQUENT ENCOUNTER: ICD-10-CM

## 2024-03-06 DIAGNOSIS — E78.1 PURE HYPERGLYCERIDEMIA: ICD-10-CM

## 2024-03-06 DIAGNOSIS — E11.621 TYPE 2 DIABETES MELLITUS WITH FOOT ULCER: ICD-10-CM

## 2024-03-06 DIAGNOSIS — N40.1 BENIGN PROSTATIC HYPERPLASIA WITH LOWER URINARY TRACT SYMPTOMS: ICD-10-CM

## 2024-03-06 DIAGNOSIS — L84 CORNS AND CALLOSITIES: ICD-10-CM

## 2024-03-06 DIAGNOSIS — I10 ESSENTIAL (PRIMARY) HYPERTENSION: ICD-10-CM

## 2024-03-06 DIAGNOSIS — Z87.81 PERSONAL HISTORY OF (HEALED) TRAUMATIC FRACTURE: ICD-10-CM

## 2024-03-06 DIAGNOSIS — L97.412 NON-PRESSURE CHRONIC ULCER OF RIGHT HEEL AND MIDFOOT WITH FAT LAYER EXPOSED: ICD-10-CM

## 2024-03-06 DIAGNOSIS — Z79.84 LONG TERM (CURRENT) USE OF ORAL HYPOGLYCEMIC DRUGS: ICD-10-CM
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
## 2024-03-06 DIAGNOSIS — M1A.09X1 IDIOPATHIC CHRONIC GOUT, MULTIPLE SITES, WITH TOPHUS (TOPHI): ICD-10-CM

## 2024-03-06 DIAGNOSIS — N52.9 MALE ERECTILE DYSFUNCTION, UNSPECIFIED: ICD-10-CM

## 2024-03-06 DIAGNOSIS — E11.40 TYPE 2 DIABETES MELLITUS WITH DIABETIC NEUROPATHY, UNSPECIFIED: ICD-10-CM

## 2024-03-06 DIAGNOSIS — R35.1 NOCTURIA: ICD-10-CM

## 2024-03-06 DIAGNOSIS — Z79.899 OTHER LONG TERM (CURRENT) DRUG THERAPY: ICD-10-CM

## 2024-03-06 DIAGNOSIS — Z87.442 PERSONAL HISTORY OF URINARY CALCULI: ICD-10-CM

## 2024-03-06 PROCEDURE — 99024 POSTOP FOLLOW-UP VISIT: CPT

## 2024-03-06 PROCEDURE — G0463: CPT

## 2024-03-06 NOTE — VITALS
[Tender] : tender [Occasional] : occasional [] : No [de-identified] : 0/10 [FreeTextEntry5] : Amoxicillin-Pot Clavulanate 875-125 MG Oral Tablet; TAKE 1 TABLET EVERY 12 HOURS DAILY

## 2024-03-06 NOTE — ASSESSMENT
[Demo] : Demo [Verbal] : Verbal [Spouse] : Spouse [Patient] : Patient [Good - alert, interested, motivated] : Good - alert, interested, motivated [Verbalizes knowledge/Understanding] : Verbalizes knowledge/understanding [Dressing changes] : dressing changes [Foot Care] : foot care [Pressure relief] : pressure relief [Signs and symptoms of infection] : sign and symptoms of infection [Skin Care] : skin care [Nutrition] : nutrition [How and When to Call] : how and when to call [Pain Management] : pain management [Off-loading] : off-loading [Patient responsibility to plan of care] : patient responsibility to plan of care [Glycemic Control] : glycemic control [Stable] : stable [Home] : Home [Not Applicable - Long Term Care/Home Health Agency] : Long Term Care/Home Health Agency: Not Applicable [Other: ____] : [unfilled] [] : No [FreeTextEntry2] : Infection Prevention Foot and nail care Nutrition and wound healing Pt Demonstrates use of both nonpharmacological and pharmacological pain relief strategies. Oral abt therapy. [FreeTextEntry3] : New wound, Right 2nd toe / Patient self-avulsed toe nail.  [FreeTextEntry4] : New wound assessed today F/U 1 Week

## 2024-03-06 NOTE — REVIEW OF SYSTEMS
[Fever] : no fever [Chills] : no chills [Eye Pain] : no eye pain [Loss Of Hearing] : no hearing loss [Shortness Of Breath] : no shortness of breath [Wheezing] : no wheezing [Abdominal Pain] : no abdominal pain [Arthralgias] : arthralgias [Joint Swelling] : joint swelling [Joint Stiffness] : joint stiffness [Skin Wound] : skin wound [Anxiety] : no anxiety [Easy Bleeding] : no tendency for easy bleeding [FreeTextEntry5] : HTN [FreeTextEntry9] : Gouty arthropathy , left and right foot hammer toes and painful deformity of the digits [de-identified] : s/p right foot resection of 5th metatarsal base with transfer of peroneus brevis to the cuboid  [de-identified] : Diabetic neuropathy  [de-identified] : NIDDM , severe gouty arthropathy

## 2024-03-06 NOTE — PLAN
[FreeTextEntry1] : Patient examined and evaluated at this time. Discussed the postoperative healing process with the patient and patient's wife. Discussed the need for patient to remain nonweightbearing to the right foot at this time. Right medial foot incision sutures removed at this time. RTC in one week. c/w local wound care and offloading  All questions answered to satisfaction and patient and patient's wife verbalized understanding. Patient follow-up in 1 week. Spent 20 minutes for patient care and medical decision making.

## 2024-03-06 NOTE — PHYSICAL EXAM
[Abdominal Pad] : Abdominal Pad [4 x 4] : 4 x 4  [1+] : left 1+ [Ankle Swelling (On Exam)] : present [Ankle Swelling Bilaterally] : bilaterally  [Ankle Swelling On The Left] : moderate [Varicose Veins Of Lower Extremities] : bilaterally [] : bilaterally [Ankle Swelling On The Right] : mild [Purpura] : no purpura  [Petechiae] : no petechiae [Skin Ulcer] : ulcer [Skin Induration] : no induration [Alert] : alert [Oriented to Person] : oriented to person [Oriented to Place] : oriented to place [Calm] : calm [de-identified] : A&Ox3, NAD [de-identified] : HTN [de-identified] : Right foot status post fifth metatarsal base resection with.  Is present in transfer to the cuboid with posterior tibial tendon lengthening. [de-identified] : Right fifth metatarsal plantar wound epithelialized, right foot incision sites with mild central dehiscence to the lateral surgical site,  intact sutures, no purulence, no fluctuance, no proximal streaking, no fluctuance, medial surgical site intact with no dehiscence  [de-identified] : Diabetic neuropathy  [de-identified] : All sutures removed.  Wound closed  [FreeTextEntry1] : Medial Foot - s/p sx 2/21/24 - suture CDI [de-identified] : For support not compression.  No neuro/vascular deficit noted [de-identified] : Dry Dressing [de-identified] : Cleansed with 0.9% Normal Saline  Kerlix  [de-identified] : some sutures removed [FreeTextEntry7] : Lateral Foot - s/p sx 2/21/24 - sutures CDI [FreeTextEntry8] : 12.1 [FreeTextEntry9] : 0.1cm [de-identified] : 0.1cm [de-identified] : serosanguinous drainage noted from medial aspect of wound [de-identified] : erythema and edematous [de-identified] : For support not compression.  No neuro/vascular deficit noted [de-identified] : Betadine Soaked Adaptic Touch [de-identified] : Cleansed with 0.9% Normal Saline  Kerlix   Patient reports that he has been walking on it/putting pressure to get his balance. DPM educated patient on importance of not putting pressure on the surgical incision.  DPM is going to start patient on PO antibiotics  [de-identified] : Right 2nd toe S/P Nail Avulsion [de-identified] : small .sanguineous  [de-identified] : none [de-identified] : other [de-identified] : none [de-identified] : intact [de-identified] : none [de-identified] : none [de-identified] : 100% [de-identified] : none [de-identified] : Dry Dressing [de-identified] : Mechanically cleansed with sterile gauze and normal saline 0.9% Dry Dressing [TWNoteComboBox1] : Right [TWNoteComboBox4] : None [TWNoteComboBox5] : No [TWNoteComboBox6] : Surgical [de-identified] : No [de-identified] : Normal [de-identified] : None [de-identified] : None [de-identified] : None [de-identified] : No [de-identified] : Ace wraps [de-identified] : Primary Dressing [de-identified] : 2x Weekly [TWNoteComboBox9] : Right [de-identified] : Moderate [de-identified] : No [de-identified] : Surgical [de-identified] : Macerated [de-identified] : No [de-identified] : None [de-identified] : None [de-identified] : None [de-identified] : No [de-identified] : Ace wraps [de-identified] : 2x Weekly [de-identified] : Primary Dressing [de-identified] : None [de-identified] : 2x Weekly [de-identified] : Primary Dressing

## 2024-03-08 ENCOUNTER — NON-APPOINTMENT (OUTPATIENT)
Age: 64
End: 2024-03-08

## 2024-03-08 DIAGNOSIS — Z98.890 OTHER SPECIFIED POSTPROCEDURAL STATES: ICD-10-CM

## 2024-03-08 DIAGNOSIS — Z87.442 PERSONAL HISTORY OF URINARY CALCULI: ICD-10-CM

## 2024-03-08 DIAGNOSIS — N40.1 BENIGN PROSTATIC HYPERPLASIA WITH LOWER URINARY TRACT SYMPTOMS: ICD-10-CM

## 2024-03-08 DIAGNOSIS — Z79.82 LONG TERM (CURRENT) USE OF ASPIRIN: ICD-10-CM

## 2024-03-08 DIAGNOSIS — Z79.899 OTHER LONG TERM (CURRENT) DRUG THERAPY: ICD-10-CM

## 2024-03-08 DIAGNOSIS — Z98.1 ARTHRODESIS STATUS: ICD-10-CM

## 2024-03-08 DIAGNOSIS — I10 ESSENTIAL (PRIMARY) HYPERTENSION: ICD-10-CM

## 2024-03-08 DIAGNOSIS — E11.40 TYPE 2 DIABETES MELLITUS WITH DIABETIC NEUROPATHY, UNSPECIFIED: ICD-10-CM

## 2024-03-08 DIAGNOSIS — M1A.09X1 IDIOPATHIC CHRONIC GOUT, MULTIPLE SITES, WITH TOPHUS (TOPHI): ICD-10-CM

## 2024-03-08 DIAGNOSIS — T81.89XD OTHER COMPLICATIONS OF PROCEDURES, NOT ELSEWHERE CLASSIFIED, SUBSEQUENT ENCOUNTER: ICD-10-CM
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
## 2024-03-08 DIAGNOSIS — Z87.81 PERSONAL HISTORY OF (HEALED) TRAUMATIC FRACTURE: ICD-10-CM

## 2024-03-08 DIAGNOSIS — N52.9 MALE ERECTILE DYSFUNCTION, UNSPECIFIED: ICD-10-CM

## 2024-03-08 DIAGNOSIS — R35.1 NOCTURIA: ICD-10-CM

## 2024-03-08 DIAGNOSIS — N32.81 OVERACTIVE BLADDER: ICD-10-CM

## 2024-03-08 DIAGNOSIS — E78.1 PURE HYPERGLYCERIDEMIA: ICD-10-CM

## 2024-03-08 DIAGNOSIS — Z79.84 LONG TERM (CURRENT) USE OF ORAL HYPOGLYCEMIC DRUGS: ICD-10-CM

## 2024-03-11 ENCOUNTER — NON-APPOINTMENT (OUTPATIENT)
Age: 64
End: 2024-03-11

## 2024-03-12 ENCOUNTER — APPOINTMENT (OUTPATIENT)
Dept: WOUND CARE | Facility: HOSPITAL | Age: 64
End: 2024-03-12
Payer: MEDICAID

## 2024-03-12 ENCOUNTER — NON-APPOINTMENT (OUTPATIENT)
Age: 64
End: 2024-03-12

## 2024-03-12 ENCOUNTER — OUTPATIENT (OUTPATIENT)
Dept: OUTPATIENT SERVICES | Facility: HOSPITAL | Age: 64
LOS: 1 days | Discharge: HOME | End: 2024-03-12
Payer: MEDICAID

## 2024-03-12 VITALS
RESPIRATION RATE: 14 BRPM | TEMPERATURE: 98.8 F | BODY MASS INDEX: 31.4 KG/M2 | WEIGHT: 212 LBS | HEIGHT: 69 IN | SYSTOLIC BLOOD PRESSURE: 122 MMHG | OXYGEN SATURATION: 98 % | HEART RATE: 74 BPM | DIASTOLIC BLOOD PRESSURE: 71 MMHG

## 2024-03-12 DIAGNOSIS — E11.621 TYPE 2 DIABETES MELLITUS WITH FOOT ULCER: ICD-10-CM

## 2024-03-12 DIAGNOSIS — Z98.890 OTHER SPECIFIED POSTPROCEDURAL STATES: Chronic | ICD-10-CM

## 2024-03-12 DIAGNOSIS — L97.509 TYPE 2 DIABETES MELLITUS WITH FOOT ULCER: ICD-10-CM

## 2024-03-12 DIAGNOSIS — M86.171 OTHER ACUTE OSTEOMYELITIS, RIGHT ANKLE AND FOOT: ICD-10-CM

## 2024-03-12 LAB
ANION GAP SERPL CALC-SCNC: 9 MMOL/L — SIGNIFICANT CHANGE UP (ref 5–17)
BASOPHILS # BLD AUTO: 0.07 K/UL — SIGNIFICANT CHANGE UP (ref 0–0.2)
BASOPHILS NFR BLD AUTO: 0.8 % — SIGNIFICANT CHANGE UP (ref 0–2)
BUN SERPL-MCNC: 15 MG/DL — SIGNIFICANT CHANGE UP (ref 7–23)
CALCIUM SERPL-MCNC: 8.9 MG/DL — SIGNIFICANT CHANGE UP (ref 8.5–10.1)
CHLORIDE SERPL-SCNC: 106 MMOL/L — SIGNIFICANT CHANGE UP (ref 96–108)
CO2 SERPL-SCNC: 27 MMOL/L — SIGNIFICANT CHANGE UP (ref 22–31)
CREAT SERPL-MCNC: 1.5 MG/DL — HIGH (ref 0.5–1.3)
EGFR: 53 ML/MIN/1.73M2 — LOW
EOSINOPHIL # BLD AUTO: 0.36 K/UL — SIGNIFICANT CHANGE UP (ref 0–0.5)
EOSINOPHIL NFR BLD AUTO: 4.3 % — SIGNIFICANT CHANGE UP (ref 0–6)
GLUCOSE SERPL-MCNC: 218 MG/DL — HIGH (ref 70–99)
HCT VFR BLD CALC: 42.9 % — SIGNIFICANT CHANGE UP (ref 39–50)
HGB BLD-MCNC: 14.9 G/DL — SIGNIFICANT CHANGE UP (ref 13–17)
IMM GRANULOCYTES NFR BLD AUTO: 0.4 % — SIGNIFICANT CHANGE UP (ref 0–0.9)
INR BLD: 0.96 RATIO — SIGNIFICANT CHANGE UP (ref 0.85–1.18)
LYMPHOCYTES # BLD AUTO: 2.11 K/UL — SIGNIFICANT CHANGE UP (ref 1–3.3)
LYMPHOCYTES # BLD AUTO: 24.9 % — SIGNIFICANT CHANGE UP (ref 13–44)
MCHC RBC-ENTMCNC: 30.3 PG — SIGNIFICANT CHANGE UP (ref 27–34)
MCHC RBC-ENTMCNC: 34.7 GM/DL — SIGNIFICANT CHANGE UP (ref 32–36)
MCV RBC AUTO: 87.4 FL — SIGNIFICANT CHANGE UP (ref 80–100)
MONOCYTES # BLD AUTO: 0.75 K/UL — SIGNIFICANT CHANGE UP (ref 0–0.9)
MONOCYTES NFR BLD AUTO: 8.9 % — SIGNIFICANT CHANGE UP (ref 2–14)
NEUTROPHILS # BLD AUTO: 5.15 K/UL — SIGNIFICANT CHANGE UP (ref 1.8–7.4)
NEUTROPHILS NFR BLD AUTO: 60.7 % — SIGNIFICANT CHANGE UP (ref 43–77)
NRBC # BLD: 0 /100 WBCS — SIGNIFICANT CHANGE UP (ref 0–0)
PLATELET # BLD AUTO: 318 K/UL — SIGNIFICANT CHANGE UP (ref 150–400)
POTASSIUM SERPL-MCNC: 4 MMOL/L — SIGNIFICANT CHANGE UP (ref 3.5–5.3)
POTASSIUM SERPL-SCNC: 4 MMOL/L — SIGNIFICANT CHANGE UP (ref 3.5–5.3)
PROTHROM AB SERPL-ACNC: 11.2 SEC — SIGNIFICANT CHANGE UP (ref 9.5–13)
RBC # BLD: 4.91 M/UL — SIGNIFICANT CHANGE UP (ref 4.2–5.8)
RBC # FLD: 13.3 % — SIGNIFICANT CHANGE UP (ref 10.3–14.5)
SODIUM SERPL-SCNC: 142 MMOL/L — SIGNIFICANT CHANGE UP (ref 135–145)
WBC # BLD: 8.47 K/UL — SIGNIFICANT CHANGE UP (ref 3.8–10.5)
WBC # FLD AUTO: 8.47 K/UL — SIGNIFICANT CHANGE UP (ref 3.8–10.5)

## 2024-03-12 PROCEDURE — 36415 COLL VENOUS BLD VENIPUNCTURE: CPT

## 2024-03-12 PROCEDURE — 80048 BASIC METABOLIC PNL TOTAL CA: CPT

## 2024-03-12 PROCEDURE — 85025 COMPLETE CBC W/AUTO DIFF WBC: CPT

## 2024-03-12 PROCEDURE — 99024 POSTOP FOLLOW-UP VISIT: CPT

## 2024-03-12 PROCEDURE — 85610 PROTHROMBIN TIME: CPT

## 2024-03-12 PROCEDURE — G0463: CPT

## 2024-03-12 NOTE — PLAN
[FreeTextEntry1] : Patient examined and evaluated at this time. Discussed the postoperative healing process with the patient and patient's wife. Antibiotics as per infectious disease recommendations. Pt will need a CAM boot. RTC in one week. c/w local wound care and offloading  All questions answered to satisfaction and patient and patient's wife verbalized understanding. Patient follow-up in 1 week. Spent 20 minutes for patient care and medical decision making.

## 2024-03-12 NOTE — ASSESSMENT
[FreeTextEntry1] : 64yo man with PMH of HTN, DM2, with diabetic chronic R foot ulcer at least since 2021.   was seen in wound center for first time today.  Had an MRI in 10/2023 with possible osteo, s/p surgery and 5th metatarsal bone removal with evidence of  acute osteomyelitis in pathology. Culture negative.  Can start on ceftriaxone 2gm daily for 6 weeks. PICC is ordered. Discussed in details about the process.  Ceftriaxone covers MSSA, strep, most GNRs.   Patient was given the opportunity to ask questions and all questions were answered to their satisfaction. Counseling included lab results, differential diagnosis, treatment options, risks and benefits, lifestyle changes, current condition, medications and dose adjustments. The patient verbalized understanding.  [Treatment Education] : treatment education [Treatment Adherence] : treatment adherence [Rx Dose / Side Effects] : Rx dose/side effects [Risk Reduction] : risk reduction [Universal Precautions] : universal precautions [Drug Interactions / Side Effects] : drug interactions/side effects [Anticipatory Guidance] : anticipatory guidance

## 2024-03-12 NOTE — VITALS
[] : No [de-identified] : Patient reports pain 0/10  [FreeTextEntry5] : patient completed course of oral antibiotic

## 2024-03-12 NOTE — HISTORY OF PRESENT ILLNESS
[FreeTextEntry1] : 64yo man with PMH of HTN, DM2, with foot ulcers was seen in wound center for first time today.  He has feet ulcers for a while for which follows in wound care center.  He has this ulcer for years and once had bone biopsy and culture with negative culture and no OM in pathology in 8/2021.   Had an MRI in 10/2023 IMPRESSION: *  Irregular shaped heterotopic ossification with postsurgical changes  adjacent to the base of the fifth metatarsal. Small fluid collection  along the superficial surface and slightly extending along its medial  surface. *  Possible osteomyelitis of the third middle and distal phalanges. *  Scattered areas of soft tissue signal hypointensity in the midfoot  which may be gout. Gout CT may be performed as clinically indicated.  Had surgery to remove the metatarsal bone on 2/21/2024 with negative culture but pathology showed acute osteomyelitis.  Currently had sutures in lateral side but medial side wound is healing very nicely.  No fever, no other complaint.

## 2024-03-12 NOTE — ASSESSMENT
[Verbal] : Verbal [Demo] : Demo [Patient] : Patient [Spouse] : Spouse [Good - alert, interested, motivated] : Good - alert, interested, motivated [Verbalizes knowledge/Understanding] : Verbalizes knowledge/understanding [Dressing changes] : dressing changes [Foot Care] : foot care [Skin Care] : skin care [Pressure relief] : pressure relief [Signs and symptoms of infection] : sign and symptoms of infection [Nutrition] : nutrition [How and When to Call] : how and when to call [Off-loading] : off-loading [Patient responsibility to plan of care] : patient responsibility to plan of care [Glycemic Control] : glycemic control [] : Yes [Stable] : stable [Hospital] : Hospital [Wheelchair] : Wheelchair [Not Applicable - Long Term Care/Home Health Agency] : Long Term Care/Home Health Agency: Not Applicable [FreeTextEntry2] : Infection prevention Localized wound care Promote optimal skin integrity  Maintain acceptable level of pain with use of pharmacological and nonpharmacological interventions Offloading / Pressure relief  Daily foot care / monitoring Nutrition to promote wound healing [FreeTextEntry4] : Follow up for an assessment in one week Patient had consult with ID.  Recommendation for patient to begin Ceftriaxone via PICC pending insurance approval.  Patient was transported to [facility] lab for blood work prior to initiating treatment.  Patient has wound care provided at [facility].  No supplies needed at this time.

## 2024-03-12 NOTE — PHYSICAL EXAM
[Abdominal Pad] : Abdominal Pad [4 x 4] : 4 x 4  [1+] : left 1+ [Ankle Swelling (On Exam)] : present [Ankle Swelling Bilaterally] : bilaterally  [Varicose Veins Of Lower Extremities] : present [Ankle Swelling On The Left] : moderate [Ankle Swelling On The Right] : mild [Purpura] : no purpura  [] : bilaterally [Petechiae] : no petechiae [Skin Ulcer] : ulcer [Skin Induration] : no induration [Alert] : alert [Oriented to Person] : oriented to person [Oriented to Place] : oriented to place [Calm] : calm [de-identified] : A&Ox3, NAD [de-identified] : HTN [de-identified] : Right fifth metatarsal plantar wound epithelialized, right foot incision sites with mild central dehiscence to the lateral surgical site,  intact sutures, no purulence, no fluctuance, no proximal streaking, no fluctuance, medial surgical site intact with no dehiscence  [de-identified] : Right foot status post fifth metatarsal base resection with.  Is present in transfer to the cuboid with posterior tibial tendon lengthening. [de-identified] : Diabetic neuropathy  [FreeTextEntry1] : Medial Foot - s/p sx 2/21/24 - CLOSED [de-identified] : For support not compression.  No neuro/vascular deficit noted [de-identified] : Dry Dressing [de-identified] : Cleansed with 0.9% Normal Saline  Kerlix  [de-identified] : some sutures removed by DPM [FreeTextEntry8] : 12.1 [FreeTextEntry7] : Lateral Foot - s/p sx 2/21/24 - sutures CDI [FreeTextEntry9] : Approximated [de-identified] : 0.0 [de-identified] : serosanguinous  [de-identified] : intact [de-identified] : For support not compression.  No neuro/vascular deficit noted [de-identified] : Adaptic [de-identified] : Cleansed with 0.9% Normal Saline  Kerlix  [de-identified] : Right 2nd toe S/P Nail Avulsion  [de-identified] : Sanguineous  [de-identified] : Adaptic  [de-identified] : Cleansed with 0.9% Normal Saline  Kerlix  [TWNoteComboBox1] : Right [TWNoteComboBox4] : None [TWNoteComboBox5] : No [TWNoteComboBox6] : Surgical [de-identified] : No [de-identified] : Normal [de-identified] : None [de-identified] : None [de-identified] : None [de-identified] : No [de-identified] : Ace wraps [de-identified] : 2x Weekly [de-identified] : Primary Dressing [TWNoteComboBox9] : Right [de-identified] : No [de-identified] : Moderate [de-identified] : Surgical [de-identified] : No [de-identified] : None [de-identified] : Erythema [de-identified] : None [de-identified] : None [de-identified] : No [de-identified] : Ace wraps [de-identified] : Weekly [de-identified] : Primary Dressing [de-identified] : Small [de-identified] : No [de-identified] : Diabetic [de-identified] : No [de-identified] : None [de-identified] : Normal [de-identified] : 100% [de-identified] : None [de-identified] : No [de-identified] : Weekly

## 2024-03-12 NOTE — REVIEW OF SYSTEMS
[Fever] : no fever [Chills] : no chills [Eye Pain] : no eye pain [Loss Of Hearing] : no hearing loss [Shortness Of Breath] : no shortness of breath [Wheezing] : no wheezing [Abdominal Pain] : no abdominal pain [Arthralgias] : arthralgias [Joint Swelling] : joint swelling [Joint Stiffness] : joint stiffness [Skin Wound] : skin wound [Anxiety] : no anxiety [Easy Bleeding] : no tendency for easy bleeding [FreeTextEntry5] : HTN [FreeTextEntry9] : Gouty arthropathy , left and right foot hammer toes and painful deformity of the digits [de-identified] : s/p right foot resection of 5th metatarsal base with transfer of peroneus brevis to the cuboid  [de-identified] : Diabetic neuropathy  [de-identified] : NIDDM , severe gouty arthropathy

## 2024-03-13 DIAGNOSIS — N32.81 OVERACTIVE BLADDER: ICD-10-CM

## 2024-03-13 DIAGNOSIS — T81.89XD OTHER COMPLICATIONS OF PROCEDURES, NOT ELSEWHERE CLASSIFIED, SUBSEQUENT ENCOUNTER: ICD-10-CM

## 2024-03-13 DIAGNOSIS — N52.9 MALE ERECTILE DYSFUNCTION, UNSPECIFIED: ICD-10-CM
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
## 2024-03-13 DIAGNOSIS — Z87.442 PERSONAL HISTORY OF URINARY CALCULI: ICD-10-CM

## 2024-03-13 DIAGNOSIS — Z79.82 LONG TERM (CURRENT) USE OF ASPIRIN: ICD-10-CM

## 2024-03-13 DIAGNOSIS — Z79.899 OTHER LONG TERM (CURRENT) DRUG THERAPY: ICD-10-CM

## 2024-03-13 DIAGNOSIS — I10 ESSENTIAL (PRIMARY) HYPERTENSION: ICD-10-CM

## 2024-03-13 DIAGNOSIS — Z98.890 OTHER SPECIFIED POSTPROCEDURAL STATES: ICD-10-CM

## 2024-03-13 DIAGNOSIS — N40.1 BENIGN PROSTATIC HYPERPLASIA WITH LOWER URINARY TRACT SYMPTOMS: ICD-10-CM

## 2024-03-13 DIAGNOSIS — Z87.81 PERSONAL HISTORY OF (HEALED) TRAUMATIC FRACTURE: ICD-10-CM

## 2024-03-13 DIAGNOSIS — Z79.84 LONG TERM (CURRENT) USE OF ORAL HYPOGLYCEMIC DRUGS: ICD-10-CM

## 2024-03-13 DIAGNOSIS — M1A.09X1 IDIOPATHIC CHRONIC GOUT, MULTIPLE SITES, WITH TOPHUS (TOPHI): ICD-10-CM
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
## 2024-03-13 DIAGNOSIS — E11.40 TYPE 2 DIABETES MELLITUS WITH DIABETIC NEUROPATHY, UNSPECIFIED: ICD-10-CM

## 2024-03-13 DIAGNOSIS — Z98.1 ARTHRODESIS STATUS: ICD-10-CM

## 2024-03-13 DIAGNOSIS — E78.1 PURE HYPERGLYCERIDEMIA: ICD-10-CM

## 2024-03-13 DIAGNOSIS — R35.1 NOCTURIA: ICD-10-CM

## 2024-03-15 NOTE — ASU PATIENT PROFILE, ADULT - NSICDXPASTMEDICALHX_GEN_ALL_CORE_FT
PAST MEDICAL HISTORY:  Anxiety and depression     C2 cervical fracture with fusion    Fall at work 03/2008    Gout     HTN (hypertension)     Kidney stones     Panic attacks

## 2024-03-18 ENCOUNTER — RESULT REVIEW (OUTPATIENT)
Age: 64
End: 2024-03-18

## 2024-03-18 ENCOUNTER — TRANSCRIPTION ENCOUNTER (OUTPATIENT)
Age: 64
End: 2024-03-18

## 2024-03-18 ENCOUNTER — OUTPATIENT (OUTPATIENT)
Dept: OUTPATIENT SERVICES | Facility: HOSPITAL | Age: 64
LOS: 1 days | End: 2024-03-18
Payer: MEDICAID

## 2024-03-18 VITALS
SYSTOLIC BLOOD PRESSURE: 151 MMHG | WEIGHT: 199.96 LBS | TEMPERATURE: 98 F | OXYGEN SATURATION: 98 % | HEART RATE: 92 BPM | RESPIRATION RATE: 19 BRPM | HEIGHT: 69 IN | DIASTOLIC BLOOD PRESSURE: 96 MMHG

## 2024-03-18 VITALS
OXYGEN SATURATION: 99 % | SYSTOLIC BLOOD PRESSURE: 143 MMHG | HEART RATE: 88 BPM | DIASTOLIC BLOOD PRESSURE: 78 MMHG | RESPIRATION RATE: 18 BRPM

## 2024-03-18 DIAGNOSIS — E11.621 TYPE 2 DIABETES MELLITUS WITH FOOT ULCER: ICD-10-CM

## 2024-03-18 DIAGNOSIS — Z98.890 OTHER SPECIFIED POSTPROCEDURAL STATES: Chronic | ICD-10-CM

## 2024-03-18 PROCEDURE — 76937 US GUIDE VASCULAR ACCESS: CPT | Mod: 26,59

## 2024-03-18 PROCEDURE — 36573 INSJ PICC RS&I 5 YR+: CPT

## 2024-03-18 PROCEDURE — 77001 FLUOROGUIDE FOR VEIN DEVICE: CPT | Mod: 26,59

## 2024-03-18 PROCEDURE — 36000 PLACE NEEDLE IN VEIN: CPT | Mod: 59

## 2024-03-18 RX ORDER — ACETAMINOPHEN 500 MG
2 TABLET ORAL
Qty: 0 | Refills: 0 | DISCHARGE

## 2024-03-18 RX ORDER — TADALAFIL 10 MG/1
1 TABLET, FILM COATED ORAL
Refills: 0 | DISCHARGE

## 2024-03-18 RX ORDER — DULOXETINE HYDROCHLORIDE 30 MG/1
2 CAPSULE, DELAYED RELEASE ORAL
Qty: 0 | Refills: 0 | DISCHARGE

## 2024-03-18 RX ORDER — COLCHICINE 0.6 MG
2 TABLET ORAL
Refills: 0 | DISCHARGE

## 2024-03-18 RX ORDER — LAMOTRIGINE 25 MG/1
2 TABLET, ORALLY DISINTEGRATING ORAL
Refills: 0 | DISCHARGE

## 2024-03-18 RX ORDER — LOSARTAN POTASSIUM 100 MG/1
1 TABLET, FILM COATED ORAL
Refills: 0 | DISCHARGE

## 2024-03-18 RX ORDER — TIRZEPATIDE 15 MG/.5ML
7.5 INJECTION, SOLUTION SUBCUTANEOUS
Refills: 0 | DISCHARGE

## 2024-03-18 RX ORDER — ASPIRIN/CALCIUM CARB/MAGNESIUM 324 MG
0 TABLET ORAL
Refills: 0 | DISCHARGE

## 2024-03-18 RX ORDER — AMLODIPINE BESYLATE 2.5 MG/1
1 TABLET ORAL
Refills: 0 | DISCHARGE

## 2024-03-18 RX ORDER — METFORMIN HYDROCHLORIDE 850 MG/1
1 TABLET ORAL
Refills: 0 | DISCHARGE

## 2024-03-18 RX ORDER — ROSUVASTATIN CALCIUM 5 MG/1
1 TABLET ORAL
Refills: 0 | DISCHARGE

## 2024-03-18 NOTE — PROCEDURE NOTE - ADDITIONAL PROCEDURE DETAILS
49 cm bard single lumen power picc inserted into patent right basilic vein under sterile conditions and image guidance.  Tip is in the SVC.  PICC can be accessed.

## 2024-03-18 NOTE — PRE PROCEDURE NOTE - PRE PROCEDURE EVALUATION
Interventional Radiology    HPI: 63y Male with  HTN, T2DM, HLD, Gout, Anxiety and Depression, Diabetic foot ulcer s/p  right 5th metatarsal base resection with peroneus brevis tendon transfer to the cuboid and right posterior tibial tendon lengthening with possible achilles tendon lengthening  with Dr. Green on 2/21/24.  Patient now presents for PICC line insertion for 6 weeks of IV antibiotics.    Allergies: No Known Allergies    Medications (Abx/Cardiac/Anticoagulation/Blood Products)      Data:  175.3  90.7  T(C): 36.6  HR: 92  BP: 151/96  RR: 19  SpO2: 98%    Exam  General: No acute distress  Chest: Non labored breathing  Abdomen: Non-distended  Extremities: No swelling, warm    Imaging: reviewed    Plan: 63y Male presents for PICC line insertion  -Risks/Benefits/alternatives explained with the patient and/or healthcare proxy and witnessed informed consent obtained.

## 2024-03-18 NOTE — ASU DISCHARGE PLAN (ADULT/PEDIATRIC) - ASU DC SPECIAL INSTRUCTIONSFT
PICC Placement    Discharge Instructions  - You have had a PICC implanted in your arm.   - The PICC is ready for use.  - You may shower as long as the PICC and dressing remains dry.  -  No soaking or swimming until the PICC is removed or when the site is completely healed.  - Keep the area covered and dry for as long as the PICC remains in. It may be removed and changed by a nurse as needed.  - Do not perform any heavy lifting or put tension on the area for the next week or until the site is healed.  - You may resume your normal diet.  - You may resume your normal medications however you should wait 48 hours before restarting aspirin, plavix, or blood thinners.  - It is normal to experience some pain over the site for the next few days. You may take apply ice to the area (20 minutes on, 20 minutes off) and take Tylenol for that pain. Do not take more frequently than every 6 hours and do not exceed more than 3000mg of Tylenol in a 24 hour period.    Notify your primary physician and/or Interventional Radiology IMMEDIATELY if you experience any of the following       - Fever of 100.4F or 38C       - Chills or Rigors/ Shakes       - Swelling and/or Redness in the area around the port       - Worsening Pain       - Blood soaked bandages or worsening bleeding       - Lightheadedness and/or dizziness upon standing       - Chest Pain/ Tightness       - Shortness of Breath       - Difficulty walking    If you have a problem that you believe requires IMMEDIATE attention, please go to your NEAREST Emergency Room. If you believe your problem can safely wait until you speak to a physician, please call Interventional Radiology for any concerns.      Please feel free to contact us at [phone]

## 2024-03-19 ENCOUNTER — APPOINTMENT (OUTPATIENT)
Dept: WOUND CARE | Facility: HOSPITAL | Age: 64
End: 2024-03-19
Payer: MEDICAID

## 2024-03-19 VITALS
OXYGEN SATURATION: 95 % | DIASTOLIC BLOOD PRESSURE: 86 MMHG | WEIGHT: 212 LBS | BODY MASS INDEX: 31.4 KG/M2 | HEIGHT: 69 IN | RESPIRATION RATE: 18 BRPM | HEART RATE: 103 BPM | SYSTOLIC BLOOD PRESSURE: 133 MMHG | TEMPERATURE: 97.9 F

## 2024-03-19 PROCEDURE — 99024 POSTOP FOLLOW-UP VISIT: CPT

## 2024-03-19 NOTE — REVIEW OF SYSTEMS
[Fever] : no fever [Chills] : no chills [Eye Pain] : no eye pain [Loss Of Hearing] : no hearing loss [Shortness Of Breath] : no shortness of breath [Wheezing] : no wheezing [Abdominal Pain] : no abdominal pain [Arthralgias] : arthralgias [Joint Swelling] : joint swelling [Joint Stiffness] : joint stiffness [Skin Wound] : skin wound [Anxiety] : no anxiety [Easy Bleeding] : no tendency for easy bleeding [FreeTextEntry5] : HTN [FreeTextEntry9] : Gouty arthropathy , left and right foot hammer toes and painful deformity of the digits [de-identified] : s/p right foot resection of 5th metatarsal base with transfer of peroneus brevis to the cuboid  [de-identified] : Diabetic neuropathy  [de-identified] : NIDDM , severe gouty arthropathy

## 2024-03-19 NOTE — PLAN
[FreeTextEntry1] : continue iv antibiotics , post op dressing and use of off loading boat that was dispensed today . PTR 1 week Spent 20 minutes for patient care and medical decision making.

## 2024-03-19 NOTE — ASSESSMENT
[Verbal] : Verbal [Written] : Written [Demo] : Demo [Patient] : Patient [Family member] : Family member [Good - alert, interested, motivated] : Good - alert, interested, motivated [Verbalizes knowledge/Understanding] : Verbalizes knowledge/understanding [Dressing changes] : dressing changes [Foot Care] : foot care [Skin Care] : skin care [Signs and symptoms of infection] : sign and symptoms of infection [Surgery] : surgery [Nutrition] : nutrition [How and When to Call] : how and when to call [Pain Management] : pain management [Off-loading] : off-loading [Patient responsibility to plan of care] : patient responsibility to plan of care [Glycemic Control] : glycemic control [Stable] : stable [Home] : Home [Ambulatory] : Ambulatory [Not Applicable - Long Term Care/Home Health Agency] : Long Term Care/Home Health Agency: Not Applicable [FreeTextEntry2] : Infection prevention Localized wound care  Goal remaining pain free regarding wounds Promote optimal nutrition  Offloading  [FreeTextEntry4] : Pt to start using CAM boot today.  Pt started IV antibiotics via picc line today.  small amount of supplies provided for patient. Pt will do his own dressing changes.  follow up in 1 week  [] : No

## 2024-03-19 NOTE — PHYSICAL EXAM
[4 x 4] : 4 x 4  [1+] : left 1+ [Ankle Swelling (On Exam)] : present [Ankle Swelling Bilaterally] : bilaterally  [Varicose Veins Of Lower Extremities] : bilaterally [Ankle Swelling On The Left] : moderate [] : bilaterally [Ankle Swelling On The Right] : mild [Purpura] : no purpura  [Petechiae] : no petechiae [Skin Ulcer] : ulcer [Skin Induration] : no induration [Alert] : alert [Oriented to Person] : oriented to person [Oriented to Place] : oriented to place [Calm] : calm [de-identified] : A&Ox3, NAD [de-identified] : HTN [de-identified] : Right foot status post fifth metatarsal base resection with.  Is present in transfer to the cuboid with posterior tibial tendon lengthening. [de-identified] : Right fifth metatarsal plantar wound epithelialized, right foot incision sites with mild central dehiscence to the lateral surgical site,  intact sutures, no purulence, no fluctuance, no proximal streaking, no fluctuance, medial surgical site intact with no dehiscence  [de-identified] : Diabetic neuropathy  [FreeTextEntry7] : right lateral foot - s/p sx 2/21/24 - Sutures intact  [de-identified] : Scant  Serous/sanguinous [de-identified] : Mild  [de-identified] : Silver alginate [de-identified] : Mechanically cleansed with sterile gauze and normal saline. Paper tape   * Sutures removed by DPM.  AgNo3 used.  [de-identified] : Right toe S/P Nail avulsion - Closed  [de-identified] : no treatment  [de-identified] : Erythema [de-identified] : None [de-identified] : 100% [de-identified] : None [de-identified] : No [de-identified] : Every other day [de-identified] : Primary Dressing

## 2024-03-19 NOTE — HISTORY OF PRESENT ILLNESS
[FreeTextEntry1] : s/p resection bone 5th metatarsal base right foot , sutures intact and removed ( 4 weeks post op ) , patient started PICC line antibiotics today , wound closed , no dehiscence , no soi

## 2024-03-25 ENCOUNTER — RX RENEWAL (OUTPATIENT)
Age: 64
End: 2024-03-25

## 2024-03-25 PROCEDURE — 76937 US GUIDE VASCULAR ACCESS: CPT

## 2024-03-25 PROCEDURE — C1751: CPT

## 2024-03-25 PROCEDURE — 36573 INSJ PICC RS&I 5 YR+: CPT

## 2024-03-25 RX ORDER — LOSARTAN POTASSIUM 100 MG/1
100 TABLET, FILM COATED ORAL
Qty: 90 | Refills: 1 | Status: ACTIVE | COMMUNITY
Start: 2020-11-02 | End: 1900-01-01

## 2024-03-26 ENCOUNTER — NON-APPOINTMENT (OUTPATIENT)
Age: 64
End: 2024-03-26

## 2024-03-26 ENCOUNTER — APPOINTMENT (OUTPATIENT)
Dept: WOUND CARE | Facility: HOSPITAL | Age: 64
End: 2024-03-26

## 2024-03-29 ENCOUNTER — APPOINTMENT (OUTPATIENT)
Dept: WOUND CARE | Facility: HOSPITAL | Age: 64
End: 2024-03-29
Payer: MEDICAID

## 2024-03-29 ENCOUNTER — OUTPATIENT (OUTPATIENT)
Dept: OUTPATIENT SERVICES | Facility: HOSPITAL | Age: 64
LOS: 1 days | Discharge: HOME | End: 2024-03-29
Payer: MEDICAID

## 2024-03-29 VITALS
BODY MASS INDEX: 31.4 KG/M2 | OXYGEN SATURATION: 96 % | HEIGHT: 69 IN | TEMPERATURE: 98 F | HEART RATE: 82 BPM | SYSTOLIC BLOOD PRESSURE: 123 MMHG | WEIGHT: 212 LBS | DIASTOLIC BLOOD PRESSURE: 75 MMHG | RESPIRATION RATE: 18 BRPM

## 2024-03-29 DIAGNOSIS — Z98.890 OTHER SPECIFIED POSTPROCEDURAL STATES: Chronic | ICD-10-CM

## 2024-03-29 DIAGNOSIS — M20.42 OTHER HAMMER TOE(S) (ACQUIRED), LEFT FOOT: ICD-10-CM

## 2024-03-29 DIAGNOSIS — E11.40 TYPE 2 DIABETES MELLITUS WITH DIABETIC NEUROPATHY, UNSPECIFIED: ICD-10-CM

## 2024-03-29 DIAGNOSIS — E11.621 TYPE 2 DIABETES MELLITUS WITH FOOT ULCER: ICD-10-CM

## 2024-03-29 DIAGNOSIS — M20.41 OTHER HAMMER TOE(S) (ACQUIRED), RIGHT FOOT: ICD-10-CM

## 2024-03-29 DIAGNOSIS — T81.89XD OTHER COMPLICATIONS OF PROCEDURES, NOT ELSEWHERE CLASSIFIED, SUBSEQUENT ENCOUNTER: ICD-10-CM

## 2024-03-29 PROCEDURE — 99214 OFFICE O/P EST MOD 30 MIN: CPT

## 2024-03-29 PROCEDURE — G0463: CPT

## 2024-04-03 DIAGNOSIS — Z79.82 LONG TERM (CURRENT) USE OF ASPIRIN: ICD-10-CM

## 2024-04-03 DIAGNOSIS — N52.9 MALE ERECTILE DYSFUNCTION, UNSPECIFIED: ICD-10-CM

## 2024-04-03 DIAGNOSIS — Z79.84 LONG TERM (CURRENT) USE OF ORAL HYPOGLYCEMIC DRUGS: ICD-10-CM

## 2024-04-03 DIAGNOSIS — E11.40 TYPE 2 DIABETES MELLITUS WITH DIABETIC NEUROPATHY, UNSPECIFIED: ICD-10-CM

## 2024-04-03 DIAGNOSIS — R35.1 NOCTURIA: ICD-10-CM

## 2024-04-03 DIAGNOSIS — T81.89XD OTHER COMPLICATIONS OF PROCEDURES, NOT ELSEWHERE CLASSIFIED, SUBSEQUENT ENCOUNTER: ICD-10-CM

## 2024-04-03 DIAGNOSIS — I10 ESSENTIAL (PRIMARY) HYPERTENSION: ICD-10-CM

## 2024-04-03 DIAGNOSIS — Z79.899 OTHER LONG TERM (CURRENT) DRUG THERAPY: ICD-10-CM

## 2024-04-03 DIAGNOSIS — M20.41 OTHER HAMMER TOE(S) (ACQUIRED), RIGHT FOOT: ICD-10-CM
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
## 2024-04-03 DIAGNOSIS — Z98.1 ARTHRODESIS STATUS: ICD-10-CM

## 2024-04-03 DIAGNOSIS — Z98.890 OTHER SPECIFIED POSTPROCEDURAL STATES: ICD-10-CM

## 2024-04-03 DIAGNOSIS — Z87.81 PERSONAL HISTORY OF (HEALED) TRAUMATIC FRACTURE: ICD-10-CM

## 2024-04-03 DIAGNOSIS — M1A.09X1 IDIOPATHIC CHRONIC GOUT, MULTIPLE SITES, WITH TOPHUS (TOPHI): ICD-10-CM

## 2024-04-03 DIAGNOSIS — Z87.442 PERSONAL HISTORY OF URINARY CALCULI: ICD-10-CM
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
## 2024-04-03 DIAGNOSIS — E78.1 PURE HYPERGLYCERIDEMIA: ICD-10-CM

## 2024-04-03 DIAGNOSIS — N40.1 BENIGN PROSTATIC HYPERPLASIA WITH LOWER URINARY TRACT SYMPTOMS: ICD-10-CM

## 2024-04-03 DIAGNOSIS — N32.81 OVERACTIVE BLADDER: ICD-10-CM

## 2024-04-03 DIAGNOSIS — M20.42 OTHER HAMMER TOE(S) (ACQUIRED), LEFT FOOT: ICD-10-CM

## 2024-04-03 NOTE — ASSESSMENT
[Written] : Written [Verbal] : Verbal [Patient] : Patient [Demo] : Demo [Family member] : Family member [Good - alert, interested, motivated] : Good - alert, interested, motivated [Verbalizes knowledge/Understanding] : Verbalizes knowledge/understanding [Dressing changes] : dressing changes [Skin Care] : skin care [Foot Care] : foot care [Signs and symptoms of infection] : sign and symptoms of infection [Surgery] : surgery [Nutrition] : nutrition [How and When to Call] : how and when to call [Pain Management] : pain management [Off-loading] : off-loading [Patient responsibility to plan of care] : patient responsibility to plan of care [Glycemic Control] : glycemic control [Stable] : stable [Ambulatory] : Ambulatory [Home] : Home [Not Applicable - Long Term Care/Home Health Agency] : Long Term Care/Home Health Agency: Not Applicable [] : No [FreeTextEntry2] : Infection prevention Localized wound care  Goal remaining pain free regarding wounds Promote optimal nutrition  Offloading  [FreeTextEntry4] : Pt to continue IV antibiotics via picc  Surgical options discussed for Left foot 2nd and 3rd digit hammer toes follow up in 2 weeks

## 2024-04-03 NOTE — PHYSICAL EXAM
[1+] : left 1+ [Ankle Swelling (On Exam)] : present [Ankle Swelling Bilaterally] : bilaterally  [Varicose Veins Of Lower Extremities] : bilaterally [Ankle Swelling On The Left] : moderate [] : bilaterally [Ankle Swelling On The Right] : mild [Skin Ulcer] : ulcer [Alert] : alert [Oriented to Person] : oriented to person [Oriented to Place] : oriented to place [Calm] : calm [Purpura] : no purpura  [Petechiae] : no petechiae [Skin Induration] : no induration [de-identified] : A&Ox3, NAD [de-identified] : HTN [de-identified] : Bilateral third toe hammertoe deformities, flexible.  Right foot status post fifth metatarsal base resection with peroneus brevis tendon transfer to the cuboid with posterior tibial tendon lengthening. [de-identified] : Right fifth metatarsal plantar wound epithelialized, right foot incision sites with mild central dehiscence to the lateral surgical site has epithelialized, medial surgical site intact with no dehiscence. [de-identified] : Diabetic neuropathy  [FreeTextEntry1] : Right lateral foot - s/p sx 2/21/24  [de-identified] : Dry [de-identified] : Lotrisone [de-identified] : Mechanically cleansed with sterile gauze and normal saline.   [de-identified] : no treatment  [de-identified] : 3x Weekly [de-identified] : False [de-identified] : False [de-identified] : False [de-identified] : False [de-identified] : False [de-identified] : False [de-identified] : False

## 2024-04-03 NOTE — HISTORY OF PRESENT ILLNESS
[FreeTextEntry1] : Patient is 63 year old male presenting for follow up seen status post right fifth metatarsal base resection with peroneus brevis tendon transfer to the cuboid and posterior tibial tendon lengthening (DOS: 2/21/2024). Patient relates that he has been walking on his right foot in regular shoe gear since the last encounter.  Patient denies any pain to the right foot.  Patient seen for new complaint of painful bilateral third hammertoe deformities, inquires regarding treatment options at this time.

## 2024-04-03 NOTE — PLAN
[FreeTextEntry1] : Patient examined and evaluated at this time. Discussed the postoperative healing process with the patient and patient's wife. Antibiotics as per infectious disease recommendations. Patient to continue with full weightbearing as tolerated. Discussed the need for surgical intervention of bilateral third toe hammertoe deformities via flexor tenotomy's.  All questions answered to satisfaction and patient verbalized understanding.  Discussed the risk, potential complications, the potential benefits regarding intervention.  Patient to proceed with surgical intervention at this time.  Will plan for bilateral third toe flexor tenotomy's and schedule accordingly. Spent 30 minutes on patient care and medical decision making.

## 2024-04-03 NOTE — REVIEW OF SYSTEMS
[Arthralgias] : arthralgias [Joint Swelling] : joint swelling [Joint Stiffness] : joint stiffness [Skin Wound] : skin wound [Fever] : no fever [Chills] : no chills [Loss Of Hearing] : no hearing loss [Eye Pain] : no eye pain [Shortness Of Breath] : no shortness of breath [Wheezing] : no wheezing [Anxiety] : no anxiety [Abdominal Pain] : no abdominal pain [Easy Bleeding] : no tendency for easy bleeding [FreeTextEntry9] : Gouty arthropathy , left and right foot hammer toes and painful deformity of the digits [FreeTextEntry5] : HTN [de-identified] : Diabetic neuropathy  [de-identified] : s/p right foot resection of 5th metatarsal base with transfer of peroneus brevis to the cuboid  [de-identified] : NIDDM , severe gouty arthropathy

## 2024-04-20 ENCOUNTER — EMERGENCY (EMERGENCY)
Facility: HOSPITAL | Age: 64
LOS: 1 days | Discharge: LEFT BEFORE DISCHARGE | End: 2024-04-20
Attending: EMERGENCY MEDICINE | Admitting: EMERGENCY MEDICINE
Payer: MEDICAID

## 2024-04-20 ENCOUNTER — EMERGENCY (EMERGENCY)
Facility: HOSPITAL | Age: 64
LOS: 1 days | Discharge: LEFT BEFORE DISCHARGE | End: 2024-04-20
Admitting: EMERGENCY MEDICINE
Payer: MEDICAID

## 2024-04-20 VITALS
HEIGHT: 69 IN | OXYGEN SATURATION: 96 % | WEIGHT: 203.05 LBS | TEMPERATURE: 98 F | DIASTOLIC BLOOD PRESSURE: 74 MMHG | SYSTOLIC BLOOD PRESSURE: 131 MMHG | RESPIRATION RATE: 16 BRPM | HEART RATE: 74 BPM

## 2024-04-20 VITALS
TEMPERATURE: 98 F | WEIGHT: 203.05 LBS | SYSTOLIC BLOOD PRESSURE: 135 MMHG | DIASTOLIC BLOOD PRESSURE: 75 MMHG | OXYGEN SATURATION: 97 % | HEIGHT: 69 IN | HEART RATE: 88 BPM | RESPIRATION RATE: 18 BRPM

## 2024-04-20 DIAGNOSIS — Z98.890 OTHER SPECIFIED POSTPROCEDURAL STATES: Chronic | ICD-10-CM

## 2024-04-20 PROCEDURE — 71045 X-RAY EXAM CHEST 1 VIEW: CPT | Mod: 26

## 2024-04-20 PROCEDURE — 99284 EMERGENCY DEPT VISIT MOD MDM: CPT

## 2024-04-20 PROCEDURE — 71045 X-RAY EXAM CHEST 1 VIEW: CPT

## 2024-04-20 PROCEDURE — L9991: CPT

## 2024-04-20 PROCEDURE — 99283 EMERGENCY DEPT VISIT LOW MDM: CPT

## 2024-04-20 NOTE — ED PROVIDER NOTE - PROGRESS NOTE DETAILS
Advised the patient be admitted to continue his IV antibiotics until PICC line can be replaced which would not be able to occur this weekend.  Patient stating he cannot due to home obligations.  Advised that he will be leaving AGAINST MEDICAL ADVICE and will miss 2 doses of his antibiotics.  Patient made aware about missing his doses resistance may develop in the antibiotic may be infected.  Reports understanding but still cannot stay at this time.  Will consider returning for antibiotic doses.  Patient reports he is scheduled for his next dose of antibiotics at 5 PM. Had an at length discussion with patient.  Patient wishes to leave at this time.  The patient understands that they are leaving against medical advice despite the risk of missing a potentially serious diagnosis which may lead to injury, disability and/or death.  I discussed with the patient which tests would need to be performed and what type of monitoring would be necessary for the patient as well.  I was unable to convince patient to stay for further workup.  The patient was given an opportunity to ask any questions as well.   The patient demonstrates understanding of all risks, is awake and alert and demonstrates competance to make medical decisions.  The patient understands that they may return at any time if desired. Discussed the importance of close, prompt medical follow-up.  Also present at bedside for discussions:

## 2024-04-20 NOTE — ED PROVIDER NOTE - CLINICAL SUMMARY MEDICAL DECISION MAKING FREE TEXT BOX
Patient is a 63-year-old gentleman who presents to the emergency room reporting that his PICC line fell out.  Past medical history of hypertension diabetes hyperlipidemia gout anxiety depression history of a diabetic foot ulcer.  Was last admitted to the hospital February 21 through February 22 for a right fifth metatarsal bone resection with peroneus brevis tendon transfer to the cuboid and right posterior tibial tendon lengthening with possible Achilles tendon lengthening.  Patient tolerated the procedure well was ultimately discharged.  He was then seen in interventional radiology on March 18 for a PICC line insertion to receive a total of 6 weeks of IV antibiotics.  PICC line was inserted patient tolerated procedure well.  Presents today reporting that the PICC line fell out.  States he last had a dressing change on Monday.  Yesterday he noted some of the PICC line had been pulled out and today the dressing had broken and the PICC line was significantly pulled from the skin and kinked with a small crack and exposed.  He is scheduled to have his dose of Rocephin at 5 PM.  Denies any fevers chills nausea vomiting chest pain or shortness of breath. Patient is a 63-year-old gentleman who presents to the emergency room reporting that his PICC line fell out.  Past medical history of hypertension diabetes hyperlipidemia gout anxiety depression history of a diabetic foot ulcer.  Was last admitted to the hospital February 21 through February 22 for a right fifth metatarsal bone resection with peroneus brevis tendon transfer to the cuboid and right posterior tibial tendon lengthening with possible Achilles tendon lengthening.  Patient tolerated the procedure well was ultimately discharged.  He was then seen in interventional radiology on March 18 for a PICC line insertion to receive a total of 6 weeks of IV antibiotics.  PICC line was inserted patient tolerated procedure well.  Presents today reporting that the PICC line fell out.  States he last had a dressing change on Monday.  Yesterday he noted some of the PICC line had been pulled out and today the dressing had broken and the PICC line was significantly pulled from the skin and kinked with a small crack and exposed.  He is scheduled to have his dose of Rocephin at 5 PM.  Denies any fevers chills nausea vomiting chest pain or shortness of breath. Patient presenting to the emergency room for removal of PICC line that has partially been exposed from the dressing.  Kink noted in tubing.  Will remove PICC line and monitor. 49 cm of PICC line removed.  It was confirmed this was the amount initially placed.  Will obtain chest x-ray and monitor.  Advised the patient be admitted to continue his IV antibiotics until PICC line can be replaced which would not be able to occur this weekend.  Patient stating he cannot due to home obligations.  Advised that he will be leaving AGAINST MEDICAL ADVICE and will miss 2 doses of his antibiotics.  Patient made aware about missing his doses resistance may develop in the antibiotic may be infected.  Reports understanding but still cannot stay at this time.  Will consider returning for antibiotic doses.  Patient reports he is scheduled for his next dose of antibiotics at 5 PM. Patient is a 63-year-old gentleman who presents to the emergency room reporting that his PICC line fell out.  Past medical history of hypertension diabetes hyperlipidemia gout anxiety depression history of a diabetic foot ulcer.  Was last admitted to the hospital February 21 through February 22 for a right fifth metatarsal bone resection with peroneus brevis tendon transfer to the cuboid and right posterior tibial tendon lengthening with possible Achilles tendon lengthening.  Patient tolerated the procedure well was ultimately discharged.  He was then seen in interventional radiology on March 18 for a PICC line insertion to receive a total of 6 weeks of IV antibiotics.  PICC line was inserted patient tolerated procedure well.  Presents today reporting that the PICC line fell out.  States he last had a dressing change on Monday.  Yesterday he noted some of the PICC line had been pulled out and today the dressing had broken and the PICC line was significantly pulled from the skin and kinked with a small crack and exposed.  He is scheduled to have his dose of Rocephin at 5 PM.  Denies any fevers chills nausea vomiting chest pain or shortness of breath. Patient presenting to the emergency room for removal of PICC line that has partially been exposed from the dressing.  Kink noted in tubing.  Will remove PICC line and monitor. 49 cm of PICC line removed.  It was confirmed this was the amount initially placed.  Will obtain chest x-ray and monitor.  Advised the patient be admitted to continue his IV antibiotics until PICC line can be replaced which would not be able to occur this weekend.  Patient stating he cannot due to home obligations.  Advised that he will be leaving AGAINST MEDICAL ADVICE and will miss 2 doses of his antibiotics.  Patient made aware about missing his doses resistance may develop in the antibiotic may be infected.  Reports understanding but still cannot stay at this time.  Will consider returning for antibiotic doses.  Patient reports he is scheduled for his next dose of antibiotics at 5 PM. Independent review of x-ray reveals no pneumo PICC line removed.

## 2024-04-20 NOTE — ED ADULT TRIAGE NOTE - CHIEF COMPLAINT QUOTE
Pt is on ceftriaxone for osteomyelitis of Rt foot. Pt had a PICC line that was not working, was taken out. Pt came to ER this morning for antibiotic this morning for IV antibiotic dose bu it was too soon. pt is now back for IV antibiotic.

## 2024-04-20 NOTE — ED ADULT NURSE NOTE - OBJECTIVE STATEMENT
Patient received complaining of PICC line coming out. States had it recently changed and noticed it was coming out, the dressing was broken, and noticed a crack on it. Patient is AOx4, safety precautions in place, awaiting evaluation

## 2024-04-20 NOTE — ED PROVIDER NOTE - CARE PROVIDER_API CALL
[facility]  Foot Surgery  [address] [place] [id number]  Phone: [phone]  Fax: [phone]  Follow Up Time:

## 2024-04-20 NOTE — ED ADULT NURSE NOTE - NSFALLUNIVINTERV_ED_ALL_ED
Bed/Stretcher in lowest position, wheels locked, appropriate side rails in place/Call bell, personal items and telephone in reach/Instruct patient to call for assistance before getting out of bed/chair/stretcher/Non-slip footwear applied when patient is off stretcher/[place] to call system/Physically safe environment - no spills, clutter or unnecessary equipment/Purposeful proactive rounding/Room/bathroom lighting operational, light cord in reach

## 2024-04-20 NOTE — ED PROVIDER NOTE - CHRONIC CONDITIONS AFFECTING CARE
Problem:  Infant, Very  Goal: Signs and Symptoms of Listed Potential Problems Will be Absent, Minimized or Managed ( Infant, Very)  Signs and symptoms of listed potential problems will be absent, minimized or managed by discharge/transition of care (reference  Infant, Very CPG).    Outcome: Ongoing (interventions implemented as appropriate)  VSS in open crib. Nippling 44ml Enfacare 22cal q 3 hrs. Requires mild chin support. Mom able to feed 28ml of 44ml in 30 mins at 1100 feed but RN was able to feed remainder. Mom here to room in at 1700. Instructed on proper feeding techniques and arousal. Able to feed baby 44ml in under 30 mins. Baby voiding and stooling.   Preparing for D/C--> Oriented mom to Rm 230, use of bulb syringe, how to take baby's temp, circ care, back to sleep, and mom did CPR today with HORACE Tucker, CRISTÓBAL. Will monitor how she handles baby.     ASHANTI attempted and referred x2 today, CCHD passed.       Chronic Conditions Affecting Care

## 2024-04-20 NOTE — ED PROVIDER NOTE - PATIENT PORTAL LINK FT
You can access the FollowMyHealth Patient Portal offered by [facility] by registering at the following website: [email or web address]. By joining [employer]’s FollowMyHealth portal, you will also be able to view your health information using other applications (apps) compatible with our system.

## 2024-04-20 NOTE — ED PROVIDER NOTE - PHYSICAL EXAMINATION
Right upper ext: PICC line had been pulled out and today the dressing had broken and the PICC line was significantly pulled from the skin and kinked with a small crack and exposed.

## 2024-04-20 NOTE — ED PROVIDER NOTE - NSFOLLOWUPINSTRUCTIONS_ED_ALL_ED_FT
Please feel free to return to the ED at anytime for any reason  Consider following up again at 5 PM for your next dose of Rocephin  Continue your current medications as prescribed  Advance activity as tolerated  Consider returning on Monday for PICC line replacement.  Follow-up with your PMD as scheduled

## 2024-04-20 NOTE — ED ADULT NURSE REASSESSMENT NOTE - NS ED NURSE REASSESS COMMENT FT1
Patient states wanting to leave AMA, not able to stay at this time for PICC line replacement, MD made aware, educated to come back at earliest convenience, patient aware stating he will return. AMA form signed.

## 2024-04-20 NOTE — ED PROVIDER NOTE - DIFFERENTIAL DIAGNOSIS
Patient presenting to the emergency room for removal of PICC line that has partially been exposed from the dressing.  Kink noted in tubing.  Will remove PICC line and monitor. Differential Diagnosis

## 2024-04-20 NOTE — ED PROVIDER NOTE - OBJECTIVE STATEMENT
Patient is a 63-year-old gentleman who presents to the emergency room reporting that his PICC line fell out.  Past medical history of hypertension diabetes hyperlipidemia gout anxiety depression history of a diabetic foot ulcer.  Was last admitted to the hospital February 21 through February 22 for a right fifth metatarsal bone resection with peroneus brevis tendon transfer to the cuboid and right posterior tibial tendon lengthening with possible Achilles tendon lengthening.  Patient tolerated the procedure well was ultimately discharged.  He was then seen in interventional radiology on March 18 for a PICC line insertion to receive a total of 6 weeks of IV antibiotics.  PICC line was inserted patient tolerated procedure well.  Presents today reporting that the PICC line fell out.  States he last had a dressing change on Monday.  Yesterday he noted some of the PICC line had been pulled out and today the dressing had broken and the PICC line was significantly pulled from the skin and kinked with a small crack and exposed.  He is scheduled to have his dose of Rocephin at 5 PM.  Denies any fevers chills nausea vomiting chest pain or shortness of breath.

## 2024-04-29 ENCOUNTER — TRANSCRIPTION ENCOUNTER (OUTPATIENT)
Age: 64
End: 2024-04-29

## 2024-04-29 NOTE — ASU PATIENT PROFILE, ADULT - NPO AFTER
PT was D/C to home D/C instruction was given to PT and PT verbalized understanding of D/C instruction. No distress noted. All personal belonging was given to PT. IV was d/C.  7 T staff accompany PT to lobby.
23:00

## 2024-04-29 NOTE — PHYSICAL EXAM
[4 x 4] : 4 x 4  [Abdominal Pad] : Abdominal Pad [1+] : left 1+ [Ankle Swelling (On Exam)] : present [Ankle Swelling Bilaterally] : bilaterally  [Varicose Veins Of Lower Extremities] : bilaterally [Ankle Swelling On The Left] : moderate [] : present [Ankle Swelling On The Right] : mild [Skin Ulcer] : ulcer [Alert] : alert [Oriented to Person] : oriented to person [Oriented to Place] : oriented to place [Calm] : calm [Purpura] : no purpura  . [Petechiae] : no petechiae [Skin Induration] : no induration [de-identified] : A&Ox3, NAD [de-identified] : HTN [de-identified] : severe gout with associated foot deformities  [de-identified] : right medial incision healed, right 2nd dorsal toe incision dehiscence with wound down to skin, subcutnaeous tissue, and fat. right lateral foot incision dehiscence with ulcer down to skin, subcutaneous tissue, fat, and bone. [de-identified] : Diabetic neuropathy  [FreeTextEntry1] : Right lateral foot [FreeTextEntry2] : 2.0 [FreeTextEntry3] : 5.1 [FreeTextEntry4] : 0.3 [de-identified] : dried serosanguineous  [de-identified] : callused- DPM shaved callus [de-identified] : 100% [FreeTextEntry5] : Post debridement measurements: L 2.1 X W 5.2 X D 0.4 cm [de-identified] : Apligraf, Wound veil, calcium alginate [de-identified] : NSC\par DD\par \par \par \par Apligraf Application # 2 44sq unit applied to Right lateral foot\par Item # [id number] Exp # 10/22/21\par Lot # [id number]\par Applied 10 %\par Discarded 90 %\par \par \par \par Reconstituted with NS\par Lot # [id number] Expiration 12/1/2023\par  [FreeTextEntry7] : Right foot 2nd digit - dry eschar [FreeTextEntry8] : 2.0 [FreeTextEntry9] : 2.5 [de-identified] : 0.1 [de-identified] : Dried serosanguineous  [de-identified] : dry eschar - shaved by ANTON [de-identified] : 80% [de-identified] : 10% [de-identified] : 10% [de-identified] : Adaptic Touch, Alginate Ag [de-identified] : NSC\par DD [TWNoteComboBox4] : Moderate [TWNoteComboBox6] : Diabetic [de-identified] : Normal [de-identified] : None [TWNoteComboBox7] : Pao [de-identified] : Application of skin substitute [de-identified] : Weekly [de-identified] : Primary Dressing [de-identified] : Moderate [de-identified] : Diabetic [de-identified] : None [de-identified] : 3x Weekly [de-identified] : Primary Dressing

## 2024-04-30 ENCOUNTER — APPOINTMENT (OUTPATIENT)
Dept: WOUND CARE | Facility: HOSPITAL | Age: 64
End: 2024-04-30

## 2024-04-30 ENCOUNTER — OUTPATIENT (OUTPATIENT)
Dept: OUTPATIENT SERVICES | Facility: HOSPITAL | Age: 64
LOS: 1 days | End: 2024-04-30
Payer: MEDICAID

## 2024-04-30 DIAGNOSIS — Z98.890 OTHER SPECIFIED POSTPROCEDURAL STATES: Chronic | ICD-10-CM

## 2024-04-30 DIAGNOSIS — M20.42 OTHER HAMMER TOE(S) (ACQUIRED), LEFT FOOT: ICD-10-CM

## 2024-04-30 DIAGNOSIS — M20.41 OTHER HAMMER TOE(S) (ACQUIRED), RIGHT FOOT: ICD-10-CM

## 2024-04-30 PROCEDURE — 82962 GLUCOSE BLOOD TEST: CPT

## 2024-04-30 PROCEDURE — 28232 INCISION OF TOE TENDON: CPT | Mod: 79,T2

## 2024-04-30 PROCEDURE — 28232 INCISION OF TOE TENDON: CPT | Mod: T2

## 2024-04-30 DEVICE — SURGICEL NU-KNIT 6 X 9": Type: IMPLANTABLE DEVICE | Site: BILATERAL | Status: FUNCTIONAL

## 2024-05-06 ENCOUNTER — RX RENEWAL (OUTPATIENT)
Age: 64
End: 2024-05-06

## 2024-05-06 RX ORDER — IBUPROFEN 600 MG/1
600 TABLET, FILM COATED ORAL
Qty: 90 | Refills: 2 | Status: ACTIVE | COMMUNITY
Start: 2020-08-03 | End: 1900-01-01

## 2024-05-11 ENCOUNTER — EMERGENCY (EMERGENCY)
Facility: HOSPITAL | Age: 64
LOS: 1 days | Discharge: HOME | End: 2024-05-11
Attending: EMERGENCY MEDICINE | Admitting: EMERGENCY MEDICINE
Payer: MEDICAID

## 2024-05-11 VITALS
OXYGEN SATURATION: 99 % | SYSTOLIC BLOOD PRESSURE: 135 MMHG | RESPIRATION RATE: 18 BRPM | DIASTOLIC BLOOD PRESSURE: 80 MMHG | HEART RATE: 74 BPM

## 2024-05-11 VITALS
SYSTOLIC BLOOD PRESSURE: 149 MMHG | HEIGHT: 69 IN | OXYGEN SATURATION: 98 % | DIASTOLIC BLOOD PRESSURE: 87 MMHG | WEIGHT: 199.96 LBS | TEMPERATURE: 98 F | HEART RATE: 60 BPM | RESPIRATION RATE: 20 BRPM

## 2024-05-11 DIAGNOSIS — Z98.890 OTHER SPECIFIED POSTPROCEDURAL STATES: Chronic | ICD-10-CM

## 2024-05-11 LAB
ALBUMIN SERPL ELPH-MCNC: 3.8 G/DL — SIGNIFICANT CHANGE UP (ref 3.3–5)
ALP SERPL-CCNC: 117 U/L — SIGNIFICANT CHANGE UP (ref 40–120)
ALT FLD-CCNC: 47 U/L — SIGNIFICANT CHANGE UP (ref 12–78)
ANION GAP SERPL CALC-SCNC: 6 MMOL/L — SIGNIFICANT CHANGE UP (ref 5–17)
APAP SERPL-MCNC: 2 UG/ML — SIGNIFICANT CHANGE UP (ref 10–30)
APPEARANCE UR: CLEAR — SIGNIFICANT CHANGE UP
AST SERPL-CCNC: 38 U/L — HIGH (ref 15–37)
BASOPHILS # BLD AUTO: 0.03 K/UL — SIGNIFICANT CHANGE UP (ref 0–0.2)
BASOPHILS NFR BLD AUTO: 0.6 % — SIGNIFICANT CHANGE UP (ref 0–2)
BILIRUB SERPL-MCNC: 0.7 MG/DL — SIGNIFICANT CHANGE UP (ref 0.2–1.2)
BILIRUB UR-MCNC: NEGATIVE — SIGNIFICANT CHANGE UP
BUN SERPL-MCNC: 18 MG/DL — SIGNIFICANT CHANGE UP (ref 7–23)
CALCIUM SERPL-MCNC: 9.2 MG/DL — SIGNIFICANT CHANGE UP (ref 8.5–10.1)
CHLORIDE SERPL-SCNC: 110 MMOL/L — HIGH (ref 96–108)
CO2 SERPL-SCNC: 24 MMOL/L — SIGNIFICANT CHANGE UP (ref 22–31)
COLOR SPEC: YELLOW — SIGNIFICANT CHANGE UP
CREAT SERPL-MCNC: 1.3 MG/DL — SIGNIFICANT CHANGE UP (ref 0.5–1.3)
DIFF PNL FLD: NEGATIVE — SIGNIFICANT CHANGE UP
EGFR: 62 ML/MIN/1.73M2 — SIGNIFICANT CHANGE UP
EOSINOPHIL # BLD AUTO: 0.08 K/UL — SIGNIFICANT CHANGE UP (ref 0–0.5)
EOSINOPHIL NFR BLD AUTO: 1.7 % — SIGNIFICANT CHANGE UP (ref 0–6)
ETHANOL SERPL-MCNC: 12 MG/DL — HIGH (ref 0–10)
GLUCOSE SERPL-MCNC: 137 MG/DL — HIGH (ref 70–99)
GLUCOSE UR QL: 100 MG/DL
HCT VFR BLD CALC: 39.5 % — SIGNIFICANT CHANGE UP (ref 39–50)
HGB BLD-MCNC: 13.8 G/DL — SIGNIFICANT CHANGE UP (ref 13–17)
IMM GRANULOCYTES NFR BLD AUTO: 0.4 % — SIGNIFICANT CHANGE UP (ref 0–0.9)
KETONES UR-MCNC: NEGATIVE MG/DL — SIGNIFICANT CHANGE UP
LEUKOCYTE ESTERASE UR-ACNC: NEGATIVE — SIGNIFICANT CHANGE UP
LYMPHOCYTES # BLD AUTO: 1.15 K/UL — SIGNIFICANT CHANGE UP (ref 1–3.3)
LYMPHOCYTES # BLD AUTO: 24.2 % — SIGNIFICANT CHANGE UP (ref 13–44)
MCHC RBC-ENTMCNC: 30.6 PG — SIGNIFICANT CHANGE UP (ref 27–34)
MCHC RBC-ENTMCNC: 34.9 GM/DL — SIGNIFICANT CHANGE UP (ref 32–36)
MCV RBC AUTO: 87.6 FL — SIGNIFICANT CHANGE UP (ref 80–100)
MONOCYTES # BLD AUTO: 0.41 K/UL — SIGNIFICANT CHANGE UP (ref 0–0.9)
MONOCYTES NFR BLD AUTO: 8.6 % — SIGNIFICANT CHANGE UP (ref 2–14)
NEUTROPHILS # BLD AUTO: 3.07 K/UL — SIGNIFICANT CHANGE UP (ref 1.8–7.4)
NEUTROPHILS NFR BLD AUTO: 64.5 % — SIGNIFICANT CHANGE UP (ref 43–77)
NITRITE UR-MCNC: NEGATIVE — SIGNIFICANT CHANGE UP
NRBC # BLD: 0 /100 WBCS — SIGNIFICANT CHANGE UP (ref 0–0)
PCP SPEC-MCNC: SIGNIFICANT CHANGE UP
PH UR: 5.5 — SIGNIFICANT CHANGE UP (ref 5–8)
PLATELET # BLD AUTO: 190 K/UL — SIGNIFICANT CHANGE UP (ref 150–400)
POTASSIUM SERPL-MCNC: 4.1 MMOL/L — SIGNIFICANT CHANGE UP (ref 3.5–5.3)
POTASSIUM SERPL-SCNC: 4.1 MMOL/L — SIGNIFICANT CHANGE UP (ref 3.5–5.3)
PROT SERPL-MCNC: 7.1 G/DL — SIGNIFICANT CHANGE UP (ref 6–8.3)
PROT UR-MCNC: 30 MG/DL
RBC # BLD: 4.51 M/UL — SIGNIFICANT CHANGE UP (ref 4.2–5.8)
RBC # FLD: 13 % — SIGNIFICANT CHANGE UP (ref 10.3–14.5)
SALICYLATES SERPL-MCNC: <1.7 MG/DL — LOW (ref 2.8–20)
SODIUM SERPL-SCNC: 140 MMOL/L — SIGNIFICANT CHANGE UP (ref 135–145)
SP GR SPEC: 1.02 — SIGNIFICANT CHANGE UP (ref 1–1.03)
UROBILINOGEN FLD QL: 1 MG/DL — SIGNIFICANT CHANGE UP (ref 0.2–1)
WBC # BLD: 4.76 K/UL — SIGNIFICANT CHANGE UP (ref 3.8–10.5)
WBC # FLD AUTO: 4.76 K/UL — SIGNIFICANT CHANGE UP (ref 3.8–10.5)

## 2024-05-11 PROCEDURE — 99285 EMERGENCY DEPT VISIT HI MDM: CPT

## 2024-05-11 PROCEDURE — 80053 COMPREHEN METABOLIC PANEL: CPT

## 2024-05-11 PROCEDURE — 99451 NTRPROF PH1/NTRNET/EHR 5/>: CPT

## 2024-05-11 PROCEDURE — 80307 DRUG TEST PRSMV CHEM ANLYZR: CPT

## 2024-05-11 PROCEDURE — 93010 ELECTROCARDIOGRAM REPORT: CPT

## 2024-05-11 PROCEDURE — 93005 ELECTROCARDIOGRAM TRACING: CPT

## 2024-05-11 PROCEDURE — 81001 URINALYSIS AUTO W/SCOPE: CPT

## 2024-05-11 PROCEDURE — 99285 EMERGENCY DEPT VISIT HI MDM: CPT | Mod: 25

## 2024-05-11 PROCEDURE — 85025 COMPLETE CBC W/AUTO DIFF WBC: CPT

## 2024-05-11 PROCEDURE — 36415 COLL VENOUS BLD VENIPUNCTURE: CPT

## 2024-05-11 RX ORDER — SODIUM CHLORIDE 9 MG/ML
1000 INJECTION INTRAMUSCULAR; INTRAVENOUS; SUBCUTANEOUS ONCE
Refills: 0 | Status: COMPLETED | OUTPATIENT
Start: 2024-05-11 | End: 2024-05-11

## 2024-05-11 RX ADMIN — SODIUM CHLORIDE 1000 MILLILITER(S): 9 INJECTION INTRAMUSCULAR; INTRAVENOUS; SUBCUTANEOUS at 13:49

## 2024-05-11 NOTE — ED PROVIDER NOTE - NSFOLLOWUPINSTRUCTIONS_ED_ALL_ED_FT
Opioids are drugs that are often used to treat pain. Opioids include illegal drugs, such as heroin, as well as prescription pain medicines, such as codeine, morphine, hydrocodone, and fentanyl.    An opioid overdose happens when you take too much of an opioid. An overdose may be intentional or accidental and can happen with any type of opioid.    The effects of an overdose can be mild, dangerous, or even deadly. Opioid overdose is a medical emergency.    What are the causes?  This condition may be caused by:  Taking too much of an opioid on purpose.  Taking too much of an opioid by accident.  Using two or more substances that contain opioids at the same time.  Taking an opioid with a substance that affects your heart, breathing, or blood pressure. These include alcohol, tranquilizers, sleeping pills, illegal drugs, and some over-the-counter medicines.  This condition may also happen due to an error made by:  A health care provider who prescribes a medicine.  The pharmacist who fills the prescription.  What increases the risk?  This condition is more likely in:  Children. They may be attracted to colorful pills. Because of a child's small size, even a small amount of a medicine can be dangerous.  Older people. They may be taking many different medicines. Older people may have difficulty reading labels or remembering when they last took their medicines. They may also be more sensitive to the effects of opioids.  People with chronic medical conditions, especially heart, liver, kidney, or neurological diseases.  People who take an opioid for a long period of time.  People who take opioids and use illegal drugs, such as heroin, or other substances, such as alcohol.  People who:  Have a history of drug or alcohol abuse.  Have certain mental health conditions.  Have a history of previous drug overdoses.  People who take opioids that are not prescribed for them.  What are the signs or symptoms?  Symptoms of this condition depend on the type of opioid and the amount that was taken. Common symptoms include:  Sleepiness or difficulty waking from sleep.  Confusion.  Slurred speech.  Slowed breathing and a slow pulse (bradycardia).  Nausea and vomiting.  Abnormally small pupils.  Signs and symptoms that require emergency treatment include:  Cold, clammy, and pale skin.  Blue lips and fingernails.  Vomiting.  Gurgling sounds in the throat.  A pulse that is very slow or difficult to detect.  Breathing that is very irregular, slow, noisy, or difficult to detect.  Inability to respond to speech or be awakened from sleep (stupor).  Seizures.  How is this diagnosed?  This condition is diagnosed based on your symptoms and medical history. It is important to tell your health care provider:  About all of the opioids that you took.  When you took the opioids.  Whether you were drinking alcohol or using marijuana, cocaine, or other drugs.  Your health care provider will do a physical exam. This exam may include:  Checking and monitoring your heart rate and rhythm, breathing rate, temperature, and blood pressure.  Measuring oxygen levels in your blood.  Checking for abnormally small pupils.  You may also have blood tests or urine tests. You may have X-rays if you are having severe breathing problems.    How is this treated?  This condition requires immediate medical treatment and hospitalization. Reversing the effects of the opioid is the first step in treatment. If you have a Narcan kit or naloxone, use it right away. Follow your health care provider's instructions. A friend or family member can also help you with this.    The rest of your treatment will be given in the hospital intensive care (ICU). Treatment in the hospital may include:  Giving salts and minerals (electrolytes) along with fluids through an IV.  Inserting a breathing tube (endotracheal tube) in your airway to help you breathe if you cannot breathe on your own or you are in danger of not being able to breathe on your own.  Giving oxygen through a small tube under your nose.  Passing a tube through your nose and into your stomach (nasogastric tube, or NG tube) to empty your stomach.  Giving medicines that:  Increase your blood pressure.  Relieve nausea and vomiting.  Relieve abdominal pain and cramping.  Reverse the effects of the opioid (naloxone).  Monitoring your heart and oxygen levels.  Ongoing counseling and mental health support if you intentionally overdosed or used an illegal drug.  Follow these instructions at home:  Three cups showing dark yellow, yellow, and pale yellow urine.  Medicines    Take over-the-counter and prescription medicines only as told by your health care provider.  Always ask your health care provider about possible side effects and interactions of any new medicine that you start taking.  Keep a list of all the medicines that you take, including over-the-counter medicines. Bring this list with you to all your medical visits.  General instructions    Drink enough fluid to keep your urine pale yellow.  Keep all follow-up visits. This is important.  How is this prevented?  Read the drug inserts that come with your opioid pain medicines.  Take medicines only as told by your health care provider. Do not take more medicine than you are told. Do not take medicines more frequently than you are told.  Do not drink alcohol or take sedatives when taking opioids.  Do not use illegal or recreational drugs, including cocaine, ecstasy, and marijuana.  Do not take opioid medicines that are not prescribed for you.  Store all medicines in safety containers that are out of the reach of children.  Get help if you are struggling with:  Alcohol or drug use.  Depression or another mental health problem.  Thoughts of hurting yourself or another person.  Keep the phone number of your local poison control center near your phone or in your mobile phone. In the [place] the hotline of the National Poison Control Center is (705) 881-6695.  If you were prescribed naloxone, make sure you understand how to take it.  Contact a health care provider if:  You need help understanding how to take your pain medicines.  You feel your medicines are too strong.  You are concerned that your pain medicines are not working well for your pain.  You develop new symptoms or side effects when you are taking medicines.  Get help right away if:  You or someone else is having symptoms of an opioid overdose. Get help even if you are not sure.  You have thoughts about hurting yourself or others.  You have:  Chest pain.  Difficulty breathing.  A loss of consciousness.  These symptoms may represent a serious problem that is an emergency. Do not wait to see if the symptoms will go away. Get medical help right away. Call your local emergency services (564 in the [place] Do not drive yourself to the hospital.    If you ever feel like you may hurt yourself or others, or have thoughts about taking your own life, get help right away. You can go to your nearest emergency department or:  Call your local emergency services (155 in the [place]  Call a suicide crisis helpline, such as the National Suicide Prevention Lifeline at 1-639.131.1047 or 553 in the [place] This is open 24 hours a day in the [place]  Text the Crisis Text Line at 896736 (in the [place]  Summary  Opioids are drugs that are often used to treat pain. Opioids include illegal drugs, such as heroin, as well as prescription pain medicines.  An opioid overdose happens when you take too much of an opioid.  Overdoses can be intentional or accidental.  Opioid overdose is very dangerous. It is a life-threatening emergency.  If you or someone you know is experiencing an opioid overdose, get help right away.  This information is not intended to replace advice given to you by your health care provider. Make sure you discuss any questions you have with your health care provider.

## 2024-05-11 NOTE — ED PROVIDER NOTE - CLINICAL SUMMARY MEDICAL DECISION MAKING FREE TEXT BOX
63-year-old male presents to the ED with accidental overdose on 20 mg of oxycodone 2 hours prior to arrival now with complaints of dizziness and weakness.  Labs, EKG, IV fluids, tox consult.

## 2024-05-11 NOTE — ED PROVIDER NOTE - CARE PROVIDERS DIRECT ADDRESSES
Add'l info request by insurance for Stelara PA - faxed chart notes.         [email or web address]

## 2024-05-11 NOTE — ED ADULT NURSE NOTE - OBJECTIVE STATEMENT
63yr old male a&ox4 arrives to ED notes to have taken 5-325 oxy/acetaminphen by accident, pt notes drowsy, feeling week, pt notes no si/hi at this time. pt denies fever chills, chest pain or sob at this time. Bradly MORIN

## 2024-05-11 NOTE — ED PROVIDER NOTE - CARE PROVIDER_API CALL
Tio Fried  [facility]  [address] [place] [address]  Phone: [phone]  Fax: [phone]  Follow Up Time:

## 2024-05-11 NOTE — CONSULT NOTE PEDS - ASSESSMENT
64 yo M with pmhx of anxiety, depression, gout, HTN, nephrolithiasis who presents after inadvertent overdose of Perocet x3-4 (5 mg oxycodone and 325 mg apap) around 9:30 am. Vitals stable, RR 20, and on exam per ED team, no bradypnea, and hypopnea. Mild somnolence. Labs and EKG non-actionable, APAP level 2.     Patient does not appearing severely opioid toxic, therefore no indication for naloxone. Minimally detectable APAP level at 3 hours out, unlikely to be above 150 at 4 hours, and non-toxic dose ingested based on report, therefore no need to repeat labs.     #Recommendations  - Supportive care due to somnolence  - If asymptomatic with normal vitals signs, cleared from acute toxicological standpoint  - Further medical and/or psychiatric care per primary team    Thank you for involving us in the care of this patient. Assessment and plan discussed with toxicology attending Dr. Teddy Douglas. Please do not hesitate to reach out to the toxicology team for any further questions or concerns.    The On-Call Toxicology Fellow can be reached 24/7 via Pager #[phone]  Please send a 10 digit call back # as [place] cover multiple hospitals    Benson Padilla MD  Toxicology Fellow  PGY-5

## 2024-05-11 NOTE — ED PROVIDER NOTE - OBJECTIVE STATEMENT
63-year-old male with history of anxiety, depression, gout, hypertension, kidney stones presents due to accidental overdose with Percocet.  States he took 3 pills of 5–3 25 Percocet around 9:30 AM today.  Has pain medications for recent surgery.  States he usually puts his pills for the day and an empty pill bottle so he can take them at work and did not realize that he put them in a bottle with 4 Percocets.  Denies doing this to try to harm or kill himself.  Shortly after taking the meds he started to feel very drowsy and lightheaded.  Denies headache, chest pain, trouble breathing, vomiting, abdominal pain.  Wife at bedside

## 2024-05-11 NOTE — ED ADULT TRIAGE NOTE - CHIEF COMPLAINT QUOTE
I accidentally took 4 percocet at 10am (5/325)   I thought I was taking my other daily medication when I took them in error.  I feel very dizzy and a little sweaty (I had percocet left over from foot surgery)

## 2024-05-11 NOTE — ED PROVIDER NOTE - PROGRESS NOTE DETAILS
tox agrees with current plan (IVF, monitor, labs, ekg, tox studies) and recommends check end tidal CO2 and vbg tox agrees with current plan (IVF, monitor, labs, ekg, tox studies) and recommends check end tidal CO2 and vbg; states if 1st tylenol level negative then no need for repeat, if positive rpt 4 hours pt awake, alert, ambulatory, tolerating PO, requesting discharge

## 2024-05-11 NOTE — CONSULT NOTE PEDS - PROBLEM SELECTOR RECOMMENDATION 9
Emergency Medicine / Medical Toxicology Attending MD Douglas:    63-year-old male who presents to the ED after reported accidental overdose 15 to 20 mg oxycodone, <1.5 g acetaminophen.  The reported quantity of acetaminophen is nontoxic, and patient's labs are unremarkable; acetaminophen level is below the [facility] nomogram treatment line.  Admission for IV acetaminophen antidote therapy is not indicated.    The reported oxycodone overdose should not result in severe opioid toxicity.  That being said, the endorsed overdose may not be reliable and the patient should be watched for at least 6 hours postingestion.  The onset of oxycodone effect is typically within 10 to 30 minutes, but the peak may be up to 6 hours for immediate release preparations.  If patient is clinically well at that time can likely be cleared from a medical toxicology standpoint.  If patient were to develop somnolence he would likely need admission for extended observation as oxycodone effects    Should patient develop somnolence please reconsult medical toxicology and assess patient for CO2 retention.

## 2024-05-11 NOTE — ED PROVIDER NOTE - PATIENT PORTAL LINK FT
You can access the FollowMyHealth Patient Portal offered by [facility] by registering at the following website: [email or web address]. By joining [facility]’s FollowMyHealth portal, you will also be able to view your health information using other applications (apps) compatible with our system.

## 2024-05-11 NOTE — CONSULT NOTE PEDS - SUBJECTIVE AND OBJECTIVE BOX
MEDICAL TOXICOLOGY CONSULT    HPI: 62 yo M with pmhx of anxiety, depression, gout, HTN, nephrolithiasis who presents after inadvertent overdose of Perocet x3-4 (5 mg oxycodone and 325 mg apap) around 9:30 am. Denies SI. Mild somnolence, otherwise no symptoms.    ONSET / TIME of exposure(s): 9:30 am today    QUANTITY of exposure(s): 3-4x    ROUTE of exposure:  INGESTION    CONTEXT of exposure: at home    ASSOCIATED symptoms: somnolence    PAST MEDICAL & SURGICAL HISTORY:  HTN (hypertension)      Anxiety and depression      Panic attacks      Fall  at work 2008      C2 cervical fracture  with fusion      Kidney stones      Gout      C2 cervical fracture  3/2008      Hernia  in Infancy      Renal stone  removed -by ? blasting      Rotator cuff tear, left      History of removal of retained hardware  From C-spine       H/O foot surgery  Right foot removed gouty tophi           MEDICATION HISTORY: see ED  note      FAMILY HISTORY: n/a      REVIEW OF SYSTEMS: All systems negative except per HPI.        Vital Signs Last 24 Hrs  T(C): 36.4 (11 May 2024 11:47), Max: 36.4 (11 May 2024 11:47)  T(F): 97.5 (11 May 2024 11:47), Max: 97.5 (11 May 2024 11:47)  HR: 74 (11 May 2024 14:45) (60 - 74)  BP: 135/80 (11 May 2024 14:45) (135/80 - 149/87)  BP(mean): --  RR: 18 (11 May 2024 14:45) (18 - 20)  SpO2: 99% (11 May 2024 14:45) (98% - 99%)    Parameters below as of 11 May 2024 11:47  Patient On (Oxygen Delivery Method): room air        SIGNIFICANT LABORATORY STUDIES:                        13.8   4.76  )-----------( 190      ( 11 May 2024 13:10 )             39.5       05-11    140  |  110<H>  |  18  ----------------------------<  137<H>  4.1   |  24  |  1.30    Ca    9.2      11 May 2024 13:10    TPro  7.1  /  Alb  3.8  /  TBili  0.7  /  DBili  x   /  AST  38<H>  /  ALT  47  /  AlkPhos  117  05-11          Urinalysis Basic - ( 11 May 2024 13:22 )    Color: Yellow / Appearance: Clear / S.018 / pH: x  Gluc: x / Ketone: Negative mg/dL  / Bili: Negative / Urobili: 1.0 mg/dL   Blood: x / Protein: 30 mg/dL / Nitrite: Negative   Leuk Esterase: Negative / RBC: 0 /HPF / WBC 0 /HPF   Sq Epi: x / Non Sq Epi: x / Bacteria: x        Anion Gap: 6 05-11 @ 13:10  CK: -- -11 @ 13:10  Troponin:  --  - @ 13:10  Pro-BNP:  --  - @ 13:10  VBG:  --  - @ 13:10  Carboxyhemoglobin %:  --  - @ 13:10  Methemoglobin %:  --  05-11 @ 13:10  Osmolality Serum:  --  05-11 @ 13:10  Aspirin Level: <1.7<L>  05-11 @ 13:10  Acetaminophen Level:  2  05-11 @ 13:10  Ethanol Level:  --  - @ 13:10  Digoxin Level:  --  05-11 @ 13:10  Phenytoin Level:  --  -11 @ 13:10  Carbamazepine level:  --  05- @ 13:10  Lamotrigine level:  --  05- @ 13:10

## 2024-05-13 DIAGNOSIS — T40.2X4A POISONING BY OTHER OPIOIDS, UNDETERMINED, INITIAL ENCOUNTER: ICD-10-CM

## 2024-05-15 ENCOUNTER — APPOINTMENT (OUTPATIENT)
Dept: WOUND CARE | Facility: HOSPITAL | Age: 64
End: 2024-05-15

## 2024-05-15 ENCOUNTER — RX RENEWAL (OUTPATIENT)
Age: 64
End: 2024-05-15

## 2024-05-17 ENCOUNTER — RX RENEWAL (OUTPATIENT)
Age: 64
End: 2024-05-17

## 2024-05-22 NOTE — ASU PATIENT PROFILE, ADULT - ALCOHOL USE HISTORY SINGLE SELECT
Dr Walker reviewed message. Per Dr Walker, next step would be surgery. It will be up to mom if would want to do in office or surgical center.  
Patients mother called and left a message stating that  gave Romy a left thumb trigger finger injection on 4/30/24. Mom states that  told them to call in a couple weeks and update them on how it does. Tyra states that unfortunately, the injection did not help at all and that it is still clicking. They are wondering what the next steps are for Romy.   
Writer spoke with mom by phone and notified of Dr Walker's recommendations. Mom and patient are going to talk and make a decision. Mom will call office back and schedule.   
never

## 2024-06-04 ENCOUNTER — APPOINTMENT (OUTPATIENT)
Dept: INTERNAL MEDICINE | Facility: CLINIC | Age: 64
End: 2024-06-04
Payer: MEDICAID

## 2024-06-04 VITALS
HEART RATE: 74 BPM | TEMPERATURE: 98 F | OXYGEN SATURATION: 96 % | WEIGHT: 196 LBS | DIASTOLIC BLOOD PRESSURE: 55 MMHG | HEIGHT: 69 IN | SYSTOLIC BLOOD PRESSURE: 95 MMHG | BODY MASS INDEX: 29.03 KG/M2

## 2024-06-04 VITALS — SYSTOLIC BLOOD PRESSURE: 100 MMHG | DIASTOLIC BLOOD PRESSURE: 78 MMHG

## 2024-06-04 DIAGNOSIS — Z12.11 ENCOUNTER FOR SCREENING FOR MALIGNANT NEOPLASM OF COLON: ICD-10-CM

## 2024-06-04 DIAGNOSIS — F32.A DEPRESSION, UNSPECIFIED: ICD-10-CM

## 2024-06-04 DIAGNOSIS — I10 ESSENTIAL (PRIMARY) HYPERTENSION: ICD-10-CM

## 2024-06-04 DIAGNOSIS — E66.3 OVERWEIGHT: ICD-10-CM

## 2024-06-04 DIAGNOSIS — M1A.09X1 IDIOPATHIC CHRONIC GOUT, MULTIPLE SITES, WITH TOPHUS (TOPHI): ICD-10-CM

## 2024-06-04 DIAGNOSIS — E11.65 TYPE 2 DIABETES MELLITUS WITH HYPERGLYCEMIA: ICD-10-CM

## 2024-06-04 DIAGNOSIS — E78.1 PURE HYPERGLYCERIDEMIA: ICD-10-CM

## 2024-06-04 DIAGNOSIS — R41.3 OTHER AMNESIA: ICD-10-CM

## 2024-06-04 DIAGNOSIS — Z23 ENCOUNTER FOR IMMUNIZATION: ICD-10-CM

## 2024-06-04 DIAGNOSIS — Z00.00 ENCOUNTER FOR GENERAL ADULT MEDICAL EXAMINATION W/OUT ABNORMAL FINDINGS: ICD-10-CM

## 2024-06-04 PROCEDURE — 90750 HZV VACC RECOMBINANT IM: CPT

## 2024-06-04 PROCEDURE — 90471 IMMUNIZATION ADMIN: CPT

## 2024-06-04 PROCEDURE — 99396 PREV VISIT EST AGE 40-64: CPT | Mod: 25

## 2024-06-04 RX ORDER — CEFTRIAXONE 2 G/1
2 INJECTION, POWDER, FOR SOLUTION INTRAMUSCULAR; INTRAVENOUS
Refills: 0 | Status: DISCONTINUED | COMMUNITY
End: 2024-06-04

## 2024-06-04 RX ORDER — TADALAFIL 20 MG/1
20 TABLET ORAL
Qty: 90 | Refills: 0 | Status: DISCONTINUED | COMMUNITY
Start: 2023-05-22 | End: 2024-06-04

## 2024-06-04 RX ORDER — CEPHALEXIN 250 MG/1
250 TABLET ORAL EVERY 6 HOURS
Qty: 28 | Refills: 0 | Status: DISCONTINUED | COMMUNITY
Start: 2024-04-30 | End: 2024-06-04

## 2024-06-04 RX ORDER — AMLODIPINE BESYLATE 10 MG/1
10 TABLET ORAL DAILY
Qty: 90 | Refills: 1 | Status: DISCONTINUED | COMMUNITY
Start: 2020-11-02 | End: 2024-06-04

## 2024-06-04 RX ORDER — OXYCODONE AND ACETAMINOPHEN 5; 325 MG/1; MG/1
5-325 TABLET ORAL EVERY 6 HOURS
Qty: 28 | Refills: 0 | Status: DISCONTINUED | COMMUNITY
Start: 2024-04-30 | End: 2024-06-04

## 2024-06-05 LAB
25(OH)D3 SERPL-MCNC: 27 NG/ML
ALBUMIN SERPL ELPH-MCNC: 4.7 G/DL
ALP BLD-CCNC: 114 U/L
ALT SERPL-CCNC: 31 U/L
ANION GAP SERPL CALC-SCNC: 14 MMOL/L
AST SERPL-CCNC: 27 U/L
BASOPHILS # BLD AUTO: 0.05 K/UL
BASOPHILS NFR BLD AUTO: 0.7 %
BILIRUB SERPL-MCNC: 0.4 MG/DL
BUN SERPL-MCNC: 31 MG/DL
CALCIUM SERPL-MCNC: 9.7 MG/DL
CHLORIDE SERPL-SCNC: 103 MMOL/L
CHOLEST SERPL-MCNC: 92 MG/DL
CO2 SERPL-SCNC: 21 MMOL/L
CREAT SERPL-MCNC: 1.91 MG/DL
EGFR: 39 ML/MIN/1.73M2
EOSINOPHIL # BLD AUTO: 0.25 K/UL
EOSINOPHIL NFR BLD AUTO: 3.5 %
ESTIMATED AVERAGE GLUCOSE: 134 MG/DL
FOLATE SERPL-MCNC: 9.8 NG/ML
GLUCOSE SERPL-MCNC: 142 MG/DL
HBA1C MFR BLD HPLC: 6.3 %
HCT VFR BLD CALC: 39.7 %
HDLC SERPL-MCNC: 30 MG/DL
HGB BLD-MCNC: 13.7 G/DL
IMM GRANULOCYTES NFR BLD AUTO: 0.4 %
LDLC SERPL CALC-MCNC: 25 MG/DL
LYMPHOCYTES # BLD AUTO: 1.85 K/UL
LYMPHOCYTES NFR BLD AUTO: 25.6 %
MAN DIFF?: NORMAL
MCHC RBC-ENTMCNC: 30.2 PG
MCHC RBC-ENTMCNC: 34.5 GM/DL
MCV RBC AUTO: 87.6 FL
MONOCYTES # BLD AUTO: 0.56 K/UL
MONOCYTES NFR BLD AUTO: 7.7 %
NEUTROPHILS # BLD AUTO: 4.5 K/UL
NEUTROPHILS NFR BLD AUTO: 62.1 %
NONHDLC SERPL-MCNC: 63 MG/DL
PLATELET # BLD AUTO: 230 K/UL
POTASSIUM SERPL-SCNC: 4.6 MMOL/L
PROT SERPL-MCNC: 6.9 G/DL
PSA SERPL-MCNC: 1.05 NG/ML
RBC # BLD: 4.53 M/UL
RBC # FLD: 13.3 %
SODIUM SERPL-SCNC: 139 MMOL/L
TRIGL SERPL-MCNC: 249 MG/DL
TSH SERPL-ACNC: 2.02 UIU/ML
URATE SERPL-MCNC: 5.5 MG/DL
VIT B12 SERPL-MCNC: 243 PG/ML
WBC # FLD AUTO: 7.24 K/UL

## 2024-06-07 LAB — ANA SER IF-ACNC: NEGATIVE

## 2024-06-07 NOTE — HISTORY OF PRESENT ILLNESS
[Spouse] : spouse [de-identified] : 63 year old M with PMH multiple medical issues presents for CPE. Complains of hypotension. Pt denies CP/SOB, fever/chills, n/v/d/c.

## 2024-06-07 NOTE — PLAN
[FreeTextEntry1] : Routine blood work drawn in office and urine collected today. Stop Amlodipine. Shingrix #1 today. Cologuard ordered. Pt advised to sign up for [facility] portal to review labs and communicate any questions or concerns directly. Yearly physical and return as needed for illness, medication refills, and new or existing complaints. f/u 3 months.

## 2024-06-07 NOTE — REVIEW OF SYSTEMS
[Fatigue] : fatigue [Dizziness] : dizziness [Memory Loss] : memory loss [Negative] : Heme/Lymph [Fever] : no fever [Chills] : no chills [Hot Flashes] : no hot flashes [Night Sweats] : no night sweats [Recent Change In Weight] : ~T no recent weight change [Headache] : no headache [Fainting] : no fainting [Confusion] : no confusion [Unsteady Walk] : no ataxia

## 2024-06-07 NOTE — HEALTH RISK ASSESSMENT
[Good] : ~his/her~  mood as  good [No] : In the past 12 months have you used drugs other than those required for medical reasons? No [0] : 2) Feeling down, depressed, or hopeless: Not at all (0) [PHQ-2 Negative - No further assessment needed] : PHQ-2 Negative - No further assessment needed [Never] : Never [None] : None [With Family] : lives with family [Employed] : employed [] :  [Reports changes in hearing] : Reports changes in hearing [Reports changes in vision] : Reports changes in vision [Reports normal functional visual acuity (ie: able to read med bottle)] : Reports normal functional visual acuity [Audit-CScore] : 0 [[id number]] : 0 [ColonoscopyComments] : Cologuard ordered

## 2024-06-25 ENCOUNTER — RX RENEWAL (OUTPATIENT)
Age: 64
End: 2024-06-25

## 2024-06-25 RX ORDER — TADALAFIL 5 MG/1
5 TABLET ORAL
Qty: 30 | Refills: 3 | Status: ACTIVE | COMMUNITY
Start: 2022-02-24 | End: 1900-01-01

## 2024-07-10 ENCOUNTER — RX RENEWAL (OUTPATIENT)
Age: 64
End: 2024-07-10

## 2024-07-17 ENCOUNTER — EMERGENCY (EMERGENCY)
Facility: HOSPITAL | Age: 64
LOS: 1 days | Discharge: LEFT BEFORE DISCHARGE | End: 2024-07-17
Admitting: EMERGENCY MEDICINE
Payer: MEDICAID

## 2024-07-17 VITALS
WEIGHT: 195.11 LBS | SYSTOLIC BLOOD PRESSURE: 121 MMHG | HEART RATE: 69 BPM | RESPIRATION RATE: 17 BRPM | OXYGEN SATURATION: 97 % | DIASTOLIC BLOOD PRESSURE: 74 MMHG | HEIGHT: 69 IN | TEMPERATURE: 98 F

## 2024-07-17 DIAGNOSIS — Z98.890 OTHER SPECIFIED POSTPROCEDURAL STATES: Chronic | ICD-10-CM

## 2024-07-17 PROCEDURE — L9991: CPT

## 2024-07-17 PROCEDURE — 99281 EMR DPT VST MAYX REQ PHY/QHP: CPT

## 2024-08-19 ENCOUNTER — APPOINTMENT (OUTPATIENT)
Dept: PHARMACY | Facility: CLINIC | Age: 64
End: 2024-08-19

## 2024-08-19 ENCOUNTER — RX RENEWAL (OUTPATIENT)
Age: 64
End: 2024-08-19

## 2024-08-20 ENCOUNTER — RX RENEWAL (OUTPATIENT)
Age: 64
End: 2024-08-20

## 2024-08-26 ENCOUNTER — APPOINTMENT (OUTPATIENT)
Dept: WOUND CARE | Facility: HOSPITAL | Age: 64
End: 2024-08-26
Payer: MEDICAID

## 2024-08-26 ENCOUNTER — OUTPATIENT (OUTPATIENT)
Dept: OUTPATIENT SERVICES | Facility: HOSPITAL | Age: 64
LOS: 1 days | Discharge: HOME | End: 2024-08-26
Payer: MEDICAID

## 2024-08-26 ENCOUNTER — NON-APPOINTMENT (OUTPATIENT)
Age: 64
End: 2024-08-26

## 2024-08-26 VITALS
OXYGEN SATURATION: 97 % | TEMPERATURE: 98 F | DIASTOLIC BLOOD PRESSURE: 76 MMHG | BODY MASS INDEX: 28.88 KG/M2 | RESPIRATION RATE: 18 BRPM | HEART RATE: 76 BPM | SYSTOLIC BLOOD PRESSURE: 140 MMHG | WEIGHT: 195 LBS | HEIGHT: 69 IN

## 2024-08-26 DIAGNOSIS — Z98.890 OTHER SPECIFIED POSTPROCEDURAL STATES: Chronic | ICD-10-CM

## 2024-08-26 DIAGNOSIS — M25.552 PAIN IN LEFT HIP: ICD-10-CM

## 2024-08-26 DIAGNOSIS — Z09 ENCOUNTER FOR FOLLOW-UP EXAMINATION AFTER COMPLETED TREATMENT FOR CONDITIONS OTHER THAN MALIGNANT NEOPLASM: ICD-10-CM

## 2024-08-26 PROCEDURE — G0463: CPT

## 2024-08-26 PROCEDURE — 99213 OFFICE O/P EST LOW 20 MIN: CPT

## 2024-08-27 NOTE — PLAN
[FreeTextEntry1] : Patient examined and evaluated at this time. Patient referred to orthopedic at this time. All questions answered to satisfaction and patient verbalized understanding. Spent 20 minutes on patient care and medical decision making.

## 2024-08-27 NOTE — ASSESSMENT
[Verbal] : Verbal [Written] : Written [Demo] : Demo [Patient] : Patient [Good - alert, interested, motivated] : Good - alert, interested, motivated [Verbalizes knowledge/Understanding] : Verbalizes knowledge/understanding [Foot Care] : foot care [Skin Care] : skin care [Signs and symptoms of infection] : sign and symptoms of infection [Nutrition] : nutrition [How and When to Call] : how and when to call [Labs and Tests] : labs and tests [Pain Management] : pain management [Patient responsibility to plan of care] : patient responsibility to plan of care [Glycemic Control] : glycemic control [Other: ____] : [unfilled] [Stable] : stable [Home] : Home [Ambulatory] : Ambulatory [Not Applicable - Long Term Care/Home Health Agency] : Long Term Care/Home Health Agency: Not Applicable [Pressure relief] : pressure relief [] : No [FreeTextEntry2] : Infection prevention S/s of infection Pain management Glycemic control Weight reduction Foot and nail care Low sodium diet Ambulation safety Regular f/u with primary medical team Hygiene   [FreeTextEntry3] : No wounds- Initial assessment [FreeTextEntry4] : Pt advised to F/U with orthopedic- physician access line provided to the pt for referral. F/U as needed

## 2024-08-27 NOTE — REVIEW OF SYSTEMS
[Arthralgias] : arthralgias [Joint Swelling] : joint swelling [Joint Stiffness] : joint stiffness [Skin Wound] : skin wound [Fever] : no fever [Chills] : no chills [Eye Pain] : no eye pain [Loss Of Hearing] : no hearing loss [Shortness Of Breath] : no shortness of breath [Wheezing] : no wheezing [Abdominal Pain] : no abdominal pain [Anxiety] : no anxiety [Easy Bleeding] : no tendency for easy bleeding [FreeTextEntry5] : HTN [FreeTextEntry9] : Gouty arthropathy , left and right foot hammer toes and painful deformity of the digits [de-identified] : s/p right foot resection of 5th metatarsal base with transfer of peroneus brevis to the cuboid  [de-identified] : Diabetic neuropathy  [de-identified] : NIDDM , severe gouty arthropathy

## 2024-08-27 NOTE — PHYSICAL EXAM
[1+] : left 1+ [Ankle Swelling (On Exam)] : present [Ankle Swelling Bilaterally] : bilaterally  [Varicose Veins Of Lower Extremities] : bilaterally [Ankle Swelling On The Left] : moderate [] : bilaterally [Ankle Swelling On The Right] : mild [Skin Ulcer] : ulcer [Alert] : alert [Oriented to Person] : oriented to person [Oriented to Place] : oriented to place [Calm] : calm [Purpura] : no purpura  [Petechiae] : no petechiae [Skin Induration] : no induration [de-identified] : A&Ox3, NAD [de-identified] : HTN [de-identified] : Bilateral third toe hammertoe deformities, flexible.  Right foot status post fifth metatarsal base resection with peroneus brevis tendon transfer to the cuboid with posterior tibial tendon lengthening. [de-identified] : no open wounds, no lacerations, no ecchymosis, no erythema, no fluctuance, no proximal streaking [de-identified] : Diabetic neuropathy  [de-identified] : Callus shaved with 15 blade [FreeTextEntry1] : Right Foot- no open wounds [de-identified] : dry callused [de-identified] : None [de-identified] : Mechanically cleansed with 0.9% normal saline and 4x4 sterile gauze  Right Dorsalis Pedis +2     Left Dorsalis Pedis +2  Right Posterior Tibialis +2 Left Posterior Tibialis +2 Skin Temperature: warm Skin Color: normal for pt Blood flow heard by Doppler: Biphasic [de-identified] : Callus shaved with 15 blade [FreeTextEntry7] : Left foot- no open wounds [de-identified] : dry/ callused [de-identified] : None [de-identified] : Mechanically cleansed with 0.9% normal saline and 4x4 sterile gauze  Right Dorsalis Pedis +2     Left Dorsalis Pedis +2  Right Posterior Tibialis +2 Left Posterior Tibialis +2 Skin Temperature: warm Skin Color: normal for pt Blood flow heard by Doppler: Biphasic [TWNoteComboBox4] : None [TWNoteComboBox6] : Pressure [de-identified] : other [de-identified] : None [de-identified] : None [de-identified] : No [de-identified] : None [de-identified] : Pressure [de-identified] : other [de-identified] : None [de-identified] : No

## 2024-08-27 NOTE — PHYSICAL EXAM
[1+] : left 1+ [Ankle Swelling (On Exam)] : present [Ankle Swelling Bilaterally] : bilaterally  [Varicose Veins Of Lower Extremities] : bilaterally [Ankle Swelling On The Left] : moderate [] : bilaterally [Ankle Swelling On The Right] : mild [Skin Ulcer] : ulcer [Alert] : alert [Oriented to Person] : oriented to person [Oriented to Place] : oriented to place [Calm] : calm [Purpura] : no purpura  [Petechiae] : no petechiae [Skin Induration] : no induration [de-identified] : A&Ox3, NAD [de-identified] : HTN [de-identified] : Bilateral third toe hammertoe deformities, flexible.  Right foot status post fifth metatarsal base resection with peroneus brevis tendon transfer to the cuboid with posterior tibial tendon lengthening. [de-identified] : no open wounds, no lacerations, no ecchymosis, no erythema, no fluctuance, no proximal streaking [de-identified] : Diabetic neuropathy  [de-identified] : Callus shaved with 15 blade [FreeTextEntry1] : Right Foot- no open wounds [de-identified] : dry callused [de-identified] : None [de-identified] : Mechanically cleansed with 0.9% normal saline and 4x4 sterile gauze  Right Dorsalis Pedis +2     Left Dorsalis Pedis +2  Right Posterior Tibialis +2 Left Posterior Tibialis +2 Skin Temperature: warm Skin Color: normal for pt Blood flow heard by Doppler: Biphasic [de-identified] : Callus shaved with 15 blade [FreeTextEntry7] : Left foot- no open wounds [de-identified] : dry/ callused [de-identified] : None [de-identified] : Mechanically cleansed with 0.9% normal saline and 4x4 sterile gauze  Right Dorsalis Pedis +2     Left Dorsalis Pedis +2  Right Posterior Tibialis +2 Left Posterior Tibialis +2 Skin Temperature: warm Skin Color: normal for pt Blood flow heard by Doppler: Biphasic [TWNoteComboBox4] : None [TWNoteComboBox6] : Pressure [de-identified] : other [de-identified] : None [de-identified] : None [de-identified] : No [de-identified] : None [de-identified] : Pressure [de-identified] : other [de-identified] : None [de-identified] : No

## 2024-08-27 NOTE — VITALS
[Pain related to present condition?] : The patient's  pain is not related to present condition. [] : No [de-identified] : 0/10 [FreeTextEntry5] : Med reconciliation completed

## 2024-08-27 NOTE — HISTORY OF PRESENT ILLNESS
[FreeTextEntry1] : Pt states 6 weeks ago while taking off his socks he felt a pop on the left sacral/ hip. Pt went to [place] ED but left without being evaluated.

## 2024-08-27 NOTE — HISTORY OF PRESENT ILLNESS
[FreeTextEntry1] : Pt states 6 weeks ago while taking off his socks he felt a pop on the left sacral/ hip. Pt went to [place] ED but left without being evaluated.

## 2024-08-27 NOTE — REVIEW OF SYSTEMS
[Arthralgias] : arthralgias [Joint Swelling] : joint swelling [Joint Stiffness] : joint stiffness [Skin Wound] : skin wound [Fever] : no fever [Chills] : no chills [Eye Pain] : no eye pain [Loss Of Hearing] : no hearing loss [Shortness Of Breath] : no shortness of breath [Wheezing] : no wheezing [Abdominal Pain] : no abdominal pain [Anxiety] : no anxiety [Easy Bleeding] : no tendency for easy bleeding [FreeTextEntry5] : HTN [FreeTextEntry9] : Gouty arthropathy , left and right foot hammer toes and painful deformity of the digits [de-identified] : s/p right foot resection of 5th metatarsal base with transfer of peroneus brevis to the cuboid  [de-identified] : Diabetic neuropathy  [de-identified] : NIDDM , severe gouty arthropathy

## 2024-08-27 NOTE — VITALS
[Pain related to present condition?] : The patient's  pain is not related to present condition. [] : No [de-identified] : 0/10 [FreeTextEntry5] : Med reconciliation completed

## 2024-09-01 DIAGNOSIS — R35.1 NOCTURIA: ICD-10-CM

## 2024-09-01 DIAGNOSIS — E11.40 TYPE 2 DIABETES MELLITUS WITH DIABETIC NEUROPATHY, UNSPECIFIED: ICD-10-CM

## 2024-09-01 DIAGNOSIS — Z79.84 LONG TERM (CURRENT) USE OF ORAL HYPOGLYCEMIC DRUGS: ICD-10-CM

## 2024-09-01 DIAGNOSIS — Z98.1 ARTHRODESIS STATUS: ICD-10-CM

## 2024-09-01 DIAGNOSIS — Z98.890 OTHER SPECIFIED POSTPROCEDURAL STATES: ICD-10-CM

## 2024-09-01 DIAGNOSIS — Z87.442 PERSONAL HISTORY OF URINARY CALCULI: ICD-10-CM

## 2024-09-01 DIAGNOSIS — Z79.82 LONG TERM (CURRENT) USE OF ASPIRIN: ICD-10-CM

## 2024-09-01 DIAGNOSIS — Z87.81 PERSONAL HISTORY OF (HEALED) TRAUMATIC FRACTURE: ICD-10-CM

## 2024-09-01 DIAGNOSIS — N52.9 MALE ERECTILE DYSFUNCTION, UNSPECIFIED: ICD-10-CM

## 2024-09-01 DIAGNOSIS — E78.1 PURE HYPERGLYCERIDEMIA: ICD-10-CM

## 2024-09-01 DIAGNOSIS — I10 ESSENTIAL (PRIMARY) HYPERTENSION: ICD-10-CM

## 2024-09-01 DIAGNOSIS — M20.41 OTHER HAMMER TOE(S) (ACQUIRED), RIGHT FOOT: ICD-10-CM

## 2024-09-01 DIAGNOSIS — M25.552 PAIN IN LEFT HIP: ICD-10-CM

## 2024-09-01 DIAGNOSIS — N32.81 OVERACTIVE BLADDER: ICD-10-CM

## 2024-09-01 DIAGNOSIS — Z79.899 OTHER LONG TERM (CURRENT) DRUG THERAPY: ICD-10-CM

## 2024-09-01 DIAGNOSIS — M20.42 OTHER HAMMER TOE(S) (ACQUIRED), LEFT FOOT: ICD-10-CM

## 2024-09-01 DIAGNOSIS — N40.1 BENIGN PROSTATIC HYPERPLASIA WITH LOWER URINARY TRACT SYMPTOMS: ICD-10-CM

## 2024-09-09 ENCOUNTER — APPOINTMENT (OUTPATIENT)
Dept: INTERNAL MEDICINE | Facility: CLINIC | Age: 64
End: 2024-09-09
Payer: MEDICAID

## 2024-09-09 VITALS
DIASTOLIC BLOOD PRESSURE: 83 MMHG | TEMPERATURE: 97.7 F | BODY MASS INDEX: 29.62 KG/M2 | HEIGHT: 69 IN | SYSTOLIC BLOOD PRESSURE: 163 MMHG | OXYGEN SATURATION: 99 % | HEART RATE: 76 BPM | WEIGHT: 200 LBS

## 2024-09-09 DIAGNOSIS — N28.9 DISORDER OF KIDNEY AND URETER, UNSPECIFIED: ICD-10-CM

## 2024-09-09 DIAGNOSIS — I10 ESSENTIAL (PRIMARY) HYPERTENSION: ICD-10-CM

## 2024-09-09 DIAGNOSIS — Z23 ENCOUNTER FOR IMMUNIZATION: ICD-10-CM

## 2024-09-09 DIAGNOSIS — R79.89 OTHER SPECIFIED ABNORMAL FINDINGS OF BLOOD CHEMISTRY: ICD-10-CM

## 2024-09-09 DIAGNOSIS — E11.65 TYPE 2 DIABETES MELLITUS WITH HYPERGLYCEMIA: ICD-10-CM

## 2024-09-09 PROCEDURE — 90471 IMMUNIZATION ADMIN: CPT

## 2024-09-09 PROCEDURE — 99214 OFFICE O/P EST MOD 30 MIN: CPT | Mod: 25

## 2024-09-09 PROCEDURE — 90750 HZV VACC RECOMBINANT IM: CPT

## 2024-09-09 PROCEDURE — G2211 COMPLEX E/M VISIT ADD ON: CPT | Mod: NC

## 2024-09-09 NOTE — PHYSICAL EXAM
[No Acute Distress] : no acute distress [Well-Appearing] : well-appearing [No Respiratory Distress] : no respiratory distress  [No Accessory Muscle Use] : no accessory muscle use [Clear to Auscultation] : lungs were clear to auscultation bilaterally [Normal Rate] : normal rate  [Regular Rhythm] : with a regular rhythm [Normal S1, S2] : normal S1 and S2 [No Murmur] : no murmur heard [Coordination Grossly Intact] : coordination grossly intact [No Focal Deficits] : no focal deficits [Normal Gait] : normal gait [Normal Affect] : the affect was normal [Normal Insight/Judgement] : insight and judgment were intact [de-identified] : obesity

## 2024-09-09 NOTE — REVIEW OF SYSTEMS
[Fatigue] : fatigue [Depression] : depression [Negative] : Neurological [Fever] : no fever [Chills] : no chills [Hot Flashes] : no hot flashes [Night Sweats] : no night sweats [Recent Change In Weight] : ~T no recent weight change [Suicidal] : not suicidal [Insomnia] : no insomnia [Anxiety] : no anxiety

## 2024-09-09 NOTE — PLAN
[FreeTextEntry1] : Increase Mounjaro to 10mg weekly. Shingrix #2. Pt advised to sign up for [facility] portal to review labs and communicate any questions or concerns directly. Yearly physical and return as needed for illness, medication refills, and new or existing complaints.

## 2024-09-09 NOTE — HISTORY OF PRESENT ILLNESS
[de-identified] : 63 year old M with PMH multiple medical issues presents for follow up. Pt denies CP/SOB, fever/chills, n/v/d/c.

## 2024-09-16 LAB
ALBUMIN SERPL ELPH-MCNC: 4.4 G/DL
ALP BLD-CCNC: 124 U/L
ALT SERPL-CCNC: 24 U/L
ANION GAP SERPL CALC-SCNC: 13 MMOL/L
APPEARANCE: CLEAR
AST SERPL-CCNC: 23 U/L
BACTERIA: NEGATIVE /HPF
BASOPHILS # BLD AUTO: 0.05 K/UL
BASOPHILS NFR BLD AUTO: 0.8 %
BILIRUB SERPL-MCNC: 0.4 MG/DL
BILIRUBIN URINE: NEGATIVE
BLOOD URINE: NEGATIVE
BUN SERPL-MCNC: 15 MG/DL
CALCIUM SERPL-MCNC: 9.3 MG/DL
CAST: 1 /LPF
CHLORIDE SERPL-SCNC: 104 MMOL/L
CHOLEST SERPL-MCNC: 109 MG/DL
CO2 SERPL-SCNC: 25 MMOL/L
COLOR: YELLOW
CREAT SERPL-MCNC: 1.12 MG/DL
CREAT SPEC-SCNC: 124 MG/DL
EGFR: 75 ML/MIN/1.73M2
EOSINOPHIL # BLD AUTO: 0.33 K/UL
EOSINOPHIL NFR BLD AUTO: 5.3 %
EPITHELIAL CELLS: 1 /HPF
ESTIMATED AVERAGE GLUCOSE: 126 MG/DL
GLUCOSE QUALITATIVE U: 250 MG/DL
GLUCOSE SERPL-MCNC: 213 MG/DL
HBA1C MFR BLD HPLC: 6 %
HCT VFR BLD CALC: 45.7 %
HDLC SERPL-MCNC: 38 MG/DL
HGB BLD-MCNC: 14.8 G/DL
IMM GRANULOCYTES NFR BLD AUTO: 0.2 %
KETONES URINE: NEGATIVE MG/DL
LDLC SERPL CALC-MCNC: 38 MG/DL
LEUKOCYTE ESTERASE URINE: NEGATIVE
LYMPHOCYTES # BLD AUTO: 1.62 K/UL
LYMPHOCYTES NFR BLD AUTO: 26 %
MAN DIFF?: NORMAL
MCHC RBC-ENTMCNC: 30 PG
MCHC RBC-ENTMCNC: 32.4 GM/DL
MCV RBC AUTO: 92.7 FL
MICROALBUMIN 24H UR DL<=1MG/L-MCNC: 17.2 MG/DL
MICROALBUMIN/CREAT 24H UR-RTO: 139 MG/G
MICROSCOPIC-UA: NORMAL
MONOCYTES # BLD AUTO: 0.32 K/UL
MONOCYTES NFR BLD AUTO: 5.1 %
NEUTROPHILS # BLD AUTO: 3.89 K/UL
NEUTROPHILS NFR BLD AUTO: 62.6 %
NITRITE URINE: NEGATIVE
NONHDLC SERPL-MCNC: 72 MG/DL
PH URINE: 6
PLATELET # BLD AUTO: 264 K/UL
POTASSIUM SERPL-SCNC: 5 MMOL/L
PROT SERPL-MCNC: 6.8 G/DL
PROTEIN URINE: 30 MG/DL
RBC # BLD: 4.93 M/UL
RBC # FLD: 13.8 %
RED BLOOD CELLS URINE: 3 /HPF
SODIUM SERPL-SCNC: 142 MMOL/L
SPECIFIC GRAVITY URINE: 1.02
TESTOST FREE SERPL-MCNC: 10 PG/ML
TESTOST SERPL-MCNC: 538 NG/DL
TRIGL SERPL-MCNC: 209 MG/DL
TSH SERPL-ACNC: 1.42 UIU/ML
UROBILINOGEN URINE: 0.2 MG/DL
WBC # FLD AUTO: 6.22 K/UL
WHITE BLOOD CELLS URINE: 0 /HPF

## 2024-10-03 ENCOUNTER — RX RENEWAL (OUTPATIENT)
Age: 64
End: 2024-10-03

## 2024-10-21 ENCOUNTER — RX RENEWAL (OUTPATIENT)
Age: 64
End: 2024-10-21

## 2024-11-04 ENCOUNTER — RX RENEWAL (OUTPATIENT)
Age: 64
End: 2024-11-04

## 2024-11-21 ENCOUNTER — RX RENEWAL (OUTPATIENT)
Age: 64
End: 2024-11-21

## 2024-12-05 ENCOUNTER — RX RENEWAL (OUTPATIENT)
Age: 64
End: 2024-12-05

## 2024-12-16 ENCOUNTER — APPOINTMENT (OUTPATIENT)
Dept: WOUND CARE | Facility: HOSPITAL | Age: 64
End: 2024-12-16
Payer: MEDICAID

## 2024-12-16 ENCOUNTER — OUTPATIENT (OUTPATIENT)
Dept: OUTPATIENT SERVICES | Facility: HOSPITAL | Age: 64
LOS: 1 days | Discharge: HOME | End: 2024-12-16
Payer: MEDICAID

## 2024-12-16 VITALS
WEIGHT: 190 LBS | HEIGHT: 69 IN | RESPIRATION RATE: 18 BRPM | HEART RATE: 77 BPM | OXYGEN SATURATION: 97 % | BODY MASS INDEX: 28.14 KG/M2 | TEMPERATURE: 98.2 F | DIASTOLIC BLOOD PRESSURE: 78 MMHG | SYSTOLIC BLOOD PRESSURE: 147 MMHG

## 2024-12-16 DIAGNOSIS — S91.309A UNSPECIFIED OPEN WOUND, UNSPECIFIED FOOT, INITIAL ENCOUNTER: ICD-10-CM

## 2024-12-16 DIAGNOSIS — Z98.890 OTHER SPECIFIED POSTPROCEDURAL STATES: Chronic | ICD-10-CM

## 2024-12-16 DIAGNOSIS — L84 CORNS AND CALLOSITIES: ICD-10-CM

## 2024-12-16 DIAGNOSIS — E11.40 TYPE 2 DIABETES MELLITUS WITH DIABETIC NEUROPATHY, UNSPECIFIED: ICD-10-CM

## 2024-12-16 PROCEDURE — 99214 OFFICE O/P EST MOD 30 MIN: CPT

## 2024-12-16 PROCEDURE — G0463: CPT

## 2024-12-16 NOTE — REVIEW OF SYSTEMS
[Fever] : no fever [Chills] : no chills [Eye Pain] : no eye pain [Loss Of Hearing] : no hearing loss [Shortness Of Breath] : no shortness of breath [Wheezing] : no wheezing [Abdominal Pain] : no abdominal pain [Arthralgias] : arthralgias [Joint Swelling] : joint swelling [Joint Stiffness] : joint stiffness [Skin Lesions] : skin lesion [Skin Wound] : no skin wound [Anxiety] : no anxiety [Easy Bleeding] : no tendency for easy bleeding [FreeTextEntry5] : HTN [FreeTextEntry9] : Gouty arthropathy , left and right foot hammer toes and painful deformity of the digits , bilateral cavus foot deformtiy [de-identified] : s/p right foot  thick , hypertrophic callus sub 1st metatarsal head  [de-identified] : Diabetic neuropathy  [de-identified] : NIDDM , severe gouty arthropathy

## 2024-12-16 NOTE — HISTORY OF PRESENT ILLNESS
[FreeTextEntry1] : Cavus foot deformity bilateral with callus sub 1st metatarsal head right foot , currently no open wounds

## 2024-12-16 NOTE — ASSESSMENT
[] : Yes [Stable] : stable [Home] : Home [Ambulatory] : Ambulatory [Not Applicable - Long Term Care/Home Health Agency] : Long Term Care/Home Health Agency: Not Applicable [Verbal] : Verbal [Written] : Written [Demo] : Demo [Patient] : Patient [Spouse] : Spouse [Good - alert, interested, motivated] : Good - alert, interested, motivated [Verbalizes knowledge/Understanding] : Verbalizes knowledge/understanding [Dressing changes] : dressing changes [Foot Care] : foot care [Skin Care] : skin care [Pressure relief] : pressure relief [Signs and symptoms of infection] : sign and symptoms of infection [Nutrition] : nutrition [How and When to Call] : how and when to call [Pain Management] : pain management [Patient responsibility to plan of care] : patient responsibility to plan of care [Glycemic Control] : glycemic control [FreeTextEntry2] : Infection prevention Pain management Glycemic control Weight reduction Foot and nail care Restore skin integrity Low sodium diet Ambulation safety Regular f/u with primary medical team Hygiene   [FreeTextEntry4] : Pt reports recent (9/2024) HgA1c of 6.0 mg/dL. Pt advised to use Bag Balm at night covered with white sock Orthotic request submitted in the past- pt reports that an orthotic has not been obtained from those requests. Pt states that he will contact his insurance companmy to obtain the name of an Orthotist F/U 2-3 weeks

## 2024-12-16 NOTE — VITALS
[Pain related to present condition?] : The patient's  pain is related to present condition. [Dull] : dull [] : No [de-identified] : "0.5/10"- pt states he does not feel much pain in the feet [FreeTextEntry3] : Right plantar foot [FreeTextEntry2] : walking [FreeTextEntry1] : decreasing pressure on the foot [FreeTextEntry5] : Medication reconciliation completed

## 2024-12-16 NOTE — PHYSICAL EXAM
[1+] : left 1+ [Ankle Swelling (On Exam)] : present [Ankle Swelling Bilaterally] : bilaterally  [Varicose Veins Of Lower Extremities] : bilaterally [Ankle Swelling On The Left] : moderate [] : bilaterally [Ankle Swelling On The Right] : mild [Purpura] : no purpura  [Petechiae] : no petechiae [Skin Ulcer] : no ulcer [Skin Induration] : no induration [Alert] : alert [Oriented to Person] : oriented to person [Oriented to Place] : oriented to place [Calm] : calm [de-identified] : A&Ox3, NAD [de-identified] : HTN [de-identified] : Bilateral third toe hammertoe deformities, flexible.  Right foot status post fifth metatarsal base resection with peroneus brevis tendon transfer to the cuboid with posterior tibial tendon lengthening. [de-identified] : no open wounds,  thick callus sub 1st metatarsal right foot  [de-identified] : Diabetic neuropathy  [de-identified] : Callus removed by the Dpm [FreeTextEntry1] : Right plantar foot [FreeTextEntry2] : 1.5 [FreeTextEntry3] : 1.3 [FreeTextEntry4] : Callus [de-identified] : callused [de-identified] : No product [de-identified] : Mechanically cleansed with 0.9% normal saline and sterile 4x4 gauze  Right Dorsalis Pedis +2     Right Posterior Tibialis +2 Skin Temperature: warm Skin Color: normal for pt Blood flow heard by Doppler: Biphasic [FreeTextEntry7] : Left hallux [FreeTextEntry8] : 0.2 [FreeTextEntry9] : 0.3 [de-identified] : hemorrhagic blister [de-identified] : No product [de-identified] : Mechanically cleansed with 0.9% normal saline and sterile 4x4 gauze  Left Dorsalis Pedis +2  Left Posterior Tibialis +2 Skin temperature: warm Skin color: normal for pt Blood flow heard by Doppler: Biphasic [TWNoteComboBox4] : None [TWNoteComboBox5] : No [TWNoteComboBox6] : Pressure [de-identified] : No [de-identified] : other [de-identified] : None [de-identified] : None [de-identified] : No [de-identified] : None [de-identified] : No [de-identified] : Other [de-identified] : No [de-identified] : Normal [de-identified] : None [de-identified] : None [de-identified] : No

## 2024-12-16 NOTE — PLAN
[FreeTextEntry1] : continue periodic trimming of the calluses , patient would benefit from orthotic control , PTR 3 weeks Patient was told if there are signs of infection ( fever, chills, nausea, vomiting ) or changes in the condition of the wound ( pain , cellulitis , purulent drainage or odor ) they should proceed to the ER as soon as possible Spent 30 minutes for patient care and medical decision making.

## 2024-12-17 DIAGNOSIS — Z87.442 PERSONAL HISTORY OF URINARY CALCULI: ICD-10-CM

## 2024-12-17 DIAGNOSIS — Z98.1 ARTHRODESIS STATUS: ICD-10-CM

## 2024-12-17 DIAGNOSIS — R35.1 NOCTURIA: ICD-10-CM

## 2024-12-17 DIAGNOSIS — E11.40 TYPE 2 DIABETES MELLITUS WITH DIABETIC NEUROPATHY, UNSPECIFIED: ICD-10-CM

## 2024-12-17 DIAGNOSIS — Z79.82 LONG TERM (CURRENT) USE OF ASPIRIN: ICD-10-CM

## 2024-12-17 DIAGNOSIS — N40.1 BENIGN PROSTATIC HYPERPLASIA WITH LOWER URINARY TRACT SYMPTOMS: ICD-10-CM

## 2024-12-17 DIAGNOSIS — N32.81 OVERACTIVE BLADDER: ICD-10-CM

## 2024-12-17 DIAGNOSIS — I10 ESSENTIAL (PRIMARY) HYPERTENSION: ICD-10-CM

## 2024-12-17 DIAGNOSIS — L84 CORNS AND CALLOSITIES: ICD-10-CM

## 2024-12-17 DIAGNOSIS — L97.416 NON-PRESSURE CHRONIC ULCER OF RIGHT HEEL AND MIDFOOT WITH BONE INVOLVEMENT WITHOUT EVIDENCE OF NECROSIS: ICD-10-CM

## 2024-12-17 DIAGNOSIS — Z87.81 PERSONAL HISTORY OF (HEALED) TRAUMATIC FRACTURE: ICD-10-CM

## 2024-12-17 DIAGNOSIS — Z79.84 LONG TERM (CURRENT) USE OF ORAL HYPOGLYCEMIC DRUGS: ICD-10-CM

## 2024-12-17 DIAGNOSIS — Z98.890 OTHER SPECIFIED POSTPROCEDURAL STATES: ICD-10-CM

## 2024-12-17 DIAGNOSIS — E78.1 PURE HYPERGLYCERIDEMIA: ICD-10-CM

## 2024-12-17 DIAGNOSIS — E11.621 TYPE 2 DIABETES MELLITUS WITH FOOT ULCER: ICD-10-CM

## 2024-12-17 DIAGNOSIS — Z79.899 OTHER LONG TERM (CURRENT) DRUG THERAPY: ICD-10-CM

## 2024-12-27 ENCOUNTER — EMERGENCY (EMERGENCY)
Facility: HOSPITAL | Age: 64
LOS: 1 days | End: 2024-12-27

## 2024-12-27 DIAGNOSIS — Z98.890 OTHER SPECIFIED POSTPROCEDURAL STATES: Chronic | ICD-10-CM

## 2024-12-27 PROCEDURE — L9992: CPT

## 2025-01-03 ENCOUNTER — RX RENEWAL (OUTPATIENT)
Age: 65
End: 2025-01-03

## 2025-01-06 ENCOUNTER — APPOINTMENT (OUTPATIENT)
Dept: WOUND CARE | Facility: HOSPITAL | Age: 65
End: 2025-01-06
Payer: MEDICAID

## 2025-01-06 ENCOUNTER — OUTPATIENT (OUTPATIENT)
Dept: OUTPATIENT SERVICES | Facility: HOSPITAL | Age: 65
LOS: 1 days | Discharge: HOME | End: 2025-01-06
Payer: MEDICAID

## 2025-01-06 VITALS
WEIGHT: 190 LBS | RESPIRATION RATE: 18 BRPM | SYSTOLIC BLOOD PRESSURE: 114 MMHG | BODY MASS INDEX: 28.14 KG/M2 | HEIGHT: 69 IN | OXYGEN SATURATION: 95 % | HEART RATE: 88 BPM | TEMPERATURE: 97.9 F | DIASTOLIC BLOOD PRESSURE: 76 MMHG

## 2025-01-06 DIAGNOSIS — E11.40 TYPE 2 DIABETES MELLITUS WITH DIABETIC NEUROPATHY, UNSPECIFIED: ICD-10-CM

## 2025-01-06 DIAGNOSIS — L84 CORNS AND CALLOSITIES: ICD-10-CM

## 2025-01-06 DIAGNOSIS — Z98.890 OTHER SPECIFIED POSTPROCEDURAL STATES: Chronic | ICD-10-CM

## 2025-01-06 DIAGNOSIS — S91.309A UNSPECIFIED OPEN WOUND, UNSPECIFIED FOOT, INITIAL ENCOUNTER: ICD-10-CM

## 2025-01-06 PROCEDURE — 99213 OFFICE O/P EST LOW 20 MIN: CPT

## 2025-01-06 PROCEDURE — G0463: CPT

## 2025-01-06 RX ORDER — TIRZEPATIDE 10 MG/.5ML
10 INJECTION, SOLUTION SUBCUTANEOUS
Qty: 1 | Refills: 1 | Status: ACTIVE | COMMUNITY
Start: 2025-01-03 | End: 1900-01-01

## 2025-01-06 NOTE — PHYSICAL EXAM
[1+] : left 1+ [Ankle Swelling (On Exam)] : present [Ankle Swelling Bilaterally] : bilaterally  [Varicose Veins Of Lower Extremities] : bilaterally [Ankle Swelling On The Left] : moderate [] : bilaterally [Ankle Swelling On The Right] : mild [Purpura] : no purpura  [Petechiae] : no petechiae [Skin Ulcer] : no ulcer [Skin Induration] : no induration [Alert] : alert [Oriented to Person] : oriented to person [Oriented to Place] : oriented to place [Calm] : calm [de-identified] : A&Ox3, NAD [de-identified] : HTN [de-identified] : Bilateral third toe hammertoe deformities, flexible.  Right foot status post fifth metatarsal base resection with peroneus brevis tendon transfer to the cuboid with posterior tibial tendon lengthening. [de-identified] : no open wounds,  thick callus sub 1st metatarsal right foot  [de-identified] : Diabetic neuropathy  [FreeTextEntry1] : Right plantar foot - callus - No open wounds  [de-identified] : Callus  [de-identified] : Mechanically cleansed with sterile gauze and normal saline. Callus shaved by JORGE A  [FreeTextEntry7] : Left hallux - No open wounds  [de-identified] : Mechanically cleansed with sterile gauze and normal saline. No other treatment  [TWNoteComboBox4] : None [de-identified] : other [de-identified] : None [de-identified] : None [de-identified] : No

## 2025-01-06 NOTE — PLAN
[FreeTextEntry1] : Patient happy with the results , patient discharged with full instructions and patient to follow up with all outside physicians . Patient referred to podiatry clinic for routine maintenance Patient was told if there are signs of infection ( fever, chills, nausea, vomiting ) or changes in the condition of the wound ( pain , cellulitis , purulent drainage or odor ) they should proceed to the ER as soon as possible Spent 20 minutes for patient care and medical decision making.

## 2025-01-06 NOTE — REVIEW OF SYSTEMS
[Fever] : no fever [Chills] : no chills [Eye Pain] : no eye pain [Loss Of Hearing] : no hearing loss [Shortness Of Breath] : no shortness of breath [Wheezing] : no wheezing [Abdominal Pain] : no abdominal pain [Arthralgias] : arthralgias [Joint Swelling] : joint swelling [Joint Stiffness] : joint stiffness [Skin Lesions] : skin lesion [Skin Wound] : no skin wound [Anxiety] : no anxiety [Easy Bleeding] : no tendency for easy bleeding [FreeTextEntry5] : HTN [FreeTextEntry9] : Gouty arthropathy , left and right foot hammer toes and painful deformity of the digits , bilateral cavus foot deformtiy [de-identified] : s/p right foot  thick , hypertrophic callus sub 1st metatarsal head  [de-identified] : Diabetic neuropathy  [de-identified] : NIDDM , severe gouty arthropathy

## 2025-01-06 NOTE — ASSESSMENT
[Verbal] : Verbal [Written] : Written [Patient] : Patient [Family member] : Family member [Good - alert, interested, motivated] : Good - alert, interested, motivated [Verbalizes knowledge/Understanding] : Verbalizes knowledge/understanding [Foot Care] : foot care [Skin Care] : skin care [Signs and symptoms of infection] : sign and symptoms of infection [Nutrition] : nutrition [How and When to Call] : how and when to call [Pain Management] : pain management [Off-loading] : off-loading [Discharge Planning] : discharge planning [Patient responsibility to plan of care] : patient responsibility to plan of care [Glycemic Control] : glycemic control [Stable] : stable [Home] : Home [Ambulatory] : Ambulatory [Not Applicable - Long Term Care/Home Health Agency] : Long Term Care/Home Health Agency: Not Applicable [] : No [FreeTextEntry2] : Infection prevention Daily foot checks.   Promote optimal skin care and nutrition. discharge education   [FreeTextEntry4] : Pt to follow up with [facility] pod[facility]ry clinic. Pt to obtain orthotic shoes inserts.  Pt discharged.

## 2025-01-07 DIAGNOSIS — Z79.84 LONG TERM (CURRENT) USE OF ORAL HYPOGLYCEMIC DRUGS: ICD-10-CM

## 2025-01-07 DIAGNOSIS — E78.1 PURE HYPERGLYCERIDEMIA: ICD-10-CM

## 2025-01-07 DIAGNOSIS — Z98.890 OTHER SPECIFIED POSTPROCEDURAL STATES: ICD-10-CM

## 2025-01-07 DIAGNOSIS — I10 ESSENTIAL (PRIMARY) HYPERTENSION: ICD-10-CM

## 2025-01-07 DIAGNOSIS — N32.81 OVERACTIVE BLADDER: ICD-10-CM

## 2025-01-07 DIAGNOSIS — Z79.899 OTHER LONG TERM (CURRENT) DRUG THERAPY: ICD-10-CM

## 2025-01-07 DIAGNOSIS — Z87.81 PERSONAL HISTORY OF (HEALED) TRAUMATIC FRACTURE: ICD-10-CM

## 2025-01-07 DIAGNOSIS — Z79.82 LONG TERM (CURRENT) USE OF ASPIRIN: ICD-10-CM

## 2025-01-07 DIAGNOSIS — R35.1 NOCTURIA: ICD-10-CM

## 2025-01-07 DIAGNOSIS — Z98.1 ARTHRODESIS STATUS: ICD-10-CM

## 2025-01-07 DIAGNOSIS — E11.40 TYPE 2 DIABETES MELLITUS WITH DIABETIC NEUROPATHY, UNSPECIFIED: ICD-10-CM

## 2025-01-07 DIAGNOSIS — N40.1 BENIGN PROSTATIC HYPERPLASIA WITH LOWER URINARY TRACT SYMPTOMS: ICD-10-CM

## 2025-01-07 DIAGNOSIS — L84 CORNS AND CALLOSITIES: ICD-10-CM

## 2025-01-07 DIAGNOSIS — Z87.442 PERSONAL HISTORY OF URINARY CALCULI: ICD-10-CM

## 2025-01-10 ENCOUNTER — APPOINTMENT (OUTPATIENT)
Dept: ORTHOPEDIC SURGERY | Facility: CLINIC | Age: 65
End: 2025-01-10
Payer: MEDICAID

## 2025-01-10 VITALS — HEIGHT: 69 IN | BODY MASS INDEX: 28.14 KG/M2 | WEIGHT: 190 LBS

## 2025-01-10 DIAGNOSIS — M54.50 LOW BACK PAIN, UNSPECIFIED: ICD-10-CM

## 2025-01-10 PROCEDURE — 72170 X-RAY EXAM OF PELVIS: CPT

## 2025-01-10 PROCEDURE — 99203 OFFICE O/P NEW LOW 30 MIN: CPT

## 2025-01-10 PROCEDURE — 72100 X-RAY EXAM L-S SPINE 2/3 VWS: CPT

## 2025-01-10 NOTE — DISCUSSION/SUMMARY
[de-identified] : Axial low back pain no hip pain no pain attributable to his hips he has full hip range of motion with no hip symptoms he complains of low back pain.  Recommend physical therapy.  If not improved consider spine consult

## 2025-01-10 NOTE — PHYSICAL EXAM
[de-identified] : General Exam   Well developed, well nourished No apparent distress Oriented to person, place, and time Mood: Normal Affect: Normal Balance and coordination: Normal Gait: Normal Lumbar Spine Exam  	Inspection: No lesions, no obvious deformity 	Palpation: No midline tenderness palpation, step-offs, or skin lesions. No tenderness to palpation of the sciatic notch bilaterally. Paraspinal tenderness bilaterally 	ROM: Significantly restricted range of motion with respect to flexion, extension, lateral bending, and rotation.  Strength: 5/5 IP/hip abductors/hip adductors/Q/H/EHL/TA/GS 	Hip ROM: No pain with passive internal/external rotation of the hips. Mildly positive straight leg raise 	Sensation: Intact sensation to light touch bilateral lower extremities in L2-S1 distributions.  	Reflexes: [default value] patellar and Achilles reflexes. Downgoing Babinski. [default value] clonus.  	Pulses: 2+ DP and PT pulses. [de-identified] : The following radiographs were ordered and read by me during this patients visit. I reviewed each radiograph in detail with the patient and discussed the findings as highlighted below. AP pelvis AP lateral of her spine were obtained today hip joint spaces are well-maintained.  There is significant arthrosis of the lumbar spine with disc space narrowing osteophytes there is stenosis at L5

## 2025-01-10 NOTE — HISTORY OF PRESENT ILLNESS
[de-identified] :  64-year-old male with a chief complaint of low back pain.  He had severe pain in his lower back yesterday.  The pain was midline and radiated laterally no hip pain no groin pain and no history of hip issues.  He reports a long history of low back pain.  He also has a history of neck pain and a prior cervical fusion.  He was given an appointment today for his right hip for which she has no pain in his hip The patient's past medical history, past surgical history, medications, allergies, and social history were reviewed by me today with the patient and documented accordingly. In addition, the patient's family history, which is noncontributory to this visit, was also reviewed.

## 2025-01-24 NOTE — ED PROVIDER NOTE - IV ALTEPLASE DOOR HIDDEN
Called and left voicemail with Ayla. We are aware that she is currently admitted in the hospital. I cancelled her treatment appointment on Monday and will hold off on cancelling her appointment with Dr. Castro until we see if she gets discharged. Call back number provided if she has any questions   show

## 2025-02-04 ENCOUNTER — NON-APPOINTMENT (OUTPATIENT)
Age: 65
End: 2025-02-04

## 2025-02-04 ENCOUNTER — APPOINTMENT (OUTPATIENT)
Dept: INTERNAL MEDICINE | Facility: CLINIC | Age: 65
End: 2025-02-04
Payer: MEDICAID

## 2025-02-04 VITALS
DIASTOLIC BLOOD PRESSURE: 83 MMHG | OXYGEN SATURATION: 97 % | HEIGHT: 69 IN | HEART RATE: 80 BPM | BODY MASS INDEX: 28.16 KG/M2 | TEMPERATURE: 97.5 F | WEIGHT: 190.13 LBS | SYSTOLIC BLOOD PRESSURE: 138 MMHG

## 2025-02-04 DIAGNOSIS — E66.3 OVERWEIGHT: ICD-10-CM

## 2025-02-04 DIAGNOSIS — E11.65 TYPE 2 DIABETES MELLITUS WITH HYPERGLYCEMIA: ICD-10-CM

## 2025-02-04 DIAGNOSIS — R42 DIZZINESS AND GIDDINESS: ICD-10-CM

## 2025-02-04 DIAGNOSIS — I10 ESSENTIAL (PRIMARY) HYPERTENSION: ICD-10-CM

## 2025-02-04 PROCEDURE — 93000 ELECTROCARDIOGRAM COMPLETE: CPT

## 2025-02-04 PROCEDURE — 99214 OFFICE O/P EST MOD 30 MIN: CPT | Mod: 25

## 2025-02-04 NOTE — HISTORY OF PRESENT ILLNESS
[de-identified] : 64 year old M with PMH multiple medical issues presents for follow up. Complains of dizziness. Pt denies CP/SOB, fever/chills, n/v/d/c.

## 2025-02-04 NOTE — PLAN
[FreeTextEntry1] : Renew meds. Refer to neurology. ECG today. Pt advised to sign up for [facility] portal to review labs and communicate any questions or concerns directly. Yearly physical and return as needed for illness, medication refills, and new or existing complaints. f/u 3 months.

## 2025-03-04 ENCOUNTER — RX RENEWAL (OUTPATIENT)
Age: 65
End: 2025-03-04

## 2025-03-08 ENCOUNTER — RX RENEWAL (OUTPATIENT)
Age: 65
End: 2025-03-08

## 2025-03-31 ENCOUNTER — APPOINTMENT (OUTPATIENT)
Dept: CARDIOLOGY | Facility: CLINIC | Age: 65
End: 2025-03-31

## 2025-03-31 ENCOUNTER — NON-APPOINTMENT (OUTPATIENT)
Age: 65
End: 2025-03-31

## 2025-03-31 VITALS
WEIGHT: 190 LBS | SYSTOLIC BLOOD PRESSURE: 135 MMHG | RESPIRATION RATE: 17 BRPM | HEART RATE: 74 BPM | HEIGHT: 69 IN | OXYGEN SATURATION: 97 % | BODY MASS INDEX: 28.14 KG/M2 | DIASTOLIC BLOOD PRESSURE: 87 MMHG | TEMPERATURE: 97.7 F

## 2025-03-31 DIAGNOSIS — I10 ESSENTIAL (PRIMARY) HYPERTENSION: ICD-10-CM

## 2025-03-31 DIAGNOSIS — R94.31 ABNORMAL ELECTROCARDIOGRAM [ECG] [EKG]: ICD-10-CM

## 2025-03-31 DIAGNOSIS — E78.1 PURE HYPERGLYCERIDEMIA: ICD-10-CM

## 2025-03-31 DIAGNOSIS — E11.65 TYPE 2 DIABETES MELLITUS WITH HYPERGLYCEMIA: ICD-10-CM

## 2025-03-31 PROCEDURE — 93000 ELECTROCARDIOGRAM COMPLETE: CPT

## 2025-03-31 PROCEDURE — 99215 OFFICE O/P EST HI 40 MIN: CPT | Mod: 25

## 2025-04-08 ENCOUNTER — RX RENEWAL (OUTPATIENT)
Age: 65
End: 2025-04-08

## 2025-04-09 ENCOUNTER — APPOINTMENT (OUTPATIENT)
Dept: ORTHOPEDIC SURGERY | Facility: CLINIC | Age: 65
End: 2025-04-09

## 2025-04-09 VITALS — HEIGHT: 69 IN | BODY MASS INDEX: 28.14 KG/M2 | WEIGHT: 190 LBS

## 2025-04-09 DIAGNOSIS — M12.811 OTHER SPECIFIC ARTHROPATHIES, NOT ELSEWHERE CLASSIFIED, RIGHT SHOULDER: ICD-10-CM

## 2025-04-09 DIAGNOSIS — M19.011 PRIMARY OSTEOARTHRITIS, RIGHT SHOULDER: ICD-10-CM

## 2025-04-09 PROCEDURE — 20611 DRAIN/INJ JOINT/BURSA W/US: CPT | Mod: RT

## 2025-04-09 PROCEDURE — 99204 OFFICE O/P NEW MOD 45 MIN: CPT | Mod: 25

## 2025-04-09 PROCEDURE — 73030 X-RAY EXAM OF SHOULDER: CPT | Mod: RT

## 2025-04-09 RX ORDER — METHYLPREDNISOLONE ACETATE 40 MG/ML
40 INJECTION, SUSPENSION INTRA-ARTICULAR; INTRALESIONAL; INTRAMUSCULAR; SOFT TISSUE
Refills: 0 | Status: COMPLETED | OUTPATIENT
Start: 2025-04-09

## 2025-04-09 RX ADMIN — METHYLPREDNISOLONE ACETATE MG/ML: 40 INJECTION, SUSPENSION INTRA-ARTICULAR; INTRALESIONAL; INTRAMUSCULAR; SOFT TISSUE at 00:00

## 2025-04-09 NOTE — HISTORY OF PRESENT ILLNESS
[Work related] : work related [6] : 6 [0] : 0 [Sharp] : sharp [Shooting] : shooting [Intermittent] : intermittent [Household chores] : household chores [Leisure] : leisure [Sleep] : sleep [Rest] : rest [Extending back] : extending back [Full time] : Work status: full time [Right Arm] : right arm [de-identified] : Patient is a 64-year-old right-hand-dominant male who injured his right shoulder starting a leaf blower at work.  He had onset of pain right shoulder.  No pop or snap.  His pain has been increasing.  He has mild to moderate pain right shoulder reaching above him and behind him.  Occasional awakening from sleep at night.  Taking ibuprofen 600 mg as needed.  No neck pain.  No recent problems with his shoulder. He works doing maintenance at [employer] [] : Post Surgical Visit: no [FreeTextEntry1] : Right shoulder [FreeTextEntry3] : 4/04/25 [de-identified] : Certain movements

## 2025-04-09 NOTE — HISTORY OF PRESENT ILLNESS
[FreeTextEntry1] : 64M with CAD HTN DM presents for f/u PMD: Dr LANCE Fernández  previously, last seen 5/23, no complaints at that visit. tte and exercise stress done at that time without gross abnormalities.  pt now presents for follow up. today,  pt denies CP, SOB, at rest or on exertion. Denies palpitations, dizziness, diaphoresis, syncope, LE edema, orthopnea no recent falls  Exercise: work as [employer], always walking, lifting things Diet: none  Prev cardiac history: CAD Previous cardiac testing: none Recent labs:  EKG: SR 73  Med hx: CAD DM gout kidney stones, HTN Sx hx: neck, shoulder  Family hx: F: CAD Social hx: lives in [place] with wife. 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

## 2025-04-09 NOTE — IMAGING
[Right] : right shoulder [de-identified] : Cervical spine Inspection-healed posterior incision Active range of motion-moderately decreased active motion Palpation-no tenderness Negative foraminal closing test bilaterally  Shoulder-right Inspection-slight swelling Palpation-slight effusion.  Mild tenderness anterior and posterior shoulder Active range of motion-active forward flexion 160 degrees, external rotation 60 degrees, internal rotation lower lumbar region Supraspinatus strength 5 -/5 Radial pulse 2+ [FreeTextEntry1] : Reviewed and interpreted.  Right shoulder external rotation outlet views-rotator cuff arthropathy.  Moderate degenerative changes of the glenohumeral joint

## 2025-04-09 NOTE — HISTORY OF PRESENT ILLNESS
[Work related] : work related [6] : 6 [0] : 0 [Sharp] : sharp [Shooting] : shooting [Intermittent] : intermittent [Household chores] : household chores [Leisure] : leisure [Sleep] : sleep [Rest] : rest [Extending back] : extending back [Full time] : Work status: full time [Right Arm] : right arm [de-identified] : Patient is a 64-year-old right-hand-dominant male who injured his right shoulder starting a leaf blower at work.  He had onset of pain right shoulder.  No pop or snap.  His pain has been increasing.  He has mild to moderate pain right shoulder reaching above him and behind him.  Occasional awakening from sleep at night.  Taking ibuprofen 600 mg as needed.  No neck pain.  No recent problems with his shoulder. He works doing maintenance at [employer] [] : Post Surgical Visit: no [FreeTextEntry1] : Right shoulder [FreeTextEntry3] : 4/04/25 [de-identified] : Certain movements

## 2025-04-09 NOTE — HISTORY OF PRESENT ILLNESS
[FreeTextEntry1] : 64M with CAD HTN DM presents for f/u PMD: Dr LANCE Fernández  previously, last seen 5/23, no complaints at that visit. tte and exercise stress done at that time without gross abnormalities.  pt now presents for follow up. today,  pt denies CP, SOB, at rest or on exertion. Denies palpitations, dizziness, diaphoresis, syncope, LE edema, orthopnea no recent falls  Exercise: work as hsu, always walking, lifting things Diet: none  Prev cardiac history: CAD Previous cardiac testing: none Recent labs:  EKG: SR 73  Med hx: CAD DM gout kidney stones, HTN Sx hx: neck, shoulder  Family hx: F: CAD Social hx: lives in [place] with wife. 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

## 2025-04-09 NOTE — DISCUSSION/SUMMARY
[Medication Risks Reviewed] : Medication risks reviewed [de-identified] : Ice prn He will avoid irritating activities His blood sugar is controlled.  He does not need to monitor it on a daily basis I offered him a cortisone injection right shoulder today. Risks including infection, swelling, stiffness, bleeding in addition to other associated risks with joint injection, benefits and alternatives were discussed with the patient He wanted to do this I discussed Euflexxa injections for his shoulder and gave him a pamphlet. Risks including infection, swelling, stiffness, bleeding in addition to other associated risks with joint injection, benefits and alternatives were discussed with the patient He would like to do this as well Tylenol prn Continue ibuprofen 600 mg 3 times daily with food prn.  He takes this sparingly. Risk benefits and alternatives of prescribed medication(s) were discussed with the patient. Side effects were discussed including risks of GI irritation and bleeding. Questions were answered. Discontinue medication if any issues. To call me if any questions or concerns  Impression: Status post work injury right shoulder April 4, 2025 Rotator cuff arthropathy/moderate osteoarthritis, previously asymptomatic

## 2025-04-09 NOTE — REVIEW OF SYSTEMS
[Fever] : no fever [Headache] : no headache [Weight Gain (___ Lbs)] : no recent weight gain [Chills] : no chills [Feeling Fatigued] : not feeling fatigued [Weight Loss (___ Lbs)] : no recent weight loss [Blurry Vision] : no blurred vision [Sore Throat] : no sore throat [SOB] : no shortness of breath [Dyspnea on exertion] : not dyspnea during exertion [Chest Discomfort] : no chest discomfort [Lower Ext Edema] : no extremity edema [Palpitations] : no palpitations [Orthopnea] : no orthopnea [Syncope] : no syncope [Nausea] : no nausea [Vomiting] : no vomiting [Joint Pain] : no joint pain [Dizziness] : no dizziness [Confusion] : no confusion was observed [Easy Bleeding] : no tendency for easy bleeding [Easy Bruising] : no tendency for easy bruising

## 2025-04-09 NOTE — WORK
[Sprain/Strain] : sprain/strain [Was the competent medical cause of the injury] : was the competent medical cause of the injury [Are consistent with the injury] : are consistent with the injury [Consistent with my objective findings] : consistent with my objective findings [Moderate Partial] : moderate partial [At the pre-injury job] : At the pre-injury job

## 2025-04-09 NOTE — HISTORY OF PRESENT ILLNESS
[FreeTextEntry1] : 64M with CAD HTN DM presents for f/u PMD: Dr LANCE Fernández  previously, last seen 5/23, no complaints at that visit. tte and exercise stress done at that time without gross abnormalities.  pt now presents for follow up. today,  pt denies CP, SOB, at rest or on exertion. Denies palpitations, dizziness, diaphoresis, syncope, LE edema, orthopnea no recent falls  Exercise: work as [employer], always walking, lifting things Diet: none  Prev cardiac history: CAD Previous cardiac testing: none Recent labs:  EKG: SR 73  Med hx: CAD DM gout kidney stones, HTN Sx hx: neck, shoulder  Family hx: F: CAD Social hx: lives in [place] with wife. 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

## 2025-04-09 NOTE — IMAGING
[Right] : right shoulder [de-identified] : Cervical spine Inspection-healed posterior incision Active range of motion-moderately decreased active motion Palpation-no tenderness Negative foraminal closing test bilaterally  Shoulder-right Inspection-slight swelling Palpation-slight effusion.  Mild tenderness anterior and posterior shoulder Active range of motion-active forward flexion 160 degrees, external rotation 60 degrees, internal rotation lower lumbar region Supraspinatus strength 5 -/5 Radial pulse 2+ [FreeTextEntry1] : Reviewed and interpreted.  Right shoulder external rotation outlet views-rotator cuff arthropathy.  Moderate degenerative changes of the glenohumeral joint

## 2025-04-09 NOTE — DISCUSSION/SUMMARY
[Medication Risks Reviewed] : Medication risks reviewed [de-identified] : Ice prn He will avoid irritating activities His blood sugar is controlled.  He does not need to monitor it on a daily basis I offered him a cortisone injection right shoulder today. Risks including infection, swelling, stiffness, bleeding in addition to other associated risks with joint injection, benefits and alternatives were discussed with the patient He wanted to do this I discussed Euflexxa injections for his shoulder and gave him a pamphlet. Risks including infection, swelling, stiffness, bleeding in addition to other associated risks with joint injection, benefits and alternatives were discussed with the patient He would like to do this as well Tylenol prn Continue ibuprofen 600 mg 3 times daily with food prn.  He takes this sparingly. Risk benefits and alternatives of prescribed medication(s) were discussed with the patient. Side effects were discussed including risks of GI irritation and bleeding. Questions were answered. Discontinue medication if any issues. To call me if any questions or concerns  Impression: Status post work injury right shoulder April 4, 2025 Rotator cuff arthropathy/moderate osteoarthritis, previously asymptomatic

## 2025-04-09 NOTE — PROCEDURE
[Large Joint Injection] : Large joint injection [Glenohumeral Joint] : glenohumeral joint [Pain] : pain [Alcohol] : alcohol [Betadine] : betadine [Ethyl Chloride sprayed topically] : ethyl chloride sprayed topically [Sterile technique used] : sterile technique used [___ cc    1%] : Lidocaine ~Vcc of 1%  [___ cc    40mg] : Methylprednisolone (Depomedrol) ~Vcc of 40 mg  [] : Patient tolerated procedure well [Patient was advised to rest the joint(s) for ____ days] : patient was advised to rest the joint(s) for [unfilled] days [Risks, benefits, alternatives discussed / Verbal consent obtained] : the risks benefits, and alternatives have been discussed, and verbal consent was obtained [Glenohmeral injection] : glenohumeral injection [All ultrasound images have been permanently captured and stored accordingly in our picture archiving and communication system] : All ultrasound images have been permanently captured and stored accordingly in our picture archiving and communication system [FreeTextEntry3] : Do not submerge underwater for 24 hours

## 2025-04-09 NOTE — DISCUSSION/SUMMARY
[EKG obtained to assist in diagnosis and management of assessed problem(s)] : EKG obtained to assist in diagnosis and management of assessed problem(s) [FreeTextEntry1] : 64M with CAD HTN DM presents for f/u  feeling well denies cp sob at rest or on exertion prev testing reviewed  HTN -cont losartan and norvasc   f/u 6 months unless required sooner I have spent 40 minutes of time on the encounter which excludes teaching and separately reported services.

## 2025-05-01 ENCOUNTER — RX RENEWAL (OUTPATIENT)
Age: 65
End: 2025-05-01

## 2025-05-13 ENCOUNTER — APPOINTMENT (OUTPATIENT)
Dept: INTERNAL MEDICINE | Facility: CLINIC | Age: 65
End: 2025-05-13

## 2025-05-19 ENCOUNTER — APPOINTMENT (OUTPATIENT)
Dept: ORTHOPEDIC SURGERY | Facility: CLINIC | Age: 65
End: 2025-05-19
Payer: OTHER MISCELLANEOUS

## 2025-05-19 VITALS — HEIGHT: 69 IN | WEIGHT: 190 LBS | BODY MASS INDEX: 28.14 KG/M2

## 2025-05-19 PROCEDURE — 20611 DRAIN/INJ JOINT/BURSA W/US: CPT | Mod: RT

## 2025-05-19 PROCEDURE — 99213 OFFICE O/P EST LOW 20 MIN: CPT | Mod: 25

## 2025-05-19 RX ORDER — HYALURONATE SODIUM 20 MG/2 ML
20 SYRINGE (ML) INTRAARTICULAR
Refills: 0 | Status: COMPLETED | OUTPATIENT
Start: 2025-05-19

## 2025-05-19 RX ADMIN — Medication MG/2ML: at 00:00

## 2025-05-19 NOTE — PROCEDURE
[Large Joint Injection] : Large joint injection [Glenohumeral Joint] : glenohumeral joint [Pain] : pain [Alcohol] : alcohol [Betadine] : betadine [Ethyl Chloride sprayed topically] : ethyl chloride sprayed topically [Sterile technique used] : sterile technique used [] : Patient tolerated procedure well [Patient was advised to rest the joint(s) for ____ days] : patient was advised to rest the joint(s) for [unfilled] days [Risks, benefits, alternatives discussed / Verbal consent obtained] : the risks benefits, and alternatives have been discussed, and verbal consent was obtained [Glenohmeral injection] : glenohumeral injection [All ultrasound images have been permanently captured and stored accordingly in our picture archiving and communication system] : All ultrasound images have been permanently captured and stored accordingly in our picture archiving and communication system [Right] : of the right [Euflexxa(20mg)] : 20mg of Euflexxa [#1] : series #1 [FreeTextEntry3] : Do not submerge underwater for 24 hours

## 2025-05-19 NOTE — DISCUSSION/SUMMARY
[Medication Risks Reviewed] : Medication risks reviewed [de-identified] : Ice prn He will avoid irritating activities His blood sugar is controlled.  He does not need to monitor it on a daily basis I discussed Euflexxa injections for his shoulder and gave him a pamphlet. Risks including infection, swelling, stiffness, bleeding in addition to other associated risks with joint injection, benefits and alternatives were discussed with the patient He would like to do this Tylenol prn Continue ibuprofen 600 mg 3 times daily with food prn.  He takes this sparingly. Risk benefits and alternatives of prescribed medication(s) were discussed with the patient. Side effects were discussed including risks of GI irritation and bleeding. Questions were answered. Discontinue medication if any issues. To call me if any questions or concerns  Impression: Status post work injury right shoulder April 4, 2025 Rotator cuff arthropathy/moderate osteoarthritis, previously asymptomatic

## 2025-05-19 NOTE — HISTORY OF PRESENT ILLNESS
[Right Arm] : right arm [Work related] : work related [6] : 6 [0] : 0 [Sharp] : sharp [Shooting] : shooting [Intermittent] : intermittent [Household chores] : household chores [Leisure] : leisure [Sleep] : sleep [Rest] : rest [Extending back] : extending back [Full time] : Work status: full time [1] : 1 [Euflexxa] : Euflexxa [de-identified] : Date of injury April 4, 2025 The patient feels better after cortisone injection right shoulder last visit.  He has mild pain reaching above him and behind him.  No awakening from sleep at night [] : Post Surgical Visit: no [FreeTextEntry1] : Right shoulder [FreeTextEntry3] : 4/04/25 [de-identified] : Certain movements [de-identified] : 4/9/2025 [de-identified] : Dr. Bell [de-identified] : 4/9/2025 [de-identified] : R Shoulder

## 2025-05-19 NOTE — IMAGING
[Right] : right shoulder [FreeTextEntry1] : Reviewed and interpreted.  Right shoulder external rotation outlet views-rotator cuff arthropathy.  Moderate degenerative changes of the glenohumeral joint [de-identified] :  Shoulder-right Inspection-slight swelling Palpation-slight effusion.  Mild tenderness anterior and posterior shoulder Active range of motion-active forward flexion 160 degrees, external rotation 60 degrees, internal rotation lower lumbar region

## 2025-05-27 ENCOUNTER — APPOINTMENT (OUTPATIENT)
Dept: ORTHOPEDIC SURGERY | Facility: CLINIC | Age: 65
End: 2025-05-27
Payer: OTHER MISCELLANEOUS

## 2025-05-27 VITALS — HEIGHT: 69 IN | BODY MASS INDEX: 28.14 KG/M2 | WEIGHT: 190 LBS

## 2025-05-27 PROCEDURE — 99213 OFFICE O/P EST LOW 20 MIN: CPT | Mod: 25

## 2025-05-27 PROCEDURE — 20611 DRAIN/INJ JOINT/BURSA W/US: CPT | Mod: RT

## 2025-05-27 RX ORDER — HYALURONATE SODIUM 20 MG/2 ML
20 SYRINGE (ML) INTRAARTICULAR
Refills: 0 | Status: COMPLETED | OUTPATIENT
Start: 2025-05-27

## 2025-05-27 RX ADMIN — Medication MG/2ML: at 00:00

## 2025-05-27 NOTE — IMAGING
[de-identified] : Shoulder-right Inspection-no swelling Palpation-Mild tenderness anterior and posterior shoulder Active range of motion-active forward flexion 160 degrees, external rotation 60 degrees, internal rotation lower lumbar region

## 2025-05-27 NOTE — PROCEDURE
[Large Joint Injection] : Large joint injection [Right] : of the right [Glenohumeral Joint] : glenohumeral joint [Pain] : pain [Alcohol] : alcohol [Betadine] : betadine [Ethyl Chloride sprayed topically] : ethyl chloride sprayed topically [Sterile technique used] : sterile technique used [Euflexxa(20mg)] : 20mg of Euflexxa [] : Patient tolerated procedure well [Patient was advised to rest the joint(s) for ____ days] : patient was advised to rest the joint(s) for [unfilled] days [Risks, benefits, alternatives discussed / Verbal consent obtained] : the risks benefits, and alternatives have been discussed, and verbal consent was obtained [Glenohmeral injection] : glenohumeral injection [All ultrasound images have been permanently captured and stored accordingly in our picture archiving and communication system] : All ultrasound images have been permanently captured and stored accordingly in our picture archiving and communication system [#2] : series #2 [FreeTextEntry3] : Do not submerge underwater for 24 hours

## 2025-05-27 NOTE — HISTORY OF PRESENT ILLNESS
[Right Arm] : right arm [Work related] : work related [6] : 6 [0] : 0 [Sharp] : sharp [Shooting] : shooting [Intermittent] : intermittent [Household chores] : household chores [Leisure] : leisure [Sleep] : sleep [Rest] : rest [Extending back] : extending back [Full time] : Work status: full time [2] : 2 [Euflexxa] : Euflexxa [de-identified] : Date of injury April 4, 2025 The patient feels better after the first Euflexxa injection right shoulder.  He has mild pain reaching above him and behind him.  No awakening from sleep at night [] : Post Surgical Visit: no [FreeTextEntry1] : Right shoulder [FreeTextEntry3] : 4/04/25 [de-identified] : Certain movements [de-identified] : 4/9/2025 [de-identified] : Dr. Bell [de-identified] : 5/16/2025 [de-identified] : R Shoulder

## 2025-05-27 NOTE — DISCUSSION/SUMMARY
[Medication Risks Reviewed] : Medication risks reviewed [de-identified] : Ice prn He will avoid irritating activities Tylenol prn Continue ibuprofen 600 mg 3 times daily with food prn.  He takes this sparingly. Risk benefits and alternatives of prescribed medication(s) were discussed with the patient. Side effects were discussed including risks of GI irritation and bleeding. Questions were answered. Discontinue medication if any issues. To call me if any questions or concerns  Impression: Status post work injury right shoulder April 4, 2025 Rotator cuff arthropathy/moderate osteoarthritis, previously asymptomatic

## 2025-06-02 ENCOUNTER — APPOINTMENT (OUTPATIENT)
Dept: ORTHOPEDIC SURGERY | Facility: CLINIC | Age: 65
End: 2025-06-02
Payer: OTHER MISCELLANEOUS

## 2025-06-02 DIAGNOSIS — M19.011 PRIMARY OSTEOARTHRITIS, RIGHT SHOULDER: ICD-10-CM

## 2025-06-02 PROCEDURE — 99213 OFFICE O/P EST LOW 20 MIN: CPT | Mod: 25

## 2025-06-02 PROCEDURE — 20611 DRAIN/INJ JOINT/BURSA W/US: CPT | Mod: RT

## 2025-06-02 RX ORDER — HYALURONATE SODIUM 20 MG/2 ML
20 SYRINGE (ML) INTRAARTICULAR
Refills: 0 | Status: COMPLETED | OUTPATIENT
Start: 2025-06-02

## 2025-06-02 RX ADMIN — Medication MG/2ML: at 00:00

## 2025-06-02 NOTE — HISTORY OF PRESENT ILLNESS
[Right Arm] : right arm [Work related] : work related [6] : 6 [0] : 0 [Sharp] : sharp [Shooting] : shooting [Intermittent] : intermittent [Household chores] : household chores [Leisure] : leisure [Sleep] : sleep [Rest] : rest [Extending back] : extending back [Full time] : Work status: full time [3] : 3 [Euflexxa] : Euflexxa [] : yes

## 2025-06-02 NOTE — PROCEDURE
[Large Joint Injection] : Large joint injection [Right] : of the right [Glenohumeral Joint] : glenohumeral joint [Pain] : pain [Alcohol] : alcohol [Betadine] : betadine [Ethyl Chloride sprayed topically] : ethyl chloride sprayed topically [Sterile technique used] : sterile technique used [Euflexxa(20mg)] : 20mg of Euflexxa [] : Patient tolerated procedure well [Patient was advised to rest the joint(s) for ____ days] : patient was advised to rest the joint(s) for [unfilled] days [Risks, benefits, alternatives discussed / Verbal consent obtained] : the risks benefits, and alternatives have been discussed, and verbal consent was obtained [Glenohmeral injection] : glenohumeral injection [All ultrasound images have been permanently captured and stored accordingly in our picture archiving and communication system] : All ultrasound images have been permanently captured and stored accordingly in our picture archiving and communication system

## 2025-06-05 ENCOUNTER — EMERGENCY (EMERGENCY)
Facility: HOSPITAL | Age: 65
LOS: 1 days | End: 2025-06-05
Attending: STUDENT IN AN ORGANIZED HEALTH CARE EDUCATION/TRAINING PROGRAM | Admitting: STUDENT IN AN ORGANIZED HEALTH CARE EDUCATION/TRAINING PROGRAM
Payer: MEDICAID

## 2025-06-05 VITALS
WEIGHT: 195.11 LBS | TEMPERATURE: 98 F | DIASTOLIC BLOOD PRESSURE: 80 MMHG | HEIGHT: 69 IN | RESPIRATION RATE: 18 BRPM | SYSTOLIC BLOOD PRESSURE: 139 MMHG | OXYGEN SATURATION: 96 % | HEART RATE: 94 BPM

## 2025-06-05 VITALS
OXYGEN SATURATION: 97 % | RESPIRATION RATE: 18 BRPM | DIASTOLIC BLOOD PRESSURE: 81 MMHG | HEART RATE: 84 BPM | TEMPERATURE: 97 F | SYSTOLIC BLOOD PRESSURE: 129 MMHG

## 2025-06-05 DIAGNOSIS — E11.621 TYPE 2 DIABETES MELLITUS WITH FOOT ULCER: ICD-10-CM

## 2025-06-05 DIAGNOSIS — Z98.890 OTHER SPECIFIED POSTPROCEDURAL STATES: Chronic | ICD-10-CM

## 2025-06-05 LAB
ALBUMIN SERPL ELPH-MCNC: 3.4 G/DL — SIGNIFICANT CHANGE UP (ref 3.3–5)
ALP SERPL-CCNC: 99 U/L — SIGNIFICANT CHANGE UP (ref 40–120)
ALT FLD-CCNC: 31 U/L — SIGNIFICANT CHANGE UP (ref 12–78)
ANION GAP SERPL CALC-SCNC: 8 MMOL/L — SIGNIFICANT CHANGE UP (ref 5–17)
APTT BLD: 29.9 SEC — SIGNIFICANT CHANGE UP (ref 26.1–36.8)
AST SERPL-CCNC: 29 U/L — SIGNIFICANT CHANGE UP (ref 15–37)
BASOPHILS # BLD AUTO: 0.04 K/UL — SIGNIFICANT CHANGE UP (ref 0–0.2)
BASOPHILS NFR BLD AUTO: 0.6 % — SIGNIFICANT CHANGE UP (ref 0–2)
BILIRUB SERPL-MCNC: 0.8 MG/DL — SIGNIFICANT CHANGE UP (ref 0.2–1.2)
BUN SERPL-MCNC: 22 MG/DL — SIGNIFICANT CHANGE UP (ref 7–23)
CALCIUM SERPL-MCNC: 8.8 MG/DL — SIGNIFICANT CHANGE UP (ref 8.5–10.1)
CHLORIDE SERPL-SCNC: 109 MMOL/L — HIGH (ref 96–108)
CO2 SERPL-SCNC: 23 MMOL/L — SIGNIFICANT CHANGE UP (ref 22–31)
CREAT SERPL-MCNC: 1.3 MG/DL — SIGNIFICANT CHANGE UP (ref 0.5–1.3)
EGFR: 62 ML/MIN/1.73M2 — SIGNIFICANT CHANGE UP
EGFR: 62 ML/MIN/1.73M2 — SIGNIFICANT CHANGE UP
EOSINOPHIL # BLD AUTO: 0.26 K/UL — SIGNIFICANT CHANGE UP (ref 0–0.5)
EOSINOPHIL NFR BLD AUTO: 4 % — SIGNIFICANT CHANGE UP (ref 0–6)
GLUCOSE SERPL-MCNC: 136 MG/DL — HIGH (ref 70–99)
HCT VFR BLD CALC: 41.2 % — SIGNIFICANT CHANGE UP (ref 39–50)
HGB BLD-MCNC: 14.4 G/DL — SIGNIFICANT CHANGE UP (ref 13–17)
IMM GRANULOCYTES NFR BLD AUTO: 0.3 % — SIGNIFICANT CHANGE UP (ref 0–0.9)
INR BLD: 0.97 RATIO — SIGNIFICANT CHANGE UP (ref 0.85–1.16)
LACTATE SERPL-SCNC: 1.1 MMOL/L — SIGNIFICANT CHANGE UP (ref 0.7–2)
LACTATE SERPL-SCNC: 2.3 MMOL/L — HIGH (ref 0.7–2)
LYMPHOCYTES # BLD AUTO: 1.18 K/UL — SIGNIFICANT CHANGE UP (ref 1–3.3)
LYMPHOCYTES # BLD AUTO: 18.1 % — SIGNIFICANT CHANGE UP (ref 13–44)
MCHC RBC-ENTMCNC: 30.8 PG — SIGNIFICANT CHANGE UP (ref 27–34)
MCHC RBC-ENTMCNC: 35 G/DL — SIGNIFICANT CHANGE UP (ref 32–36)
MCV RBC AUTO: 88 FL — SIGNIFICANT CHANGE UP (ref 80–100)
MONOCYTES # BLD AUTO: 0.43 K/UL — SIGNIFICANT CHANGE UP (ref 0–0.9)
MONOCYTES NFR BLD AUTO: 6.6 % — SIGNIFICANT CHANGE UP (ref 2–14)
NEUTROPHILS # BLD AUTO: 4.58 K/UL — SIGNIFICANT CHANGE UP (ref 1.8–7.4)
NEUTROPHILS NFR BLD AUTO: 70.4 % — SIGNIFICANT CHANGE UP (ref 43–77)
NRBC BLD AUTO-RTO: 0 /100 WBCS — SIGNIFICANT CHANGE UP (ref 0–0)
PLATELET # BLD AUTO: 206 K/UL — SIGNIFICANT CHANGE UP (ref 150–400)
POTASSIUM SERPL-MCNC: 3.8 MMOL/L — SIGNIFICANT CHANGE UP (ref 3.5–5.3)
POTASSIUM SERPL-SCNC: 3.8 MMOL/L — SIGNIFICANT CHANGE UP (ref 3.5–5.3)
PROT SERPL-MCNC: 6.8 G/DL — SIGNIFICANT CHANGE UP (ref 6–8.3)
PROTHROM AB SERPL-ACNC: 11.5 SEC — SIGNIFICANT CHANGE UP (ref 9.9–13.4)
RBC # BLD: 4.68 M/UL — SIGNIFICANT CHANGE UP (ref 4.2–5.8)
RBC # FLD: 12.9 % — SIGNIFICANT CHANGE UP (ref 10.3–14.5)
SODIUM SERPL-SCNC: 140 MMOL/L — SIGNIFICANT CHANGE UP (ref 135–145)
WBC # BLD: 6.51 K/UL — SIGNIFICANT CHANGE UP (ref 3.8–10.5)
WBC # FLD AUTO: 6.51 K/UL — SIGNIFICANT CHANGE UP (ref 3.8–10.5)

## 2025-06-05 PROCEDURE — 80053 COMPREHEN METABOLIC PANEL: CPT

## 2025-06-05 PROCEDURE — 96366 THER/PROPH/DIAG IV INF ADDON: CPT

## 2025-06-05 PROCEDURE — 87040 BLOOD CULTURE FOR BACTERIA: CPT

## 2025-06-05 PROCEDURE — 85730 THROMBOPLASTIN TIME PARTIAL: CPT

## 2025-06-05 PROCEDURE — 99283 EMERGENCY DEPT VISIT LOW MDM: CPT

## 2025-06-05 PROCEDURE — 11042 DBRDMT SUBQ TIS 1ST 20SQCM/<: CPT

## 2025-06-05 PROCEDURE — 87186 SC STD MICRODIL/AGAR DIL: CPT

## 2025-06-05 PROCEDURE — 85025 COMPLETE CBC W/AUTO DIFF WBC: CPT

## 2025-06-05 PROCEDURE — 87077 CULTURE AEROBIC IDENTIFY: CPT

## 2025-06-05 PROCEDURE — 99285 EMERGENCY DEPT VISIT HI MDM: CPT

## 2025-06-05 PROCEDURE — 85610 PROTHROMBIN TIME: CPT

## 2025-06-05 PROCEDURE — 87070 CULTURE OTHR SPECIMN AEROBIC: CPT

## 2025-06-05 PROCEDURE — 83605 ASSAY OF LACTIC ACID: CPT

## 2025-06-05 PROCEDURE — 73630 X-RAY EXAM OF FOOT: CPT

## 2025-06-05 PROCEDURE — 36415 COLL VENOUS BLD VENIPUNCTURE: CPT

## 2025-06-05 PROCEDURE — 99285 EMERGENCY DEPT VISIT HI MDM: CPT | Mod: 25

## 2025-06-05 PROCEDURE — 73630 X-RAY EXAM OF FOOT: CPT | Mod: 26,RT

## 2025-06-05 PROCEDURE — 96365 THER/PROPH/DIAG IV INF INIT: CPT | Mod: XU

## 2025-06-05 PROCEDURE — 93005 ELECTROCARDIOGRAM TRACING: CPT

## 2025-06-05 PROCEDURE — 93010 ELECTROCARDIOGRAM REPORT: CPT | Mod: 76

## 2025-06-05 RX ORDER — CEFADROXIL 500 MG/1
1 CAPSULE ORAL
Qty: 14 | Refills: 0
Start: 2025-06-05 | End: 2025-06-11

## 2025-06-05 RX ORDER — PIPERACILLIN-TAZO-DEXTROSE,ISO 3.375G/5
3.38 IV SOLUTION, PIGGYBACK PREMIX FROZEN(ML) INTRAVENOUS ONCE
Refills: 0 | Status: COMPLETED | OUTPATIENT
Start: 2025-06-05 | End: 2025-06-05

## 2025-06-05 RX ADMIN — Medication 200 GRAM(S): at 17:54

## 2025-06-05 RX ADMIN — Medication 3.38 GRAM(S): at 19:38

## 2025-06-05 RX ADMIN — Medication 1000 MILLILITER(S): at 16:57

## 2025-06-05 RX ADMIN — Medication 1000 MILLILITER(S): at 18:11

## 2025-06-05 RX ADMIN — Medication 1000 MILLILITER(S): at 19:38

## 2025-06-05 NOTE — ED ADULT NURSE NOTE - NSFALLUNIVINTERV_ED_ALL_ED
Bed/Stretcher in lowest position, wheels locked, appropriate side rails in place/Call bell, personal items and telephone in reach/Instruct patient to call for assistance before getting out of bed/chair/stretcher/Non-slip footwear applied when patient is off stretcher/[place] to call system/Physically safe environment - no spills, clutter or unnecessary equipment/Purposeful proactive rounding/Room/bathroom lighting operational, light cord in reach

## 2025-06-05 NOTE — ED PROVIDER NOTE - PROGRESS NOTE DETAILS
Podiatry resident Rasta evaluated patient in ED and debrided wound. Results discussed with Dr. Green as well, recommend close outpatient follow up and dc on duricef 500 mg BID. + mildly elevated lactate, will add on sepsis fluids and repeat lactate

## 2025-06-05 NOTE — CONSULT NOTE ADULT - PROBLEM SELECTOR RECOMMENDATION 9
Discussed diagnosis and treatment with patient. All questions asked and answered for patient and patient's family satisfaction    Patient's symptoms consistent with right foot blister / DM foot ulcer  X-ray reviewed without emphysematous changes. No acute pathology noted   Ordered and performed excisional debridement on right foot hyperkeratotic lesion/ blister at level of skin and subcutaneous tissue. Concern pt has chronic DM foot ulcer underneath , negative probe to bone  Obtained cultures right foot and sent to pathology    Recs a course of abx as per ED  Recs elevation on right lower leg, partial weight bearing on right foot  Pt understands that surgical intervention maybe required at a later date if symptoms due not remit  Patient will follow up with Dr. Green at [facility] on Monday.  Patient is instructed to go to ER if symptoms get worse  Discussed with attending who agrees

## 2025-06-05 NOTE — ED PROVIDER NOTE - PATIENT PORTAL LINK FT
You can access the FollowMyHealth Patient Portal offered by [facility] by registering at the following website: [email or web address]. By joining [employer]’s FollowMyHealth portal, you will also be able to view your health information using other applications (apps) compatible with our system.

## 2025-06-05 NOTE — ED PROVIDER NOTE - OBJECTIVE STATEMENT
64-year-old male with history of diabetes, gout, kidney stones presents to the ED complaining of wound to the right foot.  Patient with chronic callus to plantar aspect of foot however over the past week pain has worsened with swelling and redness.  Denies fever, chills, trauma to the foot, lower extremity weakness or paresthesias.  No discharge from wound.     podiatrist Dr. Green.

## 2025-06-05 NOTE — ED PROVIDER NOTE - CLINICAL SUMMARY MEDICAL DECISION MAKING FREE TEXT BOX
Here with worsening foot wound with streaking erythema on exam and hematoma under callus. differential diagnosis inclusive of infected wound, r/o osteomyelitis, cellulitis, lymphangitis. check labsm x-ray, podiatry evaluation, likely admit.

## 2025-06-05 NOTE — ED PROVIDER NOTE - CARE PROVIDER_API CALL
Sherwin GreenKhang  Podiatric Medicine  [address] [place] [id number]  Phone: [phone]  Fax: [phone]  Follow Up Time: 1-3 Days

## 2025-06-05 NOTE — ED PROVIDER NOTE - NSFOLLOWUPINSTRUCTIONS_ED_ALL_ED_FT
Follow up with Dr. Green this Monday  Take antibiotics as prescribed  Return to ED with any new or worsening symptoms.

## 2025-06-05 NOTE — ED PROVIDER NOTE - ATTENDING APP SHARED VISIT CONTRIBUTION OF CARE
This was a shared visit with CONNIE. I reviewed and verified the documentation and independently performed the documented MDM. Patient seen and examined by myself.

## 2025-06-05 NOTE — ED ADULT NURSE NOTE - ALCOHOL PRE SCREEN (AUDIT - C)
Statement Selected General Sunscreen Counseling: I recommended a broad spectrum sunscreen with a SPF of 30 or higher.  I explained that SPF 30 sunscreens block approximately 97 percent of the sun's harmful rays.  Sunscreens should be applied at least 15 minutes prior to expected sun exposure and then every 2 hours after that as long as sun exposure continues. If swimming or exercising sunscreen should be reapplied every 45 minutes to an hour after getting wet or sweating.  One ounce, or the equivalent of a shot glass full of sunscreen, is adequate to protect the skin not covered by a bathing suit. I also recommended a lip balm with a sunscreen as well. Sun protective clothing can be used in lieu of sunscreen but must be worn the entire time you are exposed to the sun's rays. Detail Level: Detailed

## 2025-06-05 NOTE — ED PROVIDER NOTE - SKIN, MLM
+ wound to right plantar aspect of foot with wound/hematoma with surrounding erythema. no discharge. cap refill < 2 sec

## 2025-06-05 NOTE — CONSULT NOTE ADULT - SUBJECTIVE AND OBJECTIVE BOX
Patient is a 64y old  Male who presents with a chief complaint of right foot blister    HPI:   64-year-old male with history of diabetes, gout, kidney stones presents to the ED complaining of wound to the right foot.  Patient with chronic callus to plantar aspect of foot however over the past week pain has worsened with swelling and redness.  Denies fever, chills, trauma to the foot, lower extremity weakness or paresthesias.  No discharge from wound.       PAST MEDICAL & SURGICAL HISTORY:  HTN (hypertension)      Anxiety and depression      Panic attacks      Fall  at work 03/2008      C2 cervical fracture  with fusion      Kidney stones      Gout      C2 cervical fracture  3/2008      Hernia  in Infancy      Renal stone  removed -by ? blasting      Rotator cuff tear, left      History of removal of retained hardware  From C-spine 2013      H/O foot surgery  Right foot removed gouty tophi 2022          MEDICATIONS  (STANDING):    MEDICATIONS  (PRN):      Allergies    No Known Allergies    Intolerances        VITALS:    Vital Signs Last 24 Hrs  T(C): 36.4 (05 Jun 2025 15:09), Max: 36.4 (05 Jun 2025 15:09)  T(F): 97.5 (05 Jun 2025 15:09), Max: 97.5 (05 Jun 2025 15:09)  HR: 94 (05 Jun 2025 15:09) (94 - 94)  BP: 139/80 (05 Jun 2025 15:09) (139/80 - 139/80)  BP(mean): --  RR: 18 (05 Jun 2025 15:09) (18 - 18)  SpO2: 96% (05 Jun 2025 15:09) (96% - 96%)    Parameters below as of 05 Jun 2025 15:09  Patient On (Oxygen Delivery Method): room air        LABS:                          14.4   6.51  )-----------( 206      ( 05 Jun 2025 16:35 )             41.2       06-05    140  |  109[H]  |  22  ----------------------------<  136[H]  3.8   |  23  |  1.30    Ca    8.8      05 Jun 2025 16:35    TPro  6.8  /  Alb  3.4  /  TBili  0.8  /  DBili  x   /  AST  29  /  ALT  31  /  AlkPhos  99  06-05      CAPILLARY BLOOD GLUCOSE          PT/INR - ( 05 Jun 2025 16:35 )   PT: 11.5 sec;   INR: 0.97 ratio         PTT - ( 05 Jun 2025 16:35 )  PTT:29.9 sec    LOWER EXTREMITY PHYSICAL EXAM:      Constitutional: NAD, AAO x3    Lower extremity focused   Vasc: Dorsalis Pedis and Posterior Tibial pulses palpable 1/4 . Right foot edema up to the ankle.  Capillary re-fill nellie less then 3 seconds digits 1-5 on right foot,    Derm:  right foot plantar aspect of 1st met head with blister measuring 3cm in diameter, postive lin-wound erythema, negative probe to bone, purulence, fluctuance, malodorous and streaking .   Neuro: Protective sensation diminished to the level of the digits on b/l   MSK: Muscle strength 5/5 all major muscle groups on b/l.

## 2025-06-05 NOTE — ED PROVIDER NOTE - NSCAREINITIATED _GEN_ER
Mary Garcia(PA) Kilograms Preamble Statement (Weight Entered In Details Tab): Reported Weight in kilograms:

## 2025-06-08 LAB
-  AMOXICILLIN/CLAVULANIC ACID: SIGNIFICANT CHANGE UP
-  AMPICILLIN/SULBACTAM: SIGNIFICANT CHANGE UP
-  AMPICILLIN: SIGNIFICANT CHANGE UP
-  AZTREONAM: SIGNIFICANT CHANGE UP
-  CEFAZOLIN: SIGNIFICANT CHANGE UP
-  CEFEPIME: SIGNIFICANT CHANGE UP
-  CEFOXITIN: SIGNIFICANT CHANGE UP
-  CEFTRIAXONE: SIGNIFICANT CHANGE UP
-  CIPROFLOXACIN: SIGNIFICANT CHANGE UP
-  ERTAPENEM: SIGNIFICANT CHANGE UP
-  GENTAMICIN: SIGNIFICANT CHANGE UP
-  LEVOFLOXACIN: SIGNIFICANT CHANGE UP
-  MEROPENEM: SIGNIFICANT CHANGE UP
-  PIPERACILLIN/TAZOBACTAM: SIGNIFICANT CHANGE UP
-  TOBRAMYCIN: SIGNIFICANT CHANGE UP
-  TRIMETHOPRIM/SULFAMETHOXAZOLE: SIGNIFICANT CHANGE UP
METHOD TYPE: SIGNIFICANT CHANGE UP

## 2025-06-08 RX ORDER — CEFPODOXIME PROXETIL 200 MG/1
2 TABLET, FILM COATED ORAL
Qty: 28 | Refills: 0
Start: 2025-06-08 | End: 2025-06-14

## 2025-06-09 ENCOUNTER — APPOINTMENT (OUTPATIENT)
Dept: WOUND CARE | Facility: HOSPITAL | Age: 65
End: 2025-06-09
Payer: MEDICAID

## 2025-06-09 ENCOUNTER — OUTPATIENT (OUTPATIENT)
Dept: OUTPATIENT SERVICES | Facility: HOSPITAL | Age: 65
LOS: 1 days | End: 2025-06-09
Payer: MEDICAID

## 2025-06-09 VITALS
TEMPERATURE: 98 F | SYSTOLIC BLOOD PRESSURE: 135 MMHG | DIASTOLIC BLOOD PRESSURE: 81 MMHG | HEIGHT: 69 IN | OXYGEN SATURATION: 97 % | BODY MASS INDEX: 28.14 KG/M2 | HEART RATE: 76 BPM | RESPIRATION RATE: 18 BRPM | WEIGHT: 190 LBS

## 2025-06-09 DIAGNOSIS — M79.671 PAIN IN RIGHT FOOT: ICD-10-CM

## 2025-06-09 DIAGNOSIS — Z98.890 OTHER SPECIFIED POSTPROCEDURAL STATES: Chronic | ICD-10-CM

## 2025-06-09 PROCEDURE — 99214 OFFICE O/P EST MOD 30 MIN: CPT

## 2025-06-09 PROCEDURE — G0463: CPT

## 2025-06-10 DIAGNOSIS — Z79.84 LONG TERM (CURRENT) USE OF ORAL HYPOGLYCEMIC DRUGS: ICD-10-CM

## 2025-06-10 DIAGNOSIS — Z98.1 ARTHRODESIS STATUS: ICD-10-CM

## 2025-06-10 DIAGNOSIS — Z79.82 LONG TERM (CURRENT) USE OF ASPIRIN: ICD-10-CM

## 2025-06-10 DIAGNOSIS — Z98.890 OTHER SPECIFIED POSTPROCEDURAL STATES: ICD-10-CM

## 2025-06-10 DIAGNOSIS — E11.621 TYPE 2 DIABETES MELLITUS WITH FOOT ULCER: ICD-10-CM

## 2025-06-10 DIAGNOSIS — E78.1 PURE HYPERGLYCERIDEMIA: ICD-10-CM

## 2025-06-10 DIAGNOSIS — Z87.81 PERSONAL HISTORY OF (HEALED) TRAUMATIC FRACTURE: ICD-10-CM

## 2025-06-10 DIAGNOSIS — Z87.442 PERSONAL HISTORY OF URINARY CALCULI: ICD-10-CM

## 2025-06-10 DIAGNOSIS — L97.512 NON-PRESSURE CHRONIC ULCER OF OTHER PART OF RIGHT FOOT WITH FAT LAYER EXPOSED: ICD-10-CM

## 2025-06-10 DIAGNOSIS — R35.1 NOCTURIA: ICD-10-CM

## 2025-06-10 DIAGNOSIS — N32.81 OVERACTIVE BLADDER: ICD-10-CM

## 2025-06-10 DIAGNOSIS — I10 ESSENTIAL (PRIMARY) HYPERTENSION: ICD-10-CM

## 2025-06-10 DIAGNOSIS — E11.40 TYPE 2 DIABETES MELLITUS WITH DIABETIC NEUROPATHY, UNSPECIFIED: ICD-10-CM

## 2025-06-10 DIAGNOSIS — N40.1 BENIGN PROSTATIC HYPERPLASIA WITH LOWER URINARY TRACT SYMPTOMS: ICD-10-CM

## 2025-06-10 DIAGNOSIS — Z79.899 OTHER LONG TERM (CURRENT) DRUG THERAPY: ICD-10-CM

## 2025-06-10 LAB
-  AMOXICILLIN/CLAVULANIC ACID: SIGNIFICANT CHANGE UP
-  AMPICILLIN/SULBACTAM: SIGNIFICANT CHANGE UP
-  AMPICILLIN: SIGNIFICANT CHANGE UP
-  AZTREONAM: SIGNIFICANT CHANGE UP
-  CEFAZOLIN: SIGNIFICANT CHANGE UP
-  CEFEPIME: SIGNIFICANT CHANGE UP
-  CEFOXITIN: SIGNIFICANT CHANGE UP
-  CEFTRIAXONE: SIGNIFICANT CHANGE UP
-  CIPROFLOXACIN: SIGNIFICANT CHANGE UP
-  ERTAPENEM: SIGNIFICANT CHANGE UP
-  GENTAMICIN: SIGNIFICANT CHANGE UP
-  IMIPENEM: SIGNIFICANT CHANGE UP
-  LEVOFLOXACIN: SIGNIFICANT CHANGE UP
-  MEROPENEM: SIGNIFICANT CHANGE UP
-  PIPERACILLIN/TAZOBACTAM: SIGNIFICANT CHANGE UP
-  TIGECYCLINE: SIGNIFICANT CHANGE UP
-  TOBRAMYCIN: SIGNIFICANT CHANGE UP
-  TRIMETHOPRIM/SULFAMETHOXAZOLE: SIGNIFICANT CHANGE UP
CULTURE RESULTS: ABNORMAL
CULTURE RESULTS: SIGNIFICANT CHANGE UP
CULTURE RESULTS: SIGNIFICANT CHANGE UP
METHOD TYPE: SIGNIFICANT CHANGE UP
ORGANISM # SPEC MICROSCOPIC CNT: ABNORMAL
ORGANISM # SPEC MICROSCOPIC CNT: SIGNIFICANT CHANGE UP
SPECIMEN SOURCE: SIGNIFICANT CHANGE UP

## 2025-06-10 NOTE — PLAN
[FreeTextEntry1] : Patient examined and evaluated at this time. Continue local wound care and offloading. Will request auth for MRI to rule out osteomyelitis. Spent 30 minutes for patient care and medical decision making. Patient to follow up in 1 week.

## 2025-06-10 NOTE — REVIEW OF SYSTEMS
[Arthralgias] : arthralgias [Joint Swelling] : joint swelling [Joint Stiffness] : joint stiffness [Skin Wound] : skin wound [Fever] : no fever [Chills] : no chills [Eye Pain] : no eye pain [Loss Of Hearing] : no hearing loss [Shortness Of Breath] : no shortness of breath [Wheezing] : no wheezing [Abdominal Pain] : no abdominal pain [Anxiety] : no anxiety [Easy Bleeding] : no tendency for easy bleeding [FreeTextEntry5] : HTN [FreeTextEntry9] : Gouty arthropathy , left and right foot hammer toes and painful deformity of the digits [de-identified] : s/p right foot resection of 5th metatarsal base with transfer of peroneus brevis to the cuboid  [de-identified] : Diabetic neuropathy  [de-identified] : NIDDM , severe gouty arthropathy

## 2025-06-10 NOTE — ASSESSMENT
[Verbal] : Verbal [Written] : Written [Demo] : Demo [Patient] : Patient [Good - alert, interested, motivated] : Good - alert, interested, motivated [Verbalizes knowledge/Understanding] : Verbalizes knowledge/understanding [Dressing changes] : dressing changes [Foot Care] : foot care [Skin Care] : skin care [Signs and symptoms of infection] : sign and symptoms of infection [Nutrition] : nutrition [Pain Management] : pain management [How and When to Call] : how and when to call [Patient responsibility to plan of care] : patient responsibility to plan of care [] : Yes [Stable] : stable [Home] : Home [Ambulatory] : Ambulatory [Not Applicable - Long Term Care/Home Health Agency] : Long Term Care/Home Health Agency: Not Applicable [FreeTextEntry2] : Infection prevention wound care Maintain optimal Skin Integrity to high pressure areas. Nutrition and Wound Healing Elevation and low sodium compliance Pressure relief/Pressure redistribution     [FreeTextEntry3] : Initial Visit  [FreeTextEntry4] : Pt is able to perform his own dressing changes, small amount of supplies provided. Auth submitted for MRI, if the wound closes by next week patient does not need to obtain the MRI Follow up in 1 week

## 2025-06-10 NOTE — HISTORY OF PRESENT ILLNESS
[FreeTextEntry1] : Patient with chronic callus to his right plantar foot beneath the hallux that developed a blood blister and then opened up last week. Patient went to [facility] ER where he received IV antibiotics and a debridement. Patient was advised to follow up at  mask, nonrebreather

## 2025-06-10 NOTE — REVIEW OF SYSTEMS
[Arthralgias] : arthralgias [Joint Swelling] : joint swelling [Joint Stiffness] : joint stiffness [Skin Wound] : skin wound [Fever] : no fever [Chills] : no chills [Eye Pain] : no eye pain [Loss Of Hearing] : no hearing loss [Shortness Of Breath] : no shortness of breath [Wheezing] : no wheezing [Abdominal Pain] : no abdominal pain [Anxiety] : no anxiety [Easy Bleeding] : no tendency for easy bleeding [FreeTextEntry5] : HTN [FreeTextEntry9] : Gouty arthropathy , left and right foot hammer toes and painful deformity of the digits [de-identified] : s/p right foot resection of 5th metatarsal base with transfer of peroneus brevis to the cuboid  [de-identified] : Diabetic neuropathy  [de-identified] : NIDDM , severe gouty arthropathy

## 2025-06-10 NOTE — PHYSICAL EXAM
[2 x 2] : 2 x 2  [1+] : left 1+ [Ankle Swelling (On Exam)] : present [Ankle Swelling Bilaterally] : bilaterally  [Varicose Veins Of Lower Extremities] : bilaterally [Ankle Swelling On The Left] : moderate [] : bilaterally [Ankle Swelling On The Right] : mild [Skin Ulcer] : ulcer [Alert] : alert [Oriented to Person] : oriented to person [Oriented to Place] : oriented to place [Calm] : calm [Purpura] : no purpura  [Petechiae] : no petechiae [Skin Induration] : no induration [de-identified] : HTN [de-identified] : A&Ox3, NAD [de-identified] : Bilateral third toe hammertoe deformities, flexible.  Right foot status post fifth metatarsal base resection with peroneus brevis tendon transfer to the cuboid with posterior tibial tendon lengthening. [de-identified] : Right foot submet 1 ulcer down to skin, subcutaneous tissue, fat [de-identified] : Diabetic neuropathy  [FreeTextEntry1] : Right Plantar Foot [FreeTextEntry4] : 0.2cm [FreeTextEntry3] : 0.3cm [FreeTextEntry2] : 0.8cm [de-identified] : Serosanguinous  [de-identified] : Silver Alginate [de-identified] :  Mechanically cleansed with NS 0.9%, Sterile Gauze Cloth Tape [TWNoteComboBox6] : Diabetic [TWNoteComboBox5] : No [TWNoteComboBox4] : Moderate [de-identified] : No [de-identified] : Normal [de-identified] : None [de-identified] : 100% [de-identified] : None [de-identified] : Every other day [de-identified] : Primary Dressing

## 2025-06-10 NOTE — ASSESSMENT
[Written] : Written [Verbal] : Verbal [Demo] : Demo [Patient] : Patient [Good - alert, interested, motivated] : Good - alert, interested, motivated [Verbalizes knowledge/Understanding] : Verbalizes knowledge/understanding [Dressing changes] : dressing changes [Foot Care] : foot care [Skin Care] : skin care [Signs and symptoms of infection] : sign and symptoms of infection [Nutrition] : nutrition [How and When to Call] : how and when to call [Pain Management] : pain management [Patient responsibility to plan of care] : patient responsibility to plan of care [] : Yes [Stable] : stable [Home] : Home [Ambulatory] : Ambulatory [Not Applicable - Long Term Care/Home Health Agency] : Long Term Care/Home Health Agency: Not Applicable [FreeTextEntry2] : Infection prevention wound care Maintain optimal Skin Integrity to high pressure areas. Nutrition and Wound Healing Elevation and low sodium compliance Pressure relief/Pressure redistribution     [FreeTextEntry4] : Pt is able to perform his own dressing changes, small amount of supplies provided. Auth submitted for MRI, if the wound closes by next week patient does not need to obtain the MRI Follow up in 1 week [FreeTextEntry3] : Initial Visit

## 2025-06-10 NOTE — HISTORY OF PRESENT ILLNESS
[FreeTextEntry1] : Patient with chronic callus to his right plantar foot beneath the hallux that developed a blood blister and then opened up last week. Patient went to [facility] ER where he received IV antibiotics and a debridement. Patient was advised to follow up at

## 2025-06-10 NOTE — PHYSICAL EXAM
[2 x 2] : 2 x 2  [1+] : left 1+ [Ankle Swelling (On Exam)] : present [Ankle Swelling Bilaterally] : bilaterally  [Varicose Veins Of Lower Extremities] : bilaterally [Ankle Swelling On The Left] : moderate [] : bilaterally [Ankle Swelling On The Right] : mild [Skin Ulcer] : ulcer [Alert] : alert [Oriented to Person] : oriented to person [Oriented to Place] : oriented to place [Calm] : calm [Purpura] : no purpura  [Petechiae] : no petechiae [Skin Induration] : no induration [de-identified] : HTN [de-identified] : Bilateral third toe hammertoe deformities, flexible.  Right foot status post fifth metatarsal base resection with peroneus brevis tendon transfer to the cuboid with posterior tibial tendon lengthening. [de-identified] : A&Ox3, NAD [de-identified] : Right foot submet 1 ulcer down to skin, subcutaneous tissue, fat [de-identified] : Diabetic neuropathy  [FreeTextEntry1] : Right Plantar Foot [FreeTextEntry4] : 0.2cm [FreeTextEntry3] : 0.3cm [FreeTextEntry2] : 0.8cm [de-identified] : Serosanguinous  [de-identified] : Silver Alginate [de-identified] :  Mechanically cleansed with NS 0.9%, Sterile Gauze Cloth Tape [TWNoteComboBox4] : Moderate [TWNoteComboBox5] : No [TWNoteComboBox6] : Diabetic [de-identified] : Normal [de-identified] : No [de-identified] : 100% [de-identified] : None [de-identified] : None [de-identified] : Every other day [de-identified] : Primary Dressing

## 2025-06-16 ENCOUNTER — APPOINTMENT (OUTPATIENT)
Dept: WOUND CARE | Facility: HOSPITAL | Age: 65
End: 2025-06-16

## 2025-06-16 ENCOUNTER — RX RENEWAL (OUTPATIENT)
Age: 65
End: 2025-06-16

## 2025-07-02 ENCOUNTER — RX RENEWAL (OUTPATIENT)
Age: 65
End: 2025-07-02

## 2025-07-07 ENCOUNTER — RX RENEWAL (OUTPATIENT)
Age: 65
End: 2025-07-07

## 2025-07-07 RX ORDER — TIRZEPATIDE 10 MG/.5ML
10 INJECTION, SOLUTION SUBCUTANEOUS
Qty: 1 | Refills: 1 | Status: ACTIVE | COMMUNITY
Start: 2025-07-07 | End: 1900-01-01

## 2025-07-19 NOTE — DISCHARGE NOTE PROVIDER - NSDCCPCAREPLAN_GEN_ALL_CORE_FT
Thank you for allowing us to evaluate you today.  Follow up with primary care clinician  in 1 week for reevaluation..    Use the antibiotic(s) as prescribed.   Please read the guidance provided with your discharge instructions.  Immediately return to the emergency department with any concerns.    
PRINCIPAL DISCHARGE DIAGNOSIS  Diagnosis: Diabetic foot ulcer  Assessment and Plan of Treatment: You had right 5th metatarsal base resection with peroneus brevis tendon transfer to the cuboid and right posterior tibial tendon lengthening with possible achilles tendon lengthening  with Dr. Green.  Please follow up with Dr. Green within 1 week.      SECONDARY DISCHARGE DIAGNOSES  Diagnosis: Hypertension  Assessment and Plan of Treatment: continue Norvasc and Losartan    Diagnosis: Hyperlipidemia  Assessment and Plan of Treatment: continue Crestor    Diagnosis: Type 2 diabetes mellitus  Assessment and Plan of Treatment: continue Metformin

## 2025-08-11 ENCOUNTER — OUTPATIENT (OUTPATIENT)
Dept: OUTPATIENT SERVICES | Facility: HOSPITAL | Age: 65
LOS: 1 days | End: 2025-08-11
Payer: MEDICAID

## 2025-08-11 ENCOUNTER — APPOINTMENT (OUTPATIENT)
Dept: WOUND CARE | Facility: HOSPITAL | Age: 65
End: 2025-08-11
Payer: MEDICAID

## 2025-08-11 VITALS
TEMPERATURE: 98 F | OXYGEN SATURATION: 97 % | HEART RATE: 68 BPM | BODY MASS INDEX: 28.14 KG/M2 | RESPIRATION RATE: 18 BRPM | HEIGHT: 69 IN | SYSTOLIC BLOOD PRESSURE: 141 MMHG | WEIGHT: 190 LBS | DIASTOLIC BLOOD PRESSURE: 91 MMHG

## 2025-08-11 DIAGNOSIS — M79.671 PAIN IN RIGHT FOOT: ICD-10-CM

## 2025-08-11 DIAGNOSIS — L97.509 TYPE 2 DIABETES MELLITUS WITH FOOT ULCER: ICD-10-CM

## 2025-08-11 DIAGNOSIS — Z98.890 OTHER SPECIFIED POSTPROCEDURAL STATES: Chronic | ICD-10-CM

## 2025-08-11 DIAGNOSIS — E11.621 TYPE 2 DIABETES MELLITUS WITH FOOT ULCER: ICD-10-CM

## 2025-08-11 PROCEDURE — G0463: CPT

## 2025-08-11 PROCEDURE — 99214 OFFICE O/P EST MOD 30 MIN: CPT

## 2025-08-11 RX ORDER — BREXPIPRAZOLE 4 MG/1
TABLET ORAL
Refills: 0 | Status: ACTIVE | COMMUNITY

## 2025-08-12 DIAGNOSIS — E11.40 TYPE 2 DIABETES MELLITUS WITH DIABETIC NEUROPATHY, UNSPECIFIED: ICD-10-CM

## 2025-08-12 DIAGNOSIS — Z79.84 LONG TERM (CURRENT) USE OF ORAL HYPOGLYCEMIC DRUGS: ICD-10-CM

## 2025-08-12 DIAGNOSIS — N52.9 MALE ERECTILE DYSFUNCTION, UNSPECIFIED: ICD-10-CM

## 2025-08-12 DIAGNOSIS — Z87.81 PERSONAL HISTORY OF (HEALED) TRAUMATIC FRACTURE: ICD-10-CM

## 2025-08-12 DIAGNOSIS — I10 ESSENTIAL (PRIMARY) HYPERTENSION: ICD-10-CM

## 2025-08-12 DIAGNOSIS — E11.621 TYPE 2 DIABETES MELLITUS WITH FOOT ULCER: ICD-10-CM

## 2025-08-12 DIAGNOSIS — Z79.82 LONG TERM (CURRENT) USE OF ASPIRIN: ICD-10-CM

## 2025-08-12 DIAGNOSIS — Z87.442 PERSONAL HISTORY OF URINARY CALCULI: ICD-10-CM

## 2025-08-12 DIAGNOSIS — R35.1 NOCTURIA: ICD-10-CM

## 2025-08-12 DIAGNOSIS — N40.1 BENIGN PROSTATIC HYPERPLASIA WITH LOWER URINARY TRACT SYMPTOMS: ICD-10-CM

## 2025-08-12 DIAGNOSIS — Z79.899 OTHER LONG TERM (CURRENT) DRUG THERAPY: ICD-10-CM

## 2025-08-12 DIAGNOSIS — Z98.1 ARTHRODESIS STATUS: ICD-10-CM

## 2025-08-12 DIAGNOSIS — L97.512 NON-PRESSURE CHRONIC ULCER OF OTHER PART OF RIGHT FOOT WITH FAT LAYER EXPOSED: ICD-10-CM

## 2025-08-12 DIAGNOSIS — M20.42 OTHER HAMMER TOE(S) (ACQUIRED), LEFT FOOT: ICD-10-CM

## 2025-08-12 DIAGNOSIS — N32.81 OVERACTIVE BLADDER: ICD-10-CM

## 2025-08-12 DIAGNOSIS — M20.41 OTHER HAMMER TOE(S) (ACQUIRED), RIGHT FOOT: ICD-10-CM

## 2025-08-12 DIAGNOSIS — Z98.890 OTHER SPECIFIED POSTPROCEDURAL STATES: ICD-10-CM

## 2025-08-12 DIAGNOSIS — L84 CORNS AND CALLOSITIES: ICD-10-CM

## 2025-08-12 DIAGNOSIS — E78.1 PURE HYPERGLYCERIDEMIA: ICD-10-CM

## 2025-09-02 ENCOUNTER — RX RENEWAL (OUTPATIENT)
Age: 65
End: 2025-09-02

## 2025-09-08 ENCOUNTER — APPOINTMENT (OUTPATIENT)
Dept: WOUND CARE | Facility: HOSPITAL | Age: 65
End: 2025-09-08
Payer: MEDICAID

## 2025-09-08 ENCOUNTER — RX RENEWAL (OUTPATIENT)
Age: 65
End: 2025-09-08

## 2025-09-08 VITALS
DIASTOLIC BLOOD PRESSURE: 77 MMHG | SYSTOLIC BLOOD PRESSURE: 128 MMHG | HEART RATE: 68 BPM | RESPIRATION RATE: 18 BRPM | HEIGHT: 69 IN | TEMPERATURE: 98 F | OXYGEN SATURATION: 99 % | BODY MASS INDEX: 28.14 KG/M2 | WEIGHT: 190 LBS

## 2025-09-08 DIAGNOSIS — M20.42 OTHER HAMMER TOE(S) (ACQUIRED), LEFT FOOT: ICD-10-CM

## 2025-09-08 DIAGNOSIS — L84 CORNS AND CALLOSITIES: ICD-10-CM

## 2025-09-08 DIAGNOSIS — M20.41 OTHER HAMMER TOE(S) (ACQUIRED), RIGHT FOOT: ICD-10-CM

## 2025-09-08 DIAGNOSIS — L97.512 NON-PRESSURE CHRONIC ULCER OF OTHER PART OF RIGHT FOOT WITH FAT LAYER EXPOSED: ICD-10-CM

## 2025-09-08 DIAGNOSIS — L97.509 TYPE 2 DIABETES MELLITUS WITH FOOT ULCER: ICD-10-CM

## 2025-09-08 DIAGNOSIS — E11.40 TYPE 2 DIABETES MELLITUS WITH DIABETIC NEUROPATHY, UNSPECIFIED: ICD-10-CM

## 2025-09-08 DIAGNOSIS — E11.621 TYPE 2 DIABETES MELLITUS WITH FOOT ULCER: ICD-10-CM

## 2025-09-08 PROCEDURE — 99214 OFFICE O/P EST MOD 30 MIN: CPT

## 2025-09-09 ENCOUNTER — APPOINTMENT (OUTPATIENT)
Dept: ORTHOPEDIC SURGERY | Facility: CLINIC | Age: 65
End: 2025-09-09
Payer: OTHER MISCELLANEOUS

## 2025-09-09 ENCOUNTER — APPOINTMENT (OUTPATIENT)
Dept: ORTHOPEDIC SURGERY | Facility: CLINIC | Age: 65
End: 2025-09-09

## 2025-09-09 DIAGNOSIS — M12.811 OTHER SPECIFIC ARTHROPATHIES, NOT ELSEWHERE CLASSIFIED, RIGHT SHOULDER: ICD-10-CM

## 2025-09-09 DIAGNOSIS — M19.011 PRIMARY OSTEOARTHRITIS, RIGHT SHOULDER: ICD-10-CM

## 2025-09-09 PROCEDURE — 99213 OFFICE O/P EST LOW 20 MIN: CPT

## (undated) DEVICE — DRSG CURITY GAUZE SPONGE 4 X 4" 12-PLY

## (undated) DEVICE — SAW BLADE LINVATEC SAGITTAL MIC 9.5X25.5X0.4MM

## (undated) DEVICE — SAW BLADE RASP CONMED LINVATEC MICRO 100 OSCILLATING 5.5 X 13 X 0.4MM

## (undated) DEVICE — DRAPE 3/4 SHEET W REINFORCEMENT 56X77"

## (undated) DEVICE — DRSG XEROFORM 1 X 8"

## (undated) DEVICE — DRSG KLING 6"

## (undated) DEVICE — DRSG WEBRIL 6"

## (undated) DEVICE — PACKING GAUZE PLAIN 0.5"

## (undated) DEVICE — GLV 8 PROTEXIS (WHITE)

## (undated) DEVICE — DRSG ADAPTIC 3 X 8"

## (undated) DEVICE — SOL IRR POUR NS 0.9% 1000ML

## (undated) DEVICE — DRSG KLING 3"

## (undated) DEVICE — BLADE SCALPEL SAFETYLOCK #15

## (undated) DEVICE — DRSG CAST FIBERGLASS 4"

## (undated) DEVICE — DRSG WEBRIL 4"

## (undated) DEVICE — PACK LOWER EXTREMITY NS PLAINVI

## (undated) DEVICE — WARMING BLANKET UPPER ADULT

## (undated) DEVICE — TOURNIQUET CUFF 34" DUAL PORT W PLC

## (undated) DEVICE — PACK LOWER EXTREMITY

## (undated) DEVICE — BUR MICROAIRE CARBIDE OVAL 4MM 8 FLUTE

## (undated) DEVICE — SUT POLYSORB 5-0 18" P-12 UNDYED

## (undated) DEVICE — DRAPE C ARM MINI PACK FOR 6800

## (undated) DEVICE — SUT POLYSORB 3-0 30" V-20 UNDYED

## (undated) DEVICE — PLV-SCD MACHINE: Type: DURABLE MEDICAL EQUIPMENT

## (undated) DEVICE — LIGASURE SMALL JAW

## (undated) DEVICE — TOURNIQUET CUFF 18" DUAL PORT SINGLE BLADDER LUER LOCK (BLACK)

## (undated) DEVICE — DRSG WEBRIL 3"

## (undated) DEVICE — SUT FIBERWIRE #2 38" STRAND 1 BLUE T-5 TAPER

## (undated) DEVICE — SUT SOFSILK 3-0 30" TIES

## (undated) DEVICE — DRSG KERLIX ROLL 4.5"

## (undated) DEVICE — DRSG CAST PLASTER 6" (BLUE)

## (undated) DEVICE — VENODYNE/SCD SLEEVE CALF LARGE

## (undated) DEVICE — GLV 7.5 PROTEXIS (CREAM) NEU-THERA

## (undated) DEVICE — SLING ARM CHIEFTAIN MESH MEDIUM

## (undated) DEVICE — DRSG ADAPTIC 3 X 3"

## (undated) DEVICE — SUT POLYSORB 2-0 30" V-20 UNDYED

## (undated) DEVICE — SLING ARM CHIEFTAIN MESH LARGE

## (undated) DEVICE — ARTHREX MINI SUTURETAK FOR MINI PUSHLOCK

## (undated) DEVICE — SUT MONOSOF 3-0 18" C-14

## (undated) DEVICE — DRSG COMBINE 5X9"

## (undated) DEVICE — PREP BETADINE KIT

## (undated) DEVICE — SYR ASEPTO

## (undated) DEVICE — SUT MONOSOF 4-0 18" P-12

## (undated) DEVICE — SUT POLYSORB 4-0 30" V-20 UNDYED

## (undated) DEVICE — DRSG TELFA 3 X 8

## (undated) DEVICE — GLV 7.5 PROTEXIS (WHITE)

## (undated) DEVICE — VENODYNE/SCD SLEEVE CALF MEDIUM

## (undated) DEVICE — TUBING SUCTION 20FT

## (undated) DEVICE — PLV/PSP-ESU FORCE2 FIL 16736T: Type: DURABLE MEDICAL EQUIPMENT

## (undated) DEVICE — DRAPE LIGHT HANDLE COVER (GREEN)